# Patient Record
Sex: MALE | Race: WHITE | Employment: OTHER | URBAN - METROPOLITAN AREA
[De-identification: names, ages, dates, MRNs, and addresses within clinical notes are randomized per-mention and may not be internally consistent; named-entity substitution may affect disease eponyms.]

---

## 2019-10-03 ENCOUNTER — HOSPITAL ENCOUNTER (OUTPATIENT)
Dept: ULTRASOUND IMAGING | Age: 64
Discharge: HOME | End: 2019-10-05
Payer: MEDICARE

## 2019-10-03 DIAGNOSIS — N18.9 CHRONIC KIDNEY DISEASE, UNSPECIFIED CKD STAGE: ICD-10-CM

## 2019-10-03 PROCEDURE — 76770 US EXAM ABDO BACK WALL COMP: CPT

## 2019-10-22 ENCOUNTER — HOSPITAL ENCOUNTER (OUTPATIENT)
Age: 64
Discharge: HOME | End: 2019-10-24
Payer: MEDICARE

## 2019-10-22 ENCOUNTER — HOSPITAL ENCOUNTER (OUTPATIENT)
Dept: GENERAL RADIOLOGY | Age: 64
Discharge: HOME | End: 2019-10-24
Payer: MEDICARE

## 2019-10-22 DIAGNOSIS — R52 PAIN: ICD-10-CM

## 2019-10-22 PROCEDURE — 73080 X-RAY EXAM OF ELBOW: CPT

## 2020-03-12 VITALS
HEIGHT: 70 IN | WEIGHT: 244 LBS | BODY MASS INDEX: 34.93 KG/M2 | DIASTOLIC BLOOD PRESSURE: 98 MMHG | HEART RATE: 76 BPM | SYSTOLIC BLOOD PRESSURE: 126 MMHG

## 2020-05-30 LAB
BASOPHILS ABSOLUTE: NORMAL
BASOPHILS RELATIVE PERCENT: NORMAL
EOSINOPHILS ABSOLUTE: NORMAL
EOSINOPHILS RELATIVE PERCENT: NORMAL
HCT VFR BLD CALC: NORMAL %
HEMOGLOBIN: NORMAL
LYMPHOCYTES ABSOLUTE: NORMAL
LYMPHOCYTES RELATIVE PERCENT: NORMAL
MCH RBC QN AUTO: NORMAL PG
MCHC RBC AUTO-ENTMCNC: NORMAL G/DL
MCV RBC AUTO: NORMAL FL
MONOCYTES ABSOLUTE: NORMAL
MONOCYTES RELATIVE PERCENT: NORMAL
NEUTROPHILS ABSOLUTE: NORMAL
NEUTROPHILS RELATIVE PERCENT: NORMAL
PLATELET # BLD: NORMAL 10*3/UL
PMV BLD AUTO: NORMAL FL
RBC # BLD: NORMAL 10*6/UL
WBC # BLD: NORMAL 10*3/UL

## 2020-10-06 LAB
AVERAGE GLUCOSE: NORMAL
CHOLESTEROL, TOTAL: NORMAL
CHOLESTEROL/HDL RATIO: NORMAL
HBA1C MFR BLD: 7.8 %
HDLC SERPL-MCNC: NORMAL MG/DL
LDL CHOLESTEROL CALCULATED: NORMAL
NONHDLC SERPL-MCNC: NORMAL MG/DL
TRIGL SERPL-MCNC: NORMAL MG/DL
VLDLC SERPL CALC-MCNC: NORMAL MG/DL

## 2020-12-14 LAB — DIABETIC RETINOPATHY: POSITIVE

## 2021-01-19 ENCOUNTER — HOSPITAL ENCOUNTER (OUTPATIENT)
Dept: ULTRASOUND IMAGING | Age: 66
Discharge: HOME | End: 2021-01-21
Payer: MEDICARE

## 2021-01-19 DIAGNOSIS — N17.9 ACUTE RENAL FAILURE, UNSPECIFIED ACUTE RENAL FAILURE TYPE (HCC): ICD-10-CM

## 2021-01-19 DIAGNOSIS — I10 HYPERTENSION, UNSPECIFIED TYPE: ICD-10-CM

## 2021-01-19 DIAGNOSIS — I10 ESSENTIAL HYPERTENSION: ICD-10-CM

## 2021-01-19 PROCEDURE — 76770 US EXAM ABDO BACK WALL COMP: CPT

## 2021-01-19 PROCEDURE — 76775 US EXAM ABDO BACK WALL LIM: CPT

## 2021-04-02 ENCOUNTER — HOSPITAL ENCOUNTER (INPATIENT)
Age: 66
LOS: 6 days | Discharge: HOME HEALTH | DRG: 657 | End: 2021-04-08
Attending: EMERGENCY MEDICINE | Admitting: NEUROMUSCULOSKELETAL MEDICINE & OMM
Payer: MEDICARE

## 2021-04-02 ENCOUNTER — APPOINTMENT (OUTPATIENT)
Dept: CT IMAGING | Age: 66
DRG: 657 | End: 2021-04-02
Payer: MEDICARE

## 2021-04-02 DIAGNOSIS — N30.01 ACUTE CYSTITIS WITH HEMATURIA: ICD-10-CM

## 2021-04-02 DIAGNOSIS — R31.0 GROSS HEMATURIA: ICD-10-CM

## 2021-04-02 DIAGNOSIS — R31.9 HEMATURIA, UNSPECIFIED TYPE: ICD-10-CM

## 2021-04-02 DIAGNOSIS — N17.9 ACUTE KIDNEY INJURY (HCC): ICD-10-CM

## 2021-04-02 DIAGNOSIS — R33.8 ACUTE URINARY RETENTION: Primary | ICD-10-CM

## 2021-04-02 LAB
ALBUMIN SERPL-MCNC: 3.7 G/DL (ref 3.5–5.2)
ALBUMIN SERPL-MCNC: 3.9 G/DL (ref 3.5–5.2)
ALP BLD-CCNC: 83 U/L (ref 40–129)
ALP BLD-CCNC: 85 U/L (ref 40–129)
ALT SERPL-CCNC: 35 U/L (ref 0–40)
ALT SERPL-CCNC: 36 U/L (ref 0–40)
ANION GAP SERPL CALCULATED.3IONS-SCNC: 12 MMOL/L (ref 7–16)
ANION GAP SERPL CALCULATED.3IONS-SCNC: 13 MMOL/L (ref 7–16)
APTT: 42.6 SEC (ref 24.5–35.1)
AST SERPL-CCNC: 21 U/L (ref 0–39)
AST SERPL-CCNC: 26 U/L (ref 0–39)
BACTERIA: ABNORMAL /HPF
BASOPHILS ABSOLUTE: 0.05 E9/L (ref 0–0.2)
BASOPHILS RELATIVE PERCENT: 0.5 % (ref 0–2)
BILIRUB SERPL-MCNC: 0.4 MG/DL (ref 0–1.2)
BILIRUB SERPL-MCNC: 0.5 MG/DL (ref 0–1.2)
BILIRUBIN URINE: NEGATIVE
BLOOD, URINE: ABNORMAL
BUN BLDV-MCNC: 28 MG/DL (ref 8–23)
BUN BLDV-MCNC: 29 MG/DL (ref 8–23)
CALCIUM SERPL-MCNC: 8.8 MG/DL (ref 8.6–10.2)
CALCIUM SERPL-MCNC: 9 MG/DL (ref 8.6–10.2)
CHLORIDE BLD-SCNC: 103 MMOL/L (ref 98–107)
CHLORIDE BLD-SCNC: 106 MMOL/L (ref 98–107)
CLARITY: ABNORMAL
CO2: 23 MMOL/L (ref 22–29)
CO2: 25 MMOL/L (ref 22–29)
COLOR: ABNORMAL
CREAT SERPL-MCNC: 1.6 MG/DL (ref 0.7–1.2)
CREAT SERPL-MCNC: 1.6 MG/DL (ref 0.7–1.2)
EOSINOPHILS ABSOLUTE: 0.37 E9/L (ref 0.05–0.5)
EOSINOPHILS RELATIVE PERCENT: 3.5 % (ref 0–6)
GFR AFRICAN AMERICAN: 52
GFR AFRICAN AMERICAN: 52
GFR NON-AFRICAN AMERICAN: 43 ML/MIN/1.73
GFR NON-AFRICAN AMERICAN: 43 ML/MIN/1.73
GLUCOSE BLD-MCNC: 222 MG/DL (ref 74–99)
GLUCOSE BLD-MCNC: 292 MG/DL (ref 74–99)
GLUCOSE URINE: >=1000 MG/DL
HBA1C MFR BLD: 10.8 % (ref 4–5.6)
HCT VFR BLD CALC: 49.4 % (ref 37–54)
HEMOGLOBIN: 16.3 G/DL (ref 12.5–16.5)
IMMATURE GRANULOCYTES #: 0.05 E9/L
IMMATURE GRANULOCYTES %: 0.5 % (ref 0–5)
INR BLD: 2.5
KETONES, URINE: ABNORMAL MG/DL
LEUKOCYTE ESTERASE, URINE: ABNORMAL
LYMPHOCYTES ABSOLUTE: 2.81 E9/L (ref 1.5–4)
LYMPHOCYTES RELATIVE PERCENT: 26.7 % (ref 20–42)
MCH RBC QN AUTO: 28.6 PG (ref 26–35)
MCHC RBC AUTO-ENTMCNC: 33 % (ref 32–34.5)
MCV RBC AUTO: 86.8 FL (ref 80–99.9)
MONOCYTES ABSOLUTE: 0.83 E9/L (ref 0.1–0.95)
MONOCYTES RELATIVE PERCENT: 7.9 % (ref 2–12)
NEUTROPHILS ABSOLUTE: 6.42 E9/L (ref 1.8–7.3)
NEUTROPHILS RELATIVE PERCENT: 60.9 % (ref 43–80)
NITRITE, URINE: POSITIVE
PDW BLD-RTO: 13.2 FL (ref 11.5–15)
PH UA: 7 (ref 5–9)
PLATELET # BLD: 238 E9/L (ref 130–450)
PMV BLD AUTO: 10.8 FL (ref 7–12)
POTASSIUM REFLEX MAGNESIUM: 4.2 MMOL/L (ref 3.5–5)
POTASSIUM SERPL-SCNC: 4.2 MMOL/L (ref 3.5–5)
PROTEIN UA: >=300 MG/DL
PROTHROMBIN TIME: 27.3 SEC (ref 9.3–12.4)
RBC # BLD: 5.69 E12/L (ref 3.8–5.8)
RBC UA: ABNORMAL /HPF (ref 0–2)
SODIUM BLD-SCNC: 140 MMOL/L (ref 132–146)
SODIUM BLD-SCNC: 142 MMOL/L (ref 132–146)
SPECIFIC GRAVITY UA: 1.01 (ref 1–1.03)
TOTAL PROTEIN: 6.8 G/DL (ref 6.4–8.3)
TOTAL PROTEIN: 7 G/DL (ref 6.4–8.3)
UROBILINOGEN, URINE: 1 E.U./DL
WBC # BLD: 10.5 E9/L (ref 4.5–11.5)
WBC UA: ABNORMAL /HPF (ref 0–5)

## 2021-04-02 PROCEDURE — 87088 URINE BACTERIA CULTURE: CPT

## 2021-04-02 PROCEDURE — 74176 CT ABD & PELVIS W/O CONTRAST: CPT

## 2021-04-02 PROCEDURE — 6370000000 HC RX 637 (ALT 250 FOR IP): Performed by: NEUROMUSCULOSKELETAL MEDICINE & OMM

## 2021-04-02 PROCEDURE — 81001 URINALYSIS AUTO W/SCOPE: CPT

## 2021-04-02 PROCEDURE — 85610 PROTHROMBIN TIME: CPT

## 2021-04-02 PROCEDURE — 85025 COMPLETE CBC W/AUTO DIFF WBC: CPT

## 2021-04-02 PROCEDURE — 85730 THROMBOPLASTIN TIME PARTIAL: CPT

## 2021-04-02 PROCEDURE — 51700 IRRIGATION OF BLADDER: CPT

## 2021-04-02 PROCEDURE — 2580000003 HC RX 258: Performed by: NEUROMUSCULOSKELETAL MEDICINE & OMM

## 2021-04-02 PROCEDURE — 36415 COLL VENOUS BLD VENIPUNCTURE: CPT

## 2021-04-02 PROCEDURE — 99282 EMERGENCY DEPT VISIT SF MDM: CPT

## 2021-04-02 PROCEDURE — 2580000003 HC RX 258: Performed by: EMERGENCY MEDICINE

## 2021-04-02 PROCEDURE — 80053 COMPREHEN METABOLIC PANEL: CPT

## 2021-04-02 PROCEDURE — 83036 HEMOGLOBIN GLYCOSYLATED A1C: CPT

## 2021-04-02 PROCEDURE — 1200000000 HC SEMI PRIVATE

## 2021-04-02 PROCEDURE — 6360000002 HC RX W HCPCS: Performed by: EMERGENCY MEDICINE

## 2021-04-02 PROCEDURE — 94660 CPAP INITIATION&MGMT: CPT

## 2021-04-02 RX ORDER — TAMSULOSIN HYDROCHLORIDE 0.4 MG/1
0.4 CAPSULE ORAL 2 TIMES DAILY
COMMUNITY
End: 2021-04-20

## 2021-04-02 RX ORDER — LISINOPRIL 20 MG/1
20 TABLET ORAL DAILY
COMMUNITY
End: 2022-04-25 | Stop reason: SDUPTHER

## 2021-04-02 RX ORDER — ATORVASTATIN CALCIUM 20 MG/1
20 TABLET, FILM COATED ORAL EVERY EVENING
Status: DISCONTINUED | OUTPATIENT
Start: 2021-04-02 | End: 2021-04-08 | Stop reason: HOSPADM

## 2021-04-02 RX ORDER — TAMSULOSIN HYDROCHLORIDE 0.4 MG/1
0.4 CAPSULE ORAL 2 TIMES DAILY
Status: DISCONTINUED | OUTPATIENT
Start: 2021-04-02 | End: 2021-04-08 | Stop reason: HOSPADM

## 2021-04-02 RX ORDER — ALLOPURINOL 100 MG/1
100 TABLET ORAL DAILY
Status: DISCONTINUED | OUTPATIENT
Start: 2021-04-02 | End: 2021-04-08 | Stop reason: HOSPADM

## 2021-04-02 RX ORDER — M-VIT,TX,IRON,MINS/CALC/FOLIC 27MG-0.4MG
1 TABLET ORAL DAILY
COMMUNITY

## 2021-04-02 RX ORDER — CARVEDILOL 25 MG/1
25 TABLET ORAL 2 TIMES DAILY
Status: ON HOLD | COMMUNITY
End: 2021-05-14

## 2021-04-02 RX ORDER — ERGOCALCIFEROL 1.25 MG/1
50000 CAPSULE ORAL WEEKLY
Status: DISCONTINUED | OUTPATIENT
Start: 2021-04-11 | End: 2021-04-08 | Stop reason: HOSPADM

## 2021-04-02 RX ORDER — EMPAGLIFLOZIN 25 MG/1
25 TABLET, FILM COATED ORAL DAILY
COMMUNITY
End: 2021-11-24 | Stop reason: SDUPTHER

## 2021-04-02 RX ORDER — CARVEDILOL 25 MG/1
25 TABLET ORAL 2 TIMES DAILY
Status: DISCONTINUED | OUTPATIENT
Start: 2021-04-02 | End: 2021-04-08 | Stop reason: HOSPADM

## 2021-04-02 RX ORDER — ATORVASTATIN CALCIUM 40 MG/1
20 TABLET, FILM COATED ORAL EVERY EVENING
COMMUNITY
End: 2021-12-23 | Stop reason: SDUPTHER

## 2021-04-02 RX ORDER — SODIUM CHLORIDE 9 MG/ML
INJECTION, SOLUTION INTRAVENOUS CONTINUOUS
Status: DISCONTINUED | OUTPATIENT
Start: 2021-04-02 | End: 2021-04-07 | Stop reason: SDUPTHER

## 2021-04-02 RX ORDER — M-VIT,TX,IRON,MINS/CALC/FOLIC 27MG-0.4MG
1 TABLET ORAL DAILY
Status: DISCONTINUED | OUTPATIENT
Start: 2021-04-03 | End: 2021-04-08 | Stop reason: HOSPADM

## 2021-04-02 RX ADMIN — ATORVASTATIN CALCIUM 20 MG: 20 TABLET, FILM COATED ORAL at 20:25

## 2021-04-02 RX ADMIN — SODIUM CHLORIDE: 9 INJECTION, SOLUTION INTRAVENOUS at 13:14

## 2021-04-02 RX ADMIN — CEFTRIAXONE SODIUM 1000 MG: 1 INJECTION, POWDER, FOR SOLUTION INTRAMUSCULAR; INTRAVENOUS at 13:14

## 2021-04-02 RX ADMIN — TAMSULOSIN HYDROCHLORIDE 0.4 MG: 0.4 CAPSULE ORAL at 20:24

## 2021-04-02 RX ADMIN — CARVEDILOL 25 MG: 25 TABLET, FILM COATED ORAL at 20:25

## 2021-04-02 RX ADMIN — ALLOPURINOL 100 MG: 100 TABLET ORAL at 13:14

## 2021-04-02 NOTE — ED PROVIDER NOTES
Department of Emergency Medicine   ED  Provider Note  Admit Date/RoomTime: 4/2/2021  9:00 AM  ED Room: 06/06          History of Present Illness:  4/2/21, Time: 9:23 AM EDT  Chief Complaint   Patient presents with    Hematuria     since tuesday,  saw Dr Nadeem Ashford on wednesday, saw clots when urinating yesterday, denies dysuria                 Will Adama is a 77 y.o. male presenting to the ED for hematuria, beginning a few days ago. The complaint has been persistent, moderate in severity, and worsened by nothing. Patient reports hematuria with urinary retention for few days. He reports he saw urology 2 days ago and was given Flomax. He reports his symptoms have worsened which is what prompted his visit. He reports he is having trouble pain and still having gross hematuria. He is anticoagulated with Coumadin secondary to atrial fibrillation. No fevers or chills. No chest pain or shortness of breath. No abdominal pain. He says his bladder feels full at times. He denies any other complaints. Review of Systems:   A complete review of systems was performed and pertinent positives and negatives are stated within HPI, all other systems reviewed and are negative.        --------------------------------------------- PAST HISTORY ---------------------------------------------  Past Medical History:  has a past medical history of Arrhythmia, Atrial fibrillation ([address] Herniated disc, Hyperlipidemia, Hypertension, LONG TERM ANTICOAGULENT USE, Obesity, CONCHITA (obstructive sleep apnea), Pre-diabetes, Tobacco abuse, and Vitamin D deficiency. Past Surgical History:  has a past surgical history that includes back surgery (2007); Diagnostic Cardiac Cath Lab Procedure (12/13/2006); and cardiovascular stress test (11/21/2006). Social History:  reports that he quit smoking about 22 years ago. He has a 30.00 pack-year smoking history.  He has never used smokeless tobacco. He reports that he does not drink alcohol or use drugs. Family History: family history includes Arthritis in his mother; Cancer in his father; Hypertension in his mother; Prostate Cancer (age of onset: 62) in his father. . Unless otherwise noted, family history is non contributory    The patients home medications have been reviewed. Allergies: Patient has no known allergies. I have reviewed the past medical history, past surgical history, social history, and family history    ---------------------------------------------------PHYSICAL EXAM--------------------------------------    Constitutional/General: Alert and oriented x3  Head: Normocephalic and atraumatic  Eyes: PERRL, EOMI, sclera non icteric  ENT: Oropharynx clear, handling secretions  Neck: Supple, full ROM, no stridor, no meningeal signs  Respiratory: Lungs clear to auscultation bilaterally, no wheezes, rales, or rhonchi. Not in respiratory distress  Cardiovascular:  Regular rate. Regular rhythm. No murmurs, no aortic murmurs, no gallops, no rubs. 2+ distal pulses. Equal extremity pulses. Gastrointestinal:  Abdomen Soft, Non tender, Non distended. No rebound, guarding, or rigidity. No pulsatile masses. Musculoskeletal: Moves all extremities x 4. Warm and well perfused, no clubbing, no cyanosis, no edema. Capillary refill <3 seconds  Skin: skin warm and dry. No rashes. Neurologic: GCS 15, no focal deficits  Psychiatric: Normal Affect          -------------------------------------------------- RESULTS -------------------------------------------------  I have personally reviewed all laboratory and imaging results for this patient. Results are listed below.      LABS: (Lab results interpreted by me)  Results for orders placed or performed during the hospital encounter of 04/02/21   CBC Auto Differential   Result Value Ref Range    WBC 10.5 4.5 - 11.5 E9/L    RBC 5.69 3.80 - 5.80 E12/L    Hemoglobin 16.3 12.5 - 16.5 g/dL    Hematocrit 49.4 37.0 - 54.0 %    MCV 86.8 80.0 - 99.9 fL    MCH 28.6 26.0 - 35.0 pg    MCHC 33.0 32.0 - 34.5 %    RDW 13.2 11.5 - 15.0 fL    Platelets 170 828 - 029 E9/L    MPV 10.8 7.0 - 12.0 fL    Neutrophils % 60.9 43.0 - 80.0 %    Immature Granulocytes % 0.5 0.0 - 5.0 %    Lymphocytes % 26.7 20.0 - 42.0 %    Monocytes % 7.9 2.0 - 12.0 %    Eosinophils % 3.5 0.0 - 6.0 %    Basophils % 0.5 0.0 - 2.0 %    Neutrophils Absolute 6.42 1.80 - 7.30 E9/L    Immature Granulocytes # 0.05 E9/L    Lymphocytes Absolute 2.81 1.50 - 4.00 E9/L    Monocytes Absolute 0.83 0.10 - 0.95 E9/L    Eosinophils Absolute 0.37 0.05 - 0.50 E9/L    Basophils Absolute 0.05 0.00 - 0.20 E9/L   Comprehensive Metabolic Panel w/ Reflex to MG   Result Value Ref Range    Sodium 142 132 - 146 mmol/L    Potassium reflex Magnesium 4.2 3.5 - 5.0 mmol/L    Chloride 106 98 - 107 mmol/L    CO2 23 22 - 29 mmol/L    Anion Gap 13 7 - 16 mmol/L    Glucose 222 (H) 74 - 99 mg/dL    BUN 29 (H) 8 - 23 mg/dL    CREATININE 1.6 (H) 0.7 - 1.2 mg/dL    GFR Non-African American 43 >=60 mL/min/1.73    GFR African American 52     Calcium 9.0 8.6 - 10.2 mg/dL    Total Protein 7.0 6.4 - 8.3 g/dL    Albumin 3.9 3.5 - 5.2 g/dL    Total Bilirubin 0.5 0.0 - 1.2 mg/dL    Alkaline Phosphatase 83 40 - 129 U/L    ALT 35 0 - 40 U/L    AST 21 0 - 39 U/L   APTT   Result Value Ref Range    aPTT 42.6 (H) 24.5 - 35.1 sec   Protime-INR   Result Value Ref Range    Protime 27.3 (H) 9.3 - 12.4 sec    INR 2.5    Urinalysis   Result Value Ref Range    Color, UA RED (A) Straw/Yellow    Clarity, UA TURBID (A) Clear    Glucose, Ur >=1000 (A) Negative mg/dL    Bilirubin Urine Negative Negative    Ketones, Urine TRACE (A) Negative mg/dL    Specific Gravity, UA 1.015 1.005 - 1.030    Blood, Urine LARGE (A) Negative    pH, UA 7.0 5.0 - 9.0    Protein, UA >=300 (A) Negative mg/dL    Urobilinogen, Urine 1.0 <2.0 E.U./dL    Nitrite, Urine POSITIVE (A) Negative    Leukocyte Esterase, Urine SMALL (A) Negative   Microscopic Urinalysis   Result Value Ref Range    WBC, UA 1-3 0 - 5 /HPF    RBC, UA PACKED 0 - 2 /HPF    Bacteria, UA RARE (A) None Seen /HPF   ,       RADIOLOGY:  Interpreted by Radiologist unless otherwise specified  CT ABDOMEN PELVIS WO CONTRAST Additional Contrast? None    (Results Pending)           ------------------------- NURSING NOTES AND VITALS REVIEWED ---------------------------   The nursing notes within the ED encounter and vital signs as below have been reviewed by myself  BP (!) 147/95   Pulse 102   Temp 97.1 °F (36.2 °C)   Ht 5' 7\" (1.702 m)   Wt 250 lb (113.4 kg)   SpO2 98%   BMI 39.16 kg/m²     Oxygen Saturation Interpretation: Normal    The patients available past medical records and past encounters were reviewed. ------------------------------ ED COURSE/MEDICAL DECISION MAKING----------------------  Medications   cefTRIAXone (ROCEPHIN) 1,000 mg in sterile water 10 mL IV syringe (has no administration in time range)              I, Dr. Kalani Corona, am the primary provider of record    Medical Decision Making:   Gross hematuria with bladder obstruction secondary to clots. Three-way Frazier with piston irrigation followed by continuous bladder irrigation started. Also has acute kidney injury. Antibiotics given for UTI. Urology consulted and evaluated and they requested medicine to admit. Medicine consulted for admission    Oxygen Saturation Interpretation: 98 % on RA. Normal      Re-Evaluations:       improving      This patient's ED course included: a personal history and physicial examination, re-evaluation prior to disposition, IV medications and complex medical decision making and emergency management    This patient has remained hemodynamically stable during their ED course. Consultations:  Urology  Dr. Joseph Rodriguez      Counseling: The emergency provider has spoken with the patient and discussed todays results, in addition to providing specific details for the plan of care and counseling regarding the diagnosis and prognosis.   Questions are answered at this time and they are agreeable with the plan.       --------------------------------- IMPRESSION AND DISPOSITION ---------------------------------    IMPRESSION  1. Acute urinary retention    2. Hematuria, unspecified type    3. Acute cystitis with hematuria    4. [facility] injury ([facility]        DISPOSITION  Disposition: Admit to med/surg floor  Patient condition is stable        NOTE: This report was transcribed using voice recognition software.  Every effort was made to ensure accuracy; however, inadvertent computerized transcription errors may be present       Cali Batista MD  04/02/21 0755

## 2021-04-02 NOTE — CARE COORDINATION
4/2/21 Transition of Care: patient to ER due to having difficulty urinating with blood. Patient is on coumadin due to having afib. He has a three way cummings placed which obtained 800cc dark cherry. Gu irrigant started. Urine specimen positive for nitrates. CT abdomen pending. He has iv 75hr. Started on iv rocephin qd. He resides alone but has help from family. He sees Dr Joseph Rodriguez and his pharmacy is [facility] on [address]. Plan is for patient to return home at discharge.  Mitch Lopez Rn Cm

## 2021-04-02 NOTE — PROGRESS NOTES
Message sent to Dr. To Client and asked for admission orders for patient.   Electronically signed by Gilda Renteria RN on 4/2/2021 at 12:09 PM

## 2021-04-02 NOTE — PROGRESS NOTES
Message sent to Dr. Contreras Rosado notifying that a code status needs ordered for patient.    Electronically signed by Lana Dhillon RN on 4/2/2021 at 4:40 PM

## 2021-04-02 NOTE — H&P
[address]                  [address] [facility],  [facility]                              HISTORY AND PHYSICAL    PATIENT NAME: Saad Brian                      :        1955  MED REC NO:   [id number]                            ROOM:       5208  ACCOUNT NO:   [de-identified]                           ADMIT DATE: 2021  PROVIDER:     Zi Cardenas DO    CHIEF COMPLAINT:  Hematuria. HISTORY OF CHIEF COMPLAINT:  A 68-year-old male presents to the  emergency room with blood in his urine, beginning two to three days  prior. The patient had seen Dr. Larry Malloy in his office on 2021,  he was planning an outpatient procedure, possibly a cystoscopy for his  hematuria. The patient states that the blood in the urine became worse,  he even saw some clots. He was given Flomax _____ per Dr. Gonzales Mail at his  office visit. Symptoms had worsened. He had been having some pain with  the urination. He is on anticoagulation of Coumadin for his chronic  atrial fib. His INR in the ER was 2.5. He denies any abdominal pain,  fever, sweats, chills, chest pain or shortness of breath. The patient  states that his bladder does feel full at times. He denies any other  complaints currently. Workup in the ER showed that he was afebrile. His white count was 10.5. As mentioned, his INR was 2.5 with a PTT of 42.6. BUN and creatinine  were 29 and 1.6 respectively with a sugar of 222. Electrolytes normal.   Liver functions normal.  Urinalysis was read; turbid, greater than 1000  glucose, large blood, positive nitrites, small leukocytes, greater than  300 protein, RBCs were packed, WBCs one to three, bacteria rare. CT  scan of the abdomen and pelvis was obtained in the ER, the result is  pending at the time of this dictation.   The patient did receive IV  Rocephin 1 gm while in the emergency room and will be admitted under my  service with Urology consulted for the gross hematuria. The patient  offers no other complaints currently. PAST MEDICAL HISTORY:  1. Chronic AFib. 2.  Hypertension. 3.  Hyperlipidemia. 4.  Obstructive sleep apnea. 5.  Tobacco abuse. 6.  Vitamin D deficiency. 7.  Borderline diabetes. Last hemoglobin A1c was 6.  8.  Lower back pain with lumbar disk disorder. 9.  Obesity. 10.  Long-term oral anticoagulation. PAST SURGICAL HISTORY:  1. He had a back surgery in 2007, repair of herniated disk. 2.  Heart catheterization done in 2006. Of note is there is no  obstructive coronary artery disease, mild dilated cardiomyopathy with an  EF of 45% to 50%. The patient had a stress test in 2006. Findings  were of an inferior and anterior ischemia. ALLERGIES:  He has no known drug allergies. MEDICATIONS:  Prior to admission he was on Prinivil 20 mg one daily,  Coreg 25 mg one by mouth twice a day, Lipitor 40 mg daily, Jardiance 25  mg one daily, multivitamin one daily, Flomax 0.4 mg one by mouth twice a  day, Lasix 20 mg daily, allopurinol 100 mg daily, Coumadin 3 mg daily,  aspirin 81 mg daily. SOCIAL HISTORY:  Former smoker, he quit back in [address] [place] [address]. Nondrinker. No  history of illicit drug use. Unknown if the patient vapes. Occupation,  I believe he is retired. Marital status, he is . FAMILY HISTORY:  Mother is alive with a history of hypertension and  arthritis. Father is , had a history of prostate cancer at age  62. He has one sister alive with no known medical problems. REVIEW OF SYSTEMS:  As mentioned in chief complaint, otherwise  unremarkable. PHYSICAL EXAMINATION:  VITAL SIGNS:  On admission, temperature 97.1, pulse 102, respirations  18, blood pressure 147/95, pulse ox 95% on room air. Height 5 feet 7  inches, weight 250 pounds, BMI is 39.16. GENERAL:  Alert and oriented x3, appears to be in no acute distress. HEENT:  Unremarkable. SKIN:  Adequate turgor. No tenting. No rash or wounds. Dry and  intact. HEART:  Regular rate and rhythm without murmurs, rubs or gallops. LUNGS:  Decreased breath sounds in the bilateral bases without rales or  rhonchi. ABDOMEN:  Obese, soft, nontender, nondistended. Good bowel sounds  throughout. No masses, no organomegaly, no bruit. EXTREMITIES:  No clubbing, cyanosis, edema noted. NEUROLOGIC:  Intact. No focal deficits. Cranial nerves II through XII  grossly intact. MUSCULOSKELETAL:  Appropriate for above-stated age. LABORATORY DATA:  Metabolic panel showed a BUN and creatinine of 29 and  1.6. Sugar 222. Liver functions normal.  CBC:  White count 10.5,  platelets 565, H and H of 16.3 and 49.4. INR is 2.5, PTT 42.6. Urinalysis shows trace ketones, large amount of blood, greater than 300  protein, positive nitrites, small leukocytes, red in color, WBCs 1 to 3,  RBCs were packed, bacteria was rare, leukocyte esterase small. The  patient did receive 1 gm of IV Rocephin in the ER. IMPRESSION:  1. Gross hematuria. 2.  Urinary retention. 3.  Acute kidney injury. 4.  Chronic atrial fib. 5.  Chronic oral anticoagulation, on Coumadin. 6.  Hypertension. 7.  Hyperlipidemia. 8.  Obesity. 9.  History of BPH.  10.  Prediabetes with last hemoglobin A1c noted to be 6. PLAN:  The patient will be admitted under my service to general medical  floor with Dr. Teofilo Dong, Urology consulted. We will do bladder irrigation  until clear. Hold Coumadin due to blood loss. Await further  recommendations per consultants and treat accordingly. DISPOSITION:  To home when medically stable.         Shalonda Serrano DO    D: 04/02/2021 12:18:57       T: 04/02/2021 12:29:29     [email or web address]  Job#: [id number]     Doc#: [id number]    CC:

## 2021-04-02 NOTE — CONSULTS
4/2/2021 9:51 AM  Service: Urology  Group: [facility] ([facility])    Sveta Lowry  [id number]     Chief Complaint:    Gross hematuria    History of Present Illness: The patient is a 77 y.o. male patient who presented to the hospital today with complaints of gross hematuria that began approximately 2 days ago. He is on Coumadin for history of atrial fibrillation. While in the emergency department he had a three-way Frazier catheter inserted for approximately 800 cc of immediate dark cherry urine output. He is known to Dr. Jonathan Malloy. He has a history of BPH with LUTS, incomplete bladder emptying, renal cysts, bladder diverticulum, and right renal calculi. He was seen in our office 2 days ago with complaints of gross hematuria. Urine culture was sent at that time but so far is showing no growth. He was scheduled for an office cystoscopy on 4/15. He currently denies any pain or discomfort. No fever or chills. No dysuria. He has been taking Flomax twice daily for his BPH. Does feel that he has been urinating well with this, but we have talked to him in he past about possible TURP in the future.          Past Medical History:   Diagnosis Date    Arrhythmia 1/2013    afib on coumadin    Atrial fibrillation ([address] 10/2012    now on Coumadin    Herniated disc 2006    s/p back surgery    Hyperlipidemia     Hypertension     LONG TERM ANTICOAGULENT USE     Obesity     CONCHITA (obstructive sleep apnea)     CPAP    Pre-diabetes 10/2012    Hgb A1c  6.0    Tobacco abuse     Vitamin D deficiency     supplimented         Past Surgical History:   Procedure Laterality Date    BACK SURGERY  2007    repair of herniated disc    CARDIOVASCULAR STRESS TEST  11/21/2006    findings of inferior and anterior ischemia     DIAGNOSTIC CARDIAC CATH LAB PROCEDURE  12/13/2006    No obstructive CAD and mild dilated cardiomyopathy with EF 45-50%       Medications Prior to Admission:    Not in a hospital admission. Allergies:    Patient has no known allergies. Social History:    reports that he quit smoking about 22 years ago. He has a 30.00 pack-year smoking history. He has never used smokeless tobacco. He reports that he does not drink alcohol or use drugs. Family History:   Non-contributory to this Urological problem  family history includes Arthritis in his mother; Cancer in his father; Hypertension in his mother; Prostate Cancer (age of onset: 62) in his father. Review of Systems:  Constitutional: No fever or chills   Respiratory: negative for cough and hemoptysis  Cardiovascular: negative for chest pain and dyspnea  Gastrointestinal: negative for abdominal pain, diarrhea, nausea and vomiting   Derm: negative for rash and skin lesion(s)  Neurological: negative for seizures and tremors  Musculoskeletal: Negative    Psychiatric: Negative   : As above in the HPI, otherwise negative  All other reviews are negative    Physical Exam:     Vitals:  BP (!) 147/95   Pulse 102   Temp 97.1 °F (36.2 °C)   Ht 5' 7\" (1.702 m)   Wt 250 lb (113.4 kg)   SpO2 98%   BMI 39.16 kg/m²     General:  Awake, alert, oriented X 3. No apparent distress. HEENT:  Normocephalic, atraumatic. Lungs:  Respirations symmetric and non-labored. Abdomen:  soft, nontender, no masses  Extremities:  No clubbing, cyanosis, or edema  Skin:  Warm and dry, no open lesions or rashes  Neuro: There are no motor or sensory deficits in the 4 quadrant extremities   Rectal: deferred  Genitourinary: Three-way Cummings catheter draining dark cherry colored urine    Labs:     No results for input(s): WBC, RBC, HGB, HCT, MCV, MCH, MCHC, RDW, PLT, MPV in the last 72 hours. No results for input(s): CREATININE in the last 72 hours. Procedures:  Irrigated Cummings catheter for large amount of clot retrieval.  Medline catheter very difficult to irrigate. At this time elected to remove the cummings catheter.   His genitalia are prepped and draped in sterile fashion. Xylocaine jelly was injected into his urethra using a Urojet. Following this a 25 French Simplastic Frazier catheter was inserted into his bladder. He was then hand irrigated with approximately 1000 cc of normal saline for moderate amount of clot retrieval.  His urine returned to a very light pink in color. CBI was started and urine was clear. He tolerated well. Assessment/plan:  Gross hematuria  Urinary Retention (800cc)  BPH with LUTS  Renal cysts  Bladder Diverticulum   Azotemia       Continue to watch the creatinine  Urine cx from 3/31 no growth to date  Repeat urine cx ordered and pending  Antibiotics per primary, given Rocephin in the ED  CT abdomen pelvis without contrast ordered and pending  Hold Coumadin if okay with primary  Continue the Frazier catheter  Manually irrigate every 2 hours and as needed gross hematuria  Continue the CBI  May benefit from TURP in the future  We will continue to follow              Electronically signed by LINDA Ugalde CNP on 4/2/2021 at 9:51 AM       The patient was seen and examined. I have reviewed the medical record in detail. I agree with the plan as outlined by SHIRA Ugalde.     Lisbeth Hager MD  4:10 PM  4/3/2021

## 2021-04-03 LAB
BASOPHILS ABSOLUTE: 0.07 E9/L (ref 0–0.2)
BASOPHILS RELATIVE PERCENT: 0.6 % (ref 0–2)
EOSINOPHILS ABSOLUTE: 0.3 E9/L (ref 0.05–0.5)
EOSINOPHILS RELATIVE PERCENT: 2.7 % (ref 0–6)
HCT VFR BLD CALC: 48.7 % (ref 37–54)
HEMOGLOBIN: 15.8 G/DL (ref 12.5–16.5)
IMMATURE GRANULOCYTES #: 0.04 E9/L
IMMATURE GRANULOCYTES %: 0.4 % (ref 0–5)
INR BLD: 2.2
LYMPHOCYTES ABSOLUTE: 2.45 E9/L (ref 1.5–4)
LYMPHOCYTES RELATIVE PERCENT: 22.4 % (ref 20–42)
MCH RBC QN AUTO: 28.9 PG (ref 26–35)
MCHC RBC AUTO-ENTMCNC: 32.4 % (ref 32–34.5)
MCV RBC AUTO: 89.2 FL (ref 80–99.9)
MONOCYTES ABSOLUTE: 0.87 E9/L (ref 0.1–0.95)
MONOCYTES RELATIVE PERCENT: 7.9 % (ref 2–12)
NEUTROPHILS ABSOLUTE: 7.23 E9/L (ref 1.8–7.3)
NEUTROPHILS RELATIVE PERCENT: 66 % (ref 43–80)
PDW BLD-RTO: 13.4 FL (ref 11.5–15)
PLATELET # BLD: 218 E9/L (ref 130–450)
PMV BLD AUTO: 11 FL (ref 7–12)
PROTHROMBIN TIME: 24.2 SEC (ref 9.3–12.4)
RBC # BLD: 5.46 E12/L (ref 3.8–5.8)
WBC # BLD: 11 E9/L (ref 4.5–11.5)

## 2021-04-03 PROCEDURE — 85610 PROTHROMBIN TIME: CPT

## 2021-04-03 PROCEDURE — 2580000003 HC RX 258: Performed by: NEUROMUSCULOSKELETAL MEDICINE & OMM

## 2021-04-03 PROCEDURE — 1200000000 HC SEMI PRIVATE

## 2021-04-03 PROCEDURE — 6370000000 HC RX 637 (ALT 250 FOR IP): Performed by: UROLOGY

## 2021-04-03 PROCEDURE — 6370000000 HC RX 637 (ALT 250 FOR IP): Performed by: NEUROMUSCULOSKELETAL MEDICINE & OMM

## 2021-04-03 PROCEDURE — 85025 COMPLETE CBC W/AUTO DIFF WBC: CPT

## 2021-04-03 PROCEDURE — 36415 COLL VENOUS BLD VENIPUNCTURE: CPT

## 2021-04-03 PROCEDURE — 94660 CPAP INITIATION&MGMT: CPT

## 2021-04-03 PROCEDURE — 6360000002 HC RX W HCPCS: Performed by: NEUROMUSCULOSKELETAL MEDICINE & OMM

## 2021-04-03 RX ORDER — FINASTERIDE 5 MG/1
5 TABLET, FILM COATED ORAL DAILY
Status: DISCONTINUED | OUTPATIENT
Start: 2021-04-03 | End: 2021-04-08 | Stop reason: HOSPADM

## 2021-04-03 RX ADMIN — ATORVASTATIN CALCIUM 20 MG: 20 TABLET, FILM COATED ORAL at 17:45

## 2021-04-03 RX ADMIN — ALLOPURINOL 100 MG: 100 TABLET ORAL at 08:20

## 2021-04-03 RX ADMIN — CARVEDILOL 25 MG: 25 TABLET, FILM COATED ORAL at 08:20

## 2021-04-03 RX ADMIN — FINASTERIDE 5 MG: 5 TABLET, FILM COATED ORAL at 17:45

## 2021-04-03 RX ADMIN — CEFTRIAXONE SODIUM 1000 MG: 1 INJECTION, POWDER, FOR SOLUTION INTRAMUSCULAR; INTRAVENOUS at 08:20

## 2021-04-03 RX ADMIN — Medication 1 TABLET: at 08:20

## 2021-04-03 RX ADMIN — TAMSULOSIN HYDROCHLORIDE 0.4 MG: 0.4 CAPSULE ORAL at 08:20

## 2021-04-03 RX ADMIN — SODIUM CHLORIDE: 9 INJECTION, SOLUTION INTRAVENOUS at 14:36

## 2021-04-03 RX ADMIN — TAMSULOSIN HYDROCHLORIDE 0.4 MG: 0.4 CAPSULE ORAL at 21:30

## 2021-04-03 RX ADMIN — CARVEDILOL 25 MG: 25 TABLET, FILM COATED ORAL at 21:30

## 2021-04-03 ASSESSMENT — PAIN SCALES - GENERAL: PAINLEVEL_OUTOF10: 0

## 2021-04-03 NOTE — PROGRESS NOTES
Date: 4/2/2021    Time: 10:40 PM    Patient Placed On BIPAP/CPAP/ Non-Invasive Ventilation? No    If no must comment. Comments:    Pt stated he usually goes on CPAP around [id number]. Will notify RN when he is ready.  Unit in room and ready to be placed on Patient when needed     [facility]

## 2021-04-03 NOTE — PLAN OF CARE
Problem: Falls - Risk of:  Goal: Will remain free from falls  Description: Will remain free from falls  4/3/2021 0127 by Shan Gonzalez RN  Outcome: Met This Shift  4/2/2021 1356 by Nevin Renteria RN  Outcome: Met This Shift  Goal: Absence of physical injury  Description: Absence of physical injury  4/3/2021 0127 by Shan Gonzalez RN  Outcome: Met This Shift  4/2/2021 1356 by Nevin Renteria RN  Outcome: Met This Shift

## 2021-04-03 NOTE — PROGRESS NOTES
[facility]  PROGRESS NOTE    Chief Complaint:   Gross hematuria/BPH/Urinary retention/Renal cysts/Bladder diverticulum/Right renal calculi    HPI:   He feels much better. No pain or catheter discomfort    Vitals:    21 0800   BP: 117/85   Pulse: 99   Resp: 18   Temp: 97.6 °F (36.4 °C)   SpO2: 95%       Allergies: Patient has no known allergies.     PAST MEDICAL HISTORY:   Past Medical History:   Diagnosis Date    Arrhythmia 2013    afib on coumadin    Atrial fibrillation ([address] 10/2012    now on Coumadin    Herniated disc 2006    s/p back surgery    Hyperlipidemia     Hypertension     LONG TERM ANTICOAGULENT USE     Obesity     CONCHITA (obstructive sleep apnea)     CPAP    Pre-diabetes 10/2012    Hgb A1c  6.0    Tobacco abuse     Vitamin D deficiency     supplimented       PAST SURGICAL HISTORY:   Past Surgical History:   Procedure Laterality Date    BACK SURGERY  2007    repair of herniated disc    CARDIOVASCULAR STRESS TEST  2006    findings of inferior and anterior ischemia     DIAGNOSTIC CARDIAC CATH LAB PROCEDURE  2006    No obstructive CAD and mild dilated cardiomyopathy with EF 45-50%        PAST FAMILY HISTORY:    Family History   Problem Relation Age of Onset    Prostate Cancer Father 62    Cancer Father     Hypertension Mother         and sister    Sergey Galla Arthritis Mother        PAST SOCIAL HISTORY:    Social History     Socioeconomic History    Marital status:      Spouse name: None    Number of children: None    Years of education: None    Highest education level: None   Occupational History    None   Social Needs    Financial resource strain: None    Food insecurity     Worry: None     Inability: None    Transportation needs     Medical: None     Non-medical: None   Tobacco Use    Smoking status: Former Smoker     Packs/day: 1.00     Years: 30.00     Pack years: 30.00     Quit date: 1999     Years since quittin.2    Smokeless tobacco: Never Used Substance and Sexual Activity    Alcohol use: No    Drug use: No    Sexual activity: None   Lifestyle    Physical activity     Days per week: None     Minutes per session: None    Stress: None   Relationships    Social connections     Talks on phone: None     Gets together: None     Attends Yazdanism service: None     Active member of club or organization: None     Attends meetings of clubs or organizations: None     Relationship status: None    Intimate partner violence     Fear of current or ex partner: None     Emotionally abused: None     Physically abused: None     Forced sexual activity: None   Other Topics Concern    None   Social History Narrative    None       IV:    sodium chloride 75 mL/hr at 04/03/21 1436       PRN:     Scheduled:    allopurinol  100 mg Oral Daily    atorvastatin  20 mg Oral QPM    carvedilol  25 mg Oral BID    empagliflozin  25 mg Oral Daily    therapeutic multivitamin-minerals  1 tablet Oral Daily    tamsulosin  0.4 mg Oral BID    [START ON 4/11/2021] vitamin D  50,000 Units Oral Weekly    cefTRIAXone (ROCEPHIN) IV  1,000 mg Intravenous Q24H       Lab Results   Component Value Date     04/02/2021    K 4.2 04/02/2021    K 4.2 04/02/2021    BUN 28 04/02/2021    CREATININE 1.6 04/02/2021        Lab Results   Component Value Date    HGB 15.8 04/03/2021    HCT 48.7 04/03/2021       No results found for: PSA    Review Of Systems:  Constitutional: Tired  Eyes: negative  Ears, nose, mouth, throat, and face: negative  Respiratory: Sleep apnea  Cardiovascular: Atrial fib  Gastrointestinal: negative  Genitourinary: BPH  Musculoskeletal: Disc disease  Neurological: negative  Behavioral/Psych: negative  Endocrine: Pre-diabetes    Physical Exam:  Skin is dry, and without rashes  Respirations are non-labored, intact  Abdomen is soft, non-tender, non-distended. Active bowel sounds are present. No rebound or guarding. Obese  Alert and oriented x 3.   No focal motor/sensory deficits  Frazier catheter is draining light pink colored urine. Minimal CBI    Assessment and Plan:  Gross hematuria/BPH/Urinary retention/Renal cysts/Bladder diverticulum/Right renal calculi  -Urine culture on 4/2/21 was negative  -Urine cytology on 3/31/21 was negative  -Continue flomax. Start proscar as well  -Continue empiric antibiotics  -He is doing much better since the catheter was changed to a three-way Simplastic Frazier. Wean CBI off as long as his hematuria resolves. He will remain off anticoagulation for now. Continue manual Frazier catheter irrigations to maintain catheter patency.    -He will likely require a TURP at some point which we discussed.   He will require preoperative medical and cardiac clearance prior to this being done.  -We will follow him during his hospital stay          Julián Camejo MD  4/3/2021  4:22 PM

## 2021-04-03 NOTE — PROGRESS NOTES
Admit Date: 4/2/2021    Subjective:     Feels fine better   Urine still pink     Objective:     Patient Vitals for the past 8 hrs:   BP Temp Temp src Pulse Resp SpO2   04/03/21 0800 117/85 97.6 °F (36.4 °C) Temporal 99 18 95 %     I/O last 3 completed shifts: In: 6516 [P.O.:360; I.V.:2411]  Out: 52947 [CFQQT:96343]  I/O this shift:  In: -   Out: 1002 [Urine:4050]    HEENT: Normal  NECK: Thyroid normal. No carotid bruit. No lymphphadenopathy. CVS: RRR  RS: Clear. No wheeze. No rhonchi. Good airflow bilaterally. ABD: Soft. Non tender. No mass. Normal BS.obese   EXT: No edema. Non tender. Pulses present.    NEURO: no focal deficit       Scheduled Meds:   allopurinol  100 mg Oral Daily    atorvastatin  20 mg Oral QPM    carvedilol  25 mg Oral BID    empagliflozin  25 mg Oral Daily    therapeutic multivitamin-minerals  1 tablet Oral Daily    tamsulosin  0.4 mg Oral BID    [START ON 4/11/2021] vitamin D  50,000 Units Oral Weekly    cefTRIAXone (ROCEPHIN) IV  1,000 mg Intravenous Q24H     Continuous Infusions:   sodium chloride 75 mL/hr at 04/02/21 2031       CBC with Differential:    Lab Results   Component Value Date    WBC 11.0 04/03/2021    RBC 5.46 04/03/2021    HGB 15.8 04/03/2021    HCT 48.7 04/03/2021     04/03/2021    MCV 89.2 04/03/2021    MCH 28.9 04/03/2021    MCHC 32.4 04/03/2021    RDW 13.4 04/03/2021    SEGSPCT 61 02/22/2013    LYMPHOPCT 22.4 04/03/2021    MONOPCT 7.9 04/03/2021    BASOPCT 0.6 04/03/2021    MONOSABS 0.87 04/03/2021    LYMPHSABS 2.45 04/03/2021    EOSABS 0.30 04/03/2021    BASOSABS 0.07 04/03/2021     CMP:    Lab Results   Component Value Date     04/02/2021    K 4.2 04/02/2021    K 4.2 04/02/2021     04/02/2021    CO2 25 04/02/2021    BUN 28 04/02/2021    CREATININE 1.6 04/02/2021    GFRAA 52 04/02/2021    LABGLOM 43 04/02/2021    PROT 6.8 04/02/2021    LABALBU 3.7 04/02/2021    CALCIUM 8.8 04/02/2021    BILITOT 0.4 04/02/2021    ALKPHOS 85 04/02/2021    AST 26

## 2021-04-04 LAB
INR BLD: 1.7
PROTHROMBIN TIME: 19.1 SEC (ref 9.3–12.4)
URINE CULTURE, ROUTINE: NORMAL

## 2021-04-04 PROCEDURE — 1200000000 HC SEMI PRIVATE

## 2021-04-04 PROCEDURE — 94660 CPAP INITIATION&MGMT: CPT

## 2021-04-04 PROCEDURE — 51700 IRRIGATION OF BLADDER: CPT

## 2021-04-04 PROCEDURE — 6360000002 HC RX W HCPCS: Performed by: NEUROMUSCULOSKELETAL MEDICINE & OMM

## 2021-04-04 PROCEDURE — 6370000000 HC RX 637 (ALT 250 FOR IP): Performed by: NEUROMUSCULOSKELETAL MEDICINE & OMM

## 2021-04-04 PROCEDURE — 2580000003 HC RX 258: Performed by: NEUROMUSCULOSKELETAL MEDICINE & OMM

## 2021-04-04 PROCEDURE — 36415 COLL VENOUS BLD VENIPUNCTURE: CPT

## 2021-04-04 PROCEDURE — 6370000000 HC RX 637 (ALT 250 FOR IP): Performed by: UROLOGY

## 2021-04-04 PROCEDURE — 85610 PROTHROMBIN TIME: CPT

## 2021-04-04 RX ADMIN — TAMSULOSIN HYDROCHLORIDE 0.4 MG: 0.4 CAPSULE ORAL at 08:29

## 2021-04-04 RX ADMIN — ATORVASTATIN CALCIUM 20 MG: 20 TABLET, FILM COATED ORAL at 17:09

## 2021-04-04 RX ADMIN — CARVEDILOL 25 MG: 25 TABLET, FILM COATED ORAL at 08:29

## 2021-04-04 RX ADMIN — ALLOPURINOL 100 MG: 100 TABLET ORAL at 08:29

## 2021-04-04 RX ADMIN — FINASTERIDE 5 MG: 5 TABLET, FILM COATED ORAL at 08:28

## 2021-04-04 RX ADMIN — TAMSULOSIN HYDROCHLORIDE 0.4 MG: 0.4 CAPSULE ORAL at 20:51

## 2021-04-04 RX ADMIN — CARVEDILOL 25 MG: 25 TABLET, FILM COATED ORAL at 20:51

## 2021-04-04 RX ADMIN — SODIUM CHLORIDE: 9 INJECTION, SOLUTION INTRAVENOUS at 15:38

## 2021-04-04 RX ADMIN — Medication 1 TABLET: at 08:28

## 2021-04-04 RX ADMIN — CEFTRIAXONE SODIUM 1000 MG: 1 INJECTION, POWDER, FOR SOLUTION INTRAMUSCULAR; INTRAVENOUS at 08:29

## 2021-04-04 ASSESSMENT — PAIN SCALES - GENERAL: PAINLEVEL_OUTOF10: 0

## 2021-04-04 NOTE — PROGRESS NOTES
04/02/2021    BILITOT 0.4 04/02/2021    ALKPHOS 85 04/02/2021    AST 26 04/02/2021    ALT 36 04/02/2021     PT/INR:    Lab Results   Component Value Date    PROTIME 19.1 04/04/2021    PROTIME 27.3 05/15/2013    INR 1.7 04/04/2021       Assessment:     Active Problems:    Hematuria    DAISY    A. Fib       Plan:   Improvement,continue irrigation monitor

## 2021-04-05 LAB
EKG ATRIAL RATE: 326 BPM
EKG Q-T INTERVAL: 366 MS
EKG QRS DURATION: 102 MS
EKG QTC CALCULATION (BAZETT): 437 MS
EKG R AXIS: -26 DEGREES
EKG T AXIS: 42 DEGREES
EKG VENTRICULAR RATE: 86 BPM
INR BLD: 1.4
PROTHROMBIN TIME: 15 SEC (ref 9.3–12.4)

## 2021-04-05 PROCEDURE — 93005 ELECTROCARDIOGRAM TRACING: CPT | Performed by: PHYSICIAN ASSISTANT

## 2021-04-05 PROCEDURE — 1200000000 HC SEMI PRIVATE

## 2021-04-05 PROCEDURE — 6370000000 HC RX 637 (ALT 250 FOR IP): Performed by: NEUROMUSCULOSKELETAL MEDICINE & OMM

## 2021-04-05 PROCEDURE — APPSS60 APP SPLIT SHARED TIME 46-60 MINUTES: Performed by: PHYSICIAN ASSISTANT

## 2021-04-05 PROCEDURE — 85610 PROTHROMBIN TIME: CPT

## 2021-04-05 PROCEDURE — 6360000002 HC RX W HCPCS: Performed by: NEUROMUSCULOSKELETAL MEDICINE & OMM

## 2021-04-05 PROCEDURE — 6370000000 HC RX 637 (ALT 250 FOR IP): Performed by: UROLOGY

## 2021-04-05 PROCEDURE — 36415 COLL VENOUS BLD VENIPUNCTURE: CPT

## 2021-04-05 PROCEDURE — 99222 1ST HOSP IP/OBS MODERATE 55: CPT | Performed by: INTERNAL MEDICINE

## 2021-04-05 PROCEDURE — 94660 CPAP INITIATION&MGMT: CPT

## 2021-04-05 PROCEDURE — 51700 IRRIGATION OF BLADDER: CPT

## 2021-04-05 PROCEDURE — 2580000003 HC RX 258: Performed by: NEUROMUSCULOSKELETAL MEDICINE & OMM

## 2021-04-05 RX ADMIN — TAMSULOSIN HYDROCHLORIDE 0.4 MG: 0.4 CAPSULE ORAL at 21:59

## 2021-04-05 RX ADMIN — FINASTERIDE 5 MG: 5 TABLET, FILM COATED ORAL at 09:27

## 2021-04-05 RX ADMIN — CEFTRIAXONE SODIUM 1000 MG: 1 INJECTION, POWDER, FOR SOLUTION INTRAMUSCULAR; INTRAVENOUS at 09:26

## 2021-04-05 RX ADMIN — ATORVASTATIN CALCIUM 20 MG: 20 TABLET, FILM COATED ORAL at 18:34

## 2021-04-05 RX ADMIN — ALLOPURINOL 100 MG: 100 TABLET ORAL at 09:26

## 2021-04-05 RX ADMIN — CARVEDILOL 25 MG: 25 TABLET, FILM COATED ORAL at 21:59

## 2021-04-05 RX ADMIN — Medication 1 TABLET: at 09:26

## 2021-04-05 RX ADMIN — TAMSULOSIN HYDROCHLORIDE 0.4 MG: 0.4 CAPSULE ORAL at 09:27

## 2021-04-05 RX ADMIN — CARVEDILOL 25 MG: 25 TABLET, FILM COATED ORAL at 09:27

## 2021-04-05 RX ADMIN — SODIUM CHLORIDE: 9 INJECTION, SOLUTION INTRAVENOUS at 19:22

## 2021-04-05 ASSESSMENT — PAIN SCALES - GENERAL
PAINLEVEL_OUTOF10: 0
PAINLEVEL_OUTOF10: 0

## 2021-04-05 NOTE — PLAN OF CARE
Problem: Falls - Risk of:  Goal: Will remain free from falls  Description: Will remain free from falls  Outcome: Met This Shift  Goal: Absence of physical injury  Description: Absence of physical injury  Outcome: Met This Shift     Problem:  Activity:  Goal: Ability to tolerate increased activity will improve  Description: Ability to tolerate increased activity will improve  Outcome: Met This Shift  Goal: Expression of feelings of increased energy will increase  Description: Expression of feelings of increased energy will increase  Outcome: Met This Shift     Problem: Cardiac:  Goal: Ability to maintain an adequate cardiac output will improve  Description: Ability to maintain an adequate cardiac output will improve  Outcome: Met This Shift     Problem: Safety:  Goal: Ability to remain free from injury will improve  Description: Ability to remain free from injury will improve  Outcome: Met This Shift  Goal: Will show no signs and symptoms of excessive bleeding  Description: Will show no signs and symptoms of excessive bleeding  Outcome: Met This Shift

## 2021-04-05 NOTE — PLAN OF CARE
Problem: Falls - Risk of:  Goal: Will remain free from falls  Description: Will remain free from falls  4/5/2021 1114 by Rondi Schlatter, RN  Outcome: Met This Shift     Problem: Falls - Risk of:  Goal: Absence of physical injury  Description: Absence of physical injury  4/5/2021 1114 by Rondi Schlatter, RN  Outcome: Met This Shift     Problem: Urinary Retention:  Goal: Urinary elimination within specified parameters  Description: Urinary elimination within specified parameters  4/5/2021 1114 by Rondi Schlatter, RN  Outcome: Met This Shift     Problem: Urinary Retention:  Goal: Able to perform urinary catheter care  Description: Able to perform urinary catheter care  4/5/2021 1114 by Rondi Schlatter, RN  Outcome: Met This Shift     Problem:  Activity:  Goal: Ability to tolerate increased activity will improve  Description: Ability to tolerate increased activity will improve  4/5/2021 1114 by Rondi Schlatter, RN  Outcome: Met This Shift     Problem: Coping:  Goal: Level of anxiety will decrease  Description: Level of anxiety will decrease  4/5/2021 1114 by Rondi Schlatter, RN  Outcome: Met This Shift

## 2021-04-05 NOTE — PROGRESS NOTES
I spoke to [facility] [employer] . Urology nurse practioner and she stated she turned off the CBI . Continue to monitor his urine and manual irrigate every 4 hours as ordered . If urine starts to turn bloody restart the CBI . Plan per Spike Sites is a TURP on Wednesday . Patient aware .

## 2021-04-05 NOTE — PROGRESS NOTES
4/5/2021 10:55 AM  Service: Urology  Group: [facility] ([facility])    Shara Rivera  [id number]    Subjective:    Urine yellow  CBI at slow rate  Denies pain or discomfort  No fever or chills  No family present  Pending cardiology consult      Review of Systems  Constitutional: No fever or chills   Respiratory: negative for cough and hemoptysis  Cardiovascular: negative for chest pain and dyspnea  Gastrointestinal: negative for abdominal pain, diarrhea, nausea and vomiting   : See above  Derm: negative for rash and skin lesion(s)  Neurological: negative for seizures and tremors  Musculoskeletal: Negative    Psychiatric: Negative   All other reviews are negative      Scheduled Meds:   finasteride  5 mg Oral Daily    allopurinol  100 mg Oral Daily    atorvastatin  20 mg Oral QPM    carvedilol  25 mg Oral BID    empagliflozin  25 mg Oral Daily    therapeutic multivitamin-minerals  1 tablet Oral Daily    tamsulosin  0.4 mg Oral BID    [START ON 4/11/2021] vitamin D  50,000 Units Oral Weekly    cefTRIAXone (ROCEPHIN) IV  1,000 mg Intravenous Q24H       Objective:  Vitals:    04/05/21 0730   BP: (!) 161/99   Pulse: 79   Resp: 16   Temp: 98 °F (36.7 °C)   SpO2: 95%         Allergies: Patient has no known allergies. General Appearance: alert and oriented to person, place and time and in no acute distress  Skin: no rash or erythema  Head: normocephalic and atraumatic  Pulmonary/Chest: normal air movement, no respiratory distress  Abdomen: soft, non-tender, non-distended  Genitourinary:  Frazier draining yellow urine  Extremities: no cyanosis, clubbing or edema         Labs:     Recent Labs     04/02/21  1721      K 4.2      CO2 25   BUN 28*   CREATININE 1.6*   GLUCOSE 292*   CALCIUM 8.8       Lab Results   Component Value Date    HGB 15.8 04/03/2021    HCT 48.7 04/03/2021         Assessment/Plan:  Gross hematuria   BPH   Urinary Retention   Renal Cysts   Bladder Diverticulum   Right Renal Calculi         Cardiology has been consulted for clearance for possible TURP and needing to be off of anticoagulation   Will wean CBI today  Continue manual irrigations every 4 hours and PRN  Cont the Flomax  Cont the Proscar   Will need TURP with medical and cardiac clearance (pending), can possibly do TURP Wednesday afternoon if still admitted and cleared  Will follow         LINDA Henley - ANAY PEREZ  Urology

## 2021-04-05 NOTE — CONSULTS
Inpatient Cardiology Consultation      Reason for Consult:  Preoperative Cardiac Clearance     Consulting Physician: Dr Mario Cohen     Requesting Physician:  Dr Graciela Watt    Date of Consultation: 4/5/2021    HISTORY OF PRESENT ILLNESS:   Mr Makayla Subramanian is a 76 yo male who follows with Dr Mario Cohen, last seen in the office 3/12/2020. Past medical history includes chronic atrial fibrillation on anticoagulation with Coumadin, hypertension, hyperlipidemia, obstructive sleep apnea on CPAP, obesity, type 2 diabetes mellitus, previous tobacco abuse. Presented to [facility] 4/2/2021 with hematuria. VS: 97.1-102-18-95%RA-147/95   Labs: BC 10.5, H&H 16.3/49.4, Plt 238. K+ 4.2, BUN/Scr 29/1.6, glucose 222. INR 2.5     UA positive, large blood     CT ab/pelvis: Nonobstructing right renal stones. Bilateral renal lesions. Focal mild prominence of the left ureter. Urinary bladder diverticulum slightly increased from previous.  Thickening of the urinary bladder likely due to underdistention but cystitis not excluded. Gas in the bladder is likely due to the Cummings catheter.  Infection or fistula could also produce this. He was admitted and evaluated by Urology with recommendations or TURP. He continues to have large cots evacuated from his cummings. Anticoagulation is on hold --> INR 1.4 today. Cardiology was asked to see the patient for cardiac clearance prior to TURP procedure. He states that he lives independently in a single story home with steps to the basement with the laundry. He states that he is able to get to the basement without chest pain or shortness of breath. He is not very active at baseline but completes his ADL without difficult. He has some peripheral edema but usually resolves with elevation and his Lasix at home. He denies any orthopnea/PNd, lightheadedness, dizziness, falls or LOC.      Please note: past medical records were reviewed per electronic medical record (EMR) - see detailed reports under Past stated age. Awake, alert, cooperative, no apparent distress  HEENT:   Head- Normocephalic, atraumatic   Eyes- Conjunctivae pink, anicteric  Throat- Oral mucosa pink and moist  Neck-  No stridor, trachea midline, no jugular venous distention. CHEST: Chest symmetrical and non-tender to palpation. RESPIRATORY: Lung sounds clear throughout fields bilaterally. No wheeze or rhonchi noted. CARDIOVASCULAR:     No carotid bruit noted bilaterally   Heart Ausculation- Irregular rhythm, controlled rate. No murmur  PV: 1+ lower extremity edema. No varicosities. ABDOMEN: Soft, non-tender to light palpation. Bowel sounds present. MS: Good muscle strength and tone. No atrophy or abnormal movements. : Deferred   SKIN: Warm and dry no statis dermatitis or ulcers   NEURO / PSYCH: Oriented to person, place and time. Speech clear and appropriate. Follows all commands. Pleasant affect     DATA:    ECG: none performed   Tele strips:  Not on a telemetry floor     Diagnostic:      Intake/Output Summary (Last 24 hours) at 4/5/2021 0945  Last data filed at 4/5/2021 0559  Gross per 24 hour   Intake 2123 ml   Output 3290 ml   Net -1167 ml       Labs:   CBC:   Recent Labs     04/03/21  0657   WBC 11.0   HGB 15.8   HCT 48.7        BMP:   Recent Labs     04/02/21  1721      K 4.2   CO2 25   BUN 28*   CREATININE 1.6*   LABGLOM 43   CALCIUM 8.8     HgA1c:   Lab Results   Component Value Date    LABA1C 10.8 (H) 04/02/2021     PT/INR:   Recent Labs     04/04/21  0523 04/05/21  0639   PROTIME 19.1* 15.0*   INR 1.7 1.4     FASTING LIPID PANEL:  Lab Results   Component Value Date    CHOL 211 01/15/2013    HDL 44.0 01/15/2013    LDLCALC 144 01/15/2013    TRIG 116 01/15/2013     LIVER PROFILE:  Recent Labs     04/02/21  1721   AST 26   ALT 36   LABALBU 3.7       Assessment:   1. Hematuria/BPH/urinary retention --  S/p cummings with irrigation -- large clot evacuations. Hgb stable   2.  Renal cysts and bladder diverticulum / right renal calculi on CT ab/pelvis   3. Chronic atrial fibrillation   4. Chronic anticoagulation with Coumadin -- on hold; INR 1.4 today  5. DAISY vs CKD? Scr 1.6 on admission (previously 1.1 2013)   6. Hypertension, controlled   7. Hyperlipidemia, on statin   8. Type 2 diabetes mellitus, A1c 10.8  9. Obstructive sleep apnea, compliant with CPAP   10. Obesity  11. Previous tobacco abuse     Plan:   1. Patient is acceptable risk to proceed with surgery without further cardiac testing at this time. RCRI class I risk, 0.4% risk of major cardiac event. 2. Resume anticoagulation when ok with urology. 3. Rest as per primary service and other consultants. Above discussed with Dr Trav Waggoner. Electronically signed by Savita Rincon on 4/5/2021 at 1625 [facility] progress note  Mojgan Villafuerte     I have personally participated in a face-to-face and personally obtained history and performed physical exam on the date of service. I reviewed chart, vitals, labs and radiologic studies. I also participated in medical decision making with KIKO Rincon on the date of service All of the assessments and recommendations are from me and I agree with all of the pertinent clinical information, assessment and treatment plan. I have reviewed and edited the note above based on my findings during my history, exam, and decision making. Please see my additional contributions to the history, physical exam, assessment, and recommendations below. Lying on bed no apparent distress  Irregular    Atrial fibrillation: Chronic  Hypertension: Controlled  Hyperlipidemia  Mild tricuspid valve regurgitation  Obstructive sleep sleep apnea  Chronic anticoagulation    Patient is at acceptable risk for perioperative cardiovascular event for the planned urological procedure, patient may proceed without any further cardiac testing.   Please feel free to call for any further information  Okay to hold anticoagulation in anticipation of

## 2021-04-05 NOTE — PROGRESS NOTES
General Progress Note  4/5/2021 9:09 AM  Subjective:   Admit Date: 4/2/2021  PCP: Huey Leong DO  Interval History: events of weekend noted. Urine is clear of blood. Will need TURP soon per Urology. Awake and alert, in no acute distress. Diet: DIET CARB CONTROL;  Pain is:None  Nausea:None  Bowel Movement/Flatus yes    Data:   Scheduled Meds:   finasteride  5 mg Oral Daily    allopurinol  100 mg Oral Daily    atorvastatin  20 mg Oral QPM    carvedilol  25 mg Oral BID    empagliflozin  25 mg Oral Daily    therapeutic multivitamin-minerals  1 tablet Oral Daily    tamsulosin  0.4 mg Oral BID    [START ON 4/11/2021] vitamin D  50,000 Units Oral Weekly    cefTRIAXone (ROCEPHIN) IV  1,000 mg Intravenous Q24H     Continuous Infusions:   sodium chloride 75 mL/hr at 04/04/21 1538     PRN Meds:  I/O last 3 completed shifts: In: 2603 [P.O.:900; I.V.:1703]  Out: 4490 [Urine:4490]  No intake/output data recorded.     Intake/Output Summary (Last 24 hours) at 4/5/2021 0909  Last data filed at 4/5/2021 0559  Gross per 24 hour   Intake 2123 ml   Output 4490 ml   Net -2367 ml       CBC with Differential:    Lab Results   Component Value Date    WBC 11.0 04/03/2021    RBC 5.46 04/03/2021    HGB 15.8 04/03/2021    HCT 48.7 04/03/2021     04/03/2021    MCV 89.2 04/03/2021    MCH 28.9 04/03/2021    MCHC 32.4 04/03/2021    RDW 13.4 04/03/2021    SEGSPCT 61 02/22/2013    LYMPHOPCT 22.4 04/03/2021    MONOPCT 7.9 04/03/2021    BASOPCT 0.6 04/03/2021    MONOSABS 0.87 04/03/2021    LYMPHSABS 2.45 04/03/2021    EOSABS 0.30 04/03/2021    BASOSABS 0.07 04/03/2021     CMP:    Lab Results   Component Value Date     04/02/2021    K 4.2 04/02/2021    K 4.2 04/02/2021     04/02/2021    CO2 25 04/02/2021    BUN 28 04/02/2021    CREATININE 1.6 04/02/2021    GFRAA 52 04/02/2021    LABGLOM 43 04/02/2021    GLUCOSE 292 04/02/2021    PROT 6.8 04/02/2021    LABALBU 3.7 04/02/2021    CALCIUM 8.8 04/02/2021    BILITOT 0.4 04/02/2021    ALKPHOS 85 04/02/2021    AST 26 04/02/2021    ALT 36 04/02/2021     Magnesium:  No results found for: MG  Phosphorus:  No results found for: PHOS  PT/INR:    Lab Results   Component Value Date    PROTIME 15.0 04/05/2021    PROTIME 27.3 05/15/2013    INR 1.4 04/05/2021     Last 3 Troponin:  No results found for: TROPONINI  U/A:    Lab Results   Component Value Date    COLORU RED 04/02/2021    PHUR 7.0 04/02/2021    WBCUA 1-3 04/02/2021    RBCUA PACKED 04/02/2021    RBCUA NONE 02/22/2013    BACTERIA RARE 04/02/2021    CLARITYU TURBID 04/02/2021    SPECGRAV 1.015 04/02/2021    LEUKOCYTESUR SMALL 04/02/2021    UROBILINOGEN 1.0 04/02/2021    BILIRUBINUR Negative 04/02/2021    BLOODU LARGE 04/02/2021    GLUCOSEU >=1000 04/02/2021     HgBA1c:  No components found for: HGBA1C  TSH:    Lab Results   Component Value Date    TSH 0.970 10/17/2012     -----------------------------------------------------------------    Objective:   Vitals: BP (!) 161/99   Pulse 79   Temp 98 °F (36.7 °C) (Temporal)   Resp 16   Ht 5' 7\" (1.702 m)   Wt 250 lb (113.4 kg)   SpO2 95%   BMI 39.16 kg/m²   General appearance: alert, appears stated age and cooperative  Skin: Skin color, texture, turgor normal. No rashes or lesions  HEENT: Head: Normal, normocephalic, atraumatic. Neck: no adenopathy, no carotid bruit, no JVD, supple, symmetrical, trachea midline and thyroid not enlarged, symmetric, no tenderness/mass/nodules  Lungs: clear to auscultation bilaterally  Heart: regular rate and rhythm, S1, S2 normal, no murmur, click, rub or gallop  Abdomen: soft, non-tender; bowel sounds normal; no masses,  no organomegaly  Extremities: extremities normal, atraumatic, no cyanosis or edema  Neurologic: Mental status: Alert, oriented, thought content appropriate    Assessment:   Active Problems:    Hematuria  Resolved Problems:    * No resolved hospital problems. *    Plan:   1.  Consult , Cardiology, for pre-operative clearance and recent hematuria with chronic oral anticoagulation for chronic a-fib. 2. Will follow and await Cardiology input. 3. Will follow.     Javad Marti D.O.  9:09 AM  4/5/2021

## 2021-04-05 NOTE — PROGRESS NOTES
Dr. Manuela Rowe, regarding chronic oral anticoagulation with h/o a-fib, pre-operative clearance for TURP. Message sent to Kayla HOOVER for cardiology consult .

## 2021-04-06 LAB
ANION GAP SERPL CALCULATED.3IONS-SCNC: 10 MMOL/L (ref 7–16)
BUN BLDV-MCNC: 20 MG/DL (ref 8–23)
CALCIUM SERPL-MCNC: 8.5 MG/DL (ref 8.6–10.2)
CHLORIDE BLD-SCNC: 110 MMOL/L (ref 98–107)
CO2: 19 MMOL/L (ref 22–29)
CREAT SERPL-MCNC: 1.2 MG/DL (ref 0.7–1.2)
GFR AFRICAN AMERICAN: >60
GFR NON-AFRICAN AMERICAN: >60 ML/MIN/1.73
GLUCOSE BLD-MCNC: 256 MG/DL (ref 74–99)
HCT VFR BLD CALC: 42.4 % (ref 37–54)
HEMOGLOBIN: 13.5 G/DL (ref 12.5–16.5)
INR BLD: 1.3
MCH RBC QN AUTO: 28.4 PG (ref 26–35)
MCHC RBC AUTO-ENTMCNC: 31.8 % (ref 32–34.5)
MCV RBC AUTO: 89.3 FL (ref 80–99.9)
PDW BLD-RTO: 13.4 FL (ref 11.5–15)
PLATELET # BLD: 228 E9/L (ref 130–450)
PMV BLD AUTO: 11.1 FL (ref 7–12)
POTASSIUM SERPL-SCNC: 4.4 MMOL/L (ref 3.5–5)
PROTHROMBIN TIME: 14 SEC (ref 9.3–12.4)
RBC # BLD: 4.75 E12/L (ref 3.8–5.8)
SODIUM BLD-SCNC: 139 MMOL/L (ref 132–146)
WBC # BLD: 12.2 E9/L (ref 4.5–11.5)

## 2021-04-06 PROCEDURE — 85610 PROTHROMBIN TIME: CPT

## 2021-04-06 PROCEDURE — 36415 COLL VENOUS BLD VENIPUNCTURE: CPT

## 2021-04-06 PROCEDURE — 6360000002 HC RX W HCPCS: Performed by: NEUROMUSCULOSKELETAL MEDICINE & OMM

## 2021-04-06 PROCEDURE — 94660 CPAP INITIATION&MGMT: CPT

## 2021-04-06 PROCEDURE — 1200000000 HC SEMI PRIVATE

## 2021-04-06 PROCEDURE — 2580000003 HC RX 258: Performed by: NEUROMUSCULOSKELETAL MEDICINE & OMM

## 2021-04-06 PROCEDURE — 85027 COMPLETE CBC AUTOMATED: CPT

## 2021-04-06 PROCEDURE — 80048 BASIC METABOLIC PNL TOTAL CA: CPT

## 2021-04-06 PROCEDURE — 6370000000 HC RX 637 (ALT 250 FOR IP): Performed by: NEUROMUSCULOSKELETAL MEDICINE & OMM

## 2021-04-06 PROCEDURE — 6370000000 HC RX 637 (ALT 250 FOR IP): Performed by: UROLOGY

## 2021-04-06 RX ADMIN — CARVEDILOL 25 MG: 25 TABLET, FILM COATED ORAL at 21:21

## 2021-04-06 RX ADMIN — TAMSULOSIN HYDROCHLORIDE 0.4 MG: 0.4 CAPSULE ORAL at 21:22

## 2021-04-06 RX ADMIN — CEFTRIAXONE SODIUM 1000 MG: 1 INJECTION, POWDER, FOR SOLUTION INTRAMUSCULAR; INTRAVENOUS at 09:21

## 2021-04-06 RX ADMIN — CARVEDILOL 25 MG: 25 TABLET, FILM COATED ORAL at 09:20

## 2021-04-06 RX ADMIN — ATORVASTATIN CALCIUM 20 MG: 20 TABLET, FILM COATED ORAL at 18:12

## 2021-04-06 RX ADMIN — TAMSULOSIN HYDROCHLORIDE 0.4 MG: 0.4 CAPSULE ORAL at 09:20

## 2021-04-06 RX ADMIN — FINASTERIDE 5 MG: 5 TABLET, FILM COATED ORAL at 09:20

## 2021-04-06 RX ADMIN — ALLOPURINOL 100 MG: 100 TABLET ORAL at 09:20

## 2021-04-06 RX ADMIN — SODIUM CHLORIDE: 9 INJECTION, SOLUTION INTRAVENOUS at 21:25

## 2021-04-06 RX ADMIN — Medication 1 TABLET: at 09:20

## 2021-04-06 NOTE — PROGRESS NOTES
General Progress Note  4/6/2021 8:59 AM  Subjective:   Admit Date: 4/2/2021  PCP: Matthias Leong DO  Interval History: no acute issues overnight. Cardiology cleared patient for TURP tomorrow. Denies abdominal pain, nausea, or vomiting. Diet: DIET CARB CONTROL;  Pain is:None  Nausea:None  Bowel Movement/Flatus yes    Data:   Scheduled Meds:   finasteride  5 mg Oral Daily    allopurinol  100 mg Oral Daily    atorvastatin  20 mg Oral QPM    carvedilol  25 mg Oral BID    empagliflozin  25 mg Oral Daily    therapeutic multivitamin-minerals  1 tablet Oral Daily    tamsulosin  0.4 mg Oral BID    [START ON 4/11/2021] vitamin D  50,000 Units Oral Weekly    cefTRIAXone (ROCEPHIN) IV  1,000 mg Intravenous Q24H     Continuous Infusions:   sodium chloride 75 mL/hr at 04/05/21 1922     PRN Meds:  I/O last 3 completed shifts: In: 1430 [P.O.:880; I.V.:550]  Out: 3350 [GDBLB:6094]  I/O this shift:   In: 362 [I.V.:870]  Out: -     Intake/Output Summary (Last 24 hours) at 4/6/2021 0859  Last data filed at 4/6/2021 0805  Gross per 24 hour   Intake 2300 ml   Output 3350 ml   Net -1050 ml       CBC with Differential:    Lab Results   Component Value Date    WBC 11.0 04/03/2021    RBC 5.46 04/03/2021    HGB 15.8 04/03/2021    HCT 48.7 04/03/2021     04/03/2021    MCV 89.2 04/03/2021    MCH 28.9 04/03/2021    MCHC 32.4 04/03/2021    RDW 13.4 04/03/2021    SEGSPCT 61 02/22/2013    LYMPHOPCT 22.4 04/03/2021    MONOPCT 7.9 04/03/2021    BASOPCT 0.6 04/03/2021    MONOSABS 0.87 04/03/2021    LYMPHSABS 2.45 04/03/2021    EOSABS 0.30 04/03/2021    BASOSABS 0.07 04/03/2021     CMP:    Lab Results   Component Value Date     04/02/2021    K 4.2 04/02/2021    K 4.2 04/02/2021     04/02/2021    CO2 25 04/02/2021    BUN 28 04/02/2021    CREATININE 1.6 04/02/2021    GFRAA 52 04/02/2021    LABGLOM 43 04/02/2021    GLUCOSE 292 04/02/2021    PROT 6.8 04/02/2021    LABALBU 3.7 04/02/2021    CALCIUM 8.8 04/02/2021 BILITOT 0.4 04/02/2021    ALKPHOS 85 04/02/2021    AST 26 04/02/2021    ALT 36 04/02/2021     Magnesium:  No results found for: MG  Phosphorus:  No results found for: PHOS  PT/INR:    Lab Results   Component Value Date    PROTIME 14.0 04/06/2021    PROTIME 27.3 05/15/2013    INR 1.3 04/06/2021     Last 3 Troponin:  No results found for: TROPONINI  U/A:    Lab Results   Component Value Date    COLORU RED 04/02/2021    PHUR 7.0 04/02/2021    WBCUA 1-3 04/02/2021    RBCUA PACKED 04/02/2021    RBCUA NONE 02/22/2013    BACTERIA RARE 04/02/2021    CLARITYU TURBID 04/02/2021    SPECGRAV 1.015 04/02/2021    LEUKOCYTESUR SMALL 04/02/2021    UROBILINOGEN 1.0 04/02/2021    BILIRUBINUR Negative 04/02/2021    BLOODU LARGE 04/02/2021    GLUCOSEU >=1000 04/02/2021     HgBA1c:  No components found for: HGBA1C  TSH:    Lab Results   Component Value Date    TSH 0.970 10/17/2012     -----------------------------------------------------------------    Objective:   Vitals: /79   Pulse 88   Temp 97.7 °F (36.5 °C) (Temporal)   Resp 16   Ht 5' 7\" (1.702 m)   Wt 250 lb (113.4 kg)   SpO2 95%   BMI 39.16 kg/m²   General appearance: alert, appears stated age and cooperative  Skin: Skin color, texture, turgor normal. No rashes or lesions  HEENT: Head: Normal, normocephalic, atraumatic.   Neck: no adenopathy, no carotid bruit, no JVD, supple, symmetrical, trachea midline and thyroid not enlarged, symmetric, no tenderness/mass/nodules  Lungs: clear to auscultation bilaterally  Heart: regular rate and rhythm, S1, S2 normal, no murmur, click, rub or gallop  Abdomen: soft, non-tender; bowel sounds normal; no masses,  no organomegaly  Extremities: extremities normal, atraumatic, no cyanosis or edema  Neurologic: Mental status: Alert, oriented, thought content appropriate    Assessment:   Principal Problem:    Hematuria  Active Problems:    Essential hypertension    Hyperlipidemia    Nonrheumatic tricuspid valve regurgitation    Chronic anticoagulation  Resolved Problems:    * No resolved hospital problems. *    Plan:   1. For TURP tomorrow per Urology. 2. Will follow.     Lynn Trinh D.O.  8:59 AM  4/6/2021

## 2021-04-06 NOTE — CARE COORDINATION
4/6/2021 - Urology following. Manual irrigations of cummings catheter continued. Cleared by cardiology for TURP tomorrow. Plan is to discharge home when medically stable. SW/CM will follow.

## 2021-04-06 NOTE — PROGRESS NOTES
growth  Cardiology has cleared patient for TURP tomorrow   Continue the Frazier catheter  Manually irrigate as needed to maintain patency  Cont the Flomax  Cont the Proscar  NPO after midnight  Continue the antibiotics, on Rocephin  Treatment consent placed for cystoscopy, urethral dilation, retrograde pyelogram, plasma loop transurethral resection of the prostate  Consented to the above         Frankie Galloway APRN - [facility]  Urology

## 2021-04-06 NOTE — PLAN OF CARE
Problem: Falls - Risk of:  Goal: Will remain free from falls  Description: Will remain free from falls  Outcome: Met This Shift     Problem: Falls - Risk of:  Goal: Absence of physical injury  Description: Absence of physical injury  Outcome: Met This Shift     Problem: Urinary Retention:  Goal: Urinary elimination within specified parameters  Description: Urinary elimination within specified parameters  Outcome: Met This Shift     Problem: Urinary Retention:  Goal: Able to perform urinary catheter care  Description: Able to perform urinary catheter care  Outcome: Met This Shift     Problem:  Activity:  Goal: Ability to tolerate increased activity will improve  Description: Ability to tolerate increased activity will improve  4/6/2021 1058 by Maurice Link RN  Outcome: Met This Shift  4/6/2021 0148 by Kelley Hough RN  Outcome: Met This Shift     Problem: Coping:  Goal: Level of anxiety will decrease  Description: Level of anxiety will decrease  Outcome: Met This Shift     Problem: Safety:  Goal: Ability to remain free from injury will improve  Description: Ability to remain free from injury will improve  Outcome: Met This Shift

## 2021-04-06 NOTE — PROGRESS NOTES
Patient's cummings catheter was irrigated per order with appropriate return amount. Urine appears light pink with red flecks present. Patient tolerated well. Will continue to monitor.

## 2021-04-07 ENCOUNTER — ANESTHESIA EVENT (OUTPATIENT)
Dept: OPERATING ROOM | Age: 66
DRG: 657 | End: 2021-04-07
Payer: MEDICARE

## 2021-04-07 ENCOUNTER — ANESTHESIA (OUTPATIENT)
Dept: OPERATING ROOM | Age: 66
DRG: 657 | End: 2021-04-07
Payer: MEDICARE

## 2021-04-07 ENCOUNTER — APPOINTMENT (OUTPATIENT)
Dept: GENERAL RADIOLOGY | Age: 66
DRG: 657 | End: 2021-04-07
Payer: MEDICARE

## 2021-04-07 VITALS
OXYGEN SATURATION: 96 % | DIASTOLIC BLOOD PRESSURE: 80 MMHG | RESPIRATION RATE: 10 BRPM | SYSTOLIC BLOOD PRESSURE: 113 MMHG

## 2021-04-07 LAB
INR BLD: 1.2
METER GLUCOSE: 142 MG/DL (ref 74–99)
PROTHROMBIN TIME: 13.4 SEC (ref 9.3–12.4)

## 2021-04-07 PROCEDURE — 36415 COLL VENOUS BLD VENIPUNCTURE: CPT

## 2021-04-07 PROCEDURE — 3700000000 HC ANESTHESIA ATTENDED CARE: Performed by: UROLOGY

## 2021-04-07 PROCEDURE — 6370000000 HC RX 637 (ALT 250 FOR IP): Performed by: UROLOGY

## 2021-04-07 PROCEDURE — 3600000013 HC SURGERY LEVEL 3 ADDTL 15MIN: Performed by: UROLOGY

## 2021-04-07 PROCEDURE — 1200000000 HC SEMI PRIVATE

## 2021-04-07 PROCEDURE — 7100000000 HC PACU RECOVERY - FIRST 15 MIN: Performed by: UROLOGY

## 2021-04-07 PROCEDURE — 2580000003 HC RX 258: Performed by: UROLOGY

## 2021-04-07 PROCEDURE — 85610 PROTHROMBIN TIME: CPT

## 2021-04-07 PROCEDURE — 2720000010 HC SURG SUPPLY STERILE: Performed by: UROLOGY

## 2021-04-07 PROCEDURE — BT1F1ZZ FLUOROSCOPY OF LEFT KIDNEY, URETER AND BLADDER USING LOW OSMOLAR CONTRAST: ICD-10-PCS | Performed by: UROLOGY

## 2021-04-07 PROCEDURE — 0T778DZ DILATION OF LEFT URETER WITH INTRALUMINAL DEVICE, VIA NATURAL OR ARTIFICIAL OPENING ENDOSCOPIC: ICD-10-PCS | Performed by: UROLOGY

## 2021-04-07 PROCEDURE — C1758 CATHETER, URETERAL: HCPCS | Performed by: UROLOGY

## 2021-04-07 PROCEDURE — 94660 CPAP INITIATION&MGMT: CPT

## 2021-04-07 PROCEDURE — 3600000003 HC SURGERY LEVEL 3 BASE: Performed by: UROLOGY

## 2021-04-07 PROCEDURE — 82962 GLUCOSE BLOOD TEST: CPT

## 2021-04-07 PROCEDURE — 51700 IRRIGATION OF BLADDER: CPT

## 2021-04-07 PROCEDURE — 6360000002 HC RX W HCPCS: Performed by: UROLOGY

## 2021-04-07 PROCEDURE — 6360000002 HC RX W HCPCS: Performed by: NEUROMUSCULOSKELETAL MEDICINE & OMM

## 2021-04-07 PROCEDURE — 6360000002 HC RX W HCPCS: Performed by: NURSE ANESTHETIST, CERTIFIED REGISTERED

## 2021-04-07 PROCEDURE — 3209999900 FLUORO FOR SURGICAL PROCEDURES

## 2021-04-07 PROCEDURE — 2580000003 HC RX 258: Performed by: NURSE ANESTHETIST, CERTIFIED REGISTERED

## 2021-04-07 PROCEDURE — 7100000001 HC PACU RECOVERY - ADDTL 15 MIN: Performed by: UROLOGY

## 2021-04-07 PROCEDURE — 88305 TISSUE EXAM BY PATHOLOGIST: CPT

## 2021-04-07 PROCEDURE — 0T5B8ZZ DESTRUCTION OF BLADDER, VIA NATURAL OR ARTIFICIAL OPENING ENDOSCOPIC: ICD-10-PCS | Performed by: UROLOGY

## 2021-04-07 PROCEDURE — 3700000001 HC ADD 15 MINUTES (ANESTHESIA): Performed by: UROLOGY

## 2021-04-07 PROCEDURE — 2709999900 HC NON-CHARGEABLE SUPPLY: Performed by: UROLOGY

## 2021-04-07 PROCEDURE — 2580000003 HC RX 258: Performed by: NEUROMUSCULOSKELETAL MEDICINE & OMM

## 2021-04-07 RX ORDER — PROPOFOL 10 MG/ML
INJECTION, EMULSION INTRAVENOUS CONTINUOUS PRN
Status: DISCONTINUED | OUTPATIENT
Start: 2021-04-07 | End: 2021-04-07 | Stop reason: SDUPTHER

## 2021-04-07 RX ORDER — SODIUM CHLORIDE, SODIUM LACTATE, POTASSIUM CHLORIDE, CALCIUM CHLORIDE 600; 310; 30; 20 MG/100ML; MG/100ML; MG/100ML; MG/100ML
INJECTION, SOLUTION INTRAVENOUS CONTINUOUS PRN
Status: DISCONTINUED | OUTPATIENT
Start: 2021-04-07 | End: 2021-04-07 | Stop reason: SDUPTHER

## 2021-04-07 RX ORDER — EPINEPHRINE 1 MG/ML
INJECTION, SOLUTION, CONCENTRATE INTRAVENOUS PRN
Status: DISCONTINUED | OUTPATIENT
Start: 2021-04-07 | End: 2021-04-07 | Stop reason: ALTCHOICE

## 2021-04-07 RX ORDER — SODIUM CHLORIDE 9 MG/ML
INJECTION, SOLUTION INTRAVENOUS CONTINUOUS
Status: DISCONTINUED | OUTPATIENT
Start: 2021-04-07 | End: 2021-04-08 | Stop reason: HOSPADM

## 2021-04-07 RX ORDER — PROMETHAZINE HYDROCHLORIDE 25 MG/ML
6.25 INJECTION, SOLUTION INTRAMUSCULAR; INTRAVENOUS
Status: DISCONTINUED | OUTPATIENT
Start: 2021-04-07 | End: 2021-04-07 | Stop reason: HOSPADM

## 2021-04-07 RX ORDER — DEXAMETHASONE SODIUM PHOSPHATE 10 MG/ML
INJECTION, SOLUTION INTRAMUSCULAR; INTRAVENOUS PRN
Status: DISCONTINUED | OUTPATIENT
Start: 2021-04-07 | End: 2021-04-07 | Stop reason: SDUPTHER

## 2021-04-07 RX ORDER — SODIUM CHLORIDE 9 MG/ML
INJECTION, SOLUTION INTRAVENOUS CONTINUOUS PRN
Status: DISCONTINUED | OUTPATIENT
Start: 2021-04-07 | End: 2021-04-07

## 2021-04-07 RX ORDER — PROPOFOL 10 MG/ML
INJECTION, EMULSION INTRAVENOUS PRN
Status: DISCONTINUED | OUTPATIENT
Start: 2021-04-07 | End: 2021-04-07 | Stop reason: SDUPTHER

## 2021-04-07 RX ORDER — ONDANSETRON 2 MG/ML
4 INJECTION INTRAMUSCULAR; INTRAVENOUS
Status: DISCONTINUED | OUTPATIENT
Start: 2021-04-07 | End: 2021-04-07 | Stop reason: HOSPADM

## 2021-04-07 RX ORDER — SODIUM CHLORIDE 9 MG/ML
25 INJECTION, SOLUTION INTRAVENOUS PRN
Status: DISCONTINUED | OUTPATIENT
Start: 2021-04-07 | End: 2021-04-08 | Stop reason: HOSPADM

## 2021-04-07 RX ORDER — SODIUM CHLORIDE 0.9 % (FLUSH) 0.9 %
5-40 SYRINGE (ML) INJECTION PRN
Status: DISCONTINUED | OUTPATIENT
Start: 2021-04-07 | End: 2021-04-08 | Stop reason: HOSPADM

## 2021-04-07 RX ORDER — SODIUM CHLORIDE 0.9 % (FLUSH) 0.9 %
5-40 SYRINGE (ML) INJECTION EVERY 12 HOURS SCHEDULED
Status: DISCONTINUED | OUTPATIENT
Start: 2021-04-07 | End: 2021-04-08 | Stop reason: HOSPADM

## 2021-04-07 RX ORDER — ATROPA BELLADONNA AND OPIUM 16.2; 6 MG/1; MG/1
SUPPOSITORY RECTAL PRN
Status: DISCONTINUED | OUTPATIENT
Start: 2021-04-07 | End: 2021-04-07 | Stop reason: ALTCHOICE

## 2021-04-07 RX ORDER — ONDANSETRON 2 MG/ML
INJECTION INTRAMUSCULAR; INTRAVENOUS PRN
Status: DISCONTINUED | OUTPATIENT
Start: 2021-04-07 | End: 2021-04-07 | Stop reason: SDUPTHER

## 2021-04-07 RX ORDER — FENTANYL CITRATE 50 UG/ML
INJECTION, SOLUTION INTRAMUSCULAR; INTRAVENOUS PRN
Status: DISCONTINUED | OUTPATIENT
Start: 2021-04-07 | End: 2021-04-07 | Stop reason: SDUPTHER

## 2021-04-07 RX ORDER — ATROPA BELLADONNA AND OPIUM 16.2; 6 MG/1; MG/1
60 SUPPOSITORY RECTAL EVERY 8 HOURS PRN
Status: DISCONTINUED | OUTPATIENT
Start: 2021-04-07 | End: 2021-04-08 | Stop reason: HOSPADM

## 2021-04-07 RX ADMIN — ONDANSETRON HYDROCHLORIDE 4 MG: 2 INJECTION, SOLUTION INTRAMUSCULAR; INTRAVENOUS at 11:23

## 2021-04-07 RX ADMIN — FENTANYL CITRATE 100 MCG: 50 INJECTION, SOLUTION INTRAMUSCULAR; INTRAVENOUS at 11:21

## 2021-04-07 RX ADMIN — FINASTERIDE 5 MG: 5 TABLET, FILM COATED ORAL at 17:06

## 2021-04-07 RX ADMIN — FENTANYL CITRATE 50 MCG: 50 INJECTION, SOLUTION INTRAMUSCULAR; INTRAVENOUS at 11:55

## 2021-04-07 RX ADMIN — FENTANYL CITRATE 25 MCG: 50 INJECTION, SOLUTION INTRAMUSCULAR; INTRAVENOUS at 12:06

## 2021-04-07 RX ADMIN — CARVEDILOL 25 MG: 25 TABLET, FILM COATED ORAL at 21:00

## 2021-04-07 RX ADMIN — CEFTRIAXONE SODIUM 1000 MG: 1 INJECTION, POWDER, FOR SOLUTION INTRAMUSCULAR; INTRAVENOUS at 09:57

## 2021-04-07 RX ADMIN — PROPOFOL 100 MCG/KG/MIN: 10 INJECTION, EMULSION INTRAVENOUS at 11:22

## 2021-04-07 RX ADMIN — PROPOFOL 50 MG: 10 INJECTION, EMULSION INTRAVENOUS at 11:21

## 2021-04-07 RX ADMIN — SODIUM CHLORIDE: 9 INJECTION, SOLUTION INTRAVENOUS at 09:58

## 2021-04-07 RX ADMIN — SODIUM CHLORIDE, POTASSIUM CHLORIDE, SODIUM LACTATE AND CALCIUM CHLORIDE: 600; 310; 30; 20 INJECTION, SOLUTION INTRAVENOUS at 11:16

## 2021-04-07 RX ADMIN — TAMSULOSIN HYDROCHLORIDE 0.4 MG: 0.4 CAPSULE ORAL at 21:00

## 2021-04-07 RX ADMIN — Medication 1 TABLET: at 17:05

## 2021-04-07 RX ADMIN — DEXAMETHASONE SODIUM PHOSPHATE 10 MG: 10 INJECTION, SOLUTION INTRAMUSCULAR; INTRAVENOUS at 11:23

## 2021-04-07 RX ADMIN — ATORVASTATIN CALCIUM 20 MG: 20 TABLET, FILM COATED ORAL at 17:17

## 2021-04-07 RX ADMIN — FENTANYL CITRATE 25 MCG: 50 INJECTION, SOLUTION INTRAMUSCULAR; INTRAVENOUS at 12:27

## 2021-04-07 RX ADMIN — SODIUM CHLORIDE: 9 INJECTION, SOLUTION INTRAVENOUS at 14:52

## 2021-04-07 RX ADMIN — ALLOPURINOL 100 MG: 100 TABLET ORAL at 17:04

## 2021-04-07 ASSESSMENT — PAIN DESCRIPTION - PAIN TYPE: TYPE: ACUTE PAIN

## 2021-04-07 ASSESSMENT — PULMONARY FUNCTION TESTS
PIF_VALUE: 1
PIF_VALUE: 0
PIF_VALUE: 1
PIF_VALUE: 0
PIF_VALUE: 1
PIF_VALUE: 0

## 2021-04-07 ASSESSMENT — PAIN DESCRIPTION - ONSET: ONSET: ON-GOING

## 2021-04-07 ASSESSMENT — PAIN SCALES - GENERAL
PAINLEVEL_OUTOF10: 0
PAINLEVEL_OUTOF10: 2

## 2021-04-07 ASSESSMENT — PAIN DESCRIPTION - FREQUENCY: FREQUENCY: CONTINUOUS

## 2021-04-07 NOTE — ANESTHESIA PRE PROCEDURE
Department of Anesthesiology  Preprocedure Note       Name:  Susie Hernandez   Age:  77 y.o.  :  1955                                          MRN:  [id number]         Date:  2021      Surgeon: Hank Amezcua):  Belkys Malloy MD    Procedure: Procedure(s):  CYSTOSCOPY  RETROGRADE PYELOGRAM, URETHRAL DILITATION,TRANSURETHRAL RESECTION PROSTATE PLASMA BUTTON    Medications prior to admission:   Prior to Admission medications    Medication Sig Start Date End Date Taking? Authorizing Provider   lisinopril (PRINIVIL;ZESTRIL) 20 MG tablet Take 20 mg by mouth daily   Yes Historical Provider, MD   carvedilol (COREG) 25 MG tablet Take 25 mg by mouth 2 times daily   Yes Historical Provider, MD   atorvastatin (LIPITOR) 40 MG tablet Take 20 mg by mouth every evening   Yes Historical Provider, MD   empagliflozin (JARDIANCE) 25 MG tablet Take 25 mg by mouth daily   Yes Historical Provider, MD   Multiple Vitamins-Minerals (THERAPEUTIC MULTIVITAMIN-MINERALS) tablet Take 1 tablet by mouth daily   Yes Historical Provider, MD   tamsulosin (FLOMAX) 0.4 MG capsule Take 0.4 mg by mouth 2 times daily   Yes Historical Provider, MD   furosemide (LASIX) 20 MG tablet Take 20 mg by mouth daily    Yes Historical Provider, MD   allopurinol (ZYLOPRIM) 100 MG tablet Take 100 mg by mouth daily   Yes Historical Provider, MD   warfarin (COUMADIN) 3 MG tablet Take 1 tablet by mouth daily. 13  Yes Leda Mcardle, MD   aspirin 81 MG tablet Take 1 tablet by mouth daily.  13  Yes Leda Mcardle, MD       Current medications:    Current Facility-Administered Medications   Medication Dose Route Frequency Provider Last Rate Last Admin    finasteride (PROSCAR) tablet 5 mg  5 mg Oral Daily Stacy Reid MD   Stopped at 21 1002    allopurinol (ZYLOPRIM) tablet 100 mg  100 mg Oral Daily Tonio Leong DO   Stopped at 21 1001    atorvastatin (LIPITOR) tablet 20 mg  20 mg Oral QPM Tonio Leong DO   20 mg at 04/06/21 1812    carvedilol (COREG) tablet 25 mg  25 mg Oral BID Tonio Leong, DO   Stopped at 04/07/21 1002    empagliflozin (JARDIANCE) tablet TABS 25 mg  25 mg Oral Daily Tonio Leong, DO   Stopped at 04/07/21 1002    therapeutic multivitamin-minerals 1 tablet  1 tablet Oral Daily Tonio Leong DO   Stopped at 04/07/21 1003    tamsulosin (FLOMAX) capsule 0.4 mg  0.4 mg Oral BID Tonio Leong DO   Stopped at 04/07/21 1003    [START ON 4/11/2021] vitamin D (ERGOCALCIFEROL) capsule 50,000 Units  50,000 Units Oral Weekly Tonio Leong DO        0.9 % sodium chloride infusion   Intravenous Continuous Catrachito Brookfelicitas Leong DO 75 mL/hr at 04/07/21 0958 New Bag at 04/07/21 0958    cefTRIAXone (ROCEPHIN) 1,000 mg in sterile water 10 mL IV syringe  1,000 mg Intravenous Q24H Tonio Leong DO   1,000 mg at 04/07/21 7331       Allergies:  No Known Allergies    Problem List:    Patient Active Problem List   Diagnosis Code    Irregular heart rhythm I49.9    Essential hypertension I10    Abnormal hemoglobin (Hgb) (HCC) D58.2    Elevated hemoglobin (HCC) D58.2    Hyperlipidemia E78.5    Atrial fibrillation (HCC) I48.91    Edema R60.9    Bulging discs [id number]    Hematuria R31.9    Nonrheumatic tricuspid valve regurgitation I36.1    Chronic anticoagulation Z79.01       Past Medical History:        Diagnosis Date    Arrhythmia 1/2013    afib on coumadin    Atrial fibrillation ([address] 10/2012    now on Coumadin    Herniated disc 2006    s/p back surgery    Hyperlipidemia     Hypertension     LONG TERM ANTICOAGULENT USE     Obesity     CONCHITA (obstructive sleep apnea)     CPAP    Pre-diabetes 10/2012    Hgb A1c  6.0    Tobacco abuse     Vitamin D deficiency     supplimented       Past Surgical History:        Procedure Laterality Date    BACK SURGERY  2007    repair of herniated disc    CARDIOVASCULAR STRESS TEST  11/21/2006    findings of inferior and anterior ischemia     DIAGNOSTIC CARDIAC CATH LAB PROCEDURE  2006    No obstructive CAD and mild dilated cardiomyopathy with EF 45-50%       Social History:    Social History     Tobacco Use    Smoking status: Former Smoker     Packs/day: 1.00     Years: 30.00     Pack years: 30.00     Quit date: 1999     Years since quittin.2    Smokeless tobacco: Never Used   Substance Use Topics    Alcohol use: No                                Counseling given: Not Answered      Vital Signs (Current):   Vitals:    21 1142 21 1506 21 0019 21 0915   BP: 116/71 (!) 102/58 139/86 134/86   Pulse: 88 94 89 92   Resp: 16 16 16 16   Temp: 98.2 °F (36.8 °C) 98.3 °F (36.8 °C) 97.6 °F (36.4 °C) 99.2 °F (37.3 °C)   TempSrc: Temporal Temporal Temporal Temporal   SpO2: 94% 93%  95%   Weight:       Height:                                                  BP Readings from Last 3 Encounters:   21 134/86   09/04/15 (!) 126/98   12/27/15 150/89       NPO Status: Time of last liquid consumption:                         Time of last solid consumption:                         Date of last liquid consumption: 21                        Date of last solid food consumption: 21    BMI:   Wt Readings from Last 3 Encounters:   21 250 lb (113.4 kg)   09/04/15 244 lb (110.7 kg)   05/15/13 259 lb (117.5 kg)     Body mass index is 39.16 kg/m².     CBC:   Lab Results   Component Value Date    WBC 12.2 2021    RBC 4.75 2021    HGB 13.5 2021    HCT 42.4 2021    MCV 89.3 2021    RDW 13.4 2021     2021       CMP:   Lab Results   Component Value Date     2021    K 4.4 2021    K 4.2 2021     2021    CO2 19 2021    BUN 20 2021    CREATININE 1.2 2021    GFRAA >60 2021    LABGLOM >60 2021    GLUCOSE 256 2021    PROT 6.8 2021    CALCIUM 8.5 2021    BILITOT 0.4 2021 ALKPHOS 85 04/02/2021    AST 26 04/02/2021    ALT 36 04/02/2021       POC Tests: No results for input(s): POCGLU, POCNA, POCK, POCCL, POCBUN, POCHEMO, POCHCT in the last 72 hours. Coags:   Lab Results   Component Value Date    PROTIME 13.4 04/07/2021    PROTIME 27.3 05/15/2013    INR 1.2 04/07/2021    APTT 42.6 04/02/2021       HCG (If Applicable): No results found for: PREGTESTUR, PREGSERUM, HCG, HCGQUANT     ABGs: No results found for: PHART, PO2ART, GWM2PIB, NCN4DQB, BEART, B4GPVZIY     Type & Screen (If Applicable):  No results found for: LABABO, LABRH    Drug/Infectious Status (If Applicable):  No results found for: HIV, HEPCAB    COVID-19 Screening (If Applicable): No results found for: COVID19        Anesthesia Evaluation  Patient summary reviewed no history of anesthetic complications:   Airway: Mallampati: III  TM distance: >3 FB   Neck ROM: full  Mouth opening: > = 3 FB Dental:    (+) upper dentures and lower dentures      Pulmonary: breath sounds clear to auscultation  (+) sleep apnea: on CPAP,      Smoker: former smoker. Cardiovascular:    (+) hypertension:, dysrhythmias: atrial fibrillation,       ECG reviewed  Rhythm: regular  Rate: normal    Stress test reviewed             ROS comment: Stress Test 2015: non-ischemic    EKG 4/2021: A-fib     Neuro/Psych:   (+) neuromuscular disease (s/p back surgery):,             GI/Hepatic/Renal:             Endo/Other:    (+) DiabetesType II DM, , blood dyscrasia: anticoagulation therapy:., .                 Abdominal:   (+) obese,         Vascular:                                      Anesthesia Plan      MAC     ASA 3       Induction: intravenous. Anesthetic plan and risks discussed with patient.                   [id number] CHAUDHRY  4/7/21

## 2021-04-07 NOTE — BRIEF OP NOTE
Brief Postoperative Note      Patient: Aurora Hong  YOB: 1955  MRN: [id number]    Date of Procedure: 4/7/2021    Pre-Op Diagnosis: Urinary retention due to bladder obstruction and BPH. Gross hematuria from bladder irritation by the Frazier catheter. Assess for upper tract cause for hematuria  Post-Op Diagnosis: Unable to see the ureteral orifices unable to do retrograde pyelograms. Narrow fossa navicularis. Patulous prostatic fossa. Trabeculated thickened bladder with diverticular formation. 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 Tumor was approximately 3 cm in depth and the tumor was 2 cm in width without satellites was a papillary lesion without solid components       Procedure. Cystopanendoscopy urethral dilatation attempted bilateral retrograde pyelograms biopsy and fulguration of a 2 cm tumor and a 3 cm diverticulum.   Surgeon(s):  Semaj Johnson MD    Assistant:  None    Anesthesia: Monitor Anesthesia Care and a B and O suppository at the end of the case  Estimated Blood Loss (mL):     Complications: Patient had either a long phallus or a partial erection I tried to decompress the erection with 0.1 cc of epinephrine 1-100,000  There was a bleeder at the base of the tumor in the diverticulum I had difficulty controlling this eventually it stopped bleeding after extensive fulguration    Specimens: Cold cut and cautery specimens of a large papillary tumor  Blood loss: 20 cc    Implants:  None      Drains:   Urethral Catheter (Active)   Catheter Indications Urology/Urologist seeing this patient or inserted indwelling catheter 04/02/21 1114   Site Assessment No urethral drainage 04/07/21 0000   Urine Color Lisa 04/07/21 0600   Urine Appearance Sediment 04/07/21 0600   Output (mL) 400 mL 04/07/21 0600       Findings: As above    Electronically signed by Semaj Johnson MD on 4/7/2021 at 8:14 AM

## 2021-04-07 NOTE — PROGRESS NOTES
04/02/2021    AST 26 04/02/2021    ALT 36 04/02/2021     Magnesium:  No results found for: MG  Phosphorus:  No results found for: PHOS  PT/INR:    Lab Results   Component Value Date    PROTIME 13.4 04/07/2021    PROTIME 27.3 05/15/2013    INR 1.2 04/07/2021     Last 3 Troponin:  No results found for: TROPONINI  U/A:    Lab Results   Component Value Date    COLORU RED 04/02/2021    PHUR 7.0 04/02/2021    WBCUA 1-3 04/02/2021    RBCUA PACKED 04/02/2021    RBCUA NONE 02/22/2013    BACTERIA RARE 04/02/2021    CLARITYU TURBID 04/02/2021    SPECGRAV 1.015 04/02/2021    LEUKOCYTESUR SMALL 04/02/2021    UROBILINOGEN 1.0 04/02/2021    BILIRUBINUR Negative 04/02/2021    BLOODU LARGE 04/02/2021    GLUCOSEU >=1000 04/02/2021     HgBA1c:  No components found for: HGBA1C  TSH:    Lab Results   Component Value Date    TSH 0.970 10/17/2012     -----------------------------------------------------------------    Objective:   Vitals: /86   Pulse 89   Temp 97.6 °F (36.4 °C) (Temporal)   Resp 16   Ht 5' 7\" (1.702 m)   Wt 250 lb (113.4 kg)   SpO2 93%   BMI 39.16 kg/m²   General appearance: alert, appears stated age and cooperative  Skin: Skin color, texture, turgor normal. No rashes or lesions  HEENT: Head: Normal, normocephalic, atraumatic.   Neck: no adenopathy, no carotid bruit, no JVD, supple, symmetrical, trachea midline and thyroid not enlarged, symmetric, no tenderness/mass/nodules  Lungs: clear to auscultation bilaterally  Heart: regular rate and rhythm, S1, S2 normal, no murmur, click, rub or gallop  Abdomen: soft, non-tender; bowel sounds normal; no masses,  no organomegaly  Extremities: extremities normal, atraumatic, no cyanosis or edema  Neurologic: Mental status: Alert, oriented, thought content appropriate    Assessment:   Principal Problem:    Hematuria  Active Problems:    Essential hypertension    Hyperlipidemia    Nonrheumatic tricuspid valve regurgitation    Chronic anticoagulation  Resolved Problems: * No resolved hospital problems. *    Plan:   1. To OR today for TURP procedure. 2. Will follow.     [address] [facility] [address], D.O.  9:12 AM  4/7/2021

## 2021-04-08 VITALS
HEIGHT: 67 IN | OXYGEN SATURATION: 94 % | WEIGHT: 250 LBS | HEART RATE: 81 BPM | DIASTOLIC BLOOD PRESSURE: 72 MMHG | RESPIRATION RATE: 16 BRPM | BODY MASS INDEX: 39.24 KG/M2 | SYSTOLIC BLOOD PRESSURE: 117 MMHG | TEMPERATURE: 97.6 F

## 2021-04-08 PROBLEM — Z98.890 S/P BLADDER TUMOR EXCISION WITH FULGURATION: Status: ACTIVE | Noted: 2021-04-08

## 2021-04-08 PROBLEM — N31.9 NEUROGENIC BLADDER: Status: ACTIVE | Noted: 2021-04-08

## 2021-04-08 LAB
BASOPHILS ABSOLUTE: 0.01 E9/L (ref 0–0.2)
BASOPHILS RELATIVE PERCENT: 0.1 % (ref 0–2)
EOSINOPHILS ABSOLUTE: 0 E9/L (ref 0.05–0.5)
EOSINOPHILS RELATIVE PERCENT: 0 % (ref 0–6)
HCT VFR BLD CALC: 44.1 % (ref 37–54)
HEMOGLOBIN: 14.1 G/DL (ref 12.5–16.5)
IMMATURE GRANULOCYTES #: 0.09 E9/L
IMMATURE GRANULOCYTES %: 0.7 % (ref 0–5)
INR BLD: 1.1
LYMPHOCYTES ABSOLUTE: 1.2 E9/L (ref 1.5–4)
LYMPHOCYTES RELATIVE PERCENT: 9.6 % (ref 20–42)
MCH RBC QN AUTO: 28.8 PG (ref 26–35)
MCHC RBC AUTO-ENTMCNC: 32 % (ref 32–34.5)
MCV RBC AUTO: 90.2 FL (ref 80–99.9)
MONOCYTES ABSOLUTE: 0.84 E9/L (ref 0.1–0.95)
MONOCYTES RELATIVE PERCENT: 6.7 % (ref 2–12)
NEUTROPHILS ABSOLUTE: 10.35 E9/L (ref 1.8–7.3)
NEUTROPHILS RELATIVE PERCENT: 82.9 % (ref 43–80)
PDW BLD-RTO: 13.5 FL (ref 11.5–15)
PLATELET # BLD: 262 E9/L (ref 130–450)
PMV BLD AUTO: 11.4 FL (ref 7–12)
PROTHROMBIN TIME: 12.4 SEC (ref 9.3–12.4)
RBC # BLD: 4.89 E12/L (ref 3.8–5.8)
WBC # BLD: 12.5 E9/L (ref 4.5–11.5)

## 2021-04-08 PROCEDURE — 97165 OT EVAL LOW COMPLEX 30 MIN: CPT

## 2021-04-08 PROCEDURE — 85610 PROTHROMBIN TIME: CPT

## 2021-04-08 PROCEDURE — 6370000000 HC RX 637 (ALT 250 FOR IP): Performed by: UROLOGY

## 2021-04-08 PROCEDURE — 2580000003 HC RX 258: Performed by: UROLOGY

## 2021-04-08 PROCEDURE — 97161 PT EVAL LOW COMPLEX 20 MIN: CPT

## 2021-04-08 PROCEDURE — 85025 COMPLETE CBC W/AUTO DIFF WBC: CPT

## 2021-04-08 PROCEDURE — 6360000002 HC RX W HCPCS: Performed by: UROLOGY

## 2021-04-08 PROCEDURE — 97530 THERAPEUTIC ACTIVITIES: CPT

## 2021-04-08 PROCEDURE — 94660 CPAP INITIATION&MGMT: CPT

## 2021-04-08 PROCEDURE — 36415 COLL VENOUS BLD VENIPUNCTURE: CPT

## 2021-04-08 PROCEDURE — 97535 SELF CARE MNGMENT TRAINING: CPT

## 2021-04-08 RX ORDER — PHENAZOPYRIDINE HYDROCHLORIDE 200 MG/1
200 TABLET, FILM COATED ORAL 2 TIMES DAILY PRN
Qty: 10 TABLET | Refills: 2 | Status: ON HOLD
Start: 2021-04-08 | End: 2021-04-22 | Stop reason: HOSPADM

## 2021-04-08 RX ORDER — HYDROCODONE BITARTRATE AND ACETAMINOPHEN 5; 325 MG/1; MG/1
1 TABLET ORAL EVERY 4 HOURS PRN
Qty: 10 TABLET | Refills: 0 | Status: SHIPPED | OUTPATIENT
Start: 2021-04-08 | End: 2021-04-15

## 2021-04-08 RX ORDER — TAMSULOSIN HYDROCHLORIDE 0.4 MG/1
0.4 CAPSULE ORAL DAILY
Qty: 10 CAPSULE | Refills: 1 | Status: ON HOLD | OUTPATIENT
Start: 2021-04-08 | End: 2021-04-22 | Stop reason: HOSPADM

## 2021-04-08 RX ORDER — CEPHALEXIN 500 MG/1
500 CAPSULE ORAL 2 TIMES DAILY
Qty: 10 CAPSULE | Refills: 1 | Status: SHIPPED | OUTPATIENT
Start: 2021-04-08 | End: 2021-04-13

## 2021-04-08 RX ADMIN — ALLOPURINOL 100 MG: 100 TABLET ORAL at 08:58

## 2021-04-08 RX ADMIN — Medication 1 TABLET: at 08:58

## 2021-04-08 RX ADMIN — FINASTERIDE 5 MG: 5 TABLET, FILM COATED ORAL at 08:58

## 2021-04-08 RX ADMIN — TAMSULOSIN HYDROCHLORIDE 0.4 MG: 0.4 CAPSULE ORAL at 08:58

## 2021-04-08 RX ADMIN — CEFTRIAXONE SODIUM 1000 MG: 1 INJECTION, POWDER, FOR SOLUTION INTRAMUSCULAR; INTRAVENOUS at 08:57

## 2021-04-08 RX ADMIN — CARVEDILOL 25 MG: 25 TABLET, FILM COATED ORAL at 08:58

## 2021-04-08 ASSESSMENT — PAIN SCALES - GENERAL: PAINLEVEL_OUTOF10: 0

## 2021-04-08 NOTE — PLAN OF CARE
Problem: Falls - Risk of:  Goal: Will remain free from falls  Description: Will remain free from falls  4/8/2021 1353 by Oly Rodriguez RN  Outcome: Completed  4/8/2021 1017 by Oly Rodriguez RN  Outcome: Met This Shift  Goal: Absence of physical injury  Description: Absence of physical injury  4/8/2021 1353 by Oly Rodriguez RN  Outcome: Completed  4/8/2021 1017 by Oly Rodriguez RN  Outcome: Met This Shift     Problem: Urinary Retention:  Goal: Urinary elimination within specified parameters  Description: Urinary elimination within specified parameters  4/8/2021 1353 by Oly Rodriguez RN  Outcome: Completed  4/8/2021 1017 by Oly Rodriguez RN  Outcome: Met This Shift  Goal: Able to perform urinary catheter care  Description: Able to perform urinary catheter care  4/8/2021 1353 by Oly Rodriguez RN  Outcome: Completed  4/8/2021 1017 by Oly Rodriguez RN  Outcome: Met This Shift  Goal: Able to perform urinary self-catheterization  Description: Able to perform urinary self-catheterization  4/8/2021 1353 by Oly Rodriguez RN  Outcome: Completed  4/8/2021 1017 by Oly Rodriguez RN  Outcome: Met This Shift  Goal: Ability to reestablish a normal urinary elimination pattern will improve - after catheter removal  Description: Ability to reestablish a normal urinary elimination pattern will improve - after catheter removal  4/8/2021 1353 by Oly Rodriguez RN  Outcome: Completed  4/8/2021 1017 by Oly Rodriguez RN  Outcome: Met This Shift  Goal: Ability to recognize the need to void and respond appropriately will improve  Description: Ability to recognize the need to void and respond appropriately will improve  4/8/2021 1353 by Oly Rodriguez RN  Outcome: Completed  4/8/2021 1017 by Oly Rodriguez RN  Outcome: Met This Shift  Goal: Absence of postvoid residual urine  Description: Absence of postvoid residual urine  4/8/2021 1353 by Oly Rodriguez RN  Outcome: Completed  4/8/2021 1017 by Oly Rodriguez RN  Outcome: Met This Shift Problem:  Activity:  Goal: Ability to tolerate increased activity will improve  Description: Ability to tolerate increased activity will improve  4/8/2021 1353 by Renetta Grover RN  Outcome: Completed  4/8/2021 1017 by Renetta Grover RN  Outcome: Met This Shift  Goal: Expression of feelings of increased energy will increase  Description: Expression of feelings of increased energy will increase  4/8/2021 1353 by Renetta Grover RN  Outcome: Completed  4/8/2021 1017 by Renetta Grover RN  Outcome: Met This Shift     Problem: Cardiac:  Goal: Ability to maintain an adequate cardiac output will improve  Description: Ability to maintain an adequate cardiac output will improve  4/8/2021 1353 by Renetta Grover RN  Outcome: Completed  4/8/2021 1017 by Renetta Grover RN  Outcome: Met This Shift  Goal: Complications related to the disease process, condition or treatment will be avoided or minimized  Description: Complications related to the disease process, condition or treatment will be avoided or minimized  4/8/2021 1353 by Renetta Grover RN  Outcome: Completed  4/8/2021 1017 by Renetta Grover RN  Outcome: Met This Shift     Problem: Coping:  Goal: Level of anxiety will decrease  Description: Level of anxiety will decrease  4/8/2021 1353 by Renetta Grover RN  Outcome: Completed  4/8/2021 1017 by Renetta Grover RN  Outcome: Met This Shift  Goal: General experience of comfort will improve  Description: General experience of comfort will improve  4/8/2021 1353 by Renetta Grover RN  Outcome: Completed  4/8/2021 1017 by Renetta Grover RN  Outcome: Met This Shift     Problem: Health Behavior:  Goal: Ability to manage health-related needs will improve  Description: Ability to manage health-related needs will improve  4/8/2021 1353 by Renetta Grover RN  Outcome: Completed  4/8/2021 1017 by Renetta Grover RN  Outcome: Met This Shift     Problem: Safety:  Goal: Ability to remain free from injury will improve  Description: Ability to remain free from

## 2021-04-08 NOTE — PROGRESS NOTES
Called answering service for Dr Kj Hsu regarding the pt restarting coumadin. Awaiting response or orders.

## 2021-04-08 NOTE — PROGRESS NOTES
4/8/2021 11:58 AM  Service: Urology  Group: [facility] ([facility])    Karis Rashid  [id number]    Subjective: The patient tolerated the procedure well  He tolerated the Frazier  Urine is clear  I described the bladder tumor with him and the fact that his prostatic fossa looked patulous    Review of Systems  Respiratory: negative  Cardiovascular: negative  Gastrointestinal: negative  Hematologic/lymphatic: negative  Musculoskeletal:negative  Neurological: negative  Endocrine: negative    Scheduled Meds:   sodium chloride flush  5-40 mL Intravenous 2 times per day    finasteride  5 mg Oral Daily    allopurinol  100 mg Oral Daily    atorvastatin  20 mg Oral QPM    carvedilol  25 mg Oral BID    empagliflozin  25 mg Oral Daily    therapeutic multivitamin-minerals  1 tablet Oral Daily    tamsulosin  0.4 mg Oral BID    [START ON 4/11/2021] vitamin D  50,000 Units Oral Weekly    cefTRIAXone (ROCEPHIN) IV  1,000 mg Intravenous Q24H       Objective:  Vitals:    04/08/21 0745   BP: 117/72   Pulse: 81   Resp: 16   Temp: 97.6 °F (36.4 °C)   SpO2: 94%         Allergies: Patient has no known allergies. General Appearance: alert and oriented to person, place and time and in no acute distress  Skin: no rash or erythema  Head: normocephalic and atraumatic  Pulmonary/Chest: clear to auscultation bilaterally- no wheezes, rales or rhonchi, normal air movement, no respiratory distress and no chest wall tenderness  Abdomen: soft, non-tender, non-distended, normal bowel sounds, no masses or organomegaly and no inguinal adenopathy  Genitalia: No penile or scrotal swelling or masses. Extremities: no cyanosis, clubbing or edema and Angelo's sign negative bilaterally      Frazier well positioned and draining clear urine.     Labs:     Recent Labs     04/06/21 1958      K 4.4   *   CO2 19*   BUN 20   CREATININE 1.2   GLUCOSE 256*   CALCIUM 8.5*       Lab Results   Component Value Date    HGB 14.1 04/08/2021    HCT 44.1

## 2021-04-08 NOTE — OP NOTE
[address]                  [address] [place],  [facility]                                OPERATIVE REPORT    PATIENT NAME: Catalina Walters                      :        1955  MED REC NO:   [id number]                            ROOM:       5208  ACCOUNT NO:   [de-identified]                           ADMIT DATE: 2021  PROVIDER:     Apolonia Bingham MD    DATE OF PROCEDURE:  2021    SURGEON:  Apolonia Bingham MD    PREOPERATIVE DIAGNOSES:  Urinary retention, gross hematuria, suspected  bladder outlet obstruction due to BPH. POSTOPERATIVE DIAGNOSES:  1. Unable to do retrograde pyelogram.  2.  Narrow fossa navicularis. 3.  Patulous prostatic fossa, suspected neurogenic bladder. 4.  Thickened trabeculated bladder with diverticular formation. 5.  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 The tumor was  approximately 2 cm in width and 3 cm depth, diverticulum without  satellites, no solid components. PROCEDURES PERFORMED:  Cystopanendoscopy, urethral dilatation, attempted  bilateral retrograde pyelogram with biopsy and fulguration of 2 cm  papillary tumor and a 3 cm diverticulum on left side of the bladder. BLOOD LOSS:  20 mL. ANESTHESIA:  Monitored sedation and B and O suppository at the end of  the case. COMPLICATIONS:  The patient had a long phallus with partial erection  that I decompressed with 1.1 mL of epinephrine. I had difficulty to get  the scopes into the bladder and diverticulum due to the long phallus. There was a bleeder at the base of the tumor that had difficulty  controlling and eventually stopped it after extensive fulguration. SPECIMEN:  Cold cut and cautery specimen of the large papillary tumor. IMPLANTS:  None. DRAIN:  A 24-Japanese Simplastic catheter. FINDINGS:  As above.     DESCRIPTION OF PROCEDURE:  The timeout was read by me, the Anesthesia,  and operating staff; reviewed history and physical, allergy and  medications. The patient was on Rocephin. No additional antibiotics  were given. Placed in lithotomy position, prepped and draped in the  usual fashion. He had edema of the ventral penis at the start of the  case and there was some penis scrotal edema to a mild degree. The  scrotum was pendulous due to some residual edema. There were no  testicular abnormalities. After he was prepped and draped, a #21  panendoscope and obturator inserted into the urethra. No strictures,  false passages, abrasions, ulcerations, cystic or solid lesions. Verumontanum was intact. Incomplete obstruction of the lateral lobes in  the midline. No median lobe hypertrophy. No bladder urine sent for  culture. The bladder showed extreme trabeculation. There was a  diverticulum on the right posterior aspect of the bladder, not as deep,  maybe 1 cm in depth. There was a diverticulum in the area of the left  trigone, but I could see a papillary tumor inside this, it was estimated  2 cm in greatest dimension, it had no solid component. This very well  may have been the source of the patient's gross hematuria. I used a 3M  drape and I was able to do a rectal and the patient's prostate was  estimated at 25 gm size, normal is 15 to 20. This was not an unusual  large gland. I think this patient has a neurogenic bladder. I directed  my attention to the bladder diverticulum to accommodate the  instrumentation. I dilated the narrow fossa navicularis with male  sounds 22 through 30-Armenian. I had some difficulty getting the scopes  into even the diverticulum and I eventually used epinephrine 1:100,000  just 1/10th of the mL mixed into the intercostal area. There did not  appear to be much reduction in penile length. The penis had a semirigid  feel, but it was a very long phallus.   I eventually got into the  diverticulum with a continuous flow plasma loop system, and I decided  before doing this, I would do some cold cut biopsies of this, and I  eventually removed this system, placed a cystoscope and flexible biopsy  forceps into the diverticulum. Grabbed some papillary pieces, so there  would be no cautery effect on the specimen. Then I replaced the system  again with the plasma loop system, and I set the cautery and cutting  setting of 50:50, and I was able to remove much of the tumor and  fulgurate the base. When I got to the apex of the tumor in about the 12  o'clock position of the diverticulum posteriorly, there was a vein and I  tried to cauterize that would not stop bleeding and I eventually thought  I would have to try to cauterize this with a VaporTrode loop, but I  could not fully get the rollerball system into the diverticulum or to  the base. The bleeder stopped, and at the end of the case, there was no  bleeding. Careful examination of the bladder again showed no evidence  of any other tumors and the small diverticula was seen. There were no  other lesions. Again, I looked through both ureteral orifices. I could  not see there was edema at the base of bladder and I abandoned attempted  retrograde pyelogram.  The bladder capacity was over 300 mL, was normal.  There was no cystitis cystica or extrinsic imprints and there was no  sign of enterovesical fistula. The 24-Setswana Simplastic catheter was  inserted with 60 mL of water in a 30 mL balloon. B and O suppository  was inserted. Again, the prostate felt 25 gm size, normal is 15 to 20. He was sent to Recovery in satisfactory condition with a continuous flow  system. Hopefully, if he has papillary tumor low-grade, he will be treated with  gemcitabine with subsequent cystoscopy, and assessment of the  diverticulum, diverticulectomy could be in the future. This is a bad  bladder I think, if he does not void well. Once his clot retention has  been resolved, he very well may need to learn self-cath.         Mikie Hager MD    D: 04/07/2021 13:20:58       T: 04/07/2021 17:27:19     [email or web address]  Job#: [id number]     Doc#: [id number]    CC:  Leydi Arshad MD

## 2021-04-08 NOTE — PLAN OF CARE
Problem: Falls - Risk of:  Goal: Will remain free from falls  Description: Will remain free from falls  4/8/2021 1017 by Nabor Shepherd RN  Outcome: Met This Shift  4/7/2021 2325 by Tracy Kate RN  Outcome: Met This Shift  4/7/2021 2101 by Rad Hargrove RN  Outcome: Met This Shift  Goal: Absence of physical injury  Description: Absence of physical injury  4/8/2021 1017 by Nabor Shepherd RN  Outcome: Met This Shift  4/7/2021 2325 by Tracy Kate RN  Outcome: Met This Shift  4/7/2021 2101 by Rad Hargrove RN  Outcome: Met This Shift     Problem: Urinary Retention:  Goal: Urinary elimination within specified parameters  Description: Urinary elimination within specified parameters  Outcome: Met This Shift  Goal: Able to perform urinary catheter care  Description: Able to perform urinary catheter care  Outcome: Met This Shift  Goal: Able to perform urinary self-catheterization  Description: Able to perform urinary self-catheterization  Outcome: Met This Shift  Goal: Ability to reestablish a normal urinary elimination pattern will improve - after catheter removal  Description: Ability to reestablish a normal urinary elimination pattern will improve - after catheter removal  Outcome: Met This Shift  Goal: Ability to recognize the need to void and respond appropriately will improve  Description: Ability to recognize the need to void and respond appropriately will improve  Outcome: Met This Shift  Goal: Absence of postvoid residual urine  Description: Absence of postvoid residual urine  Outcome: Met This Shift     Problem:  Activity:  Goal: Ability to tolerate increased activity will improve  Description: Ability to tolerate increased activity will improve  4/8/2021 1017 by Nabor Shepherd RN  Outcome: Met This Shift  4/7/2021 2325 by Tracy Kate RN  Outcome: Ongoing  4/7/2021 2101 by Rad Hargrove RN  Outcome: Ongoing  Goal: Expression of feelings of increased energy will increase  Description: Expression of feelings of increased energy will increase  4/8/2021 1017 by Savita Lombardi RN  Outcome: Met This Shift  4/7/2021 2325 by Celine Hernandes RN  Outcome: Ongoing  4/7/2021 2101 by Fallon Orlando RN  Outcome: Ongoing     Problem: Cardiac:  Goal: Ability to maintain an adequate cardiac output will improve  Description: Ability to maintain an adequate cardiac output will improve  4/8/2021 1017 by Savita Lombardi RN  Outcome: Met This Shift  4/7/2021 2101 by Fallon Orlando RN  Outcome: Met This Shift  Goal: Complications related to the disease process, condition or treatment will be avoided or minimized  Description: Complications related to the disease process, condition or treatment will be avoided or minimized  4/8/2021 1017 by Savita Lombardi RN  Outcome: Met This Shift  4/7/2021 2101 by Fallon Orlando RN  Outcome: Met This Shift     Problem: Coping:  Goal: Level of anxiety will decrease  Description: Level of anxiety will decrease  4/8/2021 1017 by Savita Lombardi RN  Outcome: Met This Shift  4/7/2021 2325 by Celine Hernandes RN  Outcome: Met This Shift  4/7/2021 2101 by Fallon Orlando RN  Outcome: Met This Shift  Goal: General experience of comfort will improve  Description: General experience of comfort will improve  4/8/2021 1017 by Savita Lombardi RN  Outcome: Met This Shift  4/7/2021 2101 by Fallon Orlando RN  Outcome: Met This Shift     Problem: Health Behavior:  Goal: Ability to manage health-related needs will improve  Description: Ability to manage health-related needs will improve  4/8/2021 1017 by Savita Lombardi RN  Outcome: Met This Shift  4/7/2021 2101 by Fallon Orlando RN  Outcome: Ongoing     Problem: Safety:  Goal: Ability to remain free from injury will improve  Description: Ability to remain free from injury will improve  4/8/2021 1017 by Savita Lombardi RN  Outcome: Met This Shift  4/7/2021 2101 by Fallon Orlando RN  Outcome: Met This Shift  Goal: Will show no signs and symptoms of excessive bleeding  Description: Will show no signs and symptoms of excessive bleeding  4/8/2021 1017 by Miguel Anthony RN  Outcome: Met This Shift  4/7/2021 2101 by Lang Lam RN  Outcome: Met This Shift     Problem: Pain:  Goal: Pain level will decrease  Description: Pain level will decrease  4/8/2021 1017 by Miguel Anthony RN  Outcome: Met This Shift  4/7/2021 2325 by Bautista Zhao RN  Outcome: Met This Shift  4/7/2021 2101 by Lang Lam RN  Outcome: Met This Shift  Goal: Control of acute pain  Description: Control of acute pain  4/8/2021 1017 by Miguel Anthony RN  Outcome: Met This Shift  4/7/2021 2325 by Bautista Zhao RN  Outcome: Met This Shift  4/7/2021 2101 by Lang Lam RN  Outcome: Met This Shift  Goal: Control of chronic pain  Description: Control of chronic pain  4/8/2021 1017 by Miguel Anthony RN  Outcome: Met This Shift  4/7/2021 2325 by Bautista Zhao RN  Outcome: Met This Shift  4/7/2021 2101 by Lang Lam RN  Outcome: Met This Shift

## 2021-04-08 NOTE — PROGRESS NOTES
OCCUPATIONAL THERAPY INITIAL EVALUATION      Date:2021  Patient Name: Eliana Lowery  MRN: [id number]  : 1955  Room: [address] [place] [address]  Referring Provider:  Valerie Angeles DO    Evaluating OT: Jacob Byrd OTR/L   License #  [id number]     AM-PAC Daily Activity Raw Score:     Recommended Adaptive Equipment:  A.E. issued    Diagnosis: Hematuria    Surgery:  21: TURP  Pertinent Medical History:   Past Medical History:   Diagnosis Date    Arrhythmia 2013    afib on coumadin    Atrial fibrillation ([facility] [address] 10/2012    now on Coumadin    Herniated disc 2006    s/p back surgery    Hyperlipidemia     Hypertension     LONG TERM ANTICOAGULENT USE     Obesity     CONCHITA (obstructive sleep apnea)     CPAP    Pre-diabetes 10/2012    Hgb A1c  6.0    Tobacco abuse     Vitamin D deficiency     supplimented      Precautions:  Falls, B hearing aids, cummings cath. Home Living: Pt lives alone in a 1 story with 1+1steps to enter and post/HR. Bathroom setup: walk in shower, std. commode   Equipment owned: multiple ww, sp cane, shower chair    Prior Level of Function: Ind. with ADLs , Ind. with IADLs; ambulated ww in the home, sp cane communtiy  Driving: [employer]  Occupation: retired     Pain Level: no pain  Cognition: A&O: 4/4; Follows 2 step directions   Memory:  good   Sequencing:  good   Problem solving:  good   Judgement/safety:  Fair/good     Functional Assessment:   Initial Eval Status  Date: 2021 Treatment Status  Date: Short Term Goals = Long Term Goals  Treatment frequency: 3-5 days   Feeding Ind. Grooming Set up seated  Modified [place]    UB Dressing SBA  Modified [place]    LB Dressing Max A to jones B socks/shoes seated in bedside chair Pt. Issued A.E. (reacher, sock aid & shoe horn) to improve Ind. with dressing Modified [place]    Bathing Moderate Assist Pt.  Issued LH sponge to improve Ind. with LB bathing Modified [place]    Toileting zoila MCADAMS cath.  Modified [place]    Bed Mobility  Supine to sit: NT   Sit to supine:NT  Supine to sit: Modified [place]   Sit to supine: Modified [place]    Functional Transfers SBA with sit <> stand, SPT using ww  Modified [place]    Functional Mobility SBA with ww ~household distances  Modified [place]    Balance Sitting:     Static:  Sup    Dynamic:SBA  Standing: SBA     Activity Tolerance Fair with lt./mod. ax.  spO2 & HR remained WFL throughout  Good with mod. ax. Visual/  Perceptual Glasses: yes        Safety Awareness Fair/good                    good     Hand dominance: R     Strength ROM Additional Info:    RUE  4+/5  WFL good  and wfl FMC/dexterity noted during ADL tasks     LUE 4+/5  WFL good  and wfl FMC/dexterity noted during ADL tasks       Hearing: WFL   Sensation:  Pt. c/o no numbness/ tingling B UE  Tone: WFL   Edema: none noted B UE                            Comments: Upon arrival, patient seated, cleared by Nursing, agreeable to OT. Pt demonstrating fair+ understanding of education/techniques, requiring additional training / education. At end of session, patient returned to bedside chair, all needs met, RN notified, with call light and phone within reach, all lines and tubes intact. Pt would benefit from continued skilled OT to increase safety and independence with completion of ADL/iADL tasks, functional transfers/mobility to improve independence and quality of life.     Treatment:  OT services provided include: facilitation of safe transfers/functional mobility/ ADLs, skilled monitoring of vitals & pt.'s response to treatment, instruction/training on safe & adapted techniques within precautions for completion of ADLs, issued/ instructed A.E. (reacher, sock aide, shoe horn, sponge) proper posture and/or positioning, facilitation of functional seated & standing tolerance & balance ax. during ADLs and functional ax., skilled cuing on hand placement & proper body mechanics during functional transfer/mobility training with focus on safety, technique, precautions. Pt.  also Instructed RE: safe transfers/mobility, ADL training, role of OT, E.C. techniques, treatment plan, recs. , prec. Eval Complexity: Low    Assessment of current deficits:    Functional mobility [x]  ADLs [x] Strength [x]  Cognition []  Functional transfers  [x] IADLs [x] Safety Awareness [x]  Endurance [x]  Fine Motor Coordination [] Balance [x] Vision/perception [] Sensation []   Gross Motor Coordination [] ROM [] Delirium []                  Motor Control []   Plan of Care: 1-3 days/week for 1-2 weeks PRN   ADL retraining/adapted techniques and AE recommendations to increase functional independence within precautions                    Energy conservation techniques to improve tolerance for selfcare routine   Functional transfer/mobility training/DME recommendations for increased independence, safety and fall prevention         Patient/family education to increase safety and functional independence             Environmental modifications for safe mobility and completion of ADLs                             Therapeutic activity to improve functional performance during ADLs. Therapeutic exercise to improve tolerance and functional strength for ADLs   Balance retraining/tolerance tasks for facilitation of postural control with dynamic challenges during ADLs                     Positioning to improve functional independence    Rehab Potential:  Good for established goals     Patient / Family Goal: to return home soon      Patient and/or family were instructed on functional diagnosis, prognosis/goals and OT plan of care. Demonstrated fair+ understanding.      Eval Complexity: Low      Time In: 13:15  Time Out: 13:38  Total Treatment Time: 13    Min Units   OT Eval Low 71602  x     OT Eval Medium [id number]      OT Eval High [id number]       OT Re-Eval [id number]       Therapeutic Ex [id number]

## 2021-04-08 NOTE — PROGRESS NOTES
General Progress Note  4/8/2021 9:27 AM  Subjective:   Admit Date: 4/2/2021  PCP: Huey Leong,   Interval History: patient awake and alert. Urine clear of blood at this time. Cystoscopy results noted. Wanting to go home. Will ask Urology about restarting oral anti-coagulation (Coumadin). Diet: DIET CARB CONTROL;  Pain is:None  Nausea:None  Bowel Movement/Flatus yes    Data:   Scheduled Meds:   sodium chloride flush  5-40 mL Intravenous 2 times per day    finasteride  5 mg Oral Daily    allopurinol  100 mg Oral Daily    atorvastatin  20 mg Oral QPM    carvedilol  25 mg Oral BID    empagliflozin  25 mg Oral Daily    therapeutic multivitamin-minerals  1 tablet Oral Daily    tamsulosin  0.4 mg Oral BID    [START ON 4/11/2021] vitamin D  50,000 Units Oral Weekly    cefTRIAXone (ROCEPHIN) IV  1,000 mg Intravenous Q24H     Continuous Infusions:   sodium chloride      sodium chloride 75 mL/hr at 04/07/21 1452     PRN Meds:sodium chloride flush, sodium chloride, opium-belladonna  I/O last 3 completed shifts: In: 960 [P.O.:360; I.V.:600]  Out: 3617 [Urine:6625]  No intake/output data recorded.     Intake/Output Summary (Last 24 hours) at 4/8/2021 0927  Last data filed at 4/8/2021 0532  Gross per 24 hour   Intake 960 ml   Output 6625 ml   Net -5665 ml       CBC with Differential:    Lab Results   Component Value Date    WBC 12.5 04/08/2021    RBC 4.89 04/08/2021    HGB 14.1 04/08/2021    HCT 44.1 04/08/2021     04/08/2021    MCV 90.2 04/08/2021    MCH 28.8 04/08/2021    MCHC 32.0 04/08/2021    RDW 13.5 04/08/2021    SEGSPCT 61 02/22/2013    LYMPHOPCT 9.6 04/08/2021    MONOPCT 6.7 04/08/2021    BASOPCT 0.1 04/08/2021    MONOSABS 0.84 04/08/2021    LYMPHSABS 1.20 04/08/2021    EOSABS 0.00 04/08/2021    BASOSABS 0.01 04/08/2021     CMP:    Lab Results   Component Value Date     04/06/2021    K 4.4 04/06/2021    K 4.2 04/02/2021     04/06/2021    CO2 19 04/06/2021    BUN 20 04/06/2021 CREATININE 1.2 04/06/2021    GFRAA >60 04/06/2021    LABGLOM >60 04/06/2021    GLUCOSE 256 04/06/2021    PROT 6.8 04/02/2021    LABALBU 3.7 04/02/2021    CALCIUM 8.5 04/06/2021    BILITOT 0.4 04/02/2021    ALKPHOS 85 04/02/2021    AST 26 04/02/2021    ALT 36 04/02/2021     Magnesium:  No results found for: MG  Phosphorus:  No results found for: PHOS  PT/INR:    Lab Results   Component Value Date    PROTIME 12.4 04/08/2021    PROTIME 27.3 05/15/2013    INR 1.1 04/08/2021     Last 3 Troponin:  No results found for: TROPONINI  U/A:    Lab Results   Component Value Date    COLORU RED 04/02/2021    PHUR 7.0 04/02/2021    WBCUA 1-3 04/02/2021    RBCUA PACKED 04/02/2021    RBCUA NONE 02/22/2013    BACTERIA RARE 04/02/2021    CLARITYU TURBID 04/02/2021    SPECGRAV 1.015 04/02/2021    LEUKOCYTESUR SMALL 04/02/2021    UROBILINOGEN 1.0 04/02/2021    BILIRUBINUR Negative 04/02/2021    BLOODU LARGE 04/02/2021    GLUCOSEU >=1000 04/02/2021     HgBA1c:  No components found for: HGBA1C  TSH:    Lab Results   Component Value Date    TSH 0.970 10/17/2012     -----------------------------------------------------------------    Objective:   Vitals: /72   Pulse 81   Temp 97.6 °F (36.4 °C) (Temporal)   Resp 16   Ht 5' 7\" (1.702 m)   Wt 250 lb (113.4 kg)   SpO2 94%   BMI 39.16 kg/m²   General appearance: alert, appears stated age and cooperative  Skin: Skin color, texture, turgor normal. No rashes or lesions  HEENT: Head: Normal, normocephalic, atraumatic.   Neck: no adenopathy, no carotid bruit, no JVD, supple, symmetrical, trachea midline and thyroid not enlarged, symmetric, no tenderness/mass/nodules  Lungs: clear to auscultation bilaterally  Heart: regular rate and rhythm, S1, S2 normal, no murmur, click, rub or gallop  Abdomen: soft, non-tender; bowel sounds normal; no masses,  no organomegaly  Extremities: extremities normal, atraumatic, no cyanosis or edema  Neurologic: Mental status: Alert, oriented, thought content appropriate        Steve Schneider MD   Physician   Specialty:  Urology   Op Note   Unsigned Transcription   Date of Service:  2021  5:27 PM            (suggestion)   Draft: Not electronically signed. Jayson STEPHENS. [address] [place],  [facility]                                 OPERATIVE REPORT     PATIENT NAME: Irving Ascencio                      :        1955  MED REC NO:   [id number]                            ROOM:       [facility]  ACCOUNT NO:   [de-identified]                           ADMIT DATE: 2021  PROVIDER:     Leydi Arshad MD     DATE OF PROCEDURE:  2021     SURGEON:  Leydi Arshad MD     PREOPERATIVE DIAGNOSES:  Urinary retention, gross hematuria, suspected  bladder outlet obstruction due to BPH.     POSTOPERATIVE DIAGNOSES:  1. Unable to do retrograde pyelogram.  2.  Narrow fossa navicularis. 3.  Patulous prostatic fossa, suspected neurogenic bladder. 4.  Thickened trabeculated bladder with diverticular formation. 5.  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 The tumor was  approximately 2 cm in width and 3 cm depth, diverticulum without  satellites, no solid components.     PROCEDURES PERFORMED:  Cystopanendoscopy, urethral dilatation, attempted  bilateral retrograde pyelogram with biopsy and fulguration of 2 cm  papillary tumor and a 3 cm diverticulum on left side of the bladder.     BLOOD LOSS:  20 mL.     ANESTHESIA:  Monitored sedation and B and O suppository at the end of  the case.     COMPLICATIONS:  The patient had a long phallus with partial erection  that I decompressed with 1.1 mL of epinephrine. I had difficulty to get  the scopes into the bladder and diverticulum due to the long phallus.    There was a bleeder at the base of the tumor that had difficulty  controlling and eventually stopped it after extensive fulguration.     SPECIMEN:  Cold cut and cautery specimen of the large papillary tumor.     IMPLANTS:  None.     DRAIN:  A 24-Tristanian Simplastic catheter.     FINDINGS:  As above.     DESCRIPTION OF PROCEDURE:  The timeout was read by me, the Anesthesia,  and operating staff; reviewed history and physical, allergy and  medications. The patient was on Rocephin. No additional antibiotics  were given. Placed in lithotomy position, prepped and draped in the  usual fashion. He had edema of the ventral penis at the start of the  case and there was some penis scrotal edema to a mild degree. The  scrotum was pendulous due to some residual edema. There were no  testicular abnormalities. After he was prepped and draped, a #21  panendoscope and obturator inserted into the urethra. No strictures,  false passages, abrasions, ulcerations, cystic or solid lesions. Verumontanum was intact. Incomplete obstruction of the lateral lobes in  the midline. No median lobe hypertrophy. No bladder urine sent for  culture. The bladder showed extreme trabeculation. There was a  diverticulum on the right posterior aspect of the bladder, not as deep,  maybe 1 cm in depth. There was a diverticulum in the area of the left  trigone, but I could see a papillary tumor inside this, it was estimated  2 cm in greatest dimension, it had no solid component. This very well  may have been the source of the patient's gross hematuria. I used a 3M  drape and I was able to do a rectal and the patient's prostate was  estimated at 25 gm size, normal is 15 to 20. This was not an unusual  large gland. I think this patient has a neurogenic bladder. I directed  my attention to the bladder diverticulum to accommodate the  instrumentation. I dilated the narrow fossa navicularis with male  sounds 22 through 30-Tristanian. I had some difficulty getting the scopes  into even the diverticulum and I eventually used epinephrine 1:100,000  just 1/10th of the mL mixed into the intercostal area.   There did not  appear to be much reduction in penile length. The penis had a semirigid  feel, but it was a very long phallus. I eventually got into the  diverticulum with a continuous flow plasma loop system, and I decided  before doing this, I would do some cold cut biopsies of this, and I  eventually removed this system, placed a cystoscope and flexible biopsy  forceps into the diverticulum. Grabbed some papillary pieces, so there  would be no cautery effect on the specimen. Then I replaced the system  again with the plasma loop system, and I set the cautery and cutting  setting of 50:50, and I was able to remove much of the tumor and  fulgurate the base. When I got to the apex of the tumor in about the 12  o'clock position of the diverticulum posteriorly, there was a vein and I  tried to cauterize that would not stop bleeding and I eventually thought  I would have to try to cauterize this with a VaporTrode loop, but I  could not fully get the rollerball system into the diverticulum or to  the base. The bleeder stopped, and at the end of the case, there was no  bleeding. Careful examination of the bladder again showed no evidence  of any other tumors and the small diverticula was seen. There were no  other lesions. Again, I looked through both ureteral orifices. I could  not see there was edema at the base of bladder and I abandoned attempted  retrograde pyelogram.  The bladder capacity was over 300 mL, was normal.  There was no cystitis cystica or extrinsic imprints and there was no  sign of enterovesical fistula. The 24-Moroccan Simplastic catheter was  inserted with 60 mL of water in a 30 mL balloon. B and O suppository  was inserted. Again, the prostate felt 25 gm size, normal is 15 to 20.    He was sent to Recovery in satisfactory condition with a continuous flow  system.     Hopefully, if he has papillary tumor low-grade, he will be treated with  gemcitabine with subsequent cystoscopy, and assessment of the  diverticulum,

## 2021-04-08 NOTE — PROGRESS NOTES
Physical Therapy  Physical Therapy Initial Assessment   Name: Karis Rashid  : 1955  MRN: [id number]    Referring Provider:  Amy Meyer DO    Date of Service: 2021    Evaluating PT:  Yair Cuellar, PT, DPT [id number]    Room #:  3761/6790-E  Diagnosis:  Hematuria  PMHx/PSHx:  HTN, CONCHITA, HLD, Tobacco abuse, arrhythmia, vit d deficiency, pre-diabetes, herniated disc, obesity, long term anti-coagulant use, afib  Procedure/Surgery:  TURP 2021  Precautions:  Falls, cummings  Equipment Needs:  Front [employer] - pt owns    SUBJECTIVE:    Pt lives alone in a 1 story home with 1+1 stairs to enter and 1 rail. Bed is on first floor and bath is in basement floor. 12 steps with 1 rail to basement. Pt ambulated with front [employer] Thuan at home and [facility] Thuan in community PTA. Pt reports independence for all ADLs and driving PTA. OBJECTIVE:   Initial Evaluation  Date: 2021 Treatment Short Term/ Long Term   Goals   AM-PAC 6 Clicks      Was pt agreeable to Eval/treatment? yes     Does pt have pain? No c/o pain     Bed Mobility  NT pt sitting in chair at beginning and end of session  Rolling: Independent  Supine to sit: Independent  Sit to supine: Independent  Scooting: Independent   Transfers Sit to stand: SBA  Stand to sit: SBA  Stand pivot: SBA front [employer]  Sit to stand: Independent  Stand to sit:  Independent  Stand pivot: Independent   Ambulation    150 feet with front [employer] SBA  300 feet with front [employer] Thuan   Stair negotiation: ascended and descended  10 steps with 1 rail Fernando  10 steps with 1 rail Thuan   ROM BUE:  Per OT note  BLE:  WNL     Strength BUE:  Per OT note  BLE:  WNL     Balance Sitting EOB:  Independent   Dynamic Standing:  SBA front [employer]  Sitting EOB:  Independent  Dynamic Standing:  Thuan front [employer]     Pt is A & O x 4  Sensation:  Pt denies numbness and tingling to extremities  Edema:  Mild edema to BLE    Vitals:  Spo2 97-98% throughout session on RA    Therapeutic Exercises:  hip flexion, LAQ, ankle

## 2021-04-08 NOTE — DISCHARGE SUMMARY
Physician Discharge Summary     Patient ID:  Lucie Oquendo  [id number]  77 y.o.  1955    Admit date: 4/2/2021    Discharge date and time: 4/8/2021    Admitting Physician: Wilner Gannon DO     Admission Diagnoses: Hematuria [R31.9]    Discharge Diagnoses gross hematuria from tumor in a diverticulum    Hospital Course: Clot retention was managed with irrigation    Treatments: Cystoscopy resection of a bladder tumor and a diverticulum    Disposition: Home  Condition: Stable  Patient Instructions:   Current Discharge Medication List      START taking these medications    Details   cephALEXin (KEFLEX) 500 MG capsule Take 1 capsule by mouth 2 times daily for 10 doses  Qty: 10 capsule, Refills: 1      phenazopyridine (PYRIDIUM) 200 MG tablet Take 1 tablet by mouth 2 times daily as needed for Pain  Qty: 10 tablet, Refills: 2      !! tamsulosin (FLOMAX) 0.4 MG capsule Take 1 capsule by mouth daily for 10 doses  Qty: 10 capsule, Refills: 1      HYDROcodone-acetaminophen (NORCO) 5-325 MG per tablet Take 1 tablet by mouth every 4 hours as needed for Pain for up to 10 doses. Qty: 10 tablet, Refills: 0    Comments: Reduce doses taken as pain becomes manageable  Associated Diagnoses: Acute urinary retention       ! ! - Potential duplicate medications found. Please discuss with provider.       CONTINUE these medications which have NOT CHANGED    Details   lisinopril (PRINIVIL;ZESTRIL) 20 MG tablet Take 20 mg by mouth daily      carvedilol (COREG) 25 MG tablet Take 25 mg by mouth 2 times daily      atorvastatin (LIPITOR) 40 MG tablet Take 20 mg by mouth every evening      empagliflozin (JARDIANCE) 25 MG tablet Take 25 mg by mouth daily      Multiple Vitamins-Minerals (THERAPEUTIC MULTIVITAMIN-MINERALS) tablet Take 1 tablet by mouth daily      !! tamsulosin (FLOMAX) 0.4 MG capsule Take 0.4 mg by mouth 2 times daily      furosemide (LASIX) 20 MG tablet Take 20 mg by mouth daily       allopurinol (ZYLOPRIM) 100 MG tablet Take 100 mg by mouth daily      warfarin (COUMADIN) 3 MG tablet Take 1 tablet by mouth daily. Qty: 30 tablet, Refills: 0    Associated Diagnoses: Atrial fibrillation (HCC)      aspirin 81 MG tablet Take 1 tablet by mouth daily. Qty: 30 tablet, Refills: 0       !! - Potential duplicate medications found. Please discuss with provider.       STOP taking these medications       rosuvastatin (CRESTOR) 40 MG tablet Comments:   Reason for Stopping:         Coenzyme Q10 (CO Q 10) 10 MG CAPS Comments:   Reason for Stopping:         B Complex Vitamins (B COMPLEX 50 PO) Comments:   Reason for Stopping:         Omega-3 Fatty Acids (OMEGA-3 CF) 1000 MG CAPS Comments:   Reason for Stopping:         metFORMIN (GLUCOPHAGE) 500 MG tablet Comments:   Reason for Stopping:         Milk Thistle 250 MG CAPS Comments:   Reason for Stopping:         traMADol (ULTRAM) 50 MG tablet Comments:   Reason for Stopping:         lisinopril-hydrochlorothiazide (PRINZIDE) 20-12.5 MG per tablet Comments:   Reason for Stopping:         lovastatin (MEVACOR) 40 MG tablet Comments:   Reason for Stopping:         Cholecalciferol (VITAMIN D-3) 5000 UNITS TABS Comments:   Reason for Stopping:         vitamin D (ERGOCALCIFEROL) 03536 UNITS CAPS capsule Comments:   Reason for Stopping:                 Signed:  Jaelyn Pierce MD  4/8/2021  12:03 PM

## 2021-04-08 NOTE — PROGRESS NOTES
Discharge instructions given and explained to pt who voiced understanding, denies needs or questions.

## 2021-04-08 NOTE — PLAN OF CARE
Problem: Falls - Risk of:  Goal: Will remain free from falls  Description: Will remain free from falls  4/7/2021 2325 by Miky Linares RN  Outcome: Met This Shift  4/7/2021 2101 by Taylor Chang RN  Outcome: Met This Shift  Goal: Absence of physical injury  Description: Absence of physical injury  4/7/2021 2325 by Miky Linares RN  Outcome: Met This Shift  4/7/2021 2101 by Taylro Chang RN  Outcome: Met This Shift     Problem: Cardiac:  Goal: Ability to maintain an adequate cardiac output will improve  Description: Ability to maintain an adequate cardiac output will improve  4/7/2021 2101 by Taylor Chang RN  Outcome: Met This Shift  Goal: Complications related to the disease process, condition or treatment will be avoided or minimized  Description: Complications related to the disease process, condition or treatment will be avoided or minimized  4/7/2021 2101 by Taylor Chang RN  Outcome: Met This Shift     Problem: Coping:  Goal: Level of anxiety will decrease  Description: Level of anxiety will decrease  4/7/2021 2325 by Miky Linares RN  Outcome: Met This Shift  4/7/2021 2101 by Taylor Chang RN  Outcome: Met This Shift  Goal: General experience of comfort will improve  Description: General experience of comfort will improve  4/7/2021 2101 by Taylor Chang RN  Outcome: Met This Shift     Problem: Safety:  Goal: Ability to remain free from injury will improve  Description: Ability to remain free from injury will improve  4/7/2021 2101 by Taylor Chang RN  Outcome: Met This Shift  Goal: Will show no signs and symptoms of excessive bleeding  Description: Will show no signs and symptoms of excessive bleeding  4/7/2021 2101 by Taylor Chang RN  Outcome: Met This Shift     Problem: Pain:  Goal: Pain level will decrease  Description: Pain level will decrease  4/7/2021 2325 by Miky Linares RN  Outcome: Met This Shift  4/7/2021 2101 by Taylor Chang RN  Outcome: Met This Shift  Goal: Control of acute pain  Description: Control of acute pain  4/7/2021 2325 by Venita Perez RN  Outcome: Met This Shift  4/7/2021 2101 by Aric Suarez RN  Outcome: Met This Shift  Goal: Control of chronic pain  Description: Control of chronic pain  4/7/2021 2325 by Venita Perez RN  Outcome: Met This Shift  4/7/2021 2101 by Aric Suarez RN  Outcome: Met This Shift     Problem:  Activity:  Goal: Ability to tolerate increased activity will improve  Description: Ability to tolerate increased activity will improve  4/7/2021 2325 by Venita Perez RN  Outcome: Ongoing  4/7/2021 2101 by Aric Suarez RN  Outcome: Ongoing  Goal: Expression of feelings of increased energy will increase  Description: Expression of feelings of increased energy will increase  4/7/2021 2325 by Venita Perez RN  Outcome: Ongoing  4/7/2021 2101 by Aric Suarez RN  Outcome: Ongoing     Problem: Health Behavior:  Goal: Ability to manage health-related needs will improve  Description: Ability to manage health-related needs will improve  4/7/2021 2101 by Aric Suarez RN  Outcome: Ongoing

## 2021-04-08 NOTE — PLAN OF CARE
Problem: Falls - Risk of:  Goal: Will remain free from falls  Description: Will remain free from falls  Outcome: Met This Shift  Goal: Absence of physical injury  Description: Absence of physical injury  Outcome: Met This Shift     Problem: Cardiac:  Goal: Ability to maintain an adequate cardiac output will improve  Description: Ability to maintain an adequate cardiac output will improve  Outcome: Met This Shift  Goal: Complications related to the disease process, condition or treatment will be avoided or minimized  Description: Complications related to the disease process, condition or treatment will be avoided or minimized  Outcome: Met This Shift     Problem: Coping:  Goal: Level of anxiety will decrease  Description: Level of anxiety will decrease  Outcome: Met This Shift  Goal: General experience of comfort will improve  Description: General experience of comfort will improve  Outcome: Met This Shift     Problem: Safety:  Goal: Ability to remain free from injury will improve  Description: Ability to remain free from injury will improve  Outcome: Met This Shift  Goal: Will show no signs and symptoms of excessive bleeding  Description: Will show no signs and symptoms of excessive bleeding  Outcome: Met This Shift     Problem: Pain:  Goal: Pain level will decrease  Description: Pain level will decrease  Outcome: Met This Shift  Goal: Control of acute pain  Description: Control of acute pain  Outcome: Met This Shift  Goal: Control of chronic pain  Description: Control of chronic pain  Outcome: Met This Shift     Problem:  Activity:  Goal: Ability to tolerate increased activity will improve  Description: Ability to tolerate increased activity will improve  Outcome: Ongoing  Goal: Expression of feelings of increased energy will increase  Description: Expression of feelings of increased energy will increase  Outcome: Ongoing     Problem: Health Behavior:  Goal: Ability to manage health-related needs will improve  Description: Ability to manage health-related needs will improve  Outcome: Ongoing

## 2021-04-09 NOTE — CARE COORDINATION
4/9, SW spoke with Malorie Lozada from [facility] on patient. Discussed patient discharging yesterday. Faxed [facility] order to [phone]. PCP is Dr. Daniela Peters updated on this information.       TEENA Bennett  [facility] Case Management  [phone]

## 2021-04-09 NOTE — PROGRESS NOTES
Physician Progress Note      PATIENT:               Pia Tatum  CSN #:                  [id number]  :                       1955  ADMIT DATE:       2021 9:00 AM  DISCH DATE:        2021 3:30 PM  RESPONDING  PROVIDER #:        Suellen VARELA DO          QUERY TEXT:    Dear Dr Sarah Rodríguez,    Pt admitted with gross hematuria. Pt noted to have papillary tumor per op   report urology. If possible, please document in progress notes and discharge   summary the relationship, if any, between gross hematuria and papillary tumor. The medical record reflects the following:  Risk Factors: age chronic AF anticoagulation HTN  Clinical Indicators: U/A: Urinalysis was read; turbid, greater than 1000  glucose, large blood, positive nitrites, small leukocytes, greater than  300 protein, RBCs were packed, WBCs one to three, bacteria rare. Per urology   op report: papillary tumor inside this, it was estimated 2 cm in greatest   dimension, it had no solid component. This very well  may have been the source of the patient's gross hematuria  Treatment: bladder irrigation cystoscopy monitor I&O's H&H's    Rocio Ortiz RN BSN CCDS  Options provided:  -- gross hematuria due to papillary tumor  -- gross hematuria unrelated to unrelated to papillary tumor  -- Other - I will add my own diagnosis  -- Disagree - Not applicable / Not valid  -- Disagree - Clinically unable to determine / Unknown  -- Refer to Clinical Documentation Reviewer    PROVIDER RESPONSE TEXT:    This patient has gross hematuria due to papillary tumor.     Query created by: Yuliana Clay on 2021 2:28 PM      Electronically signed by:  Roslyn Sheth DO 2021 6:25 AM

## 2021-04-16 ENCOUNTER — HOSPITAL ENCOUNTER (OUTPATIENT)
Age: 66
Discharge: HOME | End: 2021-04-18
Payer: MEDICARE

## 2021-04-16 DIAGNOSIS — Z01.818 PREOP TESTING: ICD-10-CM

## 2021-04-16 PROCEDURE — U0005 INFEC AGEN DETEC AMPLI PROBE: HCPCS

## 2021-04-16 PROCEDURE — U0003 INFECTIOUS AGENT DETECTION BY NUCLEIC ACID (DNA OR RNA); SEVERE ACUTE RESPIRATORY SYNDROME CORONAVIRUS 2 (SARS-COV-2) (CORONAVIRUS DISEASE [COVID-19]), AMPLIFIED PROBE TECHNIQUE, MAKING USE OF HIGH THROUGHPUT TECHNOLOGIES AS DESCRIBED BY CMS-2020-01-R: HCPCS

## 2021-04-18 ENCOUNTER — PREP FOR PROCEDURE (OUTPATIENT)
Dept: UROLOGY | Age: 66
End: 2021-04-18

## 2021-04-18 LAB — SARS-COV-2, PCR: NOT DETECTED

## 2021-04-18 RX ORDER — SODIUM CHLORIDE 9 MG/ML
25 INJECTION, SOLUTION INTRAVENOUS PRN
Status: CANCELLED | OUTPATIENT
Start: 2021-04-18

## 2021-04-18 RX ORDER — SODIUM CHLORIDE 0.9 % (FLUSH) 0.9 %
5-40 SYRINGE (ML) INJECTION PRN
Status: CANCELLED | OUTPATIENT
Start: 2021-04-18

## 2021-04-18 RX ORDER — SODIUM CHLORIDE 0.9 % (FLUSH) 0.9 %
5-40 SYRINGE (ML) INJECTION EVERY 12 HOURS SCHEDULED
Status: CANCELLED | OUTPATIENT
Start: 2021-04-18

## 2021-04-18 RX ORDER — CIPROFLOXACIN 2 MG/ML
400 INJECTION, SOLUTION INTRAVENOUS
Status: CANCELLED | OUTPATIENT
Start: 2021-04-18 | End: 2021-04-18

## 2021-04-20 NOTE — PROGRESS NOTES
Have you been tested for COVID  Yes           Have you been told you were positive for COVID No  Have you had any known exposure to someone that is positive for COVID No  Do you have a cough                   No              Do you have shortness of breath No                 Do you have a sore throat            No                Are you having chills                    No                Are you having muscle aches. No                    Please come to the hospital wearing a mask and have your significant other wear a mask as well. Both of you should check your temperature before leaving to come here,  if it is 100 or higher please call [phone] for instruction. Timothy PRE-ADMISSION TESTING INSTRUCTIONS    The Preadmission Testing patient is instructed accordingly using the following criteria (check applicable):    ARRIVAL INSTRUCTIONS:  [x] Parking the day of Surgery is located in the Main Entrance lot. Upon entering the door, make an immediate right to the surgery reception desk    [x] Bring photo ID and insurance card     [x] Bring in a copy of Living will or Durable Power of  papers.     [x] Please be sure to arrange for responsible adult to provide transportation to and from the hospital    [x] Please arrange for responsible adult to be with you for the 24 hour period post procedure due to having anesthesia      GENERAL INSTRUCTIONS:    [x] Nothing by mouth after midnight, including gum, candy, mints or water    [x] You may brush your teeth, but do not swallow any water    [x] Take medications as instructed with 1-2 oz of water    [x] Stop herbal supplements and vitamins 5 days prior to procedure    [x] Follow preop dosing of blood thinners per physician instructions    [] Take 1/2 dose of evening insulin, but no insulin after midnight    [x] No oral diabetic medications after midnight    [x] If diabetic and have low blood sugar or feel symptomatic, take 1-2oz apple juice only    [] Bring inhalers day of surgery    [] Bring C-PAP/ Bi-Pap day of surgery    [] Bring urine specimen day of surgery    [x] Shower or bath with soap, lather and rinse well, AM of Surgery, no lotion, powders or creams to surgical site    [] Follow bowel prep as instructed per surgeon    [x] No tobacco products within 24 hours of surgery     [x] No alcohol or illegal drug use within 24 hours of surgery.     [x] Jewelry, body piercing's, eyeglasses, contact lenses and dentures are not permitted into surgery (bring cases)      [x] Please do not wear any nail polish, make up or hair products on the day of surgery    [x] You can expect a call the business day prior to procedure to notify you if your arrival time changes    [x] If you receive a survey after surgery we would greatly appreciate your comments    [] Parent/guardian of a minor must accompany their child and remain on the premises  the entire time they are under our care     [] Pediatric patients may bring favorite toy, blanket or comfort item with them    [] A caregiver or family member must remain with the patient during their stay if they are mentally handicapped, have dementia, disoriented or unable to use a call light or would be a safety concern if left unattended    [x] Please notify surgeon if you develop any illness between now and time of surgery (cold, cough, sore throat, fever, nausea, vomiting) or any signs of infections  including skin, wounds, and dental.    [x]  The Outpatient Pharmacy is available to fill your prescription here on your day of surgery, ask your preop nurse for details    [] Other instructions    EDUCATIONAL MATERIALS PROVIDED:    [] PAT Preoperative Education Packet/Booklet     [] Medication List    [] Transfusion bracelet applied with instructions    [] Shower with soap, lather and rinse well, and use CHG wipes provided the evening before surgery as instructed    [] Incentive spirometer with instructions

## 2021-04-20 NOTE — PROGRESS NOTES
Message left with Angelito at Dr. Joel Verde office in regard to not yet having a medical clearance.

## 2021-04-21 ENCOUNTER — ANESTHESIA (OUTPATIENT)
Dept: OPERATING ROOM | Age: 66
End: 2021-04-21
Payer: MEDICARE

## 2021-04-21 ENCOUNTER — HOSPITAL ENCOUNTER (OUTPATIENT)
Age: 66
Setting detail: OBSERVATION
Discharge: HOME | End: 2021-04-22
Attending: UROLOGY | Admitting: UROLOGY
Payer: MEDICARE

## 2021-04-21 ENCOUNTER — HOSPITAL ENCOUNTER (OUTPATIENT)
Dept: GENERAL RADIOLOGY | Age: 66
Setting detail: OUTPATIENT SURGERY
Discharge: HOME | End: 2021-04-23
Attending: UROLOGY
Payer: MEDICARE

## 2021-04-21 ENCOUNTER — ANESTHESIA EVENT (OUTPATIENT)
Dept: OPERATING ROOM | Age: 66
End: 2021-04-21
Payer: MEDICARE

## 2021-04-21 VITALS
SYSTOLIC BLOOD PRESSURE: 108 MMHG | DIASTOLIC BLOOD PRESSURE: 77 MMHG | RESPIRATION RATE: 3 BRPM | TEMPERATURE: 97.3 F | OXYGEN SATURATION: 98 %

## 2021-04-21 DIAGNOSIS — Z01.818 PREOP TESTING: Primary | ICD-10-CM

## 2021-04-21 DIAGNOSIS — G89.18 POST-OP PAIN: ICD-10-CM

## 2021-04-21 DIAGNOSIS — R52 PAIN: ICD-10-CM

## 2021-04-21 PROBLEM — N40.1 BPH WITH URINARY OBSTRUCTION: Status: ACTIVE | Noted: 2021-04-21

## 2021-04-21 PROBLEM — N13.8 BPH WITH URINARY OBSTRUCTION: Status: ACTIVE | Noted: 2021-04-21

## 2021-04-21 LAB — METER GLUCOSE: 135 MG/DL (ref 74–99)

## 2021-04-21 PROCEDURE — 82962 GLUCOSE BLOOD TEST: CPT

## 2021-04-21 PROCEDURE — 6360000002 HC RX W HCPCS: Performed by: NURSE ANESTHETIST, CERTIFIED REGISTERED

## 2021-04-21 PROCEDURE — C1758 CATHETER, URETERAL: HCPCS | Performed by: UROLOGY

## 2021-04-21 PROCEDURE — 6370000000 HC RX 637 (ALT 250 FOR IP): Performed by: NEUROMUSCULOSKELETAL MEDICINE & OMM

## 2021-04-21 PROCEDURE — 3700000000 HC ANESTHESIA ATTENDED CARE: Performed by: UROLOGY

## 2021-04-21 PROCEDURE — 3600000013 HC SURGERY LEVEL 3 ADDTL 15MIN: Performed by: UROLOGY

## 2021-04-21 PROCEDURE — 7100000000 HC PACU RECOVERY - FIRST 15 MIN: Performed by: UROLOGY

## 2021-04-21 PROCEDURE — 3209999900 FLUORO FOR SURGICAL PROCEDURES

## 2021-04-21 PROCEDURE — 6360000004 HC RX CONTRAST MEDICATION: Performed by: UROLOGY

## 2021-04-21 PROCEDURE — 3600000003 HC SURGERY LEVEL 3 BASE: Performed by: UROLOGY

## 2021-04-21 PROCEDURE — 2500000003 HC RX 250 WO HCPCS: Performed by: UROLOGY

## 2021-04-21 PROCEDURE — 2709999900 HC NON-CHARGEABLE SUPPLY: Performed by: UROLOGY

## 2021-04-21 PROCEDURE — 2720000010 HC SURG SUPPLY STERILE: Performed by: UROLOGY

## 2021-04-21 PROCEDURE — C1887 CATHETER, GUIDING: HCPCS | Performed by: UROLOGY

## 2021-04-21 PROCEDURE — 3700000001 HC ADD 15 MINUTES (ANESTHESIA): Performed by: UROLOGY

## 2021-04-21 PROCEDURE — 7100000001 HC PACU RECOVERY - ADDTL 15 MIN: Performed by: UROLOGY

## 2021-04-21 PROCEDURE — 2580000003 HC RX 258: Performed by: NURSE ANESTHETIST, CERTIFIED REGISTERED

## 2021-04-21 PROCEDURE — 2580000003 HC RX 258: Performed by: UROLOGY

## 2021-04-21 PROCEDURE — 6360000002 HC RX W HCPCS: Performed by: NURSE PRACTITIONER

## 2021-04-21 PROCEDURE — G0378 HOSPITAL OBSERVATION PER HR: HCPCS

## 2021-04-21 PROCEDURE — 2500000003 HC RX 250 WO HCPCS: Performed by: NURSE ANESTHETIST, CERTIFIED REGISTERED

## 2021-04-21 PROCEDURE — 88305 TISSUE EXAM BY PATHOLOGIST: CPT

## 2021-04-21 RX ORDER — OXYCODONE HYDROCHLORIDE AND ACETAMINOPHEN 5; 325 MG/1; MG/1
2 TABLET ORAL EVERY 4 HOURS PRN
Status: DISCONTINUED | OUTPATIENT
Start: 2021-04-21 | End: 2021-04-22 | Stop reason: HOSPADM

## 2021-04-21 RX ORDER — SODIUM CHLORIDE 0.9 % (FLUSH) 0.9 %
5-40 SYRINGE (ML) INJECTION EVERY 12 HOURS SCHEDULED
Status: DISCONTINUED | OUTPATIENT
Start: 2021-04-21 | End: 2021-04-22 | Stop reason: HOSPADM

## 2021-04-21 RX ORDER — CARVEDILOL 25 MG/1
25 TABLET ORAL 2 TIMES DAILY
Status: DISCONTINUED | OUTPATIENT
Start: 2021-04-21 | End: 2021-04-22 | Stop reason: HOSPADM

## 2021-04-21 RX ORDER — FENTANYL CITRATE 50 UG/ML
INJECTION, SOLUTION INTRAMUSCULAR; INTRAVENOUS PRN
Status: DISCONTINUED | OUTPATIENT
Start: 2021-04-21 | End: 2021-04-21 | Stop reason: SDUPTHER

## 2021-04-21 RX ORDER — DIPHENHYDRAMINE HYDROCHLORIDE 50 MG/ML
12.5 INJECTION INTRAMUSCULAR; INTRAVENOUS
Status: DISCONTINUED | OUTPATIENT
Start: 2021-04-21 | End: 2021-04-21 | Stop reason: HOSPADM

## 2021-04-21 RX ORDER — TAMSULOSIN HYDROCHLORIDE 0.4 MG/1
0.4 CAPSULE ORAL DAILY
Status: DISCONTINUED | OUTPATIENT
Start: 2021-04-21 | End: 2021-04-22 | Stop reason: HOSPADM

## 2021-04-21 RX ORDER — SODIUM CHLORIDE 9 MG/ML
25 INJECTION, SOLUTION INTRAVENOUS PRN
Status: DISCONTINUED | OUTPATIENT
Start: 2021-04-21 | End: 2021-04-22 | Stop reason: HOSPADM

## 2021-04-21 RX ORDER — CIPROFLOXACIN 2 MG/ML
400 INJECTION, SOLUTION INTRAVENOUS EVERY 12 HOURS
Status: COMPLETED | OUTPATIENT
Start: 2021-04-22 | End: 2021-04-22

## 2021-04-21 RX ORDER — SODIUM CHLORIDE 0.9 % (FLUSH) 0.9 %
5-40 SYRINGE (ML) INJECTION EVERY 12 HOURS SCHEDULED
Status: DISCONTINUED | OUTPATIENT
Start: 2021-04-21 | End: 2021-04-21 | Stop reason: HOSPADM

## 2021-04-21 RX ORDER — LIDOCAINE HYDROCHLORIDE 20 MG/ML
INJECTION, SOLUTION EPIDURAL; INFILTRATION; INTRACAUDAL; PERINEURAL PRN
Status: DISCONTINUED | OUTPATIENT
Start: 2021-04-21 | End: 2021-04-21 | Stop reason: SDUPTHER

## 2021-04-21 RX ORDER — OXYCODONE HYDROCHLORIDE AND ACETAMINOPHEN 5; 325 MG/1; MG/1
1 TABLET ORAL EVERY 6 HOURS PRN
Status: DISCONTINUED | OUTPATIENT
Start: 2021-04-21 | End: 2021-04-22 | Stop reason: HOSPADM

## 2021-04-21 RX ORDER — LISINOPRIL 20 MG/1
20 TABLET ORAL DAILY
Status: DISCONTINUED | OUTPATIENT
Start: 2021-04-21 | End: 2021-04-22 | Stop reason: HOSPADM

## 2021-04-21 RX ORDER — ASPIRIN 81 MG/1
81 TABLET, CHEWABLE ORAL DAILY
Status: DISCONTINUED | OUTPATIENT
Start: 2021-04-21 | End: 2021-04-22 | Stop reason: HOSPADM

## 2021-04-21 RX ORDER — M-VIT,TX,IRON,MINS/CALC/FOLIC 27MG-0.4MG
1 TABLET ORAL DAILY
Status: DISCONTINUED | OUTPATIENT
Start: 2021-04-21 | End: 2021-04-22 | Stop reason: HOSPADM

## 2021-04-21 RX ORDER — SODIUM CHLORIDE 0.9 % (FLUSH) 0.9 %
5-40 SYRINGE (ML) INJECTION PRN
Status: DISCONTINUED | OUTPATIENT
Start: 2021-04-21 | End: 2021-04-21 | Stop reason: HOSPADM

## 2021-04-21 RX ORDER — SODIUM CHLORIDE 9 MG/ML
25 INJECTION, SOLUTION INTRAVENOUS PRN
Status: DISCONTINUED | OUTPATIENT
Start: 2021-04-21 | End: 2021-04-21 | Stop reason: HOSPADM

## 2021-04-21 RX ORDER — PROPOFOL 10 MG/ML
INJECTION, EMULSION INTRAVENOUS PRN
Status: DISCONTINUED | OUTPATIENT
Start: 2021-04-21 | End: 2021-04-21 | Stop reason: SDUPTHER

## 2021-04-21 RX ORDER — FUROSEMIDE 20 MG/1
20 TABLET ORAL DAILY
Status: DISCONTINUED | OUTPATIENT
Start: 2021-04-21 | End: 2021-04-22 | Stop reason: HOSPADM

## 2021-04-21 RX ORDER — MEPERIDINE HYDROCHLORIDE 25 MG/ML
12.5 INJECTION INTRAMUSCULAR; INTRAVENOUS; SUBCUTANEOUS EVERY 5 MIN PRN
Status: DISCONTINUED | OUTPATIENT
Start: 2021-04-21 | End: 2021-04-21 | Stop reason: HOSPADM

## 2021-04-21 RX ORDER — CIPROFLOXACIN 2 MG/ML
400 INJECTION, SOLUTION INTRAVENOUS
Status: COMPLETED | OUTPATIENT
Start: 2021-04-21 | End: 2021-04-21

## 2021-04-21 RX ORDER — ALLOPURINOL 100 MG/1
100 TABLET ORAL DAILY
Status: DISCONTINUED | OUTPATIENT
Start: 2021-04-21 | End: 2021-04-22 | Stop reason: HOSPADM

## 2021-04-21 RX ORDER — ATORVASTATIN CALCIUM 20 MG/1
20 TABLET, FILM COATED ORAL EVERY EVENING
Status: DISCONTINUED | OUTPATIENT
Start: 2021-04-21 | End: 2021-04-22 | Stop reason: HOSPADM

## 2021-04-21 RX ORDER — PHENAZOPYRIDINE HYDROCHLORIDE 100 MG/1
200 TABLET, FILM COATED ORAL 2 TIMES DAILY PRN
Status: DISCONTINUED | OUTPATIENT
Start: 2021-04-21 | End: 2021-04-22 | Stop reason: HOSPADM

## 2021-04-21 RX ORDER — ONDANSETRON 2 MG/ML
INJECTION INTRAMUSCULAR; INTRAVENOUS PRN
Status: DISCONTINUED | OUTPATIENT
Start: 2021-04-21 | End: 2021-04-21 | Stop reason: SDUPTHER

## 2021-04-21 RX ORDER — ATROPA BELLADONNA AND OPIUM 16.2; 6 MG/1; MG/1
60 SUPPOSITORY RECTAL EVERY 8 HOURS PRN
Status: DISCONTINUED | OUTPATIENT
Start: 2021-04-21 | End: 2021-04-22 | Stop reason: HOSPADM

## 2021-04-21 RX ORDER — SODIUM CHLORIDE 9 MG/ML
INJECTION, SOLUTION INTRAVENOUS CONTINUOUS PRN
Status: DISCONTINUED | OUTPATIENT
Start: 2021-04-21 | End: 2021-04-21 | Stop reason: SDUPTHER

## 2021-04-21 RX ORDER — DEXTROSE, SODIUM CHLORIDE, AND POTASSIUM CHLORIDE 5; .45; .15 G/100ML; G/100ML; G/100ML
INJECTION INTRAVENOUS CONTINUOUS
Status: DISCONTINUED | OUTPATIENT
Start: 2021-04-21 | End: 2021-04-22 | Stop reason: HOSPADM

## 2021-04-21 RX ORDER — SODIUM CHLORIDE 0.9 % (FLUSH) 0.9 %
5-40 SYRINGE (ML) INJECTION PRN
Status: DISCONTINUED | OUTPATIENT
Start: 2021-04-21 | End: 2021-04-22 | Stop reason: HOSPADM

## 2021-04-21 RX ADMIN — PHENYLEPHRINE HYDROCHLORIDE 100 MCG: 10 INJECTION INTRAVENOUS at 13:02

## 2021-04-21 RX ADMIN — LISINOPRIL 20 MG: 20 TABLET ORAL at 17:04

## 2021-04-21 RX ADMIN — PROPOFOL 200 MG: 10 INJECTION, EMULSION INTRAVENOUS at 12:52

## 2021-04-21 RX ADMIN — ATORVASTATIN CALCIUM 20 MG: 20 TABLET, FILM COATED ORAL at 17:04

## 2021-04-21 RX ADMIN — TAMSULOSIN HYDROCHLORIDE 0.4 MG: 0.4 CAPSULE ORAL at 17:06

## 2021-04-21 RX ADMIN — SODIUM CHLORIDE: 9 INJECTION, SOLUTION INTRAVENOUS at 12:45

## 2021-04-21 RX ADMIN — POTASSIUM CHLORIDE, DEXTROSE MONOHYDRATE AND SODIUM CHLORIDE: 150; 5; 450 INJECTION, SOLUTION INTRAVENOUS at 15:39

## 2021-04-21 RX ADMIN — MULTIPLE VITAMINS W/ MINERALS TAB 1 TABLET: TAB at 17:04

## 2021-04-21 RX ADMIN — PHENYLEPHRINE HYDROCHLORIDE 100 MCG: 10 INJECTION INTRAVENOUS at 13:18

## 2021-04-21 RX ADMIN — FENTANYL CITRATE 100 MCG: 50 INJECTION, SOLUTION INTRAMUSCULAR; INTRAVENOUS at 12:52

## 2021-04-21 RX ADMIN — LIDOCAINE HYDROCHLORIDE 50 MG: 20 INJECTION, SOLUTION EPIDURAL; INFILTRATION; INTRACAUDAL; PERINEURAL at 12:52

## 2021-04-21 RX ADMIN — SODIUM CHLORIDE, PRESERVATIVE FREE 10 ML: 5 INJECTION INTRAVENOUS at 21:11

## 2021-04-21 RX ADMIN — FUROSEMIDE 20 MG: 20 TABLET ORAL at 17:04

## 2021-04-21 RX ADMIN — CIPROFLOXACIN 400 MG: 2 INJECTION, SOLUTION INTRAVENOUS at 11:56

## 2021-04-21 RX ADMIN — ONDANSETRON 4 MG: 2 INJECTION INTRAMUSCULAR; INTRAVENOUS at 13:09

## 2021-04-21 RX ADMIN — CARVEDILOL 25 MG: 25 TABLET, FILM COATED ORAL at 21:11

## 2021-04-21 RX ADMIN — ALLOPURINOL 100 MG: 100 TABLET ORAL at 17:04

## 2021-04-21 ASSESSMENT — PULMONARY FUNCTION TESTS
PIF_VALUE: 1
PIF_VALUE: 3
PIF_VALUE: 18
PIF_VALUE: 1
PIF_VALUE: 4
PIF_VALUE: 18
PIF_VALUE: 18
PIF_VALUE: 5
PIF_VALUE: 19
PIF_VALUE: 1
PIF_VALUE: 1
PIF_VALUE: 18
PIF_VALUE: 6
PIF_VALUE: 18
PIF_VALUE: 3
PIF_VALUE: 1
PIF_VALUE: 5
PIF_VALUE: 5
PIF_VALUE: 4
PIF_VALUE: 1
PIF_VALUE: 17
PIF_VALUE: 17
PIF_VALUE: 6
PIF_VALUE: 3
PIF_VALUE: 2
PIF_VALUE: 18
PIF_VALUE: 4

## 2021-04-21 ASSESSMENT — PAIN SCALES - GENERAL
PAINLEVEL_OUTOF10: 0

## 2021-04-21 NOTE — PROGRESS NOTES
Perfectserved Dr. Anthony Molina about oral pain meds. Awaiting return call.  Electronically signed by Lina Costa RN on 4/21/2021 at 7:12 PM

## 2021-04-21 NOTE — PLAN OF CARE
Problem: Falls - Risk of:  Goal: Will remain free from falls  Description: Will remain free from falls  Outcome: Met This Shift     Problem: Falls - Risk of:  Goal: Absence of physical injury  Description: Absence of physical injury  Outcome: Met This Shift     Problem: SAFETY  Goal: Free from accidental physical injury  Outcome: Met This Shift     Problem: DAILY CARE  Goal: Daily care needs are met  Outcome: Met This Shift     Problem: PAIN  Goal: Patient's pain/discomfort is manageable  Outcome: Met This Shift     Problem: SKIN INTEGRITY  Goal: Skin integrity is maintained or improved  Outcome: Met This Shift     Problem: KNOWLEDGE DEFICIT  Goal: Patient/S.O. demonstrates understanding of disease process, treatment plan, medications, and discharge instructions.   Outcome: Met This Shift     Problem: DISCHARGE BARRIERS  Goal: Patient's continuum of care needs are met  Outcome: Met This Shift

## 2021-04-21 NOTE — PROGRESS NOTES
Spoke with Dr. Judd Santiago regarding restarting warfrin and ASA. He said to verify tomorrow with Dr. Vicki Chambers to see when its ok to resume.  Electronically signed by All Mcadams RN on 4/21/2021 at 4:52 PM

## 2021-04-21 NOTE — ANESTHESIA PRE PROCEDURE
Department of Anesthesiology  Preprocedure Note       Name:  Hi Torres.   Age:  77 y.o.  :  1955                                          MRN:  [id number]         Date:  2021      Surgeon: Avinash Ordonez):  Mya Malloy DO    Procedure: Procedure(s):  CYSTOSCOPY RETROGRADE PYELOGRAM PLASMA LOOP TRANSURETHRAL RESECTION PROSTATE    Medications prior to admission:   Prior to Admission medications    Medication Sig Start Date End Date Taking? Authorizing Provider   phenazopyridine (PYRIDIUM) 200 MG tablet Take 1 tablet by mouth 2 times daily as needed for Pain 21  Yes Sherill Kanner Memo, MD   tamsulosin (FLOMAX) 0.4 MG capsule Take 1 capsule by mouth daily for 10 doses 21 Yes Sherill Kanner Memo, MD   lisinopril (PRINIVIL;ZESTRIL) 20 MG tablet Take 20 mg by mouth daily   Yes Historical Provider, MD   carvedilol (COREG) 25 MG tablet Take 25 mg by mouth 2 times daily   Yes Historical Provider, MD   atorvastatin (LIPITOR) 40 MG tablet Take 20 mg by mouth every evening   Yes Historical Provider, MD   empagliflozin (JARDIANCE) 25 MG tablet Take 25 mg by mouth daily   Yes Historical Provider, MD   Multiple Vitamins-Minerals (THERAPEUTIC MULTIVITAMIN-MINERALS) tablet Take 1 tablet by mouth daily   Yes Historical Provider, MD   furosemide (LASIX) 20 MG tablet Take 20 mg by mouth daily    Yes Historical Provider, MD   allopurinol (ZYLOPRIM) 100 MG tablet Take 100 mg by mouth daily   Yes Historical Provider, MD   warfarin (COUMADIN) 3 MG tablet Take 1 tablet by mouth daily. 13  Yes Philomena Paget, MD   aspirin 81 MG tablet Take 1 tablet by mouth daily.  13  Yes Philomena Paget, MD       Current medications:    Current Facility-Administered Medications   Medication Dose Route Frequency Provider Last Rate Last Admin    0.9 % sodium chloride infusion  25 mL Intravenous PRN Jennifer Turcios, APRN - CNP        ciprofloxacin (CIPRO) IVPB 400 mg  400 mg Intravenous On Call to [address] [place], APRN - CNP        sodium chloride flush 0.9 % injection 5-40 mL  5-40 mL Intravenous 2 times per day LINDA Aguilar CNP        sodium chloride flush 0.9 % injection 5-40 mL  5-40 mL Intravenous PRN LINDA Aguilar CNP           Allergies:  No Known Allergies    Problem List:    Patient Active Problem List   Diagnosis Code    Irregular heart rhythm I49.9    Essential hypertension I10    Abnormal hemoglobin (Hgb) (HCC) D58.2    Elevated hemoglobin (HCC) D58.2    Hyperlipidemia E78.5    Atrial fibrillation ([address] I48.91    Edema R60.9    Bulging discs [id number]    Hematuria R31.9    Nonrheumatic tricuspid valve regurgitation I36.1    Chronic anticoagulation Z79.01    S/P bladder tumor excision with fulguration Z98.890    Neurogenic bladder N31.9    BPH with urinary obstruction N40.1, N13.8       Past Medical History:        Diagnosis Date    Atrial fibrillation ([address] 10/2012    now on Coumadin    Diabetes mellitus [facility]     Hyperlipidemia     Hypertension     LONG TERM ANTICOAGULENT USE     CONCHITA (obstructive sleep apnea)     CPAP       Past Surgical History:        Procedure Laterality Date    BACK SURGERY  2007    repair of herniated disc    CARDIOVASCULAR STRESS TEST  2006    findings of inferior and anterior ischemia     DIAGNOSTIC CARDIAC CATH LAB PROCEDURE  2006    No obstructive CAD and mild dilated cardiomyopathy with EF 45-50%    TURP N/A 2021    CYSTOSCOPY, URETHRAL DILITATION, PLASMA BUTTON TRANSURETHRAL RESECTION BLADDER TUMOR WITH FULGURATION, EXCHANGE GABRIEL CATHETER performed by Maxwell Ramirez MD at [facility] OR       Social History:    Social History     Tobacco Use    Smoking status: Former Smoker     Packs/day: 1.00     Years: 30.00     Pack years: 30.00     Quit date: 1999     Years since quittin.3    Smokeless tobacco: Never Used   Substance Use Topics    Alcohol use:  No                                Counseling given: Not Answered      Vital Signs (Current):   Vitals:    04/20/21 1121 04/21/21 1126   BP:  (!) 146/94   Pulse:  96   Resp:  20   Temp:  98 °F (36.7 °C)   TempSrc:  Temporal   SpO2:  96%   Weight: 240 lb (108.9 kg) 240 lb (108.9 kg)   Height: 5' 7\" (1.702 m) 5' 7\" (1.702 m)                                              BP Readings from Last 3 Encounters:   04/21/21 (!) 146/94   04/08/21 117/72   04/07/21 113/80       NPO Status: Time of last liquid consumption: 2000                        Time of last solid consumption: 2000                        Date of last liquid consumption: 04/20/21                        Date of last solid food consumption: 04/20/21    BMI:   Wt Readings from Last 3 Encounters:   04/21/21 240 lb (108.9 kg)   04/02/21 250 lb (113.4 kg)   09/04/15 244 lb (110.7 kg)     Body mass index is 37.59 kg/m². CBC:   Lab Results   Component Value Date    WBC 12.5 04/08/2021    RBC 4.89 04/08/2021    HGB 14.1 04/08/2021    HCT 44.1 04/08/2021    MCV 90.2 04/08/2021    RDW 13.5 04/08/2021     04/08/2021       CMP:   Lab Results   Component Value Date     04/06/2021    K 4.4 04/06/2021    K 4.2 04/02/2021     04/06/2021    CO2 19 04/06/2021    BUN 20 04/06/2021    CREATININE 1.2 04/06/2021    GFRAA >60 04/06/2021    LABGLOM >60 04/06/2021    GLUCOSE 256 04/06/2021    PROT 6.8 04/02/2021    CALCIUM 8.5 04/06/2021    BILITOT 0.4 04/02/2021    ALKPHOS 85 04/02/2021    AST 26 04/02/2021    ALT 36 04/02/2021       POC Tests: No results for input(s): POCGLU, POCNA, POCK, POCCL, POCBUN, POCHEMO, POCHCT in the last 72 hours.     Coags:   Lab Results   Component Value Date    PROTIME 12.4 04/08/2021    PROTIME 27.3 05/15/2013    INR 1.1 04/08/2021    APTT 42.6 04/02/2021       HCG (If Applicable): No results found for: PREGTESTUR, PREGSERUM, HCG, HCGQUANT     ABGs: No results found for: PHART, PO2ART, GHR7IIN, AZR0QUR, BEART, U8HVPNRN     Type & Screen (If Applicable):  No results found for: LABABO, [facility]    Drug/Infectious Status (If Applicable):  No results found for: HIV, HEPCAB    COVID-19 Screening (If Applicable):   Lab Results   Component Value Date    COVID19 Not Detected 04/16/2021           Anesthesia Evaluation  Patient summary reviewed no history of anesthetic complications:   Airway: Mallampati: III  TM distance: >3 FB   Neck ROM: full  Mouth opening: > = 3 FB Dental:    (+) upper dentures and lower dentures      Pulmonary: breath sounds clear to auscultation  (+) sleep apnea: on CPAP,                            ROS comment: former smoker   Cardiovascular:    (+) hypertension:, dysrhythmias: atrial fibrillation, hyperlipidemia      ECG reviewed  Rhythm: regular  Rate: normal    Stress test reviewed       Beta Blocker:  Dose within 24 Hrs      ROS comment: Stress Test 2015: non-ischemic    EKG 4/2021: A-fib     Neuro/Psych:   (+) neuromuscular disease (s/p back surgery):,             GI/Hepatic/Renal: Neg GI/Hepatic/Renal ROS            Endo/Other:    (+) DiabetesType II DM, , blood dyscrasia: anticoagulation therapy:., malignancy/cancer (bladder). ROS comment: obese Abdominal:           Vascular: negative vascular ROS. Anesthesia Plan      general     ASA 3     (#5 LMA)  Induction: intravenous. Anesthetic plan and risks discussed with patient. Plan discussed with CRNA.                   Jeet Gauthier  4/21/21

## 2021-04-21 NOTE — PROGRESS NOTES
Frazier irrigated easily  with 60 cc's 0.9NS. no blot clots noted . Taking a few ice chips with good toleration.

## 2021-04-22 VITALS
TEMPERATURE: 98.3 F | DIASTOLIC BLOOD PRESSURE: 54 MMHG | BODY MASS INDEX: 37.67 KG/M2 | RESPIRATION RATE: 16 BRPM | HEIGHT: 67 IN | SYSTOLIC BLOOD PRESSURE: 92 MMHG | WEIGHT: 240 LBS | OXYGEN SATURATION: 92 % | HEART RATE: 104 BPM

## 2021-04-22 PROBLEM — N40.1 URINARY RETENTION DUE TO BENIGN PROSTATIC HYPERPLASIA: Status: ACTIVE | Noted: 2021-04-22

## 2021-04-22 PROBLEM — R33.8 URINARY RETENTION DUE TO BENIGN PROSTATIC HYPERPLASIA: Status: ACTIVE | Noted: 2021-04-22

## 2021-04-22 PROBLEM — C67.9 BLADDER CANCER (HCC): Status: ACTIVE | Noted: 2021-04-22

## 2021-04-22 LAB
ANION GAP SERPL CALCULATED.3IONS-SCNC: 9 MMOL/L (ref 7–16)
BUN BLDV-MCNC: 23 MG/DL (ref 6–23)
CALCIUM SERPL-MCNC: 8.2 MG/DL (ref 8.6–10.2)
CHLORIDE BLD-SCNC: 105 MMOL/L (ref 98–107)
CO2: 21 MMOL/L (ref 22–29)
CREAT SERPL-MCNC: 1.4 MG/DL (ref 0.7–1.2)
GFR AFRICAN AMERICAN: >60
GFR NON-AFRICAN AMERICAN: 50 ML/MIN/1.73
GLUCOSE BLD-MCNC: 219 MG/DL (ref 74–99)
HCT VFR BLD CALC: 43.9 % (ref 37–54)
HEMOGLOBIN: 13.9 G/DL (ref 12.5–16.5)
MCH RBC QN AUTO: 28.8 PG (ref 26–35)
MCHC RBC AUTO-ENTMCNC: 31.7 % (ref 32–34.5)
MCV RBC AUTO: 91.1 FL (ref 80–99.9)
PDW BLD-RTO: 13.2 FL (ref 11.5–15)
PLATELET # BLD: 281 E9/L (ref 130–450)
PMV BLD AUTO: 10.6 FL (ref 7–12)
POTASSIUM SERPL-SCNC: 4.2 MMOL/L (ref 3.5–5)
RBC # BLD: 4.82 E12/L (ref 3.8–5.8)
SODIUM BLD-SCNC: 135 MMOL/L (ref 132–146)
WBC # BLD: 14.3 E9/L (ref 4.5–11.5)

## 2021-04-22 PROCEDURE — 2580000003 HC RX 258: Performed by: UROLOGY

## 2021-04-22 PROCEDURE — 96366 THER/PROPH/DIAG IV INF ADDON: CPT

## 2021-04-22 PROCEDURE — 6360000002 HC RX W HCPCS: Performed by: UROLOGY

## 2021-04-22 PROCEDURE — 85027 COMPLETE CBC AUTOMATED: CPT

## 2021-04-22 PROCEDURE — 80048 BASIC METABOLIC PNL TOTAL CA: CPT

## 2021-04-22 PROCEDURE — 96365 THER/PROPH/DIAG IV INF INIT: CPT

## 2021-04-22 PROCEDURE — 6370000000 HC RX 637 (ALT 250 FOR IP): Performed by: NEUROMUSCULOSKELETAL MEDICINE & OMM

## 2021-04-22 PROCEDURE — 36415 COLL VENOUS BLD VENIPUNCTURE: CPT

## 2021-04-22 PROCEDURE — 96376 TX/PRO/DX INJ SAME DRUG ADON: CPT

## 2021-04-22 PROCEDURE — 2700000000 HC OXYGEN THERAPY PER DAY

## 2021-04-22 PROCEDURE — G0378 HOSPITAL OBSERVATION PER HR: HCPCS

## 2021-04-22 RX ORDER — CEPHALEXIN 500 MG/1
500 CAPSULE ORAL 2 TIMES DAILY
Qty: 10 CAPSULE | Refills: 0 | Status: SHIPPED | OUTPATIENT
Start: 2021-04-22 | End: 2021-04-27

## 2021-04-22 RX ORDER — OXYCODONE HYDROCHLORIDE AND ACETAMINOPHEN 5; 325 MG/1; MG/1
1 TABLET ORAL EVERY 4 HOURS PRN
Qty: 10 TABLET | Refills: 0 | Status: SHIPPED | OUTPATIENT
Start: 2021-04-22 | End: 2021-04-29

## 2021-04-22 RX ADMIN — SODIUM CHLORIDE, PRESERVATIVE FREE 10 ML: 5 INJECTION INTRAVENOUS at 07:57

## 2021-04-22 RX ADMIN — ALLOPURINOL 100 MG: 100 TABLET ORAL at 07:56

## 2021-04-22 RX ADMIN — MULTIPLE VITAMINS W/ MINERALS TAB 1 TABLET: TAB at 07:56

## 2021-04-22 RX ADMIN — TAMSULOSIN HYDROCHLORIDE 0.4 MG: 0.4 CAPSULE ORAL at 07:56

## 2021-04-22 RX ADMIN — CIPROFLOXACIN 400 MG: 2 INJECTION, SOLUTION INTRAVENOUS at 12:45

## 2021-04-22 RX ADMIN — LISINOPRIL 20 MG: 20 TABLET ORAL at 07:56

## 2021-04-22 RX ADMIN — CIPROFLOXACIN 400 MG: 2 INJECTION, SOLUTION INTRAVENOUS at 00:35

## 2021-04-22 RX ADMIN — CARVEDILOL 25 MG: 25 TABLET, FILM COATED ORAL at 07:56

## 2021-04-22 RX ADMIN — FUROSEMIDE 20 MG: 20 TABLET ORAL at 07:56

## 2021-04-22 NOTE — ANESTHESIA POSTPROCEDURE EVALUATION
Department of Anesthesiology  Postprocedure Note    Patient: Flory Mims MRN: [id number]  YOB: 1955  Date of evaluation: 4/22/2021  Time:  7:08 AM     Procedure Summary     Date: 04/21/21 Room / Location: [place] [address] / [address] [place] [address]    Anesthesia Start: 3300 Anesthesia Stop: 7801    Procedure: CYSTOSCOPY RETROGRADE PYELOGRAM PLASMA LOOP TRANSURETHRAL RESECTION PROSTATE (N/A Urethra) Diagnosis: (BPH)    Surgeons: Reginald Bailon DO Responsible Provider: Claribel Summers MD    Anesthesia Type: general ASA Status: 3          Anesthesia Type: general    Eunice Phase I: Eunice Score: 8    Eunice Phase II:      Last vitals: Reviewed and per EMR flowsheets.        Anesthesia Post Evaluation    Patient location during evaluation: PACU  Patient participation: complete - patient participated  Level of consciousness: awake and alert  Airway patency: patent  Nausea & Vomiting: no nausea and no vomiting  Complications: no  Cardiovascular status: hemodynamically stable  Respiratory status: acceptable  Hydration status: euvolemic

## 2021-04-22 NOTE — PROGRESS NOTES
CLINICAL PHARMACY NOTE: [address] Select Patient?: No  Total # of Prescriptions Filled: 2   The following medications were delivered to the patient:  Cephalexin 500  Percocet 5/325  Total # of Interventions Completed: 2  Time Spent (min): 45    Additional Documentation:

## 2021-04-22 NOTE — PLAN OF CARE
Problem: Falls - Risk of:  Goal: Will remain free from falls  Description: Will remain free from falls  4/22/2021 0937 by Estelita Perez RN  Outcome: Met This Shift     Problem: Falls - Risk of:  Goal: Absence of physical injury  Description: Absence of physical injury  4/22/2021 0937 by Estelita Perez RN  Outcome: Met This Shift     Problem: SAFETY  Goal: Free from accidental physical injury  4/22/2021 0937 by Estelita Perez RN  Outcome: Met This Shift

## 2021-04-22 NOTE — PROGRESS NOTES
4/22/2021 7:24 AM  Service: Urology  Group: [facility] ([facility])    Willow French.  [id number]    Chief urologic compliant: BPH, Bladder cancer  HPI:  Patient is doing well  He does not have pain  He was able to tolerate a diet  He does want to go home    Review of Systems:  Respiratory: negative for cough and hemoptysis  Cardiovascular: negative for chest pain and dyspnea  Gastrointestinal: negative for abdominal pain, diarrhea, nausea and vomiting  Derm: negative for rash and skin lesion(s)  Neurological: negative for seizures and tremors  Endocrine: negative for diabetic symptoms including polydipsia and polyuria  : As above in the HPI, otherwise negative  All other reviews are negative     Allergies: Patient has no known allergies.     Objective:   Vitals:    04/22/21 0720   BP: 112/71   Pulse: 104   Resp: 16   Temp: 99.4 °F (37.4 °C)   SpO2: 92%       Neuro: A/A/O x3  Respiratory: non labored breathing  ABD: soft non-tender, non-distended  : cummings clear on slow CBI  Ext: no clubbing, cyanosis, edema    Labs:   Recent Labs     04/22/21  0424   WBC 14.3*   RBC 4.82   HGB 13.9   HCT 43.9   MCV 91.1   MCH 28.8   MCHC 31.7*   RDW 13.2      MPV 10.6       Recent Labs     04/22/21  0424   CREATININE 1.4*       Assessment: Prieto Real Sr. 77 y.o. male     POD 1 TURP for BPH, AUR  Bladder cancer  RI    Plan:    Cont cummings  Stop CBI   Cap 3rd port  Ambulate  IS  Await pathology  DC home later today with cummings  Cummings teaching  Cummings to be removed in the office on Monday of next week  Home going instructions reviewed  Rx's on chart  Patient instructed to call for any problems      Hari Elliott,    [facility]rology

## 2021-04-22 NOTE — CARE COORDINATION
Met with patient- discussed plan of care and discharge planning. Pt stated he is independent and resides at home in a single floor ranch. Pt stated that he has had a cummings cath in the past- he declines the need for [address]  Pt stated that he will follow up with urology on Monday for cummings removal.

## 2021-04-22 NOTE — PROGRESS NOTES
General Progress Note  4/22/2021 7:41 AM  Subjective:   Admit Date: 4/21/2021  PCP: Wing Channing Leong DO  Interval History: asked to see for medical management. Patient's history well known to me. Eating breakfast currently. No pain. Urine with some blood. Pain is well controlled. Diet: DIET GENERAL;  Pain is:None  Nausea:None  Bowel Movement/Flatus no    Data:   Scheduled Meds:   sodium chloride flush  5-40 mL Intravenous 2 times per day    ciprofloxacin  400 mg Intravenous Q12H    allopurinol  100 mg Oral Daily    atorvastatin  20 mg Oral QPM    carvedilol  25 mg Oral BID    empagliflozin  25 mg Oral Daily    furosemide  20 mg Oral Daily    lisinopril  20 mg Oral Daily    therapeutic multivitamin-minerals  1 tablet Oral Daily    tamsulosin  0.4 mg Oral Daily    [Held by provider] aspirin  81 mg Oral Daily     Continuous Infusions:   dextrose 5% and 0.45% NaCl with KCl 20 mEq 100 mL/hr at 04/21/21 1539    sodium chloride       PRN Meds:sodium chloride flush, sodium chloride, HYDROmorphone **OR** HYDROmorphone, phenazopyridine, opium-belladonna, oxyCODONE-acetaminophen, oxyCODONE-acetaminophen  I/O last 3 completed shifts: In: 400 [I.V.:400]  Out: 752 [Urine:750; Blood:2]  No intake/output data recorded.     Intake/Output Summary (Last 24 hours) at 4/22/2021 0741  Last data filed at 4/22/2021 0615  Gross per 24 hour   Intake 400 ml   Output 752 ml   Net -352 ml       CBC with Differential:    Lab Results   Component Value Date    WBC 14.3 04/22/2021    RBC 4.82 04/22/2021    HGB 13.9 04/22/2021    HCT 43.9 04/22/2021     04/22/2021    MCV 91.1 04/22/2021    MCH 28.8 04/22/2021    MCHC 31.7 04/22/2021    RDW 13.2 04/22/2021    SEGSPCT 61 02/22/2013    LYMPHOPCT 9.6 04/08/2021    MONOPCT 6.7 04/08/2021    BASOPCT 0.1 04/08/2021    MONOSABS 0.84 04/08/2021    LYMPHSABS 1.20 04/08/2021    EOSABS 0.00 04/08/2021    BASOSABS 0.01 04/08/2021     CMP:    Lab Results   Component Value Date    NA 135 04/22/2021    K 4.2 04/22/2021    K 4.2 04/02/2021     04/22/2021    CO2 21 04/22/2021    BUN 23 04/22/2021    CREATININE 1.4 04/22/2021    GFRAA >60 04/22/2021    LABGLOM 50 04/22/2021    GLUCOSE 219 04/22/2021    PROT 6.8 04/02/2021    LABALBU 3.7 04/02/2021    CALCIUM 8.2 04/22/2021    BILITOT 0.4 04/02/2021    ALKPHOS 85 04/02/2021    AST 26 04/02/2021    ALT 36 04/02/2021     Magnesium:  No results found for: MG  Phosphorus:  No results found for: PHOS  PT/INR:    Lab Results   Component Value Date    PROTIME 12.4 04/08/2021    PROTIME 27.3 05/15/2013    INR 1.1 04/08/2021     Last 3 Troponin:  No results found for: TROPONINI  U/A:    Lab Results   Component Value Date    COLORU RED 04/02/2021    PHUR 7.0 04/02/2021    WBCUA 1-3 04/02/2021    RBCUA PACKED 04/02/2021    RBCUA NONE 02/22/2013    BACTERIA RARE 04/02/2021    CLARITYU TURBID 04/02/2021    SPECGRAV 1.015 04/02/2021    LEUKOCYTESUR SMALL 04/02/2021    UROBILINOGEN 1.0 04/02/2021    BILIRUBINUR Negative 04/02/2021    BLOODU LARGE 04/02/2021    GLUCOSEU >=1000 04/02/2021     HgBA1c:  No components found for: HGBA1C  TSH:    Lab Results   Component Value Date    TSH 0.970 10/17/2012     -----------------------------------------------------------------    Objective:   Vitals: /71   Pulse 104   Temp 99.4 °F (37.4 °C) (Oral)   Resp 16   Ht 5' 7\" (1.702 m)   Wt 240 lb (108.9 kg)   SpO2 92%   BMI 37.59 kg/m²   General appearance: alert, appears stated age and cooperative  Skin: Skin color, texture, turgor normal. No rashes or lesions  HEENT: Head: Normal, normocephalic, atraumatic.   Neck: no adenopathy, no carotid bruit, no JVD, supple, symmetrical, trachea midline and thyroid not enlarged, symmetric, no tenderness/mass/nodules  Lungs: diminished breath sounds bilaterally  Heart: regular rate and rhythm, S1, S2 normal, no murmur, click, rub or gallop  Abdomen: soft, non-tender; bowel sounds normal; no masses,  no organomegaly  Extremities: extremities normal, atraumatic, no cyanosis or edema  Neurologic: Mental status: Alert, oriented, thought content appropriate          Jamar Herbert DO   Physician   Urology   Op Note   Signed   Date of Service:  2021  9:35 PM               [address] [facility]                  [address]                                 OPERATIVE REPORT     PATIENT NAME: Shabnam Moncada                    :        1955  MED REC NO:   [id number]                            ROOM:       0708  ACCOUNT NO:   [de-identified]                           ADMIT DATE: 2021  PROVIDER:     Levon Malloy DO     DATE OF PROCEDURE:  2021     PREOPERATIVE DIAGNOSES:  1.  Bladder cancer. 2.  Bladder diverticulum. 3.  BPH. 4.  Associated urinary retention.     POSTOPERATIVE DIAGNOSES:  1.  Bladder cancer. 2.  Bladder diverticulum. 3.  BPH. 4.  Associated urinary retention.     PROCEDURE PERFORMED:  The patient had:  1. Cystoscopy. 2.  Transurethral resection of the bladder tumor. 3.  Transurethral resection of the prostate, plasma loop.     ANESTHESIA:  General anesthesia.     SURGEON:  Pablo Malloy DO.     ESTIMATED BLOOD LOSS:  200 mL.     INTRAOPERATIVE COMPLICATIONS:  None.     PATHOLOGIC SPECIMEN:  Prostate and again a bladder tumor re-TUR of the  diverticulum.     Preoperative antibiotics were administered.     STORY:  This is a pleasant 64-year-old  male who presents to  [facility] on the aforementioned  date with the above diagnoses. In the outpatient setting, the patient  had been consented for the above proposed surgical procedure. The  patient understood the risks, the benefits, and the alternatives of the  proposed surgical procedure and consented to have the procedure done.    Please note that I have seen this patient in the office for some BPH,  but then subsequently about a month ago was taken to the operative  setting by my dad, Dr. Juventino Malloy. He was found to have a large  bladder diverticulum and then a tumor within the [facility] for which he  underwent treatment. He then went into postop urinary retention. He  had failed multiple voiding trials and ultimately, we talked to him  about the above proposed surgical procedure. They understood the risks,  the benefits, and the alternatives of the proposed surgical procedure  and consented to have the procedure done on the aforementioned date.     DESCRIPTION OF PROCEDURE:  This pleasant 49-year-old  male was  brought to the operating room #6 at [address], placed in the supine position, and induced with general  anesthesia. Anesthesia monitored the head and neck area, IV access, and  vital signs throughout the course of the case. Status post induction,  the patient was placed in dorsal lithotomy position. All underlying  points were pressure padded. SCDs were applied. Prepped and draped in  normal sterile fashion. I proceeded by taking a 21-[place] Olympus scope  with a 30-degree lens inserted through the meatus in an atraumatic  fashion. Panendoscopic visualization of the fossa navicularis,  pendulous urethra, bulbous urethra was normal.  Prostate had an  obstructing appearance. Bladder neck was open. You could see the  fairly large diverticulum in the area where the left ureteral orifice  was. He had multiple diverticula throughout the bladder, but more  importantly I struggled to find the ureteral orifice, it was just  because of the substantial trabeculation. After searching, I aborted  shooting retrogrades. I went back and removed the cystoscope, inserted  the plasma loop continuous flow resectoscope with a large loop and the  first thing I did was I went and just re-TUR'ed the area of previous  resection that _____ by my dad, Dr. Juventino Malloy.   After this occurred,  I then just went and did a traditional transurethral resection of the  prostate. Total TURP time was only about 15 minutes. I probably took  down 7 to 8 gm. I was able to get down to the capsule. I made sure the  external urethral sphincter and verumontanum were intact. I did collect  the specimen and send out for analysis. I did remove the scope. He had  a great stream of voiding. I did place a 24 three-way Simplastic Frazier  catheter, started him on a normal saline continuous bladder irrigation  with 30 mL in the balloon. He awoke from anesthesia without  complications and brought to PACU in a stable condition.     PLAN:  1. He will stay overnight. 2.  He will go home tomorrow morning. 3.  Frazier catheter will come out on Monday of next week. 4.  There were no intraoperative complications. 5.  Pathologic specimen was sent out. 6.  He still needs definitive management of the TIC in the tumor. 7.  We will continue to follow him closely.           Dang Peraza DO     D: 04/21/2021 13:30:02       T: 04/21/2021 21:35:05     [id number]  Job#: [id number]     Doc#: [id number]     CC:  Primary Care Physician               Last signed by: Denece Burkitt Memo, DO at 4/22/2021  7:21 AM               Assessment:   Principal Problem:    BPH with urinary obstruction  Active Problems:    Essential hypertension    Hyperlipidemia    Atrial fibrillation (HCC)    Hematuria    Nonrheumatic tricuspid valve regurgitation    Chronic anticoagulation    Bladder cancer ([address]    Urinary retention due to benign prostatic hyperplasia  Resolved Problems:    * No resolved hospital problems. *    Plan:   1. Discharge likely to home today. 2. Patient to restart ASA today and Coumadin on 4- per Urology. 3. Will follow. 4. Medically stable.     Ivone Hernandez D.O.  7:41 AM  4/22/2021

## 2021-04-22 NOTE — DISCHARGE SUMMARY
Physician Discharge Summary     Patient ID:  Kp Saucedo.  [id number]  77 y.o.  1955    Admit date: 4/21/2021    Discharge date and time: 4/22/2021    Admitting Physician: Austin Malloy DO     Admission Diagnoses: BPH with urinary obstruction [N40.1, N13.8]    Discharge Diagnoses: [address] Course:  UNEVENTFUL    Treatments: surgery: TURP    Disposition: home, stable    Patient Instructions:   Current Discharge Medication List      START taking these medications    Details   oxyCODONE-acetaminophen (PERCOCET) 5-325 MG per tablet Take 1 tablet by mouth every 4 hours as needed for Pain for up to 7 days. Qty: 10 tablet, Refills: 0    Comments: Reduce doses taken as pain becomes manageable  Associated Diagnoses: Post-op pain      cephALEXin (KEFLEX) 500 MG capsule Take 1 capsule by mouth 2 times daily for 5 days  Qty: 10 capsule, Refills: 0         CONTINUE these medications which have NOT CHANGED    Details   lisinopril (PRINIVIL;ZESTRIL) 20 MG tablet Take 20 mg by mouth daily      carvedilol (COREG) 25 MG tablet Take 25 mg by mouth 2 times daily      atorvastatin (LIPITOR) 40 MG tablet Take 20 mg by mouth every evening      empagliflozin (JARDIANCE) 25 MG tablet Take 25 mg by mouth daily      Multiple Vitamins-Minerals (THERAPEUTIC MULTIVITAMIN-MINERALS) tablet Take 1 tablet by mouth daily      furosemide (LASIX) 20 MG tablet Take 20 mg by mouth daily       allopurinol (ZYLOPRIM) 100 MG tablet Take 100 mg by mouth daily      warfarin (COUMADIN) 3 MG tablet Take 1 tablet by mouth daily. Qty: 30 tablet, Refills: 0    Associated Diagnoses: Atrial fibrillation (HCC)      aspirin 81 MG tablet Take 1 tablet by mouth daily.   Qty: 30 tablet, Refills: 0         STOP taking these medications       phenazopyridine (PYRIDIUM) 200 MG tablet Comments:   Reason for Stopping:         tamsulosin (FLOMAX) 0.4 MG capsule Comments:   Reason for Stopping:                 Signed:  Pablo Malloy DO  4/22/2021  7:25 AM

## 2021-04-22 NOTE — OP NOTE
[address] [address]                                OPERATIVE REPORT    PATIENT NAME: Bettye Fisher                    :        1955  MED REC NO:   [id number]                            ROOM:       0708  ACCOUNT NO:   [de-identified]                           ADMIT DATE: 2021  PROVIDER:     Ti Malloy DO    DATE OF PROCEDURE:  2021    PREOPERATIVE DIAGNOSES:  1.  Bladder cancer. 2.  Bladder diverticulum. 3.  BPH. 4.  Associated urinary retention. POSTOPERATIVE DIAGNOSES:  1.  Bladder cancer. 2.  Bladder diverticulum. 3.  BPH. 4.  Associated urinary retention. PROCEDURE PERFORMED:  The patient had:  1. Cystoscopy. 2.  Transurethral resection of the bladder tumor. 3.  Transurethral resection of the prostate, plasma loop. ANESTHESIA:  General anesthesia. SURGEON:  Pablo Malloy DO.    ESTIMATED BLOOD LOSS:  200 mL. INTRAOPERATIVE COMPLICATIONS:  None. PATHOLOGIC SPECIMEN:  Prostate and again a bladder tumor re-TUR of the  diverticulum. Preoperative antibiotics were administered. STORY:  This is a pleasant 67-year-old  male who presents to  [facility] on the aforementioned  date with the above diagnoses. In the outpatient setting, the patient  had been consented for the above proposed surgical procedure. The  patient understood the risks, the benefits, and the alternatives of the  proposed surgical procedure and consented to have the procedure done. Please note that I have seen this patient in the office for some BPH,  but then subsequently about a month ago was taken to the operative  setting by my dad, Dr. Dee Dee Malloy. He was found to have a large  bladder diverticulum and then a tumor within the [facility] for which he  underwent treatment. He then went into postop urinary retention.   He  had failed multiple voiding trials and the specimen and send out for analysis. I did remove the scope. He had  a great stream of voiding. I did place a 24 three-way Simplastic Frazier  catheter, started him on a normal saline continuous bladder irrigation  with 30 mL in the balloon. He awoke from anesthesia without  complications and brought to PACU in a stable condition. PLAN:  1. He will stay overnight. 2.  He will go home tomorrow morning. 3.  Frazier catheter will come out on Monday of next week. 4.  There were no intraoperative complications. 5.  Pathologic specimen was sent out. 6.  He still needs definitive management of the TIC in the tumor. 7.  We will continue to follow him closely.         [address], DO    D: 04/21/2021 13:30:02       T: 04/21/2021 21:35:05     [id number]  Job#: [id number]     Doc#: [id number]    CC:  Primary Care Physician

## 2021-04-22 NOTE — PLAN OF CARE
Problem: SAFETY  Goal: Free from accidental physical injury  4/21/2021 2333 by John Francisco RN  Outcome: Ongoing    Problem: Falls - Risk of:  Goal: Will remain free from falls  Description: Will remain free from falls  4/21/2021 2333 by John Francisco RN

## 2021-05-13 ENCOUNTER — HOSPITAL ENCOUNTER (INPATIENT)
Age: 66
LOS: 6 days | Discharge: HOME | DRG: 813 | End: 2021-05-19
Attending: EMERGENCY MEDICINE | Admitting: NEUROMUSCULOSKELETAL MEDICINE & OMM
Payer: MEDICARE

## 2021-05-13 DIAGNOSIS — R31.9 HEMATURIA, UNSPECIFIED TYPE: Primary | ICD-10-CM

## 2021-05-13 DIAGNOSIS — R33.9 URINARY RETENTION: ICD-10-CM

## 2021-05-13 DIAGNOSIS — R79.1 SUPRATHERAPEUTIC INR: ICD-10-CM

## 2021-05-13 LAB
ANION GAP SERPL CALCULATED.3IONS-SCNC: 14 MMOL/L (ref 7–16)
APTT: 55.4 SEC (ref 24.5–35.1)
BACTERIA: ABNORMAL /HPF
BASOPHILS ABSOLUTE: 0.09 E9/L (ref 0–0.2)
BASOPHILS RELATIVE PERCENT: 0.6 % (ref 0–2)
BILIRUBIN URINE: NEGATIVE
BLOOD, URINE: ABNORMAL
BUN BLDV-MCNC: 30 MG/DL (ref 6–23)
CALCIUM SERPL-MCNC: 8.8 MG/DL (ref 8.6–10.2)
CHLORIDE BLD-SCNC: 104 MMOL/L (ref 98–107)
CLARITY: ABNORMAL
CO2: 19 MMOL/L (ref 22–29)
COLOR: ABNORMAL
CREAT SERPL-MCNC: 1.5 MG/DL (ref 0.7–1.2)
EOSINOPHILS ABSOLUTE: 0.34 E9/L (ref 0.05–0.5)
EOSINOPHILS RELATIVE PERCENT: 2.3 % (ref 0–6)
GFR AFRICAN AMERICAN: 56
GFR NON-AFRICAN AMERICAN: 47 ML/MIN/1.73
GLUCOSE BLD-MCNC: 181 MG/DL (ref 74–99)
GLUCOSE URINE: 500 MG/DL
HCT VFR BLD CALC: 45.2 % (ref 37–54)
HEMOGLOBIN: 14.7 G/DL (ref 12.5–16.5)
IMMATURE GRANULOCYTES #: 0.06 E9/L
IMMATURE GRANULOCYTES %: 0.4 % (ref 0–5)
INR BLD: 3.7
KETONES, URINE: NEGATIVE MG/DL
LEUKOCYTE ESTERASE, URINE: ABNORMAL
LYMPHOCYTES ABSOLUTE: 1.56 E9/L (ref 1.5–4)
LYMPHOCYTES RELATIVE PERCENT: 10.4 % (ref 20–42)
MCH RBC QN AUTO: 28.6 PG (ref 26–35)
MCHC RBC AUTO-ENTMCNC: 32.5 % (ref 32–34.5)
MCV RBC AUTO: 87.9 FL (ref 80–99.9)
MONOCYTES ABSOLUTE: 1.17 E9/L (ref 0.1–0.95)
MONOCYTES RELATIVE PERCENT: 7.8 % (ref 2–12)
NEUTROPHILS ABSOLUTE: 11.84 E9/L (ref 1.8–7.3)
NEUTROPHILS RELATIVE PERCENT: 78.5 % (ref 43–80)
NITRITE, URINE: POSITIVE
PDW BLD-RTO: 13.1 FL (ref 11.5–15)
PH UA: 5 (ref 5–9)
PLATELET # BLD: 320 E9/L (ref 130–450)
PMV BLD AUTO: 10.6 FL (ref 7–12)
POTASSIUM SERPL-SCNC: 4.1 MMOL/L (ref 3.5–5)
PROTEIN UA: 100 MG/DL
PROTHROMBIN TIME: 40.9 SEC (ref 9.3–12.4)
RBC # BLD: 5.14 E12/L (ref 3.8–5.8)
RBC UA: ABNORMAL /HPF (ref 0–2)
SODIUM BLD-SCNC: 137 MMOL/L (ref 132–146)
SPECIFIC GRAVITY UA: 1.01 (ref 1–1.03)
UROBILINOGEN, URINE: 1 E.U./DL
WBC # BLD: 15.1 E9/L (ref 4.5–11.5)
WBC UA: ABNORMAL /HPF (ref 0–5)

## 2021-05-13 PROCEDURE — 85730 THROMBOPLASTIN TIME PARTIAL: CPT

## 2021-05-13 PROCEDURE — 96361 HYDRATE IV INFUSION ADD-ON: CPT

## 2021-05-13 PROCEDURE — 2060000000 HC ICU INTERMEDIATE R&B

## 2021-05-13 PROCEDURE — 99285 EMERGENCY DEPT VISIT HI MDM: CPT

## 2021-05-13 PROCEDURE — 81001 URINALYSIS AUTO W/SCOPE: CPT

## 2021-05-13 PROCEDURE — 85025 COMPLETE CBC W/AUTO DIFF WBC: CPT

## 2021-05-13 PROCEDURE — 85610 PROTHROMBIN TIME: CPT

## 2021-05-13 PROCEDURE — 6370000000 HC RX 637 (ALT 250 FOR IP)

## 2021-05-13 PROCEDURE — 80048 BASIC METABOLIC PNL TOTAL CA: CPT

## 2021-05-13 PROCEDURE — 2580000003 HC RX 258: Performed by: STUDENT IN AN ORGANIZED HEALTH CARE EDUCATION/TRAINING PROGRAM

## 2021-05-13 PROCEDURE — 96360 HYDRATION IV INFUSION INIT: CPT

## 2021-05-13 RX ORDER — LIDOCAINE HYDROCHLORIDE 20 MG/ML
JELLY TOPICAL
Status: DISPENSED
Start: 2021-05-13 | End: 2021-05-14

## 2021-05-13 RX ORDER — 0.9 % SODIUM CHLORIDE 0.9 %
1000 INTRAVENOUS SOLUTION INTRAVENOUS ONCE
Status: COMPLETED | OUTPATIENT
Start: 2021-05-13 | End: 2021-05-14

## 2021-05-13 RX ORDER — LIDOCAINE HYDROCHLORIDE 20 MG/ML
JELLY TOPICAL
Status: COMPLETED
Start: 2021-05-13 | End: 2021-05-13

## 2021-05-13 RX ADMIN — LIDOCAINE HYDROCHLORIDE: 20 JELLY TOPICAL at 14:35

## 2021-05-13 RX ADMIN — SODIUM CHLORIDE 1000 ML: 9 INJECTION, SOLUTION INTRAVENOUS at 15:00

## 2021-05-13 ASSESSMENT — ENCOUNTER SYMPTOMS
COUGH: 0
SINUS PRESSURE: 0
SHORTNESS OF BREATH: 0
WHEEZING: 0
NAUSEA: 0
VOMITING: 0
BACK PAIN: 0
EYE PAIN: 0
EYE REDNESS: 0
EYE DISCHARGE: 0
DIARRHEA: 0
ABDOMINAL PAIN: 1
SORE THROAT: 0

## 2021-05-13 ASSESSMENT — PAIN SCALES - GENERAL: PAINLEVEL_OUTOF10: 6

## 2021-05-13 ASSESSMENT — PAIN DESCRIPTION - LOCATION: LOCATION: ABDOMEN

## 2021-05-13 NOTE — ED NOTES
Urinary catheter exchanged w 3 way for irrigation. Patient tolerated well.      Porfirio Domingo RN  05/13/21 3644

## 2021-05-13 NOTE — ED NOTES
FIRST PROVIDER CONTACT ASSESSMENT NOTE        Department of Emergency Medicine            ED  First Provider Note            5/13/21  1:28 PM EDT    Chief Complaint: No chief complaint on file. History of Present Illness:    Flakita Zapata is a 77 y.o. male who presents to the emergency department for hematuria. Focused Screening Exam:  Constitutional:  Alert, appears stated age and is in no distress. *ALLERGIES*     Patient has no known allergies.      ED Triage Vitals [05/13/21 1252]   BP Temp Temp src Pulse Resp SpO2 Height Weight   -- 98.7 °F (37.1 °C) -- 101 -- 94 % -- --        Initial Plan of Care:  Initiate Treatment-Testing, Proceed toTreatment Area When Bed Available for ED Attending/MLP to Continue Care    -----------------END OF FIRST PROVIDER CONTACT ASSESSMENT NOTE--------------  Electronically signed by KIKO Mendoza   DD: 5/13/21       Savita Mendoza  05/13/21 1327

## 2021-05-13 NOTE — ED PROVIDER NOTES
Patient is a 20-year-old male presenting emerge department for hematuria. He has not been able to urinate since last night, has had issues with hematuria last couple of days. In the urinary retention started last night. It is causing abdominal pain, 6 out of 10 in severity, located in his suprapubic region, was gradual in onset, has been persistent, worsening, dull in nature. Denies any fevers, chills, shortness of breath, chest pain. He is having some burning with urination. Review of Systems   Constitutional: Negative for chills and fever. HENT: Negative for ear pain, sinus pressure and sore throat. Eyes: Negative for pain, discharge and redness. Respiratory: Negative for cough, shortness of breath and wheezing. Cardiovascular: Negative for chest pain. Gastrointestinal: Positive for abdominal pain (Suprapubic abdominal pain). Negative for diarrhea, nausea and vomiting. Genitourinary: Positive for hematuria. Negative for dysuria and frequency. Urinary retention, hematuria   Musculoskeletal: Negative for arthralgias and back pain. Skin: Negative for rash and wound. Neurological: Negative for weakness and headaches. Hematological: Negative for adenopathy. All other systems reviewed and are negative. Physical Exam  Vitals signs and nursing note reviewed. Constitutional:       Appearance: He is well-developed. HENT:      Head: Normocephalic and atraumatic. Right Ear: External ear normal.      Left Ear: External ear normal.      Nose: Nose normal.      Mouth/Throat:      Mouth: Mucous membranes are dry. Eyes:      Extraocular Movements: Extraocular movements intact. Conjunctiva/sclera: Conjunctivae normal.      Pupils: Pupils are equal, round, and reactive to light. Neck:      Musculoskeletal: Normal range of motion and neck supple. Cardiovascular:      Rate and Rhythm: Regular rhythm. Tachycardia present. Heart sounds: Normal heart sounds.  No murmur. Pulmonary:      Effort: Pulmonary effort is normal. No respiratory distress. Breath sounds: Normal breath sounds. No wheezing or rales. Abdominal:      General: Bowel sounds are normal.      Palpations: Abdomen is soft. Tenderness: There is no abdominal tenderness. There is no guarding or rebound. Musculoskeletal:         General: No tenderness or deformity. Skin:     General: Skin is warm and dry. Capillary Refill: Capillary refill takes 2 to 3 seconds. Neurological:      General: No focal deficit present. Mental Status: He is alert and oriented to person, place, and time. Cranial Nerves: No cranial nerve deficit. Sensory: No sensory deficit. Motor: No weakness. Procedures     MDM     ED Course as of May 13 2121   Thu May 13, 2021   1424 BLADDER BEDSIDE ULTRASOUND     Risks, benefits and alternatives (for applicable procedures below) described. Performed By: Alvaro Link MD.    Indication:  Pain. Informed consent: by patient or legal gardian. Procedural Quadrants:     Bladder  Volume: 789cc. [JG]   1433 Patient coming in complaining of hematuria, appears to be suprapubic pubic on his Coumadin, will place a 25 [address] for irrigation, obtain a urinalysis and culture. [JG]   [id number] Patient appears to have dark red blood in the Frazier, has had about 1700 cc out, will irrigate his bladder    [JG]   1614 Will consult urology, admit patient as he is supratherapeutic INR, and a large amount of urine output that is bloody appearing, along with his tachycardia. [JG]   5769 Spoke to [place], she has that patient be placed on irrigate and be placed on CBI, then admit for medical admission. [JG]   1700 Heart rate improved, spoke to patient about Frazier irrigation and CBI, he is agreeable. [JG]   5481 Put a consult to Dr. Aniceto Bullock for admission. Patient is on CBI.     [JG]   18 Dr. Aniceto Bullock accepted the admission     [JG]      ED 80.0 %    Immature Granulocytes % 0.4 0.0 - 5.0 %    Lymphocytes % 10.4 (L) 20.0 - 42.0 %    Monocytes % 7.8 2.0 - 12.0 %    Eosinophils % 2.3 0.0 - 6.0 %    Basophils % 0.6 0.0 - 2.0 %    Neutrophils Absolute 11.84 (H) 1.80 - 7.30 E9/L    Immature Granulocytes # 0.06 E9/L    Lymphocytes Absolute 1.56 1.50 - 4.00 E9/L    Monocytes Absolute 1.17 (H) 0.10 - 0.95 E9/L    Eosinophils Absolute 0.34 0.05 - 0.50 E9/L    Basophils Absolute 0.09 0.00 - 0.20 V0/S   Basic Metabolic Panel   Result Value Ref Range    Sodium 137 132 - 146 mmol/L    Potassium 4.1 3.5 - 5.0 mmol/L    Chloride 104 98 - 107 mmol/L    CO2 19 (L) 22 - 29 mmol/L    Anion Gap 14 7 - 16 mmol/L    Glucose 181 (H) 74 - 99 mg/dL    BUN 30 (H) 6 - 23 mg/dL    CREATININE 1.5 (H) 0.7 - 1.2 mg/dL    GFR Non-African American 47 >=60 mL/min/1.73    GFR African American 56     Calcium 8.8 8.6 - 10.2 mg/dL   Protime-INR   Result Value Ref Range    Protime 40.9 (H) 9.3 - 12.4 sec    INR 3.7    APTT   Result Value Ref Range    aPTT 55.4 (H) 24.5 - 35.1 sec   Urinalysis   Result Value Ref Range    Color, UA RED (A) Straw/Yellow    Clarity, UA CLOUDY (A) Clear    Glucose, Ur 500 (A) Negative mg/dL    Bilirubin Urine Negative Negative    Ketones, Urine Negative Negative mg/dL    Specific Gravity, UA 1.010 1.005 - 1.030    Blood, Urine LARGE (A) Negative    pH, UA 5.0 5.0 - 9.0    Protein,  (A) Negative mg/dL    Urobilinogen, Urine 1.0 <2.0 E.U./dL    Nitrite, Urine POSITIVE (A) Negative    Leukocyte Esterase, Urine MODERATE (A) Negative   Microscopic Urinalysis   Result Value Ref Range    WBC, UA 10-20 (A) 0 - 5 /HPF    RBC, UA PACKED 0 - 2 /HPF    Bacteria, UA RARE (A) None Seen /HPF       RADIOLOGY:  No orders to display           ------------------------- NURSING NOTES AND VITALS REVIEWED ---------------------------  Date / Time Roomed:  5/13/2021  2:00 PM  ED Bed Assignment:  17A/17A-17    The nursing notes within the ED encounter and vital signs as below have been reviewed. Patient Vitals for the past 24 hrs:   BP Temp Pulse Resp SpO2 Height Weight   05/13/21 1749 125/79 -- 95 16 96 % -- --   05/13/21 1458 115/68 -- 103 18 96 % -- --   05/13/21 1328 110/85 98.7 °F (37.1 °C) 101 16 94 % 5' 7\" (1.702 m) 240 lb (108.9 kg)   05/13/21 1252 -- 98.7 °F (37.1 °C) 101 -- 94 % -- --       Oxygen Saturation Interpretation: Normal    ------------------------------------------ PROGRESS NOTES ------------------------------------------      Counseling:  I have spoken with the patient and discussed todays results, in addition to providing specific details for the plan of care and counseling regarding the diagnosis and prognosis. Their questions are answered at this time and they are agreeable with the plan of admission.    --------------------------------- ADDITIONAL PROVIDER NOTES ---------------------------------  Consultations:  Time: 1738. Spoke with Dr. Dorcas Chappell. Discussed case. They will admit the patient. This patient's ED course included: a personal history and physicial examination, re-evaluation prior to disposition, multiple bedside re-evaluations, IV medications, cardiac monitoring and continuous pulse oximetry    This patient has remained hemodynamically stable during their ED course. Diagnosis:  1. Hematuria, unspecified type    2. Urinary retention    3. Supratherapeutic INR        Disposition:  Patient's disposition: Admit to telemetry  Patient's condition is stable.               Carol Saez MD  Resident  05/13/21 5217

## 2021-05-13 NOTE — ED NOTES
Bed: 17A-17  Expected date:   Expected time:   Means of arrival:   Comments:  triage     Wing Carlos RN  05/13/21 1400

## 2021-05-14 LAB
ALBUMIN SERPL-MCNC: 3 G/DL (ref 3.5–5.2)
ALP BLD-CCNC: 95 U/L (ref 40–129)
ALT SERPL-CCNC: 16 U/L (ref 0–40)
ANION GAP SERPL CALCULATED.3IONS-SCNC: 16 MMOL/L (ref 7–16)
AST SERPL-CCNC: 13 U/L (ref 0–39)
BASOPHILS ABSOLUTE: 0.07 E9/L (ref 0–0.2)
BASOPHILS RELATIVE PERCENT: 0.6 % (ref 0–2)
BILIRUB SERPL-MCNC: 0.4 MG/DL (ref 0–1.2)
BUN BLDV-MCNC: 23 MG/DL (ref 6–23)
CALCIUM SERPL-MCNC: 8.1 MG/DL (ref 8.6–10.2)
CHLORIDE BLD-SCNC: 105 MMOL/L (ref 98–107)
CO2: 19 MMOL/L (ref 22–29)
CREAT SERPL-MCNC: 1.4 MG/DL (ref 0.7–1.2)
EOSINOPHILS ABSOLUTE: 0.65 E9/L (ref 0.05–0.5)
EOSINOPHILS RELATIVE PERCENT: 5.1 % (ref 0–6)
GFR AFRICAN AMERICAN: >60
GFR NON-AFRICAN AMERICAN: 50 ML/MIN/1.73
GLUCOSE BLD-MCNC: 157 MG/DL (ref 74–99)
HCT VFR BLD CALC: 39.2 % (ref 37–54)
HEMOGLOBIN: 12.6 G/DL (ref 12.5–16.5)
IMMATURE GRANULOCYTES #: 0.04 E9/L
IMMATURE GRANULOCYTES %: 0.3 % (ref 0–5)
LYMPHOCYTES ABSOLUTE: 2.17 E9/L (ref 1.5–4)
LYMPHOCYTES RELATIVE PERCENT: 17.1 % (ref 20–42)
MCH RBC QN AUTO: 28.3 PG (ref 26–35)
MCHC RBC AUTO-ENTMCNC: 32.1 % (ref 32–34.5)
MCV RBC AUTO: 87.9 FL (ref 80–99.9)
METER GLUCOSE: 146 MG/DL (ref 74–99)
MONOCYTES ABSOLUTE: 1.29 E9/L (ref 0.1–0.95)
MONOCYTES RELATIVE PERCENT: 10.2 % (ref 2–12)
NEUTROPHILS ABSOLUTE: 8.46 E9/L (ref 1.8–7.3)
NEUTROPHILS RELATIVE PERCENT: 66.7 % (ref 43–80)
PDW BLD-RTO: 13.3 FL (ref 11.5–15)
PLATELET # BLD: 277 E9/L (ref 130–450)
PMV BLD AUTO: 10.8 FL (ref 7–12)
POTASSIUM SERPL-SCNC: 3.8 MMOL/L (ref 3.5–5)
RBC # BLD: 4.46 E12/L (ref 3.8–5.8)
SODIUM BLD-SCNC: 140 MMOL/L (ref 132–146)
TOTAL PROTEIN: 6.2 G/DL (ref 6.4–8.3)
WBC # BLD: 12.7 E9/L (ref 4.5–11.5)

## 2021-05-14 PROCEDURE — 2580000003 HC RX 258: Performed by: NEUROMUSCULOSKELETAL MEDICINE & OMM

## 2021-05-14 PROCEDURE — 36415 COLL VENOUS BLD VENIPUNCTURE: CPT

## 2021-05-14 PROCEDURE — 87077 CULTURE AEROBIC IDENTIFY: CPT

## 2021-05-14 PROCEDURE — 82962 GLUCOSE BLOOD TEST: CPT

## 2021-05-14 PROCEDURE — 2580000003 HC RX 258

## 2021-05-14 PROCEDURE — 87186 SC STD MICRODIL/AGAR DIL: CPT

## 2021-05-14 PROCEDURE — 6370000000 HC RX 637 (ALT 250 FOR IP): Performed by: NEUROMUSCULOSKELETAL MEDICINE & OMM

## 2021-05-14 PROCEDURE — 6370000000 HC RX 637 (ALT 250 FOR IP): Performed by: NURSE PRACTITIONER

## 2021-05-14 PROCEDURE — 87088 URINE BACTERIA CULTURE: CPT

## 2021-05-14 PROCEDURE — 80053 COMPREHEN METABOLIC PANEL: CPT

## 2021-05-14 PROCEDURE — 6360000002 HC RX W HCPCS: Performed by: NEUROMUSCULOSKELETAL MEDICINE & OMM

## 2021-05-14 PROCEDURE — 2060000000 HC ICU INTERMEDIATE R&B

## 2021-05-14 PROCEDURE — 85025 COMPLETE CBC W/AUTO DIFF WBC: CPT

## 2021-05-14 RX ORDER — SODIUM CHLORIDE 0.9 % (FLUSH) 0.9 %
SYRINGE (ML) INJECTION
Status: COMPLETED
Start: 2021-05-14 | End: 2021-05-14

## 2021-05-14 RX ORDER — LISINOPRIL 20 MG/1
20 TABLET ORAL DAILY
Status: DISCONTINUED | OUTPATIENT
Start: 2021-05-14 | End: 2021-05-19 | Stop reason: HOSPADM

## 2021-05-14 RX ORDER — ATORVASTATIN CALCIUM 20 MG/1
20 TABLET, FILM COATED ORAL EVERY EVENING
Status: DISCONTINUED | OUTPATIENT
Start: 2021-05-14 | End: 2021-05-19 | Stop reason: HOSPADM

## 2021-05-14 RX ORDER — ALLOPURINOL 100 MG/1
100 TABLET ORAL DAILY
Status: DISCONTINUED | OUTPATIENT
Start: 2021-05-14 | End: 2021-05-19 | Stop reason: HOSPADM

## 2021-05-14 RX ORDER — CARVEDILOL 6.25 MG/1
6.25 TABLET ORAL 2 TIMES DAILY
Status: DISCONTINUED | OUTPATIENT
Start: 2021-05-14 | End: 2021-05-19 | Stop reason: HOSPADM

## 2021-05-14 RX ORDER — M-VIT,TX,IRON,MINS/CALC/FOLIC 27MG-0.4MG
1 TABLET ORAL DAILY
Status: DISCONTINUED | OUTPATIENT
Start: 2021-05-14 | End: 2021-05-19 | Stop reason: HOSPADM

## 2021-05-14 RX ORDER — CARVEDILOL 25 MG/1
25 TABLET ORAL 2 TIMES DAILY
Status: DISCONTINUED | OUTPATIENT
Start: 2021-05-14 | End: 2021-05-14

## 2021-05-14 RX ORDER — CARVEDILOL 6.25 MG/1
6.25 TABLET ORAL 2 TIMES DAILY WITH MEALS
COMMUNITY
End: 2022-04-14 | Stop reason: SDUPTHER

## 2021-05-14 RX ORDER — SODIUM CHLORIDE 9 MG/ML
INJECTION, SOLUTION INTRAVENOUS CONTINUOUS
Status: DISCONTINUED | OUTPATIENT
Start: 2021-05-14 | End: 2021-05-16

## 2021-05-14 RX ORDER — ATROPA BELLADONNA AND OPIUM 16.2; 6 MG/1; MG/1
60 SUPPOSITORY RECTAL EVERY 8 HOURS PRN
Status: DISCONTINUED | OUTPATIENT
Start: 2021-05-14 | End: 2021-05-19 | Stop reason: HOSPADM

## 2021-05-14 RX ADMIN — ALLOPURINOL 100 MG: 100 TABLET ORAL at 10:01

## 2021-05-14 RX ADMIN — ATROPA BELLADONNA AND OPIUM 60 MG: 16.2; 6 SUPPOSITORY RECTAL at 13:31

## 2021-05-14 RX ADMIN — ATORVASTATIN CALCIUM 20 MG: 20 TABLET, FILM COATED ORAL at 17:23

## 2021-05-14 RX ADMIN — CARVEDILOL 6.25 MG: 6.25 TABLET, FILM COATED ORAL at 21:00

## 2021-05-14 RX ADMIN — CEFTRIAXONE SODIUM 1000 MG: 1 INJECTION, POWDER, FOR SOLUTION INTRAMUSCULAR; INTRAVENOUS at 10:01

## 2021-05-14 RX ADMIN — CARVEDILOL 6.25 MG: 6.25 TABLET, FILM COATED ORAL at 11:16

## 2021-05-14 RX ADMIN — Medication 1 TABLET: at 10:01

## 2021-05-14 RX ADMIN — SODIUM CHLORIDE: 9 INJECTION, SOLUTION INTRAVENOUS at 17:35

## 2021-05-14 RX ADMIN — SODIUM CHLORIDE: 9 INJECTION, SOLUTION INTRAVENOUS at 07:03

## 2021-05-14 RX ADMIN — SODIUM CHLORIDE, PRESERVATIVE FREE 10 ML: 5 INJECTION INTRAVENOUS at 10:03

## 2021-05-14 ASSESSMENT — PAIN SCALES - GENERAL
PAINLEVEL_OUTOF10: 0
PAINLEVEL_OUTOF10: 0

## 2021-05-14 NOTE — H&P
distended, hypoactive bowel sounds. No  masses, no organomegaly, no bruit. EXTREMITIES:  +1 to 2 lower extremity edema without pitting. NEUROLOGIC:  Intact. No focal deficits. Cranial nerves II through XII  grossly intact. MUSCULOSKELETAL:  Appropriate for above-stated age. LABORATORY DATA:  Metabolic panel was unremarkable on admission except  for a CO2 of 19, BUN and creatinine 30 and 1.5, sugar 181. CBC; white  count 15.1, platelets 180, H and H 14.7 and 45.2. INR is 3.7. Urinalysis shows red cloudy urine, glucose 500, large blood, protein is  100, positive nitrites, moderate leukocytes, 10 to 20 wbc's, packed  rbc's, rare bacteria, moderate leukocytes. IMPRESSION:  1. Hematuria. 2.  Urinary retention with recent TURP in 04/2021.  3.  Recently diagnosed bladder CA. 4.  Coumadin coagulopathy. 5.  Chronic atrial fib. 6.  Hypertension. 7.  Hyperlipidemia. 8.  Obesity. 9.  Obstructive sleep apnea. 10.  Chronic oral anticoagulation secondary to AFib. PLAN:  We will hold aspirin and Coumadin on admission. We will obtain a  Urology consult with Dr. Kleber Bose. We will continue the continuous bladder  irrigation. We will add IV fluids of 0.9 normal saline at 75 mL an hour  regarding his renal insufficiency. We will monitor labs. Await further  recommendations per consultants and treat accordingly. We will add  Rocephin 1 g IV daily until the urine culture and sensitivity are back.         Shalonda Serrano, DO    D: 05/14/2021 8:16:19       T: 05/14/2021 8:25:54     SALOME/[email or web address]_Benigno  Job#: [id number]     Doc#: [id number]    CC:

## 2021-05-14 NOTE — CONSULTS
5/14/2021 2:37 PM  Service: Urology  Group: [facility] ([facility])    Kamran Retana.  [id number]     Chief Complaint:    Gaylia Britain hematuria    History of Present Illness: The patient is a 77 y.o. male patient who presentedto the hospital yesterday with complaints of gross hematuria, urinary retention, and abdominal pain. He had a Gabriel catheter inserted in the emergency department for approximately 1700 cc of urine output. He is known to our practice and is a patient of Dr. Denice Malloy. He has a history of BPH with LUTS and bladder cancer in bladder diverticulum. He recently underwent a cystoscopy, transurethral resection of bladder tumor, and transurethral resection of the prostate on 4/21/2021. He followed up in our office for gabriel removal and states he had been voiding comfortably. Within the last week there was some adjustment to his Coumadin and he began noticing gross hematuria. His INR in the ER was 3.7. His Gabriel catheter currently has light cherry urine in tubing.        Past Medical History:   Diagnosis Date    Atrial fibrillation ([address] 10/2012    now on Coumadin    Diabetes mellitus ([address]     Hyperlipidemia     Hypertension     LONG TERM ANTICOAGULENT USE     CONCHITA (obstructive sleep apnea)     CPAP         Past Surgical History:   Procedure Laterality Date    BACK SURGERY  2007    repair of herniated disc    CARDIOVASCULAR STRESS TEST  11/21/2006    findings of inferior and anterior ischemia     DIAGNOSTIC CARDIAC CATH LAB PROCEDURE  12/13/2006    No obstructive CAD and mild dilated cardiomyopathy with EF 45-50%    TURP N/A 4/7/2021    CYSTOSCOPY, URETHRAL DILITATION, PLASMA BUTTON TRANSURETHRAL RESECTION BLADDER TUMOR WITH FULGURATION, EXCHANGE GABRIEL CATHETER performed by Dela Favre, MD at [place] [id number] TURP N/A 4/21/2021    CYSTOSCOPY RETROGRADE PYELOGRAM PLASMA LOOP TRANSURETHRAL RESECTION PROSTATE performed by Terry Malloy DO at [facility] OR       Medications Prior to Admission:    Medications Prior to Admission: carvedilol (COREG) 6.25 MG tablet, Take 6.25 mg by mouth 2 times daily (with meals)  lisinopril (PRINIVIL;ZESTRIL) 20 MG tablet, Take 20 mg by mouth daily  atorvastatin (LIPITOR) 40 MG tablet, Take 20 mg by mouth every evening  empagliflozin (JARDIANCE) 25 MG tablet, Take 25 mg by mouth daily  Multiple Vitamins-Minerals (THERAPEUTIC MULTIVITAMIN-MINERALS) tablet, Take 1 tablet by mouth daily  [DISCONTINUED] carvedilol (COREG) 25 MG tablet, Take 25 mg by mouth 2 times daily  furosemide (LASIX) 20 MG tablet, Take 20 mg by mouth daily   allopurinol (ZYLOPRIM) 100 MG tablet, Take 100 mg by mouth daily  warfarin (COUMADIN) 3 MG tablet, Take 1 tablet by mouth daily. aspirin 81 MG tablet, Take 1 tablet by mouth daily. Allergies:    Patient has no known allergies. Social History:    reports that he quit smoking about 22 years ago. He has a 30.00 pack-year smoking history. He has never used smokeless tobacco. He reports that he does not drink alcohol or use drugs. Family History:   Non-contributory to this Urological problem  family history includes Arthritis in his mother; Cancer in his father; Hypertension in his mother; Prostate Cancer (age of onset: 62) in his father. Review of Systems:  Constitutional: No fever or chills   Respiratory: negative for cough and hemoptysis  Cardiovascular: negative for chest pain and dyspnea  Gastrointestinal: negative for abdominal pain, diarrhea, nausea and vomiting   Derm: negative for rash and skin lesion(s)  Neurological: negative for seizures and tremors  Musculoskeletal: Negative    Psychiatric: Negative   : As above in the HPI, otherwise negative  All other reviews are negative    Physical Exam:     Vitals:  /82   Pulse 105   Temp 98.1 °F (36.7 °C) (Oral)   Resp 16   Ht 5' 7\" (1.702 m)   Wt 240 lb (108.9 kg)   SpO2 95%   BMI 37.59 kg/m²     General:  Awake, alert, oriented X 3. No apparent distress.   HEENT: Normocephalic, atraumatic. Lungs:  Respirations symmetric and non-labored. Abdomen:  soft, nontender, no masses  Extremities:  No clubbing, cyanosis, or edema  Skin:  Warm and dry, no open lesions or rashes  Neuro: There are no motor or sensory deficits in the 4 quadrant extremities   Rectal: deferred  Genitourinary: Frazier catheter draining cherry colored urine    Labs:     Recent Labs     05/13/21  1335 05/14/21  1051   WBC 15.1* 12.7*   RBC 5.14 4.46   HGB 14.7 12.6   HCT 45.2 39.2   MCV 87.9 87.9   MCH 28.6 28.3   MCHC 32.5 32.1   RDW 13.1 13.3    277   MPV 10.6 10.8         Recent Labs     05/13/21  1335 05/14/21  1051   CREATININE 1.5* 1.4*           Assessment/plan:  Gross hematuria  Supratherapeutic INR   BPH w/ LUTS s/p TURP 4/21/21  UTI    Frazier catheter was hand irrigated for moderate clot retrieval.  Urine returned to krystal in color.     Continue to watch the hemoglobin  UA reviewed  Urine culture pending  Antibiotics per primary  Stop CBI  Managed with manual irrigations every 2 hours and as needed today  If urine remains clear, ok for discharge in AM   Communicated with nursing   B&O suppositories PRN Bladder spasms       Electronically signed by LINDA Pedersen CNP on 5/14/2021 at 2:37 PM     Agree with above  Seen and examined  Agree with the plan and treatment    [facility], DO

## 2021-05-14 NOTE — PROGRESS NOTES
Dr. Candis Hampton notified I restarted patients CBI-patient still dark cherry red even with frequent manual irrigation.

## 2021-05-15 LAB
METER GLUCOSE: 117 MG/DL (ref 74–99)
METER GLUCOSE: 130 MG/DL (ref 74–99)
METER GLUCOSE: 141 MG/DL (ref 74–99)
METER GLUCOSE: 149 MG/DL (ref 74–99)

## 2021-05-15 PROCEDURE — 6360000002 HC RX W HCPCS: Performed by: NEUROMUSCULOSKELETAL MEDICINE & OMM

## 2021-05-15 PROCEDURE — 2060000000 HC ICU INTERMEDIATE R&B

## 2021-05-15 PROCEDURE — 6370000000 HC RX 637 (ALT 250 FOR IP): Performed by: NEUROMUSCULOSKELETAL MEDICINE & OMM

## 2021-05-15 PROCEDURE — 2580000003 HC RX 258: Performed by: NEUROMUSCULOSKELETAL MEDICINE & OMM

## 2021-05-15 PROCEDURE — 6370000000 HC RX 637 (ALT 250 FOR IP): Performed by: NURSE PRACTITIONER

## 2021-05-15 PROCEDURE — 82962 GLUCOSE BLOOD TEST: CPT

## 2021-05-15 RX ORDER — ACETAMINOPHEN 325 MG/1
650 TABLET ORAL EVERY 6 HOURS PRN
Status: DISCONTINUED | OUTPATIENT
Start: 2021-05-15 | End: 2021-05-19 | Stop reason: HOSPADM

## 2021-05-15 RX ORDER — SODIUM CHLORIDE 0.9 % (FLUSH) 0.9 %
5-40 SYRINGE (ML) INJECTION 2 TIMES DAILY
Status: DISCONTINUED | OUTPATIENT
Start: 2021-05-15 | End: 2021-05-19 | Stop reason: HOSPADM

## 2021-05-15 RX ADMIN — Medication 10 ML: at 11:10

## 2021-05-15 RX ADMIN — ATROPA BELLADONNA AND OPIUM 60 MG: 16.2; 6 SUPPOSITORY RECTAL at 09:10

## 2021-05-15 RX ADMIN — CEFTRIAXONE SODIUM 1000 MG: 1 INJECTION, POWDER, FOR SOLUTION INTRAMUSCULAR; INTRAVENOUS at 09:04

## 2021-05-15 RX ADMIN — SODIUM CHLORIDE: 9 INJECTION, SOLUTION INTRAVENOUS at 17:33

## 2021-05-15 RX ADMIN — ALLOPURINOL 100 MG: 100 TABLET ORAL at 09:04

## 2021-05-15 RX ADMIN — CARVEDILOL 6.25 MG: 6.25 TABLET, FILM COATED ORAL at 09:04

## 2021-05-15 RX ADMIN — CARVEDILOL 6.25 MG: 6.25 TABLET, FILM COATED ORAL at 20:48

## 2021-05-15 RX ADMIN — ACETAMINOPHEN 650 MG: 325 TABLET ORAL at 11:10

## 2021-05-15 RX ADMIN — ATORVASTATIN CALCIUM 20 MG: 20 TABLET, FILM COATED ORAL at 17:33

## 2021-05-15 RX ADMIN — SODIUM CHLORIDE: 9 INJECTION, SOLUTION INTRAVENOUS at 06:16

## 2021-05-15 RX ADMIN — Medication 1 TABLET: at 09:04

## 2021-05-15 ASSESSMENT — PAIN SCALES - GENERAL
PAINLEVEL_OUTOF10: 3
PAINLEVEL_OUTOF10: 0
PAINLEVEL_OUTOF10: 0

## 2021-05-15 ASSESSMENT — PAIN DESCRIPTION - ONSET: ONSET: GRADUAL

## 2021-05-15 ASSESSMENT — PAIN DESCRIPTION - DESCRIPTORS: DESCRIPTORS: ACHING;DISCOMFORT;SORE

## 2021-05-15 ASSESSMENT — PAIN - FUNCTIONAL ASSESSMENT: PAIN_FUNCTIONAL_ASSESSMENT: PREVENTS OR INTERFERES WITH MANY ACTIVE NOT PASSIVE ACTIVITIES

## 2021-05-15 ASSESSMENT — PAIN DESCRIPTION - PAIN TYPE: TYPE: CHRONIC PAIN

## 2021-05-15 NOTE — PROGRESS NOTES
450 ml; bloody urine in urine collection bag. Urinary catheter irrigated with 100 ml NSS. Clear yellow returns. Patient tolerated procedure well.

## 2021-05-15 NOTE — PROGRESS NOTES
General Progress Note  5/15/2021 9:14 AM  Subjective:   Admit Date: 5/13/2021  PCP: Danitza Leong,   Interval History: patient had bridger irrigations yesterday, but had to be placed back on continuous per Urology due to persistent blood in urine. He is awake and alert, in no acute distress. Denies fever, sweats, or chills. His urine currently is cherry, red in the bag. Diet: DIET CARB CONTROL;  Pain is:None  Nausea:None  Bowel Movement/Flatus yes    Data:   Scheduled Meds:   allopurinol  100 mg Oral Daily    atorvastatin  20 mg Oral QPM    empagliflozin  25 mg Oral Daily    [Held by provider] lisinopril  20 mg Oral Daily    therapeutic multivitamin-minerals  1 tablet Oral Daily    cefTRIAXone (ROCEPHIN) IV  1,000 mg Intravenous Q24H    carvedilol  6.25 mg Oral BID     Continuous Infusions:   sodium chloride 75 mL/hr at 05/15/21 0616     PRN Meds:opium-belladonna  I/O last 3 completed shifts: In: 1817.7 [P.O.:600; I.V.:1217.7]  Out: -850   I/O this shift:   In: 488.8 [P.O.:360; I.V.:128.8]  Out: 150 [Urine:150]    Intake/Output Summary (Last 24 hours) at 5/15/2021 0914  Last data filed at 5/15/2021 0800  Gross per 24 hour   Intake 2306.46 ml   Output -700 ml   Net 3006.46 ml       CBC with Differential:    Lab Results   Component Value Date    WBC 12.7 05/14/2021    RBC 4.46 05/14/2021    HGB 12.6 05/14/2021    HCT 39.2 05/14/2021     05/14/2021    MCV 87.9 05/14/2021    MCH 28.3 05/14/2021    MCHC 32.1 05/14/2021    RDW 13.3 05/14/2021    SEGSPCT 61 02/22/2013    LYMPHOPCT 17.1 05/14/2021    MONOPCT 10.2 05/14/2021    BASOPCT 0.6 05/14/2021    MONOSABS 1.29 05/14/2021    LYMPHSABS 2.17 05/14/2021    EOSABS 0.65 05/14/2021    BASOSABS 0.07 05/14/2021     CMP:    Lab Results   Component Value Date     05/14/2021    K 3.8 05/14/2021    K 4.2 04/02/2021     05/14/2021    CO2 19 05/14/2021    BUN 23 05/14/2021    CREATININE 1.4 05/14/2021    GFRAA >60 05/14/2021    LABGLOM 50 05/14/2021    GLUCOSE 157 05/14/2021    PROT 6.2 05/14/2021    LABALBU 3.0 05/14/2021    CALCIUM 8.1 05/14/2021    BILITOT 0.4 05/14/2021    ALKPHOS 95 05/14/2021    AST 13 05/14/2021    ALT 16 05/14/2021     Magnesium:  No results found for: MG  Phosphorus:  No results found for: PHOS  PT/INR:    Lab Results   Component Value Date    PROTIME 40.9 05/13/2021    PROTIME 27.3 05/15/2013    INR 3.7 05/13/2021     Last 3 Troponin:  No results found for: TROPONINI  U/A:    Lab Results   Component Value Date    COLORU RED 05/13/2021    PHUR 5.0 05/13/2021    WBCUA 10-20 05/13/2021    RBCUA PACKED 05/13/2021    RBCUA NONE 02/22/2013    BACTERIA RARE 05/13/2021    CLARITYU CLOUDY 05/13/2021    SPECGRAV 1.010 05/13/2021    LEUKOCYTESUR MODERATE 05/13/2021    UROBILINOGEN 1.0 05/13/2021    BILIRUBINUR Negative 05/13/2021    BLOODU LARGE 05/13/2021    GLUCOSEU 500 05/13/2021     HgBA1c:  No components found for: HGBA1C  TSH:    Lab Results   Component Value Date    TSH 0.970 10/17/2012     -----------------------------------------------------------------    Objective:   Vitals: /85   Pulse 100   Temp 98.9 °F (37.2 °C) (Temporal)   Resp 18   Ht 5' 7\" (1.702 m)   Wt 240 lb (108.9 kg)   SpO2 92%   BMI 37.59 kg/m²   General appearance: alert, appears stated age and cooperative  Skin: Skin color, texture, turgor normal. No rashes or lesions  HEENT: Head: Normal, normocephalic, atraumatic.   Neck: no adenopathy, no carotid bruit, no JVD, supple, symmetrical, trachea midline and thyroid not enlarged, symmetric, no tenderness/mass/nodules  Lungs: clear to auscultation bilaterally  Heart: regular rate and rhythm, S1, S2 normal, no murmur, click, rub or gallop  Abdomen: soft, non-tender; bowel sounds normal; no masses,  no organomegaly  Extremities: extremities normal, atraumatic, no cyanosis or edema  Neurologic: Mental status: Alert, oriented, thought content appropriate    Assessment:   Principal Problem: Hematuria  Active Problems:    Essential hypertension    Hyperlipidemia    Atrial fibrillation (HCC)    Nonrheumatic tricuspid valve regurgitation    Chronic anticoagulation    S/P bladder tumor excision with fulguration    Neurogenic bladder    Bladder cancer (HCC)    Urinary retention due to benign prostatic hyperplasia  Resolved Problems:    * No resolved hospital problems. *    Plan:   1. Continue IV fluids and CBI. 2. Will monitor BMP's daily. 3. Continue IV Rocephin until culture and sensitivities are returned.     Lexi Hancock D.O.  9:14 AM  5/15/2021

## 2021-05-15 NOTE — PROGRESS NOTES
[facility], notified of patient's need for today's labs to be drawn.

## 2021-05-15 NOTE — PROGRESS NOTES
5/15/2021 8:22 AM  Service: Urology  Group: [facility] ([facility])    Margoth Almanzar.  [id number]    Chief urologic compliant: Gross hematuria  HPI:  Patient is doing  The hematuria restarted last night  He had to have the CBI restarted  He complains of suprapubic pressure  No fevers    Review of Systems:  Respiratory: negative for cough and hemoptysis  Cardiovascular: negative for chest pain and dyspnea  Gastrointestinal: negative for abdominal pain, diarrhea, nausea and vomiting  Derm: negative for rash and skin lesion(s)  Neurological: negative for seizures and tremors  Endocrine: negative for diabetic symptoms including polydipsia and polyuria  : As above in the HPI, otherwise negative  All other reviews are negative     Allergies: Patient has no known allergies. Objective:   Vitals:    05/15/21 0000   BP: 124/74   Pulse: 103   Resp: 18   Temp: 98.8 °F (37.1 °C)   SpO2: 95%       Neuro: A/A/O x3  Respiratory: non labored breathing  ABD: soft non-tender, non-distended  : cummings tea colored  Ext: no clubbing, cyanosis, edema    Labs:   Recent Labs     05/13/21  1335 05/14/21  1051   WBC 15.1* 12.7*   RBC 5.14 4.46   HGB 14.7 12.6   HCT 45.2 39.2   MCV 87.9 87.9   MCH 28.6 28.3   MCHC 32.5 32.1   RDW 13.1 13.3    277   MPV 10.6 10.8       Recent Labs     05/13/21  1335 05/14/21  1051   CREATININE 1.5* 1.4*       Assessment: Jean Real Sr. 77 y.o. male     Gross hematuria, recurrent   Supratherapeutic INR   BPH w/ LUTS s/p TURP 4/21/21  UTI with leukocytosis     Plan:  Unable to irrigated cummings. Balloon was deflated and readvanced up to the bevel of the cummings catheter. The balloon was inflated with 20cc of sterile water. He had approximately 1 liter of krystal urine returned. Stated relief.  Cummings was then hand irrigated, no clots, remained light pink to krystal in color    Cont the cummings  Manually irrigate the cummings ever 2 hours and PRN   Cont IVF  Cont the antibiotics  Cont to watch a Cr  Cont to watch the [facility]   If remains clear with manual irrigations will allow for discharge from  standpoint in AM with cummings catheter indwelling   Will follow

## 2021-05-15 NOTE — PROGRESS NOTES
300 ml bloody urine with red flecks in urine collection bag, Urinary catheter irrigated with 100 ml NSS, pink  colored returns with small clots. Patient tolerated procedure well.

## 2021-05-15 NOTE — PROGRESS NOTES
200 ml bloody urine with small clots in collection bag. Urinary catheter irrigated with 100 ml NSS. Pink returns with small clots noted. Patient tolerated procedure well. Addendum:   [facility] updated.   \"Restart CBI \"

## 2021-05-16 LAB
ANION GAP SERPL CALCULATED.3IONS-SCNC: 9 MMOL/L (ref 7–16)
BASOPHILS ABSOLUTE: 0.12 E9/L (ref 0–0.2)
BASOPHILS RELATIVE PERCENT: 0.9 % (ref 0–2)
BUN BLDV-MCNC: 14 MG/DL (ref 6–23)
CALCIUM SERPL-MCNC: 8.1 MG/DL (ref 8.6–10.2)
CHLORIDE BLD-SCNC: 110 MMOL/L (ref 98–107)
CO2: 21 MMOL/L (ref 22–29)
CREAT SERPL-MCNC: 1.2 MG/DL (ref 0.7–1.2)
EOSINOPHILS ABSOLUTE: 1.03 E9/L (ref 0.05–0.5)
EOSINOPHILS RELATIVE PERCENT: 7.8 % (ref 0–6)
GFR AFRICAN AMERICAN: >60
GFR NON-AFRICAN AMERICAN: >60 ML/MIN/1.73
GLUCOSE BLD-MCNC: 109 MG/DL (ref 74–99)
HCT VFR BLD CALC: 38.7 % (ref 37–54)
HEMOGLOBIN: 12.3 G/DL (ref 12.5–16.5)
IMMATURE GRANULOCYTES #: 0.07 E9/L
IMMATURE GRANULOCYTES %: 0.5 % (ref 0–5)
INR BLD: 1.9
LYMPHOCYTES ABSOLUTE: 3.01 E9/L (ref 1.5–4)
LYMPHOCYTES RELATIVE PERCENT: 22.8 % (ref 20–42)
MCH RBC QN AUTO: 28.3 PG (ref 26–35)
MCHC RBC AUTO-ENTMCNC: 31.8 % (ref 32–34.5)
MCV RBC AUTO: 89.2 FL (ref 80–99.9)
METER GLUCOSE: 110 MG/DL (ref 74–99)
METER GLUCOSE: 146 MG/DL (ref 74–99)
METER GLUCOSE: 147 MG/DL (ref 74–99)
METER GLUCOSE: 152 MG/DL (ref 74–99)
MONOCYTES ABSOLUTE: 1.2 E9/L (ref 0.1–0.95)
MONOCYTES RELATIVE PERCENT: 9.1 % (ref 2–12)
NEUTROPHILS ABSOLUTE: 7.75 E9/L (ref 1.8–7.3)
NEUTROPHILS RELATIVE PERCENT: 58.9 % (ref 43–80)
ORGANISM: ABNORMAL
PDW BLD-RTO: 13.3 FL (ref 11.5–15)
PLATELET # BLD: 294 E9/L (ref 130–450)
PMV BLD AUTO: 10.8 FL (ref 7–12)
POTASSIUM SERPL-SCNC: 3.7 MMOL/L (ref 3.5–5)
PROTHROMBIN TIME: 20.3 SEC (ref 9.3–12.4)
RBC # BLD: 4.34 E12/L (ref 3.8–5.8)
SODIUM BLD-SCNC: 140 MMOL/L (ref 132–146)
URINE CULTURE, ROUTINE: ABNORMAL
WBC # BLD: 13.2 E9/L (ref 4.5–11.5)

## 2021-05-16 PROCEDURE — 6370000000 HC RX 637 (ALT 250 FOR IP): Performed by: NEUROMUSCULOSKELETAL MEDICINE & OMM

## 2021-05-16 PROCEDURE — 6360000002 HC RX W HCPCS: Performed by: NEUROMUSCULOSKELETAL MEDICINE & OMM

## 2021-05-16 PROCEDURE — 80048 BASIC METABOLIC PNL TOTAL CA: CPT

## 2021-05-16 PROCEDURE — 82962 GLUCOSE BLOOD TEST: CPT

## 2021-05-16 PROCEDURE — 85610 PROTHROMBIN TIME: CPT

## 2021-05-16 PROCEDURE — 99222 1ST HOSP IP/OBS MODERATE 55: CPT | Performed by: INTERNAL MEDICINE

## 2021-05-16 PROCEDURE — 2060000000 HC ICU INTERMEDIATE R&B

## 2021-05-16 PROCEDURE — 36415 COLL VENOUS BLD VENIPUNCTURE: CPT

## 2021-05-16 PROCEDURE — 85025 COMPLETE CBC W/AUTO DIFF WBC: CPT

## 2021-05-16 PROCEDURE — APPSS45 APP SPLIT SHARED TIME 31-45 MINUTES: Performed by: NURSE PRACTITIONER

## 2021-05-16 PROCEDURE — 2580000003 HC RX 258: Performed by: NEUROMUSCULOSKELETAL MEDICINE & OMM

## 2021-05-16 RX ORDER — DOXYCYCLINE HYCLATE 100 MG/1
100 CAPSULE ORAL EVERY 12 HOURS SCHEDULED
Status: DISCONTINUED | OUTPATIENT
Start: 2021-05-16 | End: 2021-05-19 | Stop reason: HOSPADM

## 2021-05-16 RX ADMIN — CARVEDILOL 6.25 MG: 6.25 TABLET, FILM COATED ORAL at 21:02

## 2021-05-16 RX ADMIN — CARVEDILOL 6.25 MG: 6.25 TABLET, FILM COATED ORAL at 09:05

## 2021-05-16 RX ADMIN — SODIUM CHLORIDE: 9 INJECTION, SOLUTION INTRAVENOUS at 04:55

## 2021-05-16 RX ADMIN — Medication 10 ML: at 09:05

## 2021-05-16 RX ADMIN — ALLOPURINOL 100 MG: 100 TABLET ORAL at 09:05

## 2021-05-16 RX ADMIN — DOXYCYCLINE HYCLATE 100 MG: 100 CAPSULE ORAL at 21:03

## 2021-05-16 RX ADMIN — ATORVASTATIN CALCIUM 20 MG: 20 TABLET, FILM COATED ORAL at 18:03

## 2021-05-16 RX ADMIN — CEFTRIAXONE SODIUM 1000 MG: 1 INJECTION, POWDER, FOR SOLUTION INTRAMUSCULAR; INTRAVENOUS at 09:04

## 2021-05-16 RX ADMIN — Medication 1 TABLET: at 09:04

## 2021-05-16 RX ADMIN — DOXYCYCLINE HYCLATE 100 MG: 100 CAPSULE ORAL at 13:11

## 2021-05-16 RX ADMIN — ACETAMINOPHEN 650 MG: 325 TABLET ORAL at 09:04

## 2021-05-16 RX ADMIN — Medication 10 ML: at 21:02

## 2021-05-16 ASSESSMENT — PAIN - FUNCTIONAL ASSESSMENT: PAIN_FUNCTIONAL_ASSESSMENT: PREVENTS OR INTERFERES SOME ACTIVE ACTIVITIES AND ADLS

## 2021-05-16 ASSESSMENT — PAIN SCALES - GENERAL
PAINLEVEL_OUTOF10: 0

## 2021-05-16 ASSESSMENT — PAIN DESCRIPTION - LOCATION: LOCATION: GENERALIZED

## 2021-05-16 ASSESSMENT — PAIN DESCRIPTION - PROGRESSION: CLINICAL_PROGRESSION: GRADUALLY IMPROVING

## 2021-05-16 NOTE — PROGRESS NOTES
05/14/2021    ALKPHOS 95 05/14/2021    AST 13 05/14/2021    ALT 16 05/14/2021     Magnesium:  No results found for: MG  Phosphorus:  No results found for: PHOS  PT/INR:    Lab Results   Component Value Date    PROTIME 20.3 05/16/2021    PROTIME 27.3 05/15/2013    INR 1.9 05/16/2021     Last 3 Troponin:  No results found for: TROPONINI  U/A:    Lab Results   Component Value Date    COLORU RED 05/13/2021    PHUR 5.0 05/13/2021    WBCUA 10-20 05/13/2021    RBCUA PACKED 05/13/2021    RBCUA NONE 02/22/2013    BACTERIA RARE 05/13/2021    CLARITYU CLOUDY 05/13/2021    SPECGRAV 1.010 05/13/2021    LEUKOCYTESUR MODERATE 05/13/2021    UROBILINOGEN 1.0 05/13/2021    BILIRUBINUR Negative 05/13/2021    BLOODU LARGE 05/13/2021    GLUCOSEU 500 05/13/2021     HgBA1c:  No components found for: HGBA1C  TSH:    Lab Results   Component Value Date    TSH 0.970 10/17/2012     -----------------------------------------------------------------    Objective:   Vitals: /88   Pulse 105   Temp 99.1 °F (37.3 °C) (Temporal)   Resp 18   Ht 5' 7\" (1.702 m)   Wt 240 lb (108.9 kg)   SpO2 96%   BMI 37.59 kg/m²   General appearance: alert, appears stated age and cooperative  Skin: Skin color, texture, turgor normal. No rashes or lesions  HEENT: Head: Normal, normocephalic, atraumatic.   Neck: no adenopathy, no carotid bruit, no JVD, supple, symmetrical, trachea midline and thyroid not enlarged, symmetric, no tenderness/mass/nodules  Lungs: clear to auscultation bilaterally  Heart: regular rate and rhythm, S1, S2 normal, no murmur, click, rub or gallop  Abdomen: soft, non-tender; bowel sounds normal; no masses,  no organomegaly  Extremities: extremities normal, atraumatic, no cyanosis or edema  Neurologic: Mental status: Alert, oriented, thought content appropriate    Assessment:   Principal Problem:    Hematuria  Active Problems:    Essential hypertension    Hyperlipidemia    Atrial fibrillation (HCC)    Nonrheumatic tricuspid valve regurgitation    Chronic anticoagulation    S/P bladder tumor excision with fulguration    Neurogenic bladder    Bladder cancer [facility])    Urinary retention due to benign prostatic hyperplasia  Resolved Problems:    * No resolved hospital problems. *    Plan:   1. Will obtain Cardiology consult regarding oral anticoagulation with recent hematuria and bladder ca. 2. Will get PT/OT evaluation. 3. Will follow.     [address] [facility] [address],   10:45 AM  5/16/2021

## 2021-05-16 NOTE — CONSULTS
Inpatient Cardiology Consultation      Reason for Consult: Coumadin coagulopathy    Consulting Physician: Dr. Chelly Sarmiento    Requesting Physician: Dr. Jennifer Gonzalez    Date of Consultation: 5/16/2021    HISTORY OF PRESENT ILLNESS:     This 38-year-old male is known to [address] [place] [address] and is followed by Dr. Chelly Sarmiento. He has a history of nonischemic cardiomyopathy and ejection fraction has improved, chronic atrial fibrillation and is on Coumadin. He was last evaluated as an inpatient by Dr. Chelly Sarmiento in April 2021. At that time he was admitted for hematuria and urology recommended a TURP. Cardiology was consulted for cardiac risk stratification and it was recommended that anticoagulation could be held and he could proceed with seed with surgery with no further testing. He underwent resection of a bladder tumor and diverticulum. Coumadin was resumed    He notes that his INR is are difficult to control. Last week his INR was low and 1 mg of Coumadin was added to his 3 mg daily. At the next INR check he was supratherapeutic and Coumadin was held for 2 days. He presented to the emergency room on May 13 with hematuria. INR was supratherapeutic . Frazier catheter was placed and there was a large amount of bloody urine output in his bag    Blood pressure on admission was 110/85 and he was afebrile and there was no hypoxia but he was mildly tachycardic at 101. Potassium was 3.7 with a BUN of 14 and a creatinine of 1.21 WBCs 13.2 with a hemoglobin of 12.3 hematocrit of 38.7. EKG was not done    He was evaluated by urology and the Frazier is being irrigated and he has a urinary tract infection. Last night the hematuria returned and Frazier catheter was irrigated and is now clear. His Coumadin remains on hold and his INR is 1.9. Past Medical History:    1. Chronic Atrial fibrillation   2. Chronic anticoagulation with Coumadin   3.  [address] [facility] 2006: dilated cardiomyopathy, EF 45-50%, no obstructive coronary artery disease   4. Echo 2010: mild LVH, EF 55%, mild TR  5. Lexiscan MPS 2015: non ischemic, Ef 55-50%, no wall motion abnormalities   6. Hypertension  7. Hyperlipidemia   8. Type 2 diabetes mellitus  9. Obstructive sleep apnea on CPAP   10. Previous tobacco abuse   11. Obesity   12. H/o herniated disc s/p back surgery   13. BPH and bladder cancer  of bladder diverticulum and status post resection and recent cystoscopy and TURP on April 21, 2021 and Frazier catheter removed. Also has a neurogenic bladder      Medications Prior to admit:  Prior to Admission medications    Medication Sig Start Date End Date Taking? Authorizing Provider   carvedilol (COREG) 6.25 MG tablet Take 6.25 mg by mouth 2 times daily (with meals)   Yes Historical Provider, MD   lisinopril (PRINIVIL;ZESTRIL) 20 MG tablet Take 20 mg by mouth daily    Historical Provider, MD   atorvastatin (LIPITOR) 40 MG tablet Take 20 mg by mouth every evening    Historical Provider, MD   empagliflozin (JARDIANCE) 25 MG tablet Take 25 mg by mouth daily    Historical Provider, MD   Multiple Vitamins-Minerals (THERAPEUTIC MULTIVITAMIN-MINERALS) tablet Take 1 tablet by mouth daily    Historical Provider, MD   furosemide (LASIX) 20 MG tablet Take 20 mg by mouth daily     Historical Provider, MD   allopurinol (ZYLOPRIM) 100 MG tablet Take 100 mg by mouth daily    Historical Provider, MD   warfarin (COUMADIN) 3 MG tablet Take 1 tablet by mouth daily. 6/18/13   Elsie Reynaga MD   aspirin 81 MG tablet Take 1 tablet by mouth daily.  6/18/13   Elsie Reynaga MD       Current Medications:    Current Facility-Administered Medications: doxycycline hyclate (VIBRAMYCIN) capsule 100 mg, 100 mg, Oral, 2 times per day  acetaminophen (TYLENOL) tablet 650 mg, 650 mg, Oral, Q6H PRN  sodium chloride flush 0.9 % injection 5-40 mL, 5-40 mL, Intravenous, BID  allopurinol (ZYLOPRIM) tablet 100 mg, 100 mg, Oral, Daily  atorvastatin (LIPITOR) tablet 20 mg, 20 mg, Oral, QPM  empagliflozin Value Date    TSH 0.970 10/17/2012      HgA1c:   Lab Results   Component Value Date    LABA1C 10.8 (H) 04/02/2021     No results found for: EAG  proBNP: No results for input(s): PROBNP in the last 72 hours. PT/INR:   Recent Labs     05/13/21  1335 05/16/21  0459   PROTIME 40.9* 20.3*   INR 3.7 1.9     APTT:  Recent Labs     05/13/21  1335   APTT 55.4*     CARDIAC ENZYMES:No results for input(s): CKTOTAL, CKMB, CKMBINDEX, TROPONINI in the last 72 hours. FASTING LIPID PANEL:  Lab Results   Component Value Date    CHOL 211 01/15/2013    HDL 44.0 01/15/2013    LDLCALC 144 01/15/2013    TRIG 116 01/15/2013     LIVER PROFILE:  Recent Labs     05/14/21  1051   AST 13   ALT 16   LABALBU 3.0*   Impression    Atrial fibrillation: Chronic with chronic Coumadin therapy with a difficult to control INR   Hematuria and status post TURP with bladder cancer in bladder diverticulum and status post resection of the tumor  Hypertension: Controlled  Hyperlipidemia  Diabetes  Mild tricuspid valve regurgitation  Obstructive sleep sleep apnea  Obesity  History of nonischemic cardiomyopathy and  ejection fraction has normalized    Plans  1. Will resume anticoagulation when okay with urology  2. Stop aspirin     Electronically signed by LINDA Bruce CNP on 5/16/2021 at 11:38 AM   [id number] [facility] Cardiology Consult       Josephine Maxwell    I have personally participated in a face-to-face and personally obtained history and performed physical exam on the date of service. I reviewed chart, vitals, labs and radiologic studies. I also participated in medical decision making withLINDA Bansal CNP, CNP on the date of service All of the assessments and recommendations are from me and I agree with all of the pertinent clinical information, assessment and treatment plan. I have reviewed and edited the note above based on my findings during my history, exam, and decision making.  Please see my additional contributions to the history, physical exam, assessment, and recommendations below. HISTORY OF PRESENT ILLNESS:     Reviewed, as above      Past medical history:  Reviewed, as above. Past surgical history:  Reviewed, as above. Current medications:  Reviewed, as above    Allergies:  Reviewed, as above    Social history:  Reviewed, as above    Family medical history:  Reviewed, as above. REVIEW OF SYSTEMS:   Reviewed, as above. PHYSICAL EXAM:   CONSTITUTIONAL:  awake, alert, cooperative, no apparent distress, and appears stated age  EYES:  lids and lashes normal and pupils equal, round and reactive to light, anicteric sclerae  HEAD:  normocepalic, without obvious abnormality, atraumatic, pink, moist mucous membranes. NECK:  Supple, symmetrical, trachea midline, no adenopathy, thyroid symmetric, not enlarged and no tenderness, skin normal  HEMATOLOGIC/LYMPHATICS:  no cervical lymphadenopathy and no supraclavicular lymphadenopathy  LUNGS:  No increased work of breathing, good air exchange, clear to auscultation bilaterally, no crackles or wheezing  CARDIOVASCULAR:  Normal apical impulse, irregularly irregular, normal S1 and S2, no S3 or S4, and no murmur noted and no JVD, no carotid bruit, no pedal edema, good carotid upstroke bilaterally. ABDOMEN:  Soft, nontender, no masses, no hepatomegaly or splenomegaly, BS+  CHEST: nontender to palpation, expands symmetrically  MUSCULOSKELETAL:  No clubbing no cyanosis. there is no redness, warmth, or swelling of the joints  full range of motion noted  NEUROLOGIC:  Alert, awake,oriented x3. SKIN:  no bruising or bleeding, normal skin color, texture, turgor and no redness, warmth, or swelling    /84   Pulse 98   Temp 98.6 °F (37 °C) (Temporal)   Resp 18   Ht 5' 7\" (1.702 m)   Wt 240 lb (108.9 kg)   SpO2 97%   BMI 37.59 kg/m²       I/O last 3 completed shifts: In: 2117.5 [P.O.:1800;  I.V.:317.5]  Out: 4100 [Urine:4100]  I/O this shift:  In: 360 [P.O.:360]  Out: 800 [Urine:800]      DATA: I personally reviewed the admission EKG with the following interpretation: Not available for review    ECHO: Not performed to date  Stress Test: Not performed to date  Angiography: Not performed to date  Cardiology Labs:   BMP:    Lab Results   Component Value Date     05/17/2021    K 3.9 05/17/2021    K 4.2 04/02/2021     05/17/2021    CO2 24 05/17/2021    BUN 15 05/17/2021     CMP:    Lab Results   Component Value Date     05/17/2021    K 3.9 05/17/2021    K 4.2 04/02/2021     05/17/2021    CO2 24 05/17/2021    BUN 15 05/17/2021    PROT 6.2 05/14/2021     CBC:    Lab Results   Component Value Date    WBC 13.2 05/16/2021    RBC 4.34 05/16/2021    HGB 12.3 05/16/2021    HCT 38.7 05/16/2021    MCV 89.2 05/16/2021    RDW 13.3 05/16/2021     05/16/2021     PT/INR:  No results found for: PTINR  PT/INR Warfarin:  No components found for: PTPATWAR, PTINRWAR  PTT:    Lab Results   Component Value Date    APTT 55.4 05/13/2021     PTT Heparin:  No components found for: APTTHEP  Magnesium:  No results found for: MG  TSH:    Lab Results   Component Value Date    TSH 0.970 10/17/2012     TROPONIN:  No components found for: TROP  BNP:  No results found for: BNP  FASTING LIPID PANEL:    Lab Results   Component Value Date    CHOL 211 01/15/2013    HDL 44.0 01/15/2013    TRIG 116 01/15/2013     No orders to display       I have personally reviewed the laboratory, cardiac diagnostic and radiographic testing as outlined above:    IMPRESSION:  1. Chronic anticoagulation with Coumadin: Episodes of hematuria, will hold Coumadin for now until cause of hematuria is identified and treated, switching to other anticoagulant is not going to change the risk of bleeding, stopping stopping Coumadin completely, will put him at increased risk for thromboembolic event, given high risk of [id number] score  2. Chronic atrial fibrillation: Controlled ventricular response  3.  Hypertension: Controlled  4. Hyperlipidemia  5. Type 2 diabetes mellitus  6. Obstructive sleep apnea  7. Bladder CA      RECOMMENDATIONS:   1. Okay to hold Coumadin until the cause of hematuria is identified and treated  2. Continue the rest of medications  3. Basic metabolic panel and CBC in a.m.  4. Further cardiac recommendations will be forthcoming pending his clinical course and diagnostic test findings    I have reviewed my findings and recommendations with patient    Thank you for the consult  Electronically signed by Zack Blanco MD on 5/17/2021 at 9:21 AM  NOTE: This report was transcribed using voice recognition software.  Every effort was made to ensure accuracy; however, inadvertent computerized transcription errors may be present

## 2021-05-17 LAB
ANION GAP SERPL CALCULATED.3IONS-SCNC: 10 MMOL/L (ref 7–16)
BUN BLDV-MCNC: 15 MG/DL (ref 6–23)
CALCIUM SERPL-MCNC: 8.3 MG/DL (ref 8.6–10.2)
CHLORIDE BLD-SCNC: 107 MMOL/L (ref 98–107)
CO2: 24 MMOL/L (ref 22–29)
CREAT SERPL-MCNC: 1.2 MG/DL (ref 0.7–1.2)
GFR AFRICAN AMERICAN: >60
GFR NON-AFRICAN AMERICAN: >60 ML/MIN/1.73
GLUCOSE BLD-MCNC: 157 MG/DL (ref 74–99)
INR BLD: 1.6
METER GLUCOSE: 126 MG/DL (ref 74–99)
METER GLUCOSE: 139 MG/DL (ref 74–99)
METER GLUCOSE: 159 MG/DL (ref 74–99)
METER GLUCOSE: 162 MG/DL (ref 74–99)
POTASSIUM SERPL-SCNC: 3.9 MMOL/L (ref 3.5–5)
PROTHROMBIN TIME: 17.4 SEC (ref 9.3–12.4)
SODIUM BLD-SCNC: 141 MMOL/L (ref 132–146)

## 2021-05-17 PROCEDURE — 85610 PROTHROMBIN TIME: CPT

## 2021-05-17 PROCEDURE — 80048 BASIC METABOLIC PNL TOTAL CA: CPT

## 2021-05-17 PROCEDURE — 82962 GLUCOSE BLOOD TEST: CPT

## 2021-05-17 PROCEDURE — 6370000000 HC RX 637 (ALT 250 FOR IP): Performed by: NEUROMUSCULOSKELETAL MEDICINE & OMM

## 2021-05-17 PROCEDURE — 2060000000 HC ICU INTERMEDIATE R&B

## 2021-05-17 PROCEDURE — 36415 COLL VENOUS BLD VENIPUNCTURE: CPT

## 2021-05-17 PROCEDURE — 2580000003 HC RX 258: Performed by: NEUROMUSCULOSKELETAL MEDICINE & OMM

## 2021-05-17 PROCEDURE — 6370000000 HC RX 637 (ALT 250 FOR IP): Performed by: UROLOGY

## 2021-05-17 RX ADMIN — ALLOPURINOL 100 MG: 100 TABLET ORAL at 09:25

## 2021-05-17 RX ADMIN — DOXYCYCLINE HYCLATE 100 MG: 100 CAPSULE ORAL at 09:26

## 2021-05-17 RX ADMIN — Medication 10 ML: at 09:26

## 2021-05-17 RX ADMIN — CARVEDILOL 6.25 MG: 6.25 TABLET, FILM COATED ORAL at 20:44

## 2021-05-17 RX ADMIN — CARVEDILOL 6.25 MG: 6.25 TABLET, FILM COATED ORAL at 09:25

## 2021-05-17 RX ADMIN — Medication 1 TABLET: at 09:25

## 2021-05-17 RX ADMIN — Medication 10 ML: at 20:44

## 2021-05-17 RX ADMIN — ATORVASTATIN CALCIUM 20 MG: 20 TABLET, FILM COATED ORAL at 19:11

## 2021-05-17 RX ADMIN — DOXYCYCLINE HYCLATE 100 MG: 100 CAPSULE ORAL at 20:44

## 2021-05-17 ASSESSMENT — PAIN SCALES - GENERAL
PAINLEVEL_OUTOF10: 0
PAINLEVEL_OUTOF10: 0

## 2021-05-17 NOTE — PROGRESS NOTES
Patient requested IV Team for IV change stating \"I am a hard stick!'.   Read Alex TO, IV team, in room, to place new peripheral IV site per patient request, patient then stated \"I don't want any damn IV change, leave the same one in!\"

## 2021-05-17 NOTE — PROGRESS NOTES
tricuspid valve regurgitation    Chronic anticoagulation    S/P bladder tumor excision with fulguration    Neurogenic bladder    Bladder cancer ([facility] [address]    Urinary retention due to benign prostatic hyperplasia  Resolved Problems:    * No resolved hospital problems. *    Plan:   1. Continue CBI per Urology. 2. Will follow.     Amarilis Siu DO, D.O.  8:30 AM  5/17/2021

## 2021-05-17 NOTE — PROGRESS NOTES
Occupational Therapy  Attempted OT eval at b/s, however, pt declined to participate at this time. Will attempt OT assessment next date.   Thank you for this referral. MARVIN Villegas, OTR/L  # [id number]

## 2021-05-17 NOTE — PROGRESS NOTES
Physical Therapy    PT consult to evaluate/treat received and appreciated. Pt chart reviewed and evaluation attempted. Pt is currently adamantly refusing PT services. Will check back tomorrow. Thank you.         Xin Montes, PT, DPT   [id number]

## 2021-05-17 NOTE — PROGRESS NOTES
[facility]                                                            PROGRESS NOTE                                                                       2021          SUBJECTIVE:   Afebrile  Still having a few clots but urine seems to be clearing with irrigation  Hb 12.3 creatinine 1.2  Frazier irrigated    OBJECTIVE:    /84   Pulse 98   Temp 98.6 °F (37 °C) (Temporal)   Resp 18   Ht 5' 7\" (1.702 m)   Wt 240 lb (108.9 kg)   SpO2 97%   BMI 37.59 kg/m²     PHYSICAL EXAMINATION:  Skin: dry, without rashes  Respirations: non-labored, intact  Abdomen: soft, non-tender, non-distended      Lab Results   Component Value Date    WBC 13.2 (H) 2021    HGB 12.3 (L) 2021    HCT 38.7 2021    MCV 89.2 2021     2021       Lab Results   Component Value Date    CREATININE 1.2 2021       No results found for: PSA    REVIEW OF SYSTEMS:    CONSTITUTIONAL: negative  HEENT: negative  HEMATOLOGIC: negative  ENDOCRINE: negative  RESPIRATORY: negative  CV: negative  GI: negative  NEURO: negative  ORTHOPEDICS: negative  PSYCHIATRIC: negative  : as above    PAST FAMILY HISTORY:    Family History   Problem Relation Age of Onset    Prostate Cancer Father 62    Cancer Father     Hypertension Mother         and sister    Gama Bass Arthritis Mother      PAST SOCIAL HISTORY:    Social History     Socioeconomic History    Marital status:      Spouse name: None    Number of children: None    Years of education: None    Highest education level: None   Occupational History    None   Tobacco Use    Smoking status: Former Smoker     Packs/day: 1.00     Years: 30.00     Pack years: 30.00     Quit date: 1999     Years since quittin.3    Smokeless tobacco: Never Used   Vaping Use    Vaping Use: Never used   Substance and Sexual Activity    Alcohol use: No    Drug use: No    Sexual activity: None   Other Topics Concern complication, without long-term current use of insulin (HCC)       PLAN:  Bleeding less  Will stop irrigation if stays clear  Will need to restart anticoagulation    Tomorrow?     Harry Key MD, M.D.  5/17/2021  11:38 AM

## 2021-05-18 LAB
INR BLD: 1.4
METER GLUCOSE: 104 MG/DL (ref 74–99)
METER GLUCOSE: 124 MG/DL (ref 74–99)
PROTHROMBIN TIME: 15.8 SEC (ref 9.3–12.4)

## 2021-05-18 PROCEDURE — 36415 COLL VENOUS BLD VENIPUNCTURE: CPT

## 2021-05-18 PROCEDURE — 6370000000 HC RX 637 (ALT 250 FOR IP): Performed by: NEUROMUSCULOSKELETAL MEDICINE & OMM

## 2021-05-18 PROCEDURE — 2580000003 HC RX 258: Performed by: NEUROMUSCULOSKELETAL MEDICINE & OMM

## 2021-05-18 PROCEDURE — 6370000000 HC RX 637 (ALT 250 FOR IP): Performed by: UROLOGY

## 2021-05-18 PROCEDURE — 82962 GLUCOSE BLOOD TEST: CPT

## 2021-05-18 PROCEDURE — 51700 IRRIGATION OF BLADDER: CPT

## 2021-05-18 PROCEDURE — 2060000000 HC ICU INTERMEDIATE R&B

## 2021-05-18 PROCEDURE — 85610 PROTHROMBIN TIME: CPT

## 2021-05-18 RX ADMIN — ATORVASTATIN CALCIUM 20 MG: 20 TABLET, FILM COATED ORAL at 18:13

## 2021-05-18 RX ADMIN — Medication 1 TABLET: at 08:33

## 2021-05-18 RX ADMIN — CARVEDILOL 6.25 MG: 6.25 TABLET, FILM COATED ORAL at 08:33

## 2021-05-18 RX ADMIN — Medication 10 ML: at 21:35

## 2021-05-18 RX ADMIN — ALLOPURINOL 100 MG: 100 TABLET ORAL at 08:33

## 2021-05-18 RX ADMIN — Medication 10 ML: at 08:33

## 2021-05-18 RX ADMIN — CARVEDILOL 6.25 MG: 6.25 TABLET, FILM COATED ORAL at 19:47

## 2021-05-18 RX ADMIN — DOXYCYCLINE HYCLATE 100 MG: 100 CAPSULE ORAL at 19:47

## 2021-05-18 RX ADMIN — DOXYCYCLINE HYCLATE 100 MG: 100 CAPSULE ORAL at 08:33

## 2021-05-18 NOTE — PROGRESS NOTES
Physical Therapy      PT consult to evaluate/treat received and appreciated. Pt chart reviewed and evaluation attempted. Pt continues to refuse PT services. Attempted to explain role of PT in discharge planning which only increased pt's agitation and frustration. Pt yelling at this PT stating he's \"not here to do leg exercises or walk up and down the terrazas. \"  Attempted to explain the risks of prolonged bed rest and the need for physical therapy evaluation to determine safety and mobility at discharge to ensure pt will be able to get in/out of his home. Pt refused to let PT even get a word in while continuing to yell. Will complete PT evaluation if and when pt is agreeable. Thank you.         Andrea Mcdermott, PT, DPT   [id number]

## 2021-05-18 NOTE — PROGRESS NOTES
General Progress Note  5/18/2021 9:04 AM  Subjective:   Admit Date: 5/13/2021  PCP: Amadeo Leong DO  Interval History: case discussed with Dr. Marco Alanis regarding home going care and instructions. Hoping to discharge tomorrow, if today is a good day. Remains off anticoagulants and ASA. Awake and alert, in no acute distress. Urine is clear, currently. Diet: DIET CARB CONTROL;  Pain is:None  Nausea:None  Bowel Movement/Flatus yes    Data:   Scheduled Meds:   doxycycline hyclate  100 mg Oral 2 times per day    sodium chloride flush  5-40 mL Intravenous BID    allopurinol  100 mg Oral Daily    atorvastatin  20 mg Oral QPM    empagliflozin  25 mg Oral Daily    [Held by provider] lisinopril  20 mg Oral Daily    therapeutic multivitamin-minerals  1 tablet Oral Daily    carvedilol  6.25 mg Oral BID     Continuous Infusions:  PRN Meds:acetaminophen, opium-belladonna  I/O last 3 completed shifts: In: 1680 [P.O.:1680]  Out: 4280 [NFVDC:5650]  I/O this shift:   In: 480 [P.O.:480]  Out: 700 [Urine:700]    Intake/Output Summary (Last 24 hours) at 5/18/2021 0904  Last data filed at 5/18/2021 0844  Gross per 24 hour   Intake 1800 ml   Output 4180 ml   Net -2380 ml       CBC with Differential:    Lab Results   Component Value Date    WBC 13.2 05/16/2021    RBC 4.34 05/16/2021    HGB 12.3 05/16/2021    HCT 38.7 05/16/2021     05/16/2021    MCV 89.2 05/16/2021    MCH 28.3 05/16/2021    MCHC 31.8 05/16/2021    RDW 13.3 05/16/2021    SEGSPCT 61 02/22/2013    LYMPHOPCT 22.8 05/16/2021    MONOPCT 9.1 05/16/2021    BASOPCT 0.9 05/16/2021    MONOSABS 1.20 05/16/2021    LYMPHSABS 3.01 05/16/2021    EOSABS 1.03 05/16/2021    BASOSABS 0.12 05/16/2021     CMP:    Lab Results   Component Value Date     05/17/2021    K 3.9 05/17/2021    K 4.2 04/02/2021     05/17/2021    CO2 24 05/17/2021    BUN 15 05/17/2021    CREATININE 1.2 05/17/2021    GFRAA >60 05/17/2021    LABGLOM >60 05/17/2021    GLUCOSE 157 05/17/2021    PROT 6.2 05/14/2021    LABALBU 3.0 05/14/2021    CALCIUM 8.3 05/17/2021    BILITOT 0.4 05/14/2021    ALKPHOS 95 05/14/2021    AST 13 05/14/2021    ALT 16 05/14/2021     Magnesium:  No results found for: MG  Phosphorus:  No results found for: PHOS  PT/INR:    Lab Results   Component Value Date    PROTIME 15.8 05/18/2021    PROTIME 27.3 05/15/2013    INR 1.4 05/18/2021     Last 3 Troponin:  No results found for: TROPONINI  U/A:    Lab Results   Component Value Date    COLORU RED 05/13/2021    PHUR 5.0 05/13/2021    WBCUA 10-20 05/13/2021    RBCUA PACKED 05/13/2021    RBCUA NONE 02/22/2013    BACTERIA RARE 05/13/2021    CLARITYU CLOUDY 05/13/2021    SPECGRAV 1.010 05/13/2021    LEUKOCYTESUR MODERATE 05/13/2021    UROBILINOGEN 1.0 05/13/2021    BILIRUBINUR Negative 05/13/2021    BLOODU LARGE 05/13/2021    GLUCOSEU 500 05/13/2021     HgBA1c:  No components found for: HGBA1C  TSH:    Lab Results   Component Value Date    TSH 0.970 10/17/2012     -----------------------------------------------------------------    Objective:   Vitals: /88   Pulse 103   Temp 97.8 °F (36.6 °C) (Axillary)   Resp 18   Ht 5' 7\" (1.702 m)   Wt 240 lb (108.9 kg)   SpO2 95%   BMI 37.59 kg/m²   General appearance: alert, appears stated age and cooperative  Skin: Skin color, texture, turgor normal. No rashes or lesions  HEENT: Head: Normal, normocephalic, atraumatic.   Neck: no adenopathy, no carotid bruit, no JVD, supple, symmetrical, trachea midline and thyroid not enlarged, symmetric, no tenderness/mass/nodules  Lungs: clear to auscultation bilaterally  Heart: regular rate and rhythm, S1, S2 normal, no murmur, click, rub or gallop  Abdomen: soft, non-tender; bowel sounds normal; no masses,  no organomegaly  Extremities: extremities normal, atraumatic, no cyanosis or edema  Neurologic: Mental status: Alert, oriented, thought content appropriate    Assessment:   Principal Problem:    Hematuria  Active Problems: Essential hypertension    Hyperlipidemia    Atrial fibrillation (HCC)    Nonrheumatic tricuspid valve regurgitation    Chronic anticoagulation    S/P bladder tumor excision with fulguration    Neurogenic bladder    Bladder cancer (HCC)    Urinary retention due to benign prostatic hyperplasia    Type 2 diabetes mellitus without complication, without long-term current use of insulin (HCC)  Resolved Problems:    * No resolved hospital problems. *    Plan:   1. Planning for discharge to home tomorrow with Frazier in.  2. Will follow.     Philippe Meek DO, D.O.  9:04 AM  5/18/2021

## 2021-05-18 NOTE — PROGRESS NOTES
Frazier was no longer draining, pt complained of full feeling, manually irrigated with 100ml NSS. Small clots returned and flow returned. Now patent and draining clear yellow urine.  Call light in reach

## 2021-05-18 NOTE — PROGRESS NOTES
CBI moderate to fast.  No urinary output. Frazier catheter manually irigated with 100 ml NSS. Dime-sized red clots returned. Patient tolerated procedure well. CBI continues at fast rate,  Urine is clear yellow with multiple red clots.

## 2021-05-19 VITALS
RESPIRATION RATE: 20 BRPM | DIASTOLIC BLOOD PRESSURE: 87 MMHG | TEMPERATURE: 98.6 F | OXYGEN SATURATION: 94 % | WEIGHT: 240 LBS | HEIGHT: 67 IN | SYSTOLIC BLOOD PRESSURE: 128 MMHG | HEART RATE: 88 BPM | BODY MASS INDEX: 37.67 KG/M2

## 2021-05-19 LAB
INR BLD: 1.3
METER GLUCOSE: 100 MG/DL (ref 74–99)
METER GLUCOSE: 132 MG/DL (ref 74–99)
PROTHROMBIN TIME: 14.4 SEC (ref 9.3–12.4)

## 2021-05-19 PROCEDURE — 82962 GLUCOSE BLOOD TEST: CPT

## 2021-05-19 PROCEDURE — 85610 PROTHROMBIN TIME: CPT

## 2021-05-19 PROCEDURE — 6370000000 HC RX 637 (ALT 250 FOR IP): Performed by: NEUROMUSCULOSKELETAL MEDICINE & OMM

## 2021-05-19 PROCEDURE — 2580000003 HC RX 258: Performed by: NEUROMUSCULOSKELETAL MEDICINE & OMM

## 2021-05-19 PROCEDURE — 6370000000 HC RX 637 (ALT 250 FOR IP): Performed by: UROLOGY

## 2021-05-19 PROCEDURE — 36415 COLL VENOUS BLD VENIPUNCTURE: CPT

## 2021-05-19 RX ORDER — CARVEDILOL 6.25 MG/1
6.25 TABLET ORAL 2 TIMES DAILY
Qty: 60 TABLET | Refills: 3 | Status: SHIPPED | OUTPATIENT
Start: 2021-05-19 | End: 2021-09-08

## 2021-05-19 RX ORDER — DOXYCYCLINE HYCLATE 100 MG/1
100 CAPSULE ORAL EVERY 12 HOURS SCHEDULED
Qty: 20 CAPSULE | Refills: 0 | Status: SHIPPED | OUTPATIENT
Start: 2021-05-19 | End: 2021-05-29

## 2021-05-19 RX ADMIN — DOXYCYCLINE HYCLATE 100 MG: 100 CAPSULE ORAL at 09:06

## 2021-05-19 RX ADMIN — Medication 10 ML: at 10:06

## 2021-05-19 RX ADMIN — ALLOPURINOL 100 MG: 100 TABLET ORAL at 09:06

## 2021-05-19 RX ADMIN — CARVEDILOL 6.25 MG: 6.25 TABLET, FILM COATED ORAL at 09:06

## 2021-05-19 RX ADMIN — Medication 1 TABLET: at 09:06

## 2021-05-19 ASSESSMENT — PAIN SCALES - GENERAL
PAINLEVEL_OUTOF10: 0
PAINLEVEL_OUTOF10: 0

## 2021-05-19 NOTE — CARE COORDINATION
CBI continues, possibly stopping later today. [facility]. Will need homecare orders prior to discharge. Therapy ordered to confirm safe to return home when released. For questions I can be reached at [phone].  Austyn Riddle [place]
Natalie notified of discharge today. They will follow up at home once authorized by insurance. For questions I can be reached at [phone].  Alex Poole, [facility]
Patient for possible discharge tomorrow. [facility] notified of plan and orders faxed. They will need notified if discharge tomorrow. For [facility] today. For questions I can be reached at [phone].  Thai Smiley, [place]
Patient from home, lives alone. He has a cane and a walker. Uses either depending on the day. PCP is Dr. Shelley Winn. He has been caring for zoila at home, familiar with care. He Was recently active with [facility] and would like them again at discharge. Referral to [facility] fax [phone], will need homecare orders prior to discharge. Patient will drive himself home if he is feeling well or if not he has family that can transport. CBI continues. For questions I can be reached at [phone]. [facility]    The Plan for Transition of Care is related to the following treatment goals: discharge planning when stable     The Patient and/or patient representative Ardyce Matters was provided with a choice of provider and agrees   with the discharge plan. [x] Yes [] No    Freedom of choice list was provided with basic dialogue that supports the patient's individualized plan of care/goals, treatment preferences and shares the quality data associated with the providers.  [x] Yes [] No
Offered and patient declined

## 2021-05-19 NOTE — PROGRESS NOTES
General Progress Note  5/19/2021 12:40 PM  Subjective:   Admit Date: 5/13/2021  PCP: Deisy Leong DO  Interval History: no more blood in urine. Had a good overnight. No abdominal pain or nausea or vomiting. Urine culture showed enterococcus faecalis. Denies fever, sweat, or chills. Diet: DIET CARB CONTROL;  Pain is:None  Nausea:None  Bowel Movement/Flatus yes    Data:   Scheduled Meds:   doxycycline hyclate  100 mg Oral 2 times per day    sodium chloride flush  5-40 mL Intravenous BID    allopurinol  100 mg Oral Daily    atorvastatin  20 mg Oral QPM    empagliflozin  25 mg Oral Daily    [Held by provider] lisinopril  20 mg Oral Daily    therapeutic multivitamin-minerals  1 tablet Oral Daily    carvedilol  6.25 mg Oral BID     Continuous Infusions:  PRN Meds:acetaminophen, opium-belladonna  I/O last 3 completed shifts:   In: 960 [P.O.:960]  Out: 2775 [Urine:2775]  I/O this shift:  In: 240 [P.O.:240]  Out: -     Intake/Output Summary (Last 24 hours) at 5/19/2021 1240  Last data filed at 5/19/2021 0823  Gross per 24 hour   Intake 720 ml   Output 1975 ml   Net -1255 ml       CBC with Differential:    Lab Results   Component Value Date    WBC 13.2 05/16/2021    RBC 4.34 05/16/2021    HGB 12.3 05/16/2021    HCT 38.7 05/16/2021     05/16/2021    MCV 89.2 05/16/2021    MCH 28.3 05/16/2021    MCHC 31.8 05/16/2021    RDW 13.3 05/16/2021    SEGSPCT 61 02/22/2013    LYMPHOPCT 22.8 05/16/2021    MONOPCT 9.1 05/16/2021    BASOPCT 0.9 05/16/2021    MONOSABS 1.20 05/16/2021    LYMPHSABS 3.01 05/16/2021    EOSABS 1.03 05/16/2021    BASOSABS 0.12 05/16/2021     CMP:    Lab Results   Component Value Date     05/17/2021    K 3.9 05/17/2021    K 4.2 04/02/2021     05/17/2021    CO2 24 05/17/2021    BUN 15 05/17/2021    CREATININE 1.2 05/17/2021    GFRAA >60 05/17/2021    LABGLOM >60 05/17/2021    GLUCOSE 157 05/17/2021    PROT 6.2 05/14/2021    LABALBU 3.0 05/14/2021    CALCIUM 8.3 05/17/2021 BILITOT 0.4 05/14/2021    ALKPHOS 95 05/14/2021    AST 13 05/14/2021    ALT 16 05/14/2021     Magnesium:  No results found for: MG  Phosphorus:  No results found for: PHOS  PT/INR:    Lab Results   Component Value Date    PROTIME 14.4 05/19/2021    PROTIME 27.3 05/15/2013    INR 1.3 05/19/2021     Last 3 Troponin:  No results found for: TROPONINI  U/A:    Lab Results   Component Value Date    COLORU RED 05/13/2021    PHUR 5.0 05/13/2021    WBCUA 10-20 05/13/2021    RBCUA PACKED 05/13/2021    RBCUA NONE 02/22/2013    BACTERIA RARE 05/13/2021    CLARITYU CLOUDY 05/13/2021    SPECGRAV 1.010 05/13/2021    LEUKOCYTESUR MODERATE 05/13/2021    UROBILINOGEN 1.0 05/13/2021    BILIRUBINUR Negative 05/13/2021    BLOODU LARGE 05/13/2021    GLUCOSEU 500 05/13/2021     HgBA1c:  No components found for: HGBA1C  TSH:    Lab Results   Component Value Date    TSH 0.970 10/17/2012     -----------------------------------------------------------------    Objective:   Vitals: /87   Pulse 88   Temp 98.6 °F (37 °C) (Oral)   Resp 20   Ht 5' 7\" (1.702 m)   Wt 240 lb (108.9 kg)   SpO2 94%   BMI 37.59 kg/m²   General appearance: alert, appears stated age and cooperative  Skin: Skin color, texture, turgor normal. No rashes or lesions  HEENT: Head: Normal, normocephalic, atraumatic.   Neck: no adenopathy, no carotid bruit, no JVD, supple, symmetrical, trachea midline and thyroid not enlarged, symmetric, no tenderness/mass/nodules  Lungs: clear to auscultation bilaterally  Heart: regular rate and rhythm, S1, S2 normal, no murmur, click, rub or gallop  Abdomen: soft, non-tender; bowel sounds normal; no masses,  no organomegaly  Extremities: extremities normal, atraumatic, no cyanosis or edema  Neurologic: Mental status: Alert, oriented, thought content appropriate          5/16/2021  7:34 AM - Johnnie, Sunshine Incoming Results From [facility]    Specimen Information: Urine, clean catch        Component Collected Lab   Organism Abnormal

## 2021-05-19 NOTE — PROGRESS NOTES
CLINICAL PHARMACY NOTE: MEDS TO BEDS    Total # of Prescriptions Filled: 2   The following medications were delivered to the patient:  · Doxycycline 100  · Carvedilol 6.25    Additional Documentation:

## 2021-05-19 NOTE — PROGRESS NOTES
hypertension    Hyperlipidemia    Atrial fibrillation (HCC)    Nonrheumatic tricuspid valve regurgitation    Chronic anticoagulation    S/P bladder tumor excision with fulguration    Neurogenic bladder    Bladder cancer (HCC)    Urinary retention due to benign prostatic hyperplasia    Type 2 diabetes mellitus without complication, without long-term current use of insulin (HCC)  Resolved Problems:    * No resolved hospital problems. *      He has urinary tract infection will be managed with Trimpex prescribed by his primary care doctor 10 days twice  a day    Plan:    He is going home with a Frazier catheter today he will have this removed in our office on Friday.   Will resume his anticoagulation after we are sure that he is voiding well probably review start this on Monday    Mya Gracia MD   [facility]  Urology

## 2021-05-19 NOTE — PROGRESS NOTES
Occupational Therapy  Received OT orders, Reviewed Chart. Pt adamantly Declines to participate in OT services at this time.   Will Discontinue OT order per pt request.  Thank you for this referral MARVIN Zafar, OTR/L  # [id number]

## 2021-05-20 NOTE — DISCHARGE SUMMARY
Family Practice Discharge Summary    Esthela Rivers Sr.  :  1955  MRN:  [id number]    Admit date:  2021  Discharge date:  2021    Admitting Physician:  Leigh Ann Atkins DO    Discharge Diagnoses:    Patient Active Problem List   Diagnosis    Irregular heart rhythm    Essential hypertension    Abnormal hemoglobin (Hgb) (HCC)    Elevated hemoglobin (HCC)    Hyperlipidemia    Atrial fibrillation (HCC)    Edema    Bulging discs    Hematuria    Nonrheumatic tricuspid valve regurgitation    Chronic anticoagulation    S/P bladder tumor excision with fulguration    Neurogenic bladder    BPH with urinary obstruction    Bl[facility] ([facility]    Urinary retention due to benign prostatic hyperplasia    Type 2 diabetes mellitus without complication, without long-term current use of insulin ([facility] [facility]       Admission Condition:  fair    Discharged Condition:  fair    Hospital Course:   A 76-year-old male, who unfortunately  recently was diagnosed with bladder cancer. He is status post TURP per  Dr. Curt Aguilar Gama on 2021. He is on chronic oral anticoagulation  secondary to chronic atrial fib. He also has a history of hypertension,  hyperlipidemia, obesity. The patient presents due to inability to  urinate over the last 2 to 3 days with noted blood clots. He had some  abdominal pain, suprapubic in location. Denied any nausea or vomiting,  fever, sweats or chills associated with the episode. His bladder  ultrasound scan done in the ER showed a residual of 789 mL.     Workup in the ER showed a white count of 15.1, platelets 350, H and H  14.7 and 45.2. His metabolic panel was unremarkable except for a CO2 of  19, BUN and creatinine 30 and 1.5 respectively, glucose of 181. His INR  was 3.7. Urinalysis was red and cloudy, glucose was 500, large amount  of blood, protein 100, positive nitrites, moderate leukocytes, 10 to 20  wbc's, rare bacteria and packed rbc's.   Urology was consulted in the ER,  Dr. María Jenkins, they will see him with admission. He was placed on a  continuous bladder irrigation while in the ER. We will hold  anticoagulation on admission. He offers no other complaints at this  time.       Discharge Medications:    See medication reconciliation under discharge insructions. Consults:  cardiology and urology    Significant Diagnostic Studies:  microbiology: urine culture: positive for Enterococcus faecalis. Hematuria with clots. Treatments:   procedures: Continuous bladder irrigation.     Discharge Exam:  /87   Pulse 88   Temp 98.6 °F (37 °C) (Oral)   Resp 20   Ht 5' 7\" (1.702 m)   Wt 240 lb (108.9 kg)   SpO2 94%   BMI 37.59 kg/m²     General Appearance:    Alert, cooperative, no distress, appears stated age   Head:    Normocephalic, without obvious abnormality, atraumatic   Eyes:    PERRL, conjunctiva/corneas clear, EOM's intact, fundi     benign, both eyes        Ears:    Normal TM's and external ear canals, both ears   Nose:   Nares normal, septum midline, mucosa normal, no drainage    or sinus tenderness   Throat:   Lips, mucosa, and tongue normal; teeth and gums normal   Neck:   Supple, symmetrical, trachea midline, no adenopathy;        thyroid:  No enlargement/tenderness/nodules; no carotid    bruit or JVD   Back:     Symmetric, no curvature, ROM normal, no CVA tenderness   Lungs:     Clear to auscultation bilaterally, respirations unlabored   Chest wall:    No tenderness or deformity   Heart:    Regular rate and rhythm, S1 and S2 normal, no murmur, rub   or gallop   Abdomen:     Soft, non-tender, bowel sounds active all four quadrants,     no masses, no organomegaly   Genitalia:    Normal male without lesion, discharge or tenderness   Rectal:    Normal tone, normal prostate, no masses or tenderness;    guaiac negative stool   Extremities:   Extremities normal, atraumatic, no cyanosis or edema   Pulses:   2+ and symmetric all extremities   Skin:   Skin color, texture, turgor normal, no rashes or lesions   Lymph nodes:   Cervical, supraclavicular, and axillary nodes normal   Neurologic:   CNII-XII intact. Normal strength, sensation and reflexes       throughout       Disposition:   Home, with Frazier in place. Medication List      START taking these medications    doxycycline hyclate 100 MG capsule  Commonly known as: VIBRAMYCIN  Take 1 capsule by mouth every 12 hours for 10 days        CHANGE how you take these medications    * carvedilol 6.25 MG tablet  Commonly known as: COREG  What changed: Another medication with the same name was changed. Make sure you understand how and when to take each. * carvedilol 6.25 MG tablet  Commonly known as: COREG  Take 1 tablet by mouth 2 times daily  What changed:   · medication strength  · how much to take         * This list has 2 medication(s) that are the same as other medications prescribed for you. Read the directions carefully, and ask your doctor or other care provider to review them with you.             CONTINUE taking these medications    allopurinol 100 MG tablet  Commonly known as: ZYLOPRIM     atorvastatin 40 MG tablet  Commonly known as: LIPITOR     Jardiance 25 MG tablet  Generic drug: empagliflozin     lisinopril 20 MG tablet  Commonly known as: PRINIVIL;ZESTRIL     therapeutic multivitamin-minerals tablet        STOP taking these medications    aspirin 81 MG tablet     furosemide 20 MG tablet  Commonly known as: LASIX     warfarin 3 MG tablet  Commonly known as: Coumadin           Where to Get Your Medications      These medications were sent to [facility], [address] [address]    Phone: [phone]   · carvedilol 6.25 MG tablet  · doxycycline hyclate 100 MG capsule              Signed:  Rashard Castro DO, D.O.  5/20/2021, 7:20 AM

## 2021-09-08 ENCOUNTER — OFFICE VISIT (OUTPATIENT)
Dept: FAMILY MEDICINE CLINIC | Age: 66
End: 2021-09-08
Payer: MEDICARE

## 2021-09-08 ENCOUNTER — TELEPHONE (OUTPATIENT)
Dept: ADMINISTRATIVE | Age: 66
End: 2021-09-08

## 2021-09-08 VITALS
HEART RATE: 95 BPM | SYSTOLIC BLOOD PRESSURE: 130 MMHG | DIASTOLIC BLOOD PRESSURE: 87 MMHG | TEMPERATURE: 97.3 F | HEIGHT: 67 IN | WEIGHT: 250 LBS | RESPIRATION RATE: 20 BRPM | BODY MASS INDEX: 39.24 KG/M2 | OXYGEN SATURATION: 96 %

## 2021-09-08 DIAGNOSIS — E11.9 TYPE 2 DIABETES MELLITUS WITHOUT COMPLICATION, WITHOUT LONG-TERM CURRENT USE OF INSULIN (HCC): Primary | ICD-10-CM

## 2021-09-08 DIAGNOSIS — E78.5 HYPERLIPIDEMIA, UNSPECIFIED HYPERLIPIDEMIA TYPE: ICD-10-CM

## 2021-09-08 DIAGNOSIS — Z12.11 SCREEN FOR COLON CANCER: ICD-10-CM

## 2021-09-08 DIAGNOSIS — I48.91 ATRIAL FIBRILLATION, UNSPECIFIED TYPE (HCC): ICD-10-CM

## 2021-09-08 LAB
HBA1C MFR BLD: 10.3 %
INTERNATIONAL NORMALIZATION RATIO, POC: 3.3
PROTHROMBIN TIME, POC: 39.4

## 2021-09-08 PROCEDURE — 83036 HEMOGLOBIN GLYCOSYLATED A1C: CPT | Performed by: FAMILY MEDICINE

## 2021-09-08 PROCEDURE — 85610 PROTHROMBIN TIME: CPT | Performed by: FAMILY MEDICINE

## 2021-09-08 PROCEDURE — 82044 UR ALBUMIN SEMIQUANTITATIVE: CPT | Performed by: FAMILY MEDICINE

## 2021-09-08 PROCEDURE — 99204 OFFICE O/P NEW MOD 45 MIN: CPT | Performed by: FAMILY MEDICINE

## 2021-09-08 RX ORDER — FUROSEMIDE 20 MG/1
1 TABLET ORAL DAILY
COMMUNITY
Start: 2021-07-02 | End: 2021-12-23 | Stop reason: SDUPTHER

## 2021-09-08 RX ORDER — METFORMIN HYDROCHLORIDE 500 MG/1
500 TABLET, EXTENDED RELEASE ORAL 2 TIMES DAILY
Qty: 180 TABLET | Refills: 1 | Status: SHIPPED
Start: 2021-09-08 | End: 2022-03-12 | Stop reason: SDUPTHER

## 2021-09-08 RX ORDER — WARFARIN SODIUM 3 MG/1
1 TABLET ORAL DAILY
COMMUNITY
Start: 2021-07-02 | End: 2021-12-23 | Stop reason: SDUPTHER

## 2021-09-08 SDOH — ECONOMIC STABILITY: FOOD INSECURITY: WITHIN THE PAST 12 MONTHS, THE FOOD YOU BOUGHT JUST DIDN'T LAST AND YOU DIDN'T HAVE MONEY TO GET MORE.: NEVER TRUE

## 2021-09-08 SDOH — ECONOMIC STABILITY: FOOD INSECURITY: WITHIN THE PAST 12 MONTHS, YOU WORRIED THAT YOUR FOOD WOULD RUN OUT BEFORE YOU GOT MONEY TO BUY MORE.: NEVER TRUE

## 2021-09-08 ASSESSMENT — PATIENT HEALTH QUESTIONNAIRE - PHQ9
SUM OF ALL RESPONSES TO PHQ9 QUESTIONS 1 & 2: 0
SUM OF ALL RESPONSES TO PHQ QUESTIONS 1-9: 0
1. LITTLE INTEREST OR PLEASURE IN DOING THINGS: 0
SUM OF ALL RESPONSES TO PHQ QUESTIONS 1-9: 0
2. FEELING DOWN, DEPRESSED OR HOPELESS: 0
SUM OF ALL RESPONSES TO PHQ QUESTIONS 1-9: 0

## 2021-09-08 ASSESSMENT — SOCIAL DETERMINANTS OF HEALTH (SDOH): HOW HARD IS IT FOR YOU TO PAY FOR THE VERY BASICS LIKE FOOD, HOUSING, MEDICAL CARE, AND HEATING?: NOT VERY HARD

## 2021-09-08 NOTE — TELEPHONE ENCOUNTER
Spoke to patient - he said he hasn't seen Dr Mercy Aponte for awhile and it didn't matter to him whom he seen. He said switching cardiologist was fine with him.   Please schedule with Dr Ashley Ovalle

## 2021-09-08 NOTE — TELEPHONE ENCOUNTER
Dr. Enrico Moritz placed a referral for pt to schedule appt with [facility] for Atrial fibrillation, unspecified type [facility]). It appears pt has been seen by Dr. Raquel Johnson in the past.  Please advise regarding scheduling.

## 2021-09-11 ASSESSMENT — ENCOUNTER SYMPTOMS
COUGH: 0
EYE DISCHARGE: 0
EYE REDNESS: 0
SHORTNESS OF BREATH: 0
PHOTOPHOBIA: 0
GASTROINTESTINAL NEGATIVE: 1

## 2021-09-11 NOTE — PROGRESS NOTES
2021    Dulce Real . Chief Complaint   Patient presents with    Establish Care       HPI  History was obtained from patient. Gina Winston is a 77 y.o. male who presents today with the followin. Type 2 diabetes mellitus without complication, without long-term current use of insulin ([facility]    2. Atrial fibrillation, unspecified type ([address]    3. Hyperlipidemia, unspecified hyperlipidemia type    4. Screen for colon cancer       Patient presents to establish primary care. Patient is currently taking only Jardiance 25 mg daily for his diabetes. He does not check his blood sugars at home. Patient has atrial fibrillation and takes Coumadin 3 mg daily for that. He is following regularly with urology and has developed some bleeding problems after procedures over the last few months. REVIEW OF SYMPTOMS    Review of Systems   Constitutional: Negative for chills, fatigue and fever. HENT: Negative for congestion. Eyes: Negative for photophobia, discharge and redness. Respiratory: Negative for cough and shortness of breath. Cardiovascular: Negative for chest pain. Gastrointestinal: Negative. Genitourinary: Negative for difficulty urinating, dysuria and hematuria. Skin: Negative. Neurological: Negative for headaches. Psychiatric/Behavioral: Negative for sleep disturbance.        PAST MEDICAL HISTORY  Past Medical History:   Diagnosis Date    Atrial fibrillation ([address] 10/2012    now on Coumadin    Diabetes mellitus ([facility] [address]     Hyperlipidemia     Hypertension     LONG TERM ANTICOAGULENT USE     CONCHITA (obstructive sleep apnea)     CPAP       FAMILY HISTORY  Family History   Problem Relation Age of Onset    Prostate Cancer Father 62    Cancer Father     Hypertension Mother         and sister    Marie Stabs Arthritis Mother        SOCIAL HISTORY  Social History     Socioeconomic History    Marital status:      Spouse name: None    Number of children: None    Years of education: None    Highest education level: None   Occupational History    None   Tobacco Use    Smoking status: Former Smoker     Packs/day: 1.00     Years: 30.00     Pack years: 30.00     Quit date: 1999     Years since quittin.7    Smokeless tobacco: Never Used   Vaping Use    Vaping Use: Never used   Substance and Sexual Activity    Alcohol use: No    Drug use: No    Sexual activity: None   Other Topics Concern    None   Social History Narrative    None     Social Determinants of Health     Financial Resource Strain: Low Risk     Difficulty of Paying Living Expenses: Not very hard   Food Insecurity: No Food Insecurity    Worried About Running Out of Food in the Last Year: Never true    Ruben of Food in the Last Year: Never true   Transportation Needs:     Lack of Transportation (Medical):      Lack of Transportation (Non-Medical):    Physical Activity:     Days of Exercise per Week:     Minutes of Exercise per Session:    Stress:     Feeling of Stress :    Social Connections:     Frequency of Communication with Friends and Family:     Frequency of Social Gatherings with Friends and Family:     Attends Yarsani Services:     Active Member of Clubs or Organizations:     Attends Club or Organization Meetings:     Marital Status:    Intimate Partner Violence:     Fear of Current or Ex-Partner:     Emotionally Abused:     Physically Abused:     Sexually Abused:         SURGICAL HISTORY  Past Surgical History:   Procedure Laterality Date    BACK SURGERY  2007    repair of herniated disc    CARDIOVASCULAR STRESS TEST  2006    findings of inferior and anterior ischemia     DIAGNOSTIC CARDIAC CATH LAB PROCEDURE  2006    No obstructive CAD and mild dilated cardiomyopathy with EF 45-50%    TURP N/A 2021    CYSTOSCOPY, URETHRAL DILITATION, PLASMA BUTTON TRANSURETHRAL RESECTION BLADDER TUMOR WITH FULGURATION, EXCHANGE GABRIEL CATHETER performed by Norman Rodriguez MD at [place] [address] TURP N/A 4/21/2021    CYSTOSCOPY RETROGRADE PYELOGRAM PLASMA LOOP TRANSURETHRAL RESECTION PROSTATE performed by Romy Malloy DO at [facility] OR                 CURRENT MEDICATIONS  Current Outpatient Medications   Medication Sig Dispense Refill    JANTOVEN 3 MG tablet Take 1 tablet by mouth daily      furosemide (LASIX) 20 MG tablet Take 1 tablet by mouth daily      metFORMIN (GLUCOPHAGE-XR) 500 MG extended release tablet Take 1 tablet by mouth 2 times daily 180 tablet 1    carvedilol (COREG) 6.25 MG tablet Take 6.25 mg by mouth 2 times daily (with meals)      lisinopril (PRINIVIL;ZESTRIL) 20 MG tablet Take 20 mg by mouth daily      atorvastatin (LIPITOR) 40 MG tablet Take 20 mg by mouth every evening      empagliflozin (JARDIANCE) 25 MG tablet Take 25 mg by mouth daily      Multiple Vitamins-Minerals (THERAPEUTIC MULTIVITAMIN-MINERALS) tablet Take 1 tablet by mouth daily      allopurinol (ZYLOPRIM) 100 MG tablet Take 100 mg by mouth daily       No current facility-administered medications for this visit. ALLERGIES  No Known Allergies    PHYSICAL EXAM    /87 (Site: Right Upper Arm, Position: Sitting, Cuff Size: Medium Adult)   Pulse 95   Temp 97.3 °F (36.3 °C) (Temporal)   Resp 20   Ht 5' 7\" (1.702 m)   Wt 250 lb (113.4 kg)   SpO2 96%   BMI 39.16 kg/m²     Physical Exam  Vitals and nursing note reviewed. Constitutional:       Appearance: Normal appearance. HENT:      Head: Normocephalic and atraumatic. Nose: Nose normal.      Mouth/Throat:      Mouth: Mucous membranes are moist.      Pharynx: Oropharynx is clear. Eyes:      General: No scleral icterus. Extraocular Movements: Extraocular movements intact. Conjunctiva/sclera: Conjunctivae normal.   Cardiovascular:      Rate and Rhythm: Normal rate. Rhythm irregularly irregular. Pulses: Normal pulses. Heart sounds: Normal heart sounds.    Pulmonary:      Effort: Pulmonary effort is normal.      Breath sounds: Normal breath sounds. Abdominal:      Palpations: Abdomen is soft. There is no mass. Tenderness: There is no abdominal tenderness. There is no right CVA tenderness or left CVA tenderness. Musculoskeletal:         General: No swelling. Cervical back: Neck supple. Right lower leg: No edema. Left lower leg: No edema. Lymphadenopathy:      Cervical: No cervical adenopathy. Skin:     General: Skin is warm and dry. Neurological:      General: No focal deficit present. Mental Status: He is alert and oriented to person, place, and time. Motor: No weakness. Gait: Gait normal.   Psychiatric:         Mood and Affect: Mood normal.       INR 09/08/2021 10:50 AM Unknown   3.3    Protime 09/08/2021 10:50 AM Unknown   39.4      Hemoglobin A1C 10.3  % Final 09/08/2021 10:44 AM       ASSESSMENT & PLAN    Kelvin Reis was seen today for establish care. Diagnoses and all orders for this visit:    Type 2 diabetes mellitus without complication, without long-term current use of insulin (HCC)  -     POCT glycosylated hemoglobin (Hb A1C)  -     POCT Microalbumin  -     Diabetic Foot Exam  -     COMPREHENSIVE METABOLIC PANEL; Future  -     TSH; Future  -     MICROALBUMIN, UR; Future  -     metFORMIN (GLUCOPHAGE-XR) 500 MG extended release tablet; Take 1 tablet by mouth 2 times daily  Patient's A1c is over 10 today. We will start on Metformin ER 1 tablet twice daily  Atrial fibrillation, unspecified type ([address]  -     POCT INR  -     Fernanda Rivers MD, Cardiology, [place]  -     CBC WITH AUTO DIFFERENTIAL; Future    Hyperlipidemia, unspecified hyperlipidemia type  -     LIPID PANEL; Future    Screen for colon cancer  -     Cologuard (For External Results Only); Future  Is to call if he has any side effects from the Metformin which are most commonly diarrhea and abdominal discomfort. Return in about 2 weeks (around 9/22/2021). Electronically signed by Hermes Morton.  Lul Matamoros DO on 9/11/2021

## 2021-10-13 ENCOUNTER — TELEPHONE (OUTPATIENT)
Dept: FAMILY MEDICINE CLINIC | Age: 66
End: 2021-10-13

## 2021-10-18 ENCOUNTER — PREP FOR PROCEDURE (OUTPATIENT)
Dept: UROLOGY | Age: 66
End: 2021-10-18

## 2021-10-18 DIAGNOSIS — D49.4 BLADDER TUMOR: Primary | ICD-10-CM

## 2021-10-18 RX ORDER — SODIUM CHLORIDE 0.9 % (FLUSH) 0.9 %
10 SYRINGE (ML) INJECTION PRN
Status: CANCELLED | OUTPATIENT
Start: 2021-10-18

## 2021-10-18 RX ORDER — SODIUM CHLORIDE 9 MG/ML
25 INJECTION, SOLUTION INTRAVENOUS PRN
Status: CANCELLED | OUTPATIENT
Start: 2021-10-18

## 2021-10-18 RX ORDER — SODIUM CHLORIDE 9 MG/ML
INJECTION, SOLUTION INTRAVENOUS CONTINUOUS
Status: CANCELLED | OUTPATIENT
Start: 2021-10-18

## 2021-10-18 RX ORDER — SODIUM CHLORIDE 0.9 % (FLUSH) 0.9 %
10 SYRINGE (ML) INJECTION EVERY 12 HOURS SCHEDULED
Status: CANCELLED | OUTPATIENT
Start: 2021-10-18

## 2021-10-20 ENCOUNTER — OFFICE VISIT (OUTPATIENT)
Dept: CARDIOLOGY CLINIC | Age: 66
End: 2021-10-20
Payer: MEDICARE

## 2021-10-20 ENCOUNTER — OFFICE VISIT (OUTPATIENT)
Dept: FAMILY MEDICINE CLINIC | Age: 66
End: 2021-10-20
Payer: MEDICARE

## 2021-10-20 VITALS
RESPIRATION RATE: 18 BRPM | HEART RATE: 94 BPM | OXYGEN SATURATION: 97 % | SYSTOLIC BLOOD PRESSURE: 132 MMHG | DIASTOLIC BLOOD PRESSURE: 88 MMHG | BODY MASS INDEX: 39.36 KG/M2 | WEIGHT: 250.8 LBS | HEIGHT: 67 IN

## 2021-10-20 VITALS
HEIGHT: 67 IN | DIASTOLIC BLOOD PRESSURE: 77 MMHG | OXYGEN SATURATION: 97 % | RESPIRATION RATE: 20 BRPM | HEART RATE: 98 BPM | TEMPERATURE: 98.6 F | SYSTOLIC BLOOD PRESSURE: 118 MMHG | BODY MASS INDEX: 39.24 KG/M2 | WEIGHT: 250 LBS

## 2021-10-20 DIAGNOSIS — I48.11 LONGSTANDING PERSISTENT ATRIAL FIBRILLATION (HCC): Primary | ICD-10-CM

## 2021-10-20 DIAGNOSIS — I48.11 LONGSTANDING PERSISTENT ATRIAL FIBRILLATION (HCC): ICD-10-CM

## 2021-10-20 DIAGNOSIS — I10 ESSENTIAL HYPERTENSION: ICD-10-CM

## 2021-10-20 DIAGNOSIS — E11.9 TYPE 2 DIABETES MELLITUS WITHOUT COMPLICATION, WITHOUT LONG-TERM CURRENT USE OF INSULIN (HCC): ICD-10-CM

## 2021-10-20 PROBLEM — R31.9 HEMATURIA: Status: RESOLVED | Noted: 2021-04-02 | Resolved: 2021-10-20

## 2021-10-20 PROBLEM — N40.1 BPH WITH URINARY OBSTRUCTION: Status: RESOLVED | Noted: 2021-04-21 | Resolved: 2021-10-20

## 2021-10-20 PROBLEM — N13.8 BPH WITH URINARY OBSTRUCTION: Status: RESOLVED | Noted: 2021-04-21 | Resolved: 2021-10-20

## 2021-10-20 PROCEDURE — 99214 OFFICE O/P EST MOD 30 MIN: CPT | Performed by: FAMILY MEDICINE

## 2021-10-20 PROCEDURE — 99214 OFFICE O/P EST MOD 30 MIN: CPT | Performed by: INTERNAL MEDICINE

## 2021-10-20 PROCEDURE — 93000 ELECTROCARDIOGRAM COMPLETE: CPT | Performed by: INTERNAL MEDICINE

## 2021-10-20 ASSESSMENT — ENCOUNTER SYMPTOMS
SHORTNESS OF BREATH: 0
GASTROINTESTINAL NEGATIVE: 1
COUGH: 0

## 2021-10-20 NOTE — PROGRESS NOTES
[facility] PRE-ADMISSION TESTING INSTRUCTIONS    The Preadmission Testing patient is instructed accordingly using the following criteria (check applicable):    ARRIVAL INSTRUCTIONS:  [x] Parking the day of Surgery is located in the Main Entrance lot. Upon entering the door, make an immediate right to the surgery reception desk    [x] Bring photo ID and insurance card    [x] Bring in a copy of Living will or Durable Power of  papers. [x] Please be sure to arrange for responsible adult to provide transportation to and from the hospital    [x] Please arrange for responsible adult to be with you for the 24 hour period post procedure due to having anesthesia      GENERAL INSTRUCTIONS:    [x] Nothing by mouth after midnight, including gum, candy, mints or water    [x] You may brush your teeth, but do not swallow any water    [x] Take medications as instructed with 1-2 oz of water    [x] Stop herbal supplements and vitamins 5 days prior to procedure    [x] Follow preop dosing of blood thinners per physician instructions    [] Take 1/2 dose of evening insulin, but no insulin after midnight    [x] No oral diabetic medications after midnight    [x] If diabetic and have low blood sugar or feel symptomatic, take 1-2oz apple juice only    [] Bring inhalers day of surgery    [] Bring C-PAP/ Bi-Pap day of surgery    [] Bring urine specimen day of surgery    [x] Shower or bath with soap, lather and rinse well, AM of Surgery, no lotion, powders or creams to surgical site    [] Follow bowel prep as instructed per surgeon    [x] No tobacco products within 24 hours of surgery     [x] No alcohol or illegal drug use within 24 hours of surgery.     [x] Jewelry, body piercing's, eyeglasses, contact lenses and dentures are not permitted into surgery (bring cases)      [] Please do not wear any nail polish, make up or hair products on the day of surgery    [x] You can expect a call the business day prior to procedure to notify you if your arrival time changes    [x] If you receive a survey after surgery we would greatly appreciate your comments    [] Parent/guardian of a minor must accompany their child and remain on the premises  the entire time they are under our care     [] Pediatric patients may bring favorite toy, blanket or comfort item with them    [] A caregiver or family member must remain with the patient during their stay if they are mentally handicapped, have dementia, disoriented or unable to use a call light or would be a safety concern if left unattended    [x] Please notify surgeon if you develop any illness between now and time of surgery (cold, cough, sore throat, fever, nausea, vomiting) or any signs of infections  including skin, wounds, and dental.    [x]  The Outpatient Pharmacy is available to fill your prescription here on your day of surgery, ask your preop nurse for details    [] Other instructions    EDUCATIONAL MATERIALS PROVIDED:    [] PAT Preoperative Education Packet/Booklet     [] Medication List    [] Transfusion bracelet applied with instructions    [] Shower with soap, lather and rinse well, and use CHG wipes provided the evening before surgery as instructed    [] Incentive spirometer with instructions

## 2021-10-20 NOTE — PATIENT INSTRUCTIONS
You have been medically cleared for your surgery. Restart Coumadin after surgery, usually in 2 days.

## 2021-10-20 NOTE — PROGRESS NOTES
Chief Complaint   Patient presents with    Atrial Fibrillation       Patient Active Problem List    Diagnosis Date Noted    Type 2 diabetes mellitus without complication, without long-term current use of insulin ([facility]     Bladder cancer ([facility] 04/22/2021    Urinary retention due to benign prostatic hyperplasia 04/22/2021    S/P bladder tumor excision with fulguration 04/08/2021    Neurogenic bladder 04/08/2021    Chronic anticoagulation     Hyperlipidemia 01/18/2013    Elevated hemoglobin (HCC) 01/16/2013    Essential hypertension 10/17/2012    Atrial fibrillation (HCC) 10/01/2012       Current Outpatient Medications   Medication Sig Dispense Refill    JANTOVEN 3 MG tablet Take 1 tablet by mouth daily      furosemide (LASIX) 20 MG tablet Take 1 tablet by mouth daily      metFORMIN (GLUCOPHAGE-XR) 500 MG extended release tablet Take 1 tablet by mouth 2 times daily 180 tablet 1    carvedilol (COREG) 6.25 MG tablet Take 6.25 mg by mouth 2 times daily (with meals)      lisinopril (PRINIVIL;ZESTRIL) 20 MG tablet Take 20 mg by mouth daily      atorvastatin (LIPITOR) 40 MG tablet Take 20 mg by mouth every evening      empagliflozin (JARDIANCE) 25 MG tablet Take 25 mg by mouth daily      Multiple Vitamins-Minerals (THERAPEUTIC MULTIVITAMIN-MINERALS) tablet Take 1 tablet by mouth daily      allopurinol (ZYLOPRIM) 100 MG tablet Take 100 mg by mouth daily       No current facility-administered medications for this visit.         No Known Allergies    Vitals:    10/20/21 1102   BP: 132/88   Pulse: 94   Resp: 18   SpO2: 97%   Weight: 250 lb 12.8 oz (113.8 kg)   Height: 5' 7\" (1.702 m)       Social History     Socioeconomic History    Marital status:      Spouse name: Not on file    Number of children: Not on file    Years of education: Not on file    Highest education level: Not on file   Occupational History    Not on file   Tobacco Use    Smoking status: Former Smoker     Packs/day: 1.00 Years: 30.00     Pack years: 30.00     Quit date: 1999     Years since quittin.8    Smokeless tobacco: Never Used   Vaping Use    Vaping Use: Never used   Substance and Sexual Activity    Alcohol use: No    Drug use: No    Sexual activity: Not on file   Other Topics Concern    Not on file   Social History Narrative    Drinks 2 cups coffee most days     Social Determinants of Health     Financial Resource Strain: Low Risk     Difficulty of Paying Living Expenses: Not very hard   Food Insecurity: No Food Insecurity    Worried About Running Out of Food in the Last Year: Never true    Ruben of Food in the Last Year: Never true   Transportation Needs:     Lack of Transportation (Medical):  Lack of Transportation (Non-Medical):    Physical Activity:     Days of Exercise per Week:     Minutes of Exercise per Session:    Stress:     Feeling of Stress :    Social Connections:     Frequency of Communication with Friends and Family:     Frequency of Social Gatherings with Friends and Family:     Attends [employer] Services:     Active Member of Clubs or Organizations:     Attends Club or Organization Meetings:     Marital Status:    Intimate Partner Violence:     Fear of Current or Ex-Partner:     Emotionally Abused:     Physically Abused:     Sexually Abused:        Family History   Problem Relation Age of Onset    Prostate Cancer Father 62    Cancer Father     Hypertension Mother         and sister     Arthritis Mother          SUBJECTIVE: Henyr Shaw. presents to the office today for pre-op assessment prior to cystoscopy. DR Lorenzo Villasenor PATIENT. He complains of no new or unstable cardiac symptoms and denies   chest pain, palpitations and syncope. Review of Systems:   Heart: as above   Lungs: as above   Eyes: denies changes in vision or discharge. Ears: denies changes in hearing or pain.    Nose: denies epistaxis or masses   Throat: denies sore throat or trouble swallowing. Neuro: denies numbness, tingling, tremors. Skin: denies rashes or itching. : denies hematuria, dysuria   GI: denies vomiting, diarrhea   Psych: denies mood changed, anxiety, depression. all others negative. Physical Exam   /88   Pulse 94   Resp 18   Ht 5' 7\" (1.702 m)   Wt 250 lb 12.8 oz (113.8 kg)   SpO2 97%   BMI 39.28 kg/m²   Constitutional: Oriented to person, place, and time. Obese . No distress. Head: Normocephalic and atraumatic. Eyes: EOM are normal. Pupils are equal, round, and reactive to light. Neck: Normal range of motion. Neck supple. No hepatojugular reflux and no JVD present. Carotid bruit is not present. Cardiovascular: Normal rate, irregular rhythm, normal heart sounds and intact distal pulses. Exam reveals no gallop and no friction rub. No murmur heard. Pulmonary/Chest: Effort normal and breath sounds normal. No respiratory distress. No wheezes. No rales. Abdominal: Soft. Bowel sounds are normal. No distension and no mass. No tenderness. No rebound and no guarding. Musculoskeletal: Normal range of motion. Trace - 1+ bilateral edema and no tenderness. Neurological: Alert and oriented to person, place, and time. Skin: Skin is warm and dry. No rash noted. Not diaphoretic. No erythema. Psychiatric: Normal mood and affect. Behavior is normal.     EKG:  atrial fibrillation, rate rate 94, low voltage, unchanged from previous tracings.     ASSESSMENT AND PLAN:  Patient Active Problem List   Diagnosis    Essential hypertension    Elevated hemoglobin (HCC)    Hyperlipidemia    Atrial fibrillation (HCC)    Chronic anticoagulation    S/P bladder tumor excision with fulguration    Neurogenic bladder    Bladder cancer ([address]    Urinary retention due to benign prostatic hyperplasia    Type 2 diabetes mellitus without complication, without long-term current use of insulin ([address]     Patient with NICM (normal cores 2006, normal Lexiscan 2015) with mild LV systolic dysfunction  Permanent atrial fibrillation usually treated with warfarin - patient has already discontinued his [address] on his own  CV exam and ekg no change  OK for procedure at low risk - restart coumadin asap          The patient was educated as to the symptoms that would require a prompt return to our office. Return visit in 1 year WITH DR Brayan Kahn.     Malini Soliz M.D  [facility]

## 2021-10-21 ENCOUNTER — ANESTHESIA EVENT (OUTPATIENT)
Dept: OPERATING ROOM | Age: 66
End: 2021-10-21
Payer: MEDICARE

## 2021-10-21 NOTE — PROGRESS NOTES
Teresa Young (:  1955) is a 74 y.o. male,Established patient, here for evaluation of the following chief complaint(s):  Pre-op Exam (having surgery on friday to have cyst removed from bladder)         ASSESSMENT/PLAN:  1. Longstanding persistent atrial fibrillation ([facility]  2. Essential hypertension  3. Type 2 diabetes mellitus without complication, without long-term current use of insulin ([facility] [address]  Patient is medically cleared for his upcoming surgery. He is to restart his Coumadin 2 days after his surgery. Patient has recent labs which show no anemia and normal kidney and liver function. Labs were done at [address] [place] [address] labs and the results are in his chart under media. Return in about 3 months (around 2022). Subjective   SUBJECTIVE/OBJECTIVE:  Patient is here today for medical clearance for bladder surgery that is planned for 10/22/2021. Patient states he has already stopped taking his Coumadin 4 days ago. He received cardiac clearance from his cardiologist earlier today. Patient has no complaints today. Patient just got off a cruise earlier in the week and states that he enjoyed himself. Patient denies any chest pain or shortness of breath. Patient can walk up a flight of stairs without becoming short of breath. Review of Systems   Constitutional: Negative for chills, fatigue and fever. HENT: Negative for congestion. Respiratory: Negative for cough and shortness of breath. Cardiovascular: Negative for chest pain. Gastrointestinal: Negative. Neurological: Negative for headaches. Objective   Physical Exam  Vitals and nursing note reviewed. Constitutional:       Appearance: Normal appearance. He is obese. HENT:      Head: Normocephalic and atraumatic. Eyes:      General: No scleral icterus. Conjunctiva/sclera: Conjunctivae normal.   Cardiovascular:      Rate and Rhythm: Normal rate. Rhythm irregular. Pulses: Normal pulses.       Heart sounds: Normal heart sounds. Pulmonary:      Effort: Pulmonary effort is normal.      Breath sounds: Normal breath sounds. Abdominal:      Palpations: Abdomen is soft. Musculoskeletal:      Cervical back: Neck supple. Skin:     General: Skin is warm and dry. Neurological:      Mental Status: He is alert and oriented to person, place, and time. Psychiatric:         Mood and Affect: Mood normal.                  An electronic signature was used to authenticate this note. --Mouna Damon.  Neo Hazel

## 2021-10-22 ENCOUNTER — HOSPITAL ENCOUNTER (OUTPATIENT)
Age: 66
Setting detail: OUTPATIENT SURGERY
Discharge: HOME | End: 2021-10-22
Attending: UROLOGY | Admitting: UROLOGY
Payer: MEDICARE

## 2021-10-22 ENCOUNTER — ANESTHESIA (OUTPATIENT)
Dept: OPERATING ROOM | Age: 66
End: 2021-10-22
Payer: MEDICARE

## 2021-10-22 ENCOUNTER — APPOINTMENT (OUTPATIENT)
Dept: GENERAL RADIOLOGY | Age: 66
End: 2021-10-22
Attending: UROLOGY
Payer: MEDICARE

## 2021-10-22 VITALS
OXYGEN SATURATION: 95 % | TEMPERATURE: 96.6 F | DIASTOLIC BLOOD PRESSURE: 68 MMHG | HEART RATE: 79 BPM | WEIGHT: 250 LBS | SYSTOLIC BLOOD PRESSURE: 120 MMHG | HEIGHT: 67 IN | BODY MASS INDEX: 39.24 KG/M2 | RESPIRATION RATE: 16 BRPM

## 2021-10-22 VITALS
OXYGEN SATURATION: 97 % | RESPIRATION RATE: 14 BRPM | SYSTOLIC BLOOD PRESSURE: 102 MMHG | DIASTOLIC BLOOD PRESSURE: 70 MMHG

## 2021-10-22 DIAGNOSIS — G89.18 POST-OP PAIN: Primary | ICD-10-CM

## 2021-10-22 LAB
HCT VFR BLD CALC: 52.5 % (ref 37–54)
HEMOGLOBIN: 16.7 G/DL (ref 12.5–16.5)
MCH RBC QN AUTO: 27.4 PG (ref 26–35)
MCHC RBC AUTO-ENTMCNC: 31.8 % (ref 32–34.5)
MCV RBC AUTO: 86.1 FL (ref 80–99.9)
METER GLUCOSE: 176 MG/DL (ref 74–99)
PDW BLD-RTO: 15.9 FL (ref 11.5–15)
PLATELET # BLD: 280 E9/L (ref 130–450)
PMV BLD AUTO: 11.1 FL (ref 7–12)
RBC # BLD: 6.1 E12/L (ref 3.8–5.8)
WBC # BLD: 9.8 E9/L (ref 4.5–11.5)

## 2021-10-22 PROCEDURE — 82962 GLUCOSE BLOOD TEST: CPT

## 2021-10-22 PROCEDURE — 6370000000 HC RX 637 (ALT 250 FOR IP): Performed by: ANESTHESIOLOGY

## 2021-10-22 PROCEDURE — 3700000000 HC ANESTHESIA ATTENDED CARE: Performed by: UROLOGY

## 2021-10-22 PROCEDURE — 74420 UROGRAPHY RTRGR +-KUB: CPT

## 2021-10-22 PROCEDURE — 3600000003 HC SURGERY LEVEL 3 BASE: Performed by: UROLOGY

## 2021-10-22 PROCEDURE — 7100000011 HC PHASE II RECOVERY - ADDTL 15 MIN: Performed by: UROLOGY

## 2021-10-22 PROCEDURE — 6360000004 HC RX CONTRAST MEDICATION: Performed by: UROLOGY

## 2021-10-22 PROCEDURE — 6360000002 HC RX W HCPCS

## 2021-10-22 PROCEDURE — 2709999900 HC NON-CHARGEABLE SUPPLY: Performed by: UROLOGY

## 2021-10-22 PROCEDURE — 6360000002 HC RX W HCPCS: Performed by: NURSE PRACTITIONER

## 2021-10-22 PROCEDURE — 2580000003 HC RX 258

## 2021-10-22 PROCEDURE — 3700000001 HC ADD 15 MINUTES (ANESTHESIA): Performed by: UROLOGY

## 2021-10-22 PROCEDURE — 7100000010 HC PHASE II RECOVERY - FIRST 15 MIN: Performed by: UROLOGY

## 2021-10-22 PROCEDURE — 85027 COMPLETE CBC AUTOMATED: CPT

## 2021-10-22 PROCEDURE — 3600000013 HC SURGERY LEVEL 3 ADDTL 15MIN: Performed by: UROLOGY

## 2021-10-22 PROCEDURE — 88307 TISSUE EXAM BY PATHOLOGIST: CPT

## 2021-10-22 PROCEDURE — C1758 CATHETER, URETERAL: HCPCS | Performed by: UROLOGY

## 2021-10-22 PROCEDURE — 36415 COLL VENOUS BLD VENIPUNCTURE: CPT

## 2021-10-22 PROCEDURE — 2720000010 HC SURG SUPPLY STERILE: Performed by: UROLOGY

## 2021-10-22 PROCEDURE — 2500000003 HC RX 250 WO HCPCS

## 2021-10-22 RX ORDER — OXYCODONE HYDROCHLORIDE AND ACETAMINOPHEN 5; 325 MG/1; MG/1
1 TABLET ORAL PRN
Status: DISCONTINUED | OUTPATIENT
Start: 2021-10-22 | End: 2021-10-22 | Stop reason: HOSPADM

## 2021-10-22 RX ORDER — FENTANYL CITRATE 50 UG/ML
INJECTION, SOLUTION INTRAMUSCULAR; INTRAVENOUS PRN
Status: DISCONTINUED | OUTPATIENT
Start: 2021-10-22 | End: 2021-10-22 | Stop reason: SDUPTHER

## 2021-10-22 RX ORDER — MIDAZOLAM HYDROCHLORIDE 1 MG/ML
INJECTION INTRAMUSCULAR; INTRAVENOUS PRN
Status: DISCONTINUED | OUTPATIENT
Start: 2021-10-22 | End: 2021-10-22 | Stop reason: SDUPTHER

## 2021-10-22 RX ORDER — PROMETHAZINE HYDROCHLORIDE 25 MG/ML
6.25 INJECTION, SOLUTION INTRAMUSCULAR; INTRAVENOUS PRN
Status: DISCONTINUED | OUTPATIENT
Start: 2021-10-22 | End: 2021-10-22 | Stop reason: HOSPADM

## 2021-10-22 RX ORDER — CARVEDILOL 6.25 MG/1
6.25 TABLET ORAL ONCE
Status: COMPLETED | OUTPATIENT
Start: 2021-10-22 | End: 2021-10-22

## 2021-10-22 RX ORDER — CIPROFLOXACIN 500 MG/1
500 TABLET, FILM COATED ORAL 2 TIMES DAILY
Qty: 10 TABLET | Refills: 0 | Status: SHIPPED | OUTPATIENT
Start: 2021-10-22 | End: 2021-10-27

## 2021-10-22 RX ORDER — DIPHENHYDRAMINE HYDROCHLORIDE 50 MG/ML
12.5 INJECTION INTRAMUSCULAR; INTRAVENOUS
Status: DISCONTINUED | OUTPATIENT
Start: 2021-10-22 | End: 2021-10-22 | Stop reason: HOSPADM

## 2021-10-22 RX ORDER — SODIUM CHLORIDE 0.9 % (FLUSH) 0.9 %
10 SYRINGE (ML) INJECTION PRN
Status: DISCONTINUED | OUTPATIENT
Start: 2021-10-22 | End: 2021-10-22 | Stop reason: HOSPADM

## 2021-10-22 RX ORDER — SODIUM CHLORIDE 9 MG/ML
INJECTION, SOLUTION INTRAVENOUS CONTINUOUS PRN
Status: DISCONTINUED | OUTPATIENT
Start: 2021-10-22 | End: 2021-10-22 | Stop reason: SDUPTHER

## 2021-10-22 RX ORDER — PROPOFOL 10 MG/ML
INJECTION, EMULSION INTRAVENOUS CONTINUOUS PRN
Status: DISCONTINUED | OUTPATIENT
Start: 2021-10-22 | End: 2021-10-22 | Stop reason: SDUPTHER

## 2021-10-22 RX ORDER — MEPERIDINE HYDROCHLORIDE 25 MG/ML
12.5 INJECTION INTRAMUSCULAR; INTRAVENOUS; SUBCUTANEOUS EVERY 10 MIN PRN
Status: DISCONTINUED | OUTPATIENT
Start: 2021-10-22 | End: 2021-10-22 | Stop reason: HOSPADM

## 2021-10-22 RX ORDER — PROPOFOL 10 MG/ML
INJECTION, EMULSION INTRAVENOUS PRN
Status: DISCONTINUED | OUTPATIENT
Start: 2021-10-22 | End: 2021-10-22 | Stop reason: SDUPTHER

## 2021-10-22 RX ORDER — SODIUM CHLORIDE 9 MG/ML
INJECTION, SOLUTION INTRAVENOUS CONTINUOUS
Status: DISCONTINUED | OUTPATIENT
Start: 2021-10-22 | End: 2021-10-22 | Stop reason: HOSPADM

## 2021-10-22 RX ORDER — OXYCODONE HYDROCHLORIDE AND ACETAMINOPHEN 5; 325 MG/1; MG/1
1 TABLET ORAL EVERY 4 HOURS PRN
Qty: 10 TABLET | Refills: 0 | Status: SHIPPED | OUTPATIENT
Start: 2021-10-22 | End: 2021-10-29

## 2021-10-22 RX ORDER — LIDOCAINE HYDROCHLORIDE 20 MG/ML
INJECTION, SOLUTION EPIDURAL; INFILTRATION; INTRACAUDAL; PERINEURAL PRN
Status: DISCONTINUED | OUTPATIENT
Start: 2021-10-22 | End: 2021-10-22 | Stop reason: SDUPTHER

## 2021-10-22 RX ORDER — SODIUM CHLORIDE 9 MG/ML
25 INJECTION, SOLUTION INTRAVENOUS PRN
Status: DISCONTINUED | OUTPATIENT
Start: 2021-10-22 | End: 2021-10-22 | Stop reason: HOSPADM

## 2021-10-22 RX ORDER — SODIUM CHLORIDE 0.9 % (FLUSH) 0.9 %
10 SYRINGE (ML) INJECTION EVERY 12 HOURS SCHEDULED
Status: DISCONTINUED | OUTPATIENT
Start: 2021-10-22 | End: 2021-10-22 | Stop reason: HOSPADM

## 2021-10-22 RX ORDER — FENTANYL CITRATE 50 UG/ML
25 INJECTION, SOLUTION INTRAMUSCULAR; INTRAVENOUS EVERY 5 MIN PRN
Status: DISCONTINUED | OUTPATIENT
Start: 2021-10-22 | End: 2021-10-22 | Stop reason: HOSPADM

## 2021-10-22 RX ADMIN — Medication 2000 MG: at 08:27

## 2021-10-22 RX ADMIN — LIDOCAINE HYDROCHLORIDE 60 MG: 20 INJECTION, SOLUTION EPIDURAL; INFILTRATION; INTRACAUDAL; PERINEURAL at 08:36

## 2021-10-22 RX ADMIN — MIDAZOLAM 1 MG: 1 INJECTION INTRAMUSCULAR; INTRAVENOUS at 08:27

## 2021-10-22 RX ADMIN — FENTANYL CITRATE 50 MCG: 50 INJECTION, SOLUTION INTRAMUSCULAR; INTRAVENOUS at 08:36

## 2021-10-22 RX ADMIN — PROPOFOL 50 MG: 10 INJECTION, EMULSION INTRAVENOUS at 08:36

## 2021-10-22 RX ADMIN — SODIUM CHLORIDE: 9 INJECTION, SOLUTION INTRAVENOUS at 08:27

## 2021-10-22 RX ADMIN — PROPOFOL 150 MCG/KG/MIN: 10 INJECTION, EMULSION INTRAVENOUS at 08:36

## 2021-10-22 RX ADMIN — FENTANYL CITRATE 25 MCG: 50 INJECTION, SOLUTION INTRAMUSCULAR; INTRAVENOUS at 08:43

## 2021-10-22 RX ADMIN — MIDAZOLAM 0.5 MG: 1 INJECTION INTRAMUSCULAR; INTRAVENOUS at 08:43

## 2021-10-22 RX ADMIN — SODIUM CHLORIDE: 9 INJECTION, SOLUTION INTRAVENOUS at 07:03

## 2021-10-22 RX ADMIN — CARVEDILOL 6.25 MG: 6.25 TABLET, FILM COATED ORAL at 08:22

## 2021-10-22 ASSESSMENT — PULMONARY FUNCTION TESTS
PIF_VALUE: 1
PIF_VALUE: 2
PIF_VALUE: 1

## 2021-10-22 ASSESSMENT — PAIN - FUNCTIONAL ASSESSMENT: PAIN_FUNCTIONAL_ASSESSMENT: 0-10

## 2021-10-22 ASSESSMENT — PAIN SCALES - GENERAL: PAINLEVEL_OUTOF10: 0

## 2021-10-22 NOTE — ANESTHESIA POSTPROCEDURE EVALUATION
Department of Anesthesiology  Postprocedure Note    Patient: Loly Rothman MRN: [id number]  YOB: 1955  Date of evaluation: 10/22/2021  Time:  11:59 AM     Procedure Summary     Date: 10/22/21 Room / Location: [facility] 06 / 106 [facility]    Anesthesia Start: 4563 Anesthesia Stop: 6290    Procedure: CYSTOSCOPY RETROGRADE PYELOGRAM TRANSURETHRAL RESECTION OF BLADDER TUMOR WITH INSTALLATION OF GEMCITIBINE (N/A Bladder) Diagnosis: (MALIGNANT NEOPLASM OF THE BLADDER)    Surgeons: Heather Malloy DO Responsible Provider: Kirk López MD    Anesthesia Type: MAC ASA Status: 3          Anesthesia Type: MAC    Eunice Phase I: Eunice Score: 10    Eunice Phase II: Eunice Score: 10    Last vitals: Reviewed and per EMR flowsheets.        Anesthesia Post Evaluation    Patient location during evaluation: PACU  Patient participation: complete - patient participated  Level of consciousness: awake and alert  Airway patency: patent  Nausea & Vomiting: no vomiting and no nausea  Complications: no  Cardiovascular status: blood pressure returned to baseline  Respiratory status: acceptable  Hydration status: euvolemic

## 2021-10-22 NOTE — ANESTHESIA PRE PROCEDURE
Department of Anesthesiology  Preprocedure Note       Name:  Curt Hill.   Age:  74 y.o.  :  1955                                          MRN:  [id number]         Date:  10/22/2021      Surgeon: Gerald Arriaza):  Dasha SINGH Memo, DO    Procedure: Procedure(s):  TRANSURETHRAL RESECTION BLADDER TUMOR WITH GEMCITABINE    Medications prior to admission:   Prior to Admission medications    Medication Sig Start Date End Date Taking?  Authorizing Provider   JANTOVEN 3 MG tablet Take 1 tablet by mouth daily 21  Yes Historical Provider, MD   furosemide (LASIX) 20 MG tablet Take 1 tablet by mouth daily 21  Yes Historical Provider, MD   metFORMIN (GLUCOPHAGE-XR) 500 MG extended release tablet Take 1 tablet by mouth 2 times daily 21  Yes Chari Dave,    carvedilol (COREG) 6.25 MG tablet Take 6.25 mg by mouth 2 times daily (with meals)   Yes Historical Provider, MD   lisinopril (PRINIVIL;ZESTRIL) 20 MG tablet Take 20 mg by mouth daily   Yes Historical Provider, MD   atorvastatin (LIPITOR) 40 MG tablet Take 20 mg by mouth every evening   Yes Historical Provider, MD   empagliflozin (JARDIANCE) 25 MG tablet Take 25 mg by mouth daily   Yes Historical Provider, MD   Multiple Vitamins-Minerals (THERAPEUTIC MULTIVITAMIN-MINERALS) tablet Take 1 tablet by mouth daily   Yes Historical Provider, MD   allopurinol (ZYLOPRIM) 100 MG tablet Take 100 mg by mouth daily   Yes Historical Provider, MD       Current medications:    Current Facility-Administered Medications   Medication Dose Route Frequency Provider Last Rate Last Admin    gemcitabine (GEMZAR) 2 g in sodium chloride 0.9 % 100 mL chemo IVPB  2 g IntraVESical Once LINDA Aguilar CNP        0.9 % sodium chloride infusion   IntraVENous Continuous LINDA Aguilar CNP        0.9 % sodium chloride infusion  25 mL IntraVENous PRN LINDA Aguilar CNP        ceFAZolin (ANCEF) 2000 mg in sterile water 20 mL IV syringe  2,000 mg IntraVENous On Call to [address] Tamiko, APRN - CNP        sodium chloride flush 0.9 % injection 10 mL  10 mL IntraVENous 2 times per day Jennifer Turcios APRN - CNP        sodium chloride flush 0.9 % injection 10 mL  10 mL IntraVENous PRN Jennifer Turcios APRN - CNP           Allergies:  No Known Allergies    Problem List:    Patient Active Problem List   Diagnosis Code    Essential hypertension I10    Elevated hemoglobin (HCC) D58.2    Hyperlipidemia E78.5    Atrial fibrillation ([address] I48.91    Chronic anticoagulation Z79.01    S/P bladder tumor excision with fulguration Z98.890    Neurogenic bladder N31.9    Bladder cancer ([address] C67.9    Urinary retention due to benign prostatic hyperplasia N40.1, R33.8    Type 2 diabetes mellitus without complication, without long-term current use of insulin ([address] E11.9       Past Medical History:        Diagnosis Date    Atrial fibrillation ([address] 10/2012    now on Coumadin    Bladder tumor     Diabetes mellitus ([address]     Hyperlipidemia     Hypertension     LONG TERM ANTICOAGULENT USE     CONCHITA on CPAP        Past Surgical History:        Procedure Laterality Date    BACK SURGERY  2007    repair of herniated disc    CARDIOVASCULAR STRESS TEST  2006    findings of inferior and anterior ischemia     DIAGNOSTIC CARDIAC CATH LAB PROCEDURE  2006    No obstructive CAD and mild dilated cardiomyopathy with EF 45-50%    TURP N/A 2021    CYSTOSCOPY, URETHRAL DILITATION, PLASMA BUTTON TRANSURETHRAL RESECTION BLADDER TUMOR WITH FULGURATION, EXCHANGE GABRIEL CATHETER performed by Branden Mast MD at [facility] TURP N/A 2021    CYSTOSCOPY RETROGRADE PYELOGRAM PLASMA LOOP TRANSURETHRAL RESECTION PROSTATE performed by Malu Malloy DO at [facility] OR       Social History:    Social History     Tobacco Use    Smoking status: Former Smoker     Packs/day: 1.00     Years: 30.00     Pack years: 30.00     Quit date: 1999     Years since quittin.8  Smokeless tobacco: Never Used   Substance Use Topics    Alcohol use: No                                Counseling given: Not Answered      Vital Signs (Current):   Vitals:    10/20/21 1349   Weight: 250 lb (113.4 kg)   Height: 5' 7\" (1.702 m)                                              BP Readings from Last 3 Encounters:   10/20/21 118/77   10/20/21 132/88   09/08/21 130/87       NPO Status:                                                                                 BMI:   Wt Readings from Last 3 Encounters:   10/20/21 250 lb (113.4 kg)   10/20/21 250 lb (113.4 kg)   10/20/21 250 lb 12.8 oz (113.8 kg)     Body mass index is 39.16 kg/m². CBC:   Lab Results   Component Value Date    WBC 13.2 05/16/2021    RBC 4.34 05/16/2021    HGB 12.3 05/16/2021    HCT 38.7 05/16/2021    MCV 89.2 05/16/2021    RDW 13.3 05/16/2021     05/16/2021       CMP:   Lab Results   Component Value Date     05/17/2021    K 3.9 05/17/2021    K 4.2 04/02/2021     05/17/2021    CO2 24 05/17/2021    BUN 15 05/17/2021    CREATININE 1.2 05/17/2021    GFRAA >60 05/17/2021    LABGLOM >60 05/17/2021    GLUCOSE 157 05/17/2021    PROT 6.2 05/14/2021    CALCIUM 8.3 05/17/2021    BILITOT 0.4 05/14/2021    ALKPHOS 95 05/14/2021    AST 13 05/14/2021    ALT 16 05/14/2021       POC Tests: No results for input(s): POCGLU, POCNA, POCK, POCCL, POCBUN, POCHEMO, POCHCT in the last 72 hours.     Coags:   Lab Results   Component Value Date    PROTIME 39.4 09/08/2021    INR 3.3 09/08/2021    INR 1.3 05/19/2021    APTT 55.4 05/13/2021       HCG (If Applicable): No results found for: PREGTESTUR, PREGSERUM, HCG, HCGQUANT     ABGs: No results found for: PHART, PO2ART, PDB9NBE, QNK1KXW, BEART, X0AAJJFP     Type & Screen (If Applicable):  No results found for: LABABO, LABRH    Drug/Infectious Status (If Applicable):  No results found for: HIV, HEPCAB    COVID-19 Screening (If Applicable):   Lab Results   Component Value Date    COVID19 Not Detected 04/16/2021     EKG 4/5/21   Ref Range & Units 4/5/21 1341   Ventricular Rate BPM 86    Atrial Rate     QRS Duration ms 102    Q-T Interval ms 366    QTc Calculation (Bazett) ms 437    R Axis degrees -26    T Axis degrees 42    Resulting Agency  [facility]      Narrative & Impression    Atrial fibrillation  Inferior infarct , age undetermined  Abnormal ECG  No previous ECGs available  Confirmed by Jerry Horner ([id number]) on 4/5/2021 4:13:28 PM         Anesthesia Evaluation  Patient summary reviewed and Nursing notes reviewed no history of anesthetic complications:   Airway: Mallampati: IV  TM distance: >3 FB   Neck ROM: full   Dental:          Pulmonary:   (+) sleep apnea: on CPAP,  decreased breath sounds,                            ROS comment: Former smoker quit 1999, patient does not know his CPAP settings    Cardiovascular:  Exercise tolerance: poor (<4 METS),   (+) hypertension:, dysrhythmias (chronic AFIB, patient took jantoven last Saturday): atrial fibrillation, peripheral edema, hyperlipidemia      ECG reviewed  Rhythm: irregular  Rate: normal  Echocardiogram reviewed    Cleared by cardiology     Beta Blocker:  No, reason not specified         Neuro/Psych:   Negative Neuro/Psych ROS              GI/Hepatic/Renal:   (+) GERD: well controlled, morbid obesity         ROS comment: Occasional, relieved with tums. Endo/Other:    (+) DiabetesType II DM, , malignancy/cancer (neoplasm of bladder ). ROS comment: Patient states he does not check his blood glucose level at home- not using insulin Abdominal:   (+) obese,     Abdomen: soft. Vascular: negative vascular ROS. Other Findings:           Anesthesia Plan      MAC     ASA 3     (#20 Right PIV )  Induction: intravenous. Anesthetic plan and risks discussed with patient. Use of blood products discussed with patient whom consented to blood products.                    Ольга Griffiths RN   10/22/2021

## 2021-10-22 NOTE — H&P
History:    reports that he quit smoking about 22 years ago. He has a 30.00 pack-year smoking history. He has never used smokeless tobacco. He reports that he does not drink alcohol and does not use drugs. Family History:   Non-contributory to this urological problem  family history includes Arthritis in his mother; Cancer in his father; Hypertension in his mother; Prostate Cancer (age of onset: 62) in his father. Review of Systems:  Respiratory: negative for cough and hemoptysis  Cardiovascular: negative for chest pain and dyspnea  Gastrointestinal: negative for abdominal pain, diarrhea, nausea and vomiting  Derm: negative for rash and skin lesion(s)  Neurological: negative for seizures and tremors  Endocrine: negative for diabetic symptoms including polydipsia and polyuria  : As above in the HPI, otherwise negative  All other reviews are negative    Physical Exam:   Vitals: BP (!) 155/95   Pulse 80   Temp 97.6 °F (36.4 °C) (Temporal)   Resp 18   Ht 5' 7\" (1.702 m)   Wt 250 lb (113.4 kg)   SpO2 94%   BMI 39.16 kg/m²   General:  Awake, alert, oriented X 3. Well developed, well nourished, well groomed. No apparent distress. HEENT:  Normocephalic, atraumatic. Pupils equal, round. No scleral icterus. No conjunctival injection. Normal lips, teeth, and gums. No nasal discharge. Neck:  Supple, no masses. Heart:  RRR  Lungs:  No audible wheezing. Respirations symmetric and non-labored. Abdomen:  soft, nontender, no masses, no organomegaly, no peritoneal signs  Extremities:  No clubbing, cyanosis, or edema  Skin:  Warm and dry, no open lesions or rashes  Neuro:  Cranial nerves 2-12 intact, no focal deficits  Rectal: deferred  Genitalia:  Frazier no    Labs:   Recent Labs     10/22/21  0645   WBC 9.8   RBC 6.10*   HGB 16.7*   HCT 52.5   MCV 86.1   MCH 27.4   MCHC 31.8*   RDW 15.9*      MPV 11.1       No results for input(s): CREATININE in the last 72 hours.     Images:      Assessment: Candy Adams Jonny Mayo. 74 y.o. male     Bladder cancer    Plan:    See the outpatient H&P  All options were discussed  The patient family is present  Progress to the OR for C&P, TURBT, GC  The risks, benefits, and alternatives were discussed  NPO  DVT prophylaxis  Pre-op antibiotics      Chaya Flaherty Gama, DO   [facility]

## 2021-10-22 NOTE — PROGRESS NOTES
Patient was turned left, right and supine for 20 minutes each. Patient's catheter was drained and then removed using chemo precautions. Patient was given discharge instructions. Patient and daughter verbalized understanding of instructions. Daughter went over to get prescriptions then out to the car.

## 2021-10-22 NOTE — BRIEF OP NOTE
Brief Postoperative Note      Patient: Jones Renteria.   YOB: 1955  MRN: [id number]    Date of Procedure: 10/22/2021    Pre-Op Diagnosis: MALIGNANT NEOPLASM OF THE BLADDER    Post-Op Diagnosis: Same       Procedure(s):  CYSTOSCOPY RETROGRADE PYELOGRAM TRANSURETHRAL RESECTION OF BLADDER TUMOR WITH INSTALLATION OF GEMCITIBINE    Surgeon(s):  Pablo Malloy DO    Assistant:  * No surgical staff found *    Anesthesia: Monitor Anesthesia Care    Estimated Blood Loss (mL): Minimal    Complications: None    Specimens:   * No specimens in log *    Implants:  * No implants in log *      Drains:   Urethral Catheter Triple-lumen 22 fr (Active)       Findings: see operative report     Electronically signed by Tonio Malloy DO on 10/22/2021 at 8:27 AM

## 2021-10-23 NOTE — OP NOTE
[address] [address]                                OPERATIVE REPORT    PATIENT NAME: Yolie Velásquez                    :        1955  MED REC NO:   [id number]                            ROOM:  ACCOUNT NO:   [de-identified]                           ADMIT DATE: 10/22/2021  PROVIDER:     Sunny Malloy DO    DATE OF PROCEDURE:  10/22/2021    PREOPERATIVE DIAGNOSES:  1. History of BPH, status post TURP. 2.  Left-sided Hutch diverticulum. 3.  Recurrent bladder cancer within the [facility] diverticulum. POSTOPERATIVE DIAGNOSES:  1. History of BPH, status post TURP. 2.  Left-sided Hutch diverticulum. 3.  Recurrent bladder cancer within the [facility] diverticulum. PROCEDURES PERFORMED:  The patient had:  1. Cystoscopy. 2.  Urethral dilatation at the bladder neck. 3.  Bilateral retrograde pyelograms done under fluoroscopy. 4.  Bladder biopsy. 5.  Transurethral resection of bladder tumor, small. 6.  Frazier catheter placement. 7.  Gemcitabine instillation, approximately 2 gm per 100 mL. ANESTHESIA:  Monitored anesthesia. SURGEON:  Pablo Malloy DO.    ESTIMATED BLOOD LOSS:  None. CONDITION:  To PACU, stable. STORY ON THIS PATIENT:  A 53-year-old  male, known to me, that  has a history of TURP that I did approximately six months ago. He has  done quite well from the standpoint. He is voiding with a good stream.   Concomitantly, just prior to my TURP, he did have a Hutch diverticulum,  which was diagnosed on the left, which had a bladder tumor, which had  undergone resection, which was a tumor of grade 3. He has done quite  well from that standpoint. Recently presented to the office. In the  office, he had an office cystoscopy. The Hutch diverticulum was scoped  and he was found to have a recurrent tumor. All options were discussed  with him as well as his daughter in that setting.   The risks, the  benefits, and the alternatives of the proposed surgical procedure were  extensively explained. They understood the risks, the benefits, and the  alternatives and elected to proceed. DESCRIPTION OF PROCEDURE:  This pleasant 67-year-old  male was  brought to the operating room #6 at [address], placed in the supine position, and induced with  monitored anesthesia. Anesthesia monitored the head and neck area, IV  access, and vital signs throughout the course of the case. Status post  induction, the patient was placed in the dorsal lithotomy position. All  underlying points were pressure padded. SCDs were applied. Prepped and  draped in normal sterile fashion. I proceeded by taking a 21-Latvian  Olympus scope with a 30-degree lens inserted through the meatus in an  atraumatic fashion. Panendoscopic visualization of the fossa  navicularis, pendulous urethra, bulbous urethra, and prostate were  visualized. Prostate was well resected. There was a little mild  bladder neck contracture that I was able to get the scope through. I  would do a small DVIU on this at the end, once the resectoscope was  placed. Ultimately, the bladder had normal spherical configuration. Again, you could [facility] and there was a small tumor  within the [facility] diverticulum. The right and left ureteral orifices had  normal position. Bilateral retrograde pyelograms under fluoroscopy were  performed. The distal, mid, and proximal portions of both sets of  ureters were devoid of any significant masses, tumors, lesions, or  defects. There was appropriate cupping of the renal calyces. There was  no substantial hydronephrosis. After this occurred, I removed the  cystoscope, inserted the 27-Latvian plasma loop continuous flow  resectoscope in normal saline and did a transurethral resection of  bladder tumor, small.   Basically, what I did first was stick the loop

## 2021-11-24 ENCOUNTER — TELEPHONE (OUTPATIENT)
Dept: FAMILY MEDICINE CLINIC | Age: 66
End: 2021-11-24

## 2021-11-24 RX ORDER — EMPAGLIFLOZIN 25 MG/1
25 TABLET, FILM COATED ORAL DAILY
Qty: 90 TABLET | Refills: 1 | Status: SHIPPED
Start: 2021-11-24 | End: 2022-02-18 | Stop reason: SDUPTHER

## 2021-11-24 NOTE — TELEPHONE ENCOUNTER
Pt needs a refill of Jardiance 25 mg taken once daily written for a 90 day supply.    [id number]     Phone # [phone] ext [phone]

## 2021-12-23 ENCOUNTER — TELEPHONE (OUTPATIENT)
Dept: FAMILY MEDICINE CLINIC | Age: 66
End: 2021-12-23

## 2021-12-23 ENCOUNTER — ANTI-COAG VISIT (OUTPATIENT)
Dept: FAMILY MEDICINE CLINIC | Age: 66
End: 2021-12-23

## 2021-12-23 RX ORDER — FUROSEMIDE 20 MG/1
20 TABLET ORAL DAILY
Qty: 90 TABLET | Refills: 1 | Status: SHIPPED
Start: 2021-12-23 | End: 2022-06-08 | Stop reason: SDUPTHER

## 2021-12-23 RX ORDER — ATORVASTATIN CALCIUM 40 MG/1
20 TABLET, FILM COATED ORAL EVERY EVENING
Qty: 45 TABLET | Refills: 1 | Status: SHIPPED
Start: 2021-12-23 | End: 2022-06-08 | Stop reason: SDUPTHER

## 2021-12-23 RX ORDER — WARFARIN SODIUM 3 MG/1
3 TABLET ORAL DAILY
Qty: 90 TABLET | Refills: 1 | Status: SHIPPED
Start: 2021-12-23 | End: 2022-06-08 | Stop reason: SDUPTHER

## 2021-12-23 NOTE — TELEPHONE ENCOUNTER
Last INR was indeed 3 months ago. I scheduled patient for Monday and he is asking if the other medications will be sent to pharmacy in the meantime.  He does have enough coumadin to get through the holidays

## 2021-12-23 NOTE — TELEPHONE ENCOUNTER
Pt needs the following refills sent to [facility]   warfarin (COUMADIN) 3 MG tablet    atorvastatin (LIPITOR) 40 MG tablet    furosemide (LASIX) 20 MG tablet       90 day supply on all with refills

## 2021-12-27 ENCOUNTER — NURSE ONLY (OUTPATIENT)
Dept: FAMILY MEDICINE CLINIC | Age: 66
End: 2021-12-27
Payer: MEDICARE

## 2021-12-27 ENCOUNTER — ANTI-COAG VISIT (OUTPATIENT)
Dept: FAMILY MEDICINE CLINIC | Age: 66
End: 2021-12-27

## 2021-12-27 DIAGNOSIS — I48.11 LONGSTANDING PERSISTENT ATRIAL FIBRILLATION (HCC): ICD-10-CM

## 2021-12-27 DIAGNOSIS — I48.11 LONGSTANDING PERSISTENT ATRIAL FIBRILLATION (HCC): Primary | ICD-10-CM

## 2021-12-27 LAB
INTERNATIONAL NORMALIZATION RATIO, POC: 3.1
PROTHROMBIN TIME, POC: 37.4

## 2021-12-27 PROCEDURE — 85610 PROTHROMBIN TIME: CPT | Performed by: FAMILY MEDICINE

## 2021-12-27 PROCEDURE — 93793 ANTICOAG MGMT PT WARFARIN: CPT | Performed by: FAMILY MEDICINE

## 2022-02-14 ENCOUNTER — TELEPHONE (OUTPATIENT)
Dept: FAMILY MEDICINE CLINIC | Age: 67
End: 2022-02-14

## 2022-02-14 NOTE — TELEPHONE ENCOUNTER
Pt needs the following refills  empagliflozin (JARDIANCE) 25 MG tablet      Please send to [facility]

## 2022-02-18 RX ORDER — EMPAGLIFLOZIN 25 MG/1
25 TABLET, FILM COATED ORAL DAILY
Qty: 90 TABLET | Refills: 1 | Status: SHIPPED
Start: 2022-02-18 | End: 2022-06-08 | Stop reason: SDUPTHER

## 2022-03-11 DIAGNOSIS — E11.9 TYPE 2 DIABETES MELLITUS WITHOUT COMPLICATION, WITHOUT LONG-TERM CURRENT USE OF INSULIN (HCC): ICD-10-CM

## 2022-03-12 DIAGNOSIS — E78.5 HYPERLIPIDEMIA, UNSPECIFIED HYPERLIPIDEMIA TYPE: ICD-10-CM

## 2022-03-12 DIAGNOSIS — E11.9 TYPE 2 DIABETES MELLITUS WITHOUT COMPLICATION, WITHOUT LONG-TERM CURRENT USE OF INSULIN (HCC): Primary | ICD-10-CM

## 2022-03-12 DIAGNOSIS — I48.91 ATRIAL FIBRILLATION, UNSPECIFIED TYPE (HCC): ICD-10-CM

## 2022-03-12 DIAGNOSIS — I10 ESSENTIAL HYPERTENSION: ICD-10-CM

## 2022-03-12 RX ORDER — METFORMIN HYDROCHLORIDE 500 MG/1
1000 TABLET, EXTENDED RELEASE ORAL 2 TIMES DAILY
Qty: 360 TABLET | Refills: 1 | Status: SHIPPED
Start: 2022-03-12 | End: 2022-08-26

## 2022-03-22 ENCOUNTER — TELEPHONE (OUTPATIENT)
Dept: FAMILY MEDICINE CLINIC | Age: 67
End: 2022-03-22

## 2022-03-22 NOTE — TELEPHONE ENCOUNTER
Pt called in about forms he mailed. Pt wants to make sure all forms are filled out and faxed so he can receive his meds. Explained to Pt that we had not received them yet as he had just ,mailed them on 3/20/22

## 2022-03-28 ENCOUNTER — TELEPHONE (OUTPATIENT)
Dept: FAMILY MEDICINE CLINIC | Age: 67
End: 2022-03-28

## 2022-03-28 NOTE — TELEPHONE ENCOUNTER
Pt called states Dr Matute Going [facility] have [facility]  [facility] Jardiance, please fill out asap, pt will be out of this med in 2 days, fax completed papers to [phone]

## 2022-03-30 DIAGNOSIS — Z12.11 SCREEN FOR COLON CANCER: ICD-10-CM

## 2022-04-14 RX ORDER — CARVEDILOL 6.25 MG/1
6.25 TABLET ORAL 2 TIMES DAILY WITH MEALS
Qty: 180 TABLET | Refills: 1 | Status: SHIPPED
Start: 2022-04-14 | End: 2022-06-08 | Stop reason: SDUPTHER

## 2022-04-25 ENCOUNTER — TELEPHONE (OUTPATIENT)
Dept: FAMILY MEDICINE CLINIC | Age: 67
End: 2022-04-25

## 2022-04-25 RX ORDER — ALLOPURINOL 100 MG/1
100 TABLET ORAL DAILY
Qty: 90 TABLET | Refills: 0 | Status: SHIPPED
Start: 2022-04-25 | End: 2022-06-08 | Stop reason: SDUPTHER

## 2022-04-25 RX ORDER — LISINOPRIL 20 MG/1
20 TABLET ORAL DAILY
Qty: 90 TABLET | Refills: 0 | Status: SHIPPED
Start: 2022-04-25 | End: 2022-06-08 | Stop reason: SDUPTHER

## 2022-05-02 ENCOUNTER — TELEPHONE (OUTPATIENT)
Dept: FAMILY MEDICINE CLINIC | Age: 67
End: 2022-05-02

## 2022-05-02 RX ORDER — VALACYCLOVIR HYDROCHLORIDE 1 G/1
1000 TABLET, FILM COATED ORAL 3 TIMES DAILY
Qty: 21 TABLET | Refills: 0 | Status: SHIPPED | OUTPATIENT
Start: 2022-05-02 | End: 2022-05-09

## 2022-05-02 NOTE — TELEPHONE ENCOUNTER
Spoke with patient. Symptoms started 2 days ago with facial muscles weak. Similar symptoms in the past that resolved. Patient is diabetic and will have bladder surgery in 2 days.  Will prescribe Valtrex only sent to [facility]

## 2022-05-02 NOTE — TELEPHONE ENCOUNTER
Pt states he got bells palsy 2 days ago, has had before, wants to know if dr Rufina Perez can prescribe something for him, pt is scheduled for surgery this coming Kelly, 5-4-22, uses [facility] on [facility],

## 2022-05-04 ENCOUNTER — HOSPITAL ENCOUNTER (OUTPATIENT)
Age: 67
Discharge: HOME | End: 2022-05-06

## 2022-05-04 PROCEDURE — 88307 TISSUE EXAM BY PATHOLOGIST: CPT

## 2022-05-16 ENCOUNTER — TELEPHONE (OUTPATIENT)
Dept: FAMILY MEDICINE CLINIC | Age: 67
End: 2022-05-16

## 2022-05-16 NOTE — TELEPHONE ENCOUNTER
Talked to Dr Stanford Joyce a few weeks ago and was given meds. Pt said meds did not help with Bell's Palsy. Pt would like something else.  Please Advise

## 2022-06-01 ENCOUNTER — TELEPHONE (OUTPATIENT)
Dept: FAMILY MEDICINE CLINIC | Age: 67
End: 2022-06-01

## 2022-06-01 NOTE — TELEPHONE ENCOUNTER
ANGUS pt last INR             Anticoagulation Summary  As of 12/27/2021  INR goal:  2.0-3.0   TTR:  0.0 % (2.1 mo)   INR used for dosing:  3.1 High  (12/27/2021)   Warfarin maintenance plan:  3 mg (3 mg x 1) every day   Weekly warfarin total:  21 mg   Plan last modified:  Kyra Waters (12/27/2021)   Next INR check:  1/10/2022   Target end date:

## 2022-06-08 ENCOUNTER — OFFICE VISIT (OUTPATIENT)
Dept: FAMILY MEDICINE CLINIC | Age: 67
End: 2022-06-08
Payer: MEDICARE

## 2022-06-08 VITALS
RESPIRATION RATE: 18 BRPM | HEART RATE: 100 BPM | BODY MASS INDEX: 39.9 KG/M2 | SYSTOLIC BLOOD PRESSURE: 114 MMHG | WEIGHT: 254.2 LBS | OXYGEN SATURATION: 95 % | DIASTOLIC BLOOD PRESSURE: 80 MMHG | HEIGHT: 67 IN | TEMPERATURE: 97.3 F

## 2022-06-08 DIAGNOSIS — M54.42 CHRONIC BILATERAL LOW BACK PAIN WITH BILATERAL SCIATICA: ICD-10-CM

## 2022-06-08 DIAGNOSIS — G89.29 CHRONIC BILATERAL LOW BACK PAIN WITH BILATERAL SCIATICA: ICD-10-CM

## 2022-06-08 DIAGNOSIS — E78.5 HYPERLIPIDEMIA, UNSPECIFIED HYPERLIPIDEMIA TYPE: ICD-10-CM

## 2022-06-08 DIAGNOSIS — E11.9 TYPE 2 DIABETES MELLITUS WITHOUT COMPLICATION, WITHOUT LONG-TERM CURRENT USE OF INSULIN (HCC): ICD-10-CM

## 2022-06-08 DIAGNOSIS — I48.91 ATRIAL FIBRILLATION, UNSPECIFIED TYPE (HCC): Primary | ICD-10-CM

## 2022-06-08 DIAGNOSIS — I10 ESSENTIAL HYPERTENSION: ICD-10-CM

## 2022-06-08 DIAGNOSIS — I48.91 ATRIAL FIBRILLATION, UNSPECIFIED TYPE (HCC): ICD-10-CM

## 2022-06-08 DIAGNOSIS — M1A.9XX0 CHRONIC GOUT WITHOUT TOPHUS, UNSPECIFIED CAUSE, UNSPECIFIED SITE: ICD-10-CM

## 2022-06-08 DIAGNOSIS — M54.41 CHRONIC BILATERAL LOW BACK PAIN WITH BILATERAL SCIATICA: ICD-10-CM

## 2022-06-08 LAB
ALBUMIN SERPL-MCNC: 3.9 G/DL (ref 3.5–5.2)
ALP BLD-CCNC: 85 U/L (ref 40–129)
ALT SERPL-CCNC: 37 U/L (ref 0–40)
ANION GAP SERPL CALCULATED.3IONS-SCNC: 17 MMOL/L (ref 7–16)
AST SERPL-CCNC: 28 U/L (ref 0–39)
BASOPHILS ABSOLUTE: 0.06 E9/L (ref 0–0.2)
BASOPHILS RELATIVE PERCENT: 0.6 % (ref 0–2)
BILIRUB SERPL-MCNC: 0.4 MG/DL (ref 0–1.2)
BUN BLDV-MCNC: 32 MG/DL (ref 6–23)
CALCIUM SERPL-MCNC: 8.9 MG/DL (ref 8.6–10.2)
CHLORIDE BLD-SCNC: 107 MMOL/L (ref 98–107)
CHOLESTEROL, TOTAL: 134 MG/DL (ref 0–199)
CO2: 19 MMOL/L (ref 22–29)
CREAT SERPL-MCNC: 1.4 MG/DL (ref 0.7–1.2)
EOSINOPHILS ABSOLUTE: 0.45 E9/L (ref 0.05–0.5)
EOSINOPHILS RELATIVE PERCENT: 4.3 % (ref 0–6)
GFR AFRICAN AMERICAN: >60
GFR NON-AFRICAN AMERICAN: 50 ML/MIN/1.73
GLUCOSE BLD-MCNC: 155 MG/DL (ref 74–99)
HCT VFR BLD CALC: 53.8 % (ref 37–54)
HDLC SERPL-MCNC: 33 MG/DL
HEMOGLOBIN: 17.4 G/DL (ref 12.5–16.5)
IMMATURE GRANULOCYTES #: 0.05 E9/L
IMMATURE GRANULOCYTES %: 0.5 % (ref 0–5)
INTERNATIONAL NORMALIZATION RATIO, POC: 2.2
INTERNATIONAL NORMALIZATION RATIO, POC: 2.2
LDL CHOLESTEROL CALCULATED: 62 MG/DL (ref 0–99)
LYMPHOCYTES ABSOLUTE: 3.02 E9/L (ref 1.5–4)
LYMPHOCYTES RELATIVE PERCENT: 28.8 % (ref 20–42)
MCH RBC QN AUTO: 29 PG (ref 26–35)
MCHC RBC AUTO-ENTMCNC: 32.3 % (ref 32–34.5)
MCV RBC AUTO: 89.7 FL (ref 80–99.9)
MONOCYTES ABSOLUTE: 1.04 E9/L (ref 0.1–0.95)
MONOCYTES RELATIVE PERCENT: 9.9 % (ref 2–12)
NEUTROPHILS ABSOLUTE: 5.86 E9/L (ref 1.8–7.3)
NEUTROPHILS RELATIVE PERCENT: 55.9 % (ref 43–80)
PDW BLD-RTO: 14.3 FL (ref 11.5–15)
PLATELET # BLD: 261 E9/L (ref 130–450)
PMV BLD AUTO: 11.7 FL (ref 7–12)
POTASSIUM SERPL-SCNC: 4.6 MMOL/L (ref 3.5–5)
PROTHROMBIN TIME, POC: 25.9
RBC # BLD: 6 E12/L (ref 3.8–5.8)
SODIUM BLD-SCNC: 143 MMOL/L (ref 132–146)
TOTAL PROTEIN: 7.4 G/DL (ref 6.4–8.3)
TRIGL SERPL-MCNC: 197 MG/DL (ref 0–149)
URIC ACID, SERUM: 7.2 MG/DL (ref 3.4–7)
VLDLC SERPL CALC-MCNC: 39 MG/DL
WBC # BLD: 10.5 E9/L (ref 4.5–11.5)

## 2022-06-08 PROCEDURE — 99214 OFFICE O/P EST MOD 30 MIN: CPT | Performed by: FAMILY MEDICINE

## 2022-06-08 PROCEDURE — 83036 HEMOGLOBIN GLYCOSYLATED A1C: CPT | Performed by: FAMILY MEDICINE

## 2022-06-08 PROCEDURE — 1123F ACP DISCUSS/DSCN MKR DOCD: CPT | Performed by: FAMILY MEDICINE

## 2022-06-08 PROCEDURE — 85610 PROTHROMBIN TIME: CPT | Performed by: FAMILY MEDICINE

## 2022-06-08 RX ORDER — ALLOPURINOL 100 MG/1
100 TABLET ORAL DAILY
Qty: 90 TABLET | Refills: 1 | Status: SHIPPED | OUTPATIENT
Start: 2022-06-08

## 2022-06-08 RX ORDER — WARFARIN SODIUM 3 MG/1
3 TABLET ORAL DAILY
Qty: 90 TABLET | Refills: 1 | Status: SHIPPED | OUTPATIENT
Start: 2022-06-08

## 2022-06-08 RX ORDER — CARVEDILOL 6.25 MG/1
6.25 TABLET ORAL 2 TIMES DAILY WITH MEALS
Qty: 180 TABLET | Refills: 1 | Status: SHIPPED | OUTPATIENT
Start: 2022-06-08

## 2022-06-08 RX ORDER — FUROSEMIDE 20 MG/1
20 TABLET ORAL DAILY
Qty: 90 TABLET | Refills: 1 | Status: SHIPPED | OUTPATIENT
Start: 2022-06-08

## 2022-06-08 RX ORDER — LISINOPRIL 20 MG/1
20 TABLET ORAL DAILY
Qty: 90 TABLET | Refills: 1 | Status: SHIPPED | OUTPATIENT
Start: 2022-06-08

## 2022-06-08 RX ORDER — NITROFURANTOIN 25; 75 MG/1; MG/1
CAPSULE ORAL
COMMUNITY
Start: 2022-05-04 | End: 2022-09-26 | Stop reason: ALTCHOICE

## 2022-06-08 RX ORDER — ATORVASTATIN CALCIUM 40 MG/1
20 TABLET, FILM COATED ORAL EVERY EVENING
Qty: 45 TABLET | Refills: 1 | Status: SHIPPED | OUTPATIENT
Start: 2022-06-08

## 2022-06-08 ASSESSMENT — PATIENT HEALTH QUESTIONNAIRE - PHQ9
SUM OF ALL RESPONSES TO PHQ QUESTIONS 1-9: 2
SUM OF ALL RESPONSES TO PHQ9 QUESTIONS 1 & 2: 2
SUM OF ALL RESPONSES TO PHQ QUESTIONS 1-9: 2
SUM OF ALL RESPONSES TO PHQ QUESTIONS 1-9: 2
2. FEELING DOWN, DEPRESSED OR HOPELESS: 1
SUM OF ALL RESPONSES TO PHQ QUESTIONS 1-9: 2
1. LITTLE INTEREST OR PLEASURE IN DOING THINGS: 1

## 2022-06-10 ASSESSMENT — ENCOUNTER SYMPTOMS
SINUS PAIN: 0
EYE DISCHARGE: 0
RHINORRHEA: 0
PHOTOPHOBIA: 0
EYE REDNESS: 0
GASTROINTESTINAL NEGATIVE: 1
COUGH: 0
SHORTNESS OF BREATH: 0

## 2022-06-10 NOTE — PROGRESS NOTES
6/10/2022    Lyndsay Real . Chief Complaint   Patient presents with    Leg Pain    Back Pain    Facial Droop     bell's palsy since may 1       HPI  History was obtained from patient  Elias Lynn is a 74 y.o. male who presents today with the followin. Atrial fibrillation, unspecified type ([address]    2. Type 2 diabetes mellitus without complication, without long-term current use of insulin ([address]    3. Chronic gout without tophus, unspecified cause, unspecified site    4. Essential hypertension    5. Hyperlipidemia, unspecified hyperlipidemia type    6. Chronic bilateral low back pain with bilateral sciatica    Patient is in the office for the first time in 6 months. His chief complaint today is his chronic low back pain is worsening with bilateral sciatica. Patient states it is difficult to walk any distance now secondary to the pain. Patient presented to the exam room in wheelchair today. REVIEW OF SYMPTOMS    Review of Systems   Constitutional: Negative for chills, fatigue and fever. HENT: Negative for congestion, mouth sores, postnasal drip, rhinorrhea and sinus pain. Eyes: Negative for photophobia, discharge and redness. Respiratory: Negative for cough and shortness of breath. Cardiovascular: Negative for chest pain. Gastrointestinal: Negative. Genitourinary: Negative for difficulty urinating. Neurological: Negative for headaches.        PAST MEDICAL HISTORY  Past Medical History:   Diagnosis Date    Atrial fibrillation ([address] 10/2012    now on Coumadin    Bladder tumor     Diabetes mellitus ([address]     Hyperlipidemia     Hypertension     LONG TERM ANTICOAGULENT USE     CONCHITA on CPAP        FAMILY HISTORY  Family History   Problem Relation Age of Onset    Prostate Cancer Father 62    Cancer Father     Hypertension Mother         and sister    Defiance Lam Arthritis Mother        SOCIAL HISTORY  Social History     Socioeconomic History    Marital status:      Spouse name: None    Number of children: None    Years of education: None    Highest education level: None   Occupational History    None   Tobacco Use    Smoking status: Former Smoker     Packs/day: 1.00     Years: 30.00     Pack years: 30.00     Quit date: 1999     Years since quittin.4    Smokeless tobacco: Never Used   Vaping Use    Vaping Use: Never used   Substance and Sexual Activity    Alcohol use: No    Drug use: No    Sexual activity: None   Other Topics Concern    None   Social History Narrative    Drinks 2 cups coffee most days     Social Determinants of Health     Financial Resource Strain: Low Risk     Difficulty of Paying Living Expenses: Not very hard   Food Insecurity: No Food Insecurity    Worried About Running Out of Food in the Last Year: Never true    Ruben of Food in the Last Year: Never true   Transportation Needs:     Lack of Transportation (Medical): Not on file    Lack of Transportation (Non-Medical):  Not on file   Physical Activity:     Days of Exercise per Week: Not on file    Minutes of Exercise per Session: Not on file   Stress:     Feeling of Stress : Not on file   Social Connections:     Frequency of Communication with Friends and Family: Not on file    Frequency of Social Gatherings with Friends and Family: Not on file    Attends Shinto Services: Not on file    Active Member of [address] [place] [address] or Organizations: Not on file    Attends Club or Organization Meetings: Not on file    Marital Status: Not on file   Intimate Partner Violence:     Fear of Current or Ex-Partner: Not on file    Emotionally Abused: Not on file    Physically Abused: Not on file    Sexually Abused: Not on file   Housing Stability:     Unable to Pay for Housing in the Last Year: Not on file    Number of [place] in the Last Year: Not on file    Unstable Housing in the Last Year: Not on file        SURGICAL HISTORY  Past Surgical History:   Procedure Laterality Date    BACK SURGERY  2007    repair of herniated disc    CARDIOVASCULAR STRESS TEST  11/21/2006    findings of inferior and anterior ischemia     CYSTOSCOPY N/A 10/22/2021    CYSTOSCOPY RETROGRADE PYELOGRAM TRANSURETHRAL RESECTION OF BLADDER TUMOR WITH INSTALLATION OF GEMCITIBINE performed by Rhys Malloy DO at [address] [facility] CATH LAB PROCEDURE  12/13/2006    No obstructive CAD and mild dilated cardiomyopathy with EF 45-50%    TURP N/A 4/7/2021    CYSTOSCOPY, URETHRAL DILITATION, PLASMA BUTTON TRANSURETHRAL RESECTION BLADDER TUMOR WITH FULGURATION, EXCHANGE GABRIEL CATHETER performed by Pilar Jerome MD at [address] TURP N/A 4/21/2021    CYSTOSCOPY RETROGRADE PYELOGRAM PLASMA LOOP TRANSURETHRAL RESECTION PROSTATE performed by Ana Laura Malloy DO at [address]  Current Outpatient Medications   Medication Sig Dispense Refill    nitrofurantoin, macrocrystal-monohydrate, (MACROBID) 100 MG capsule TAKE 1 CAPSULE BY MOUTH TWICE DAILY      allopurinol (ZYLOPRIM) 100 MG tablet Take 1 tablet by mouth daily 90 tablet 1    furosemide (LASIX) 20 MG tablet Take 1 tablet by mouth daily 90 tablet 1    lisinopril (PRINIVIL;ZESTRIL) 20 MG tablet Take 1 tablet by mouth daily 90 tablet 1    atorvastatin (LIPITOR) 40 MG tablet Take 0.5 tablets by mouth every evening 45 tablet 1    empagliflozin (JARDIANCE) 25 MG tablet Take 1 tablet by mouth daily 90 tablet 1    JANTOVEN 3 MG tablet Take 1 tablet by mouth daily 90 tablet 1    carvedilol (COREG) 6.25 MG tablet Take 1 tablet by mouth 2 times daily (with meals) 180 tablet 1    metFORMIN (GLUCOPHAGE-XR) 500 MG extended release tablet Take 2 tablets by mouth 2 times daily 360 tablet 1    Multiple Vitamins-Minerals (THERAPEUTIC MULTIVITAMIN-MINERALS) tablet Take 1 tablet by mouth daily       No current facility-administered medications for this visit.        ALLERGIES  No Known Allergies    PHYSICAL EXAM    /80 (Site: Left Upper Arm, Position: Sitting, Cuff Size: Large Adult)   Pulse 100   Temp 97.3 °F (36.3 °C) (Temporal)   Resp 18   Ht 5' 7\" (1.702 m)   Wt 254 lb 3.2 oz (115.3 kg)   SpO2 95%   BMI 39.81 kg/m²     Physical Exam  Vitals and nursing note reviewed. Constitutional:       Appearance: Normal appearance. HENT:      Head: Normocephalic and atraumatic. Nose: No congestion or rhinorrhea. Mouth/Throat:      Mouth: Mucous membranes are moist.      Pharynx: Oropharynx is clear. Eyes:      Conjunctiva/sclera: Conjunctivae normal.   Cardiovascular:      Rate and Rhythm: Normal rate and regular rhythm. Heart sounds: Normal heart sounds. Pulmonary:      Effort: Pulmonary effort is normal.      Breath sounds: Normal breath sounds. Musculoskeletal:      Cervical back: Neck supple. Lumbar back: Tenderness present. Decreased range of motion. Comments: Unable to check straight leg raising today. Sensation of patient's lower extremities to light touch is grossly intact   Skin:     General: Skin is warm. Neurological:      Mental Status: He is alert. 6/8/22 1214   INR,(POC) 2.2          ASSESSMENT & PLAN    Roland Burkitt was seen today for leg pain, back pain and facial droop. Diagnoses and all orders for this visit:    Atrial fibrillation, unspecified type ([address]  -     POCT INR  -     POCT INR  -     JANTOVEN 3 MG tablet; Take 1 tablet by mouth daily    Type 2 diabetes mellitus without complication, without long-term current use of insulin (HCC)  -     POCT glycosylated hemoglobin (Hb A1C)  -     empagliflozin (JARDIANCE) 25 MG tablet; Take 1 tablet by mouth daily    Chronic gout without tophus, unspecified cause, unspecified site  -     allopurinol (ZYLOPRIM) 100 MG tablet; Take 1 tablet by mouth daily  -     Uric Acid; Future    Essential hypertension  -     furosemide (LASIX) 20 MG tablet; Take 1 tablet by mouth daily  -     lisinopril (PRINIVIL;ZESTRIL) 20 MG tablet;  Take 1 tablet by mouth daily  -     carvedilol (COREG) 6.25 MG tablet; Take 1 tablet by mouth 2 times daily (with meals)    Hyperlipidemia, unspecified hyperlipidemia type  -     atorvastatin (LIPITOR) 40 MG tablet; Take 0.5 tablets by mouth every evening    Chronic bilateral low back pain with bilateral sciatica  -     MRI LUMBAR SPINE WO CONTRAST; Future    INR today is 2.2. He will continue with his current dose of Coumadin 3 mg daily. Return in about 4 weeks (around 7/6/2022), or check up and INR check. Electronically signed by Matthew Gannon DO on 6/10/2022

## 2022-06-28 ENCOUNTER — HOSPITAL ENCOUNTER (OUTPATIENT)
Dept: MRI IMAGING | Age: 67
Discharge: HOME | End: 2022-06-30
Payer: MEDICARE

## 2022-06-28 DIAGNOSIS — G89.29 CHRONIC BILATERAL LOW BACK PAIN WITH BILATERAL SCIATICA: ICD-10-CM

## 2022-06-28 DIAGNOSIS — M54.42 CHRONIC BILATERAL LOW BACK PAIN WITH BILATERAL SCIATICA: ICD-10-CM

## 2022-06-28 DIAGNOSIS — M54.41 CHRONIC BILATERAL LOW BACK PAIN WITH BILATERAL SCIATICA: ICD-10-CM

## 2022-06-28 PROCEDURE — 72148 MRI LUMBAR SPINE W/O DYE: CPT

## 2022-07-11 ENCOUNTER — OFFICE VISIT (OUTPATIENT)
Dept: FAMILY MEDICINE CLINIC | Age: 67
End: 2022-07-11
Payer: MEDICARE

## 2022-07-11 VITALS
DIASTOLIC BLOOD PRESSURE: 85 MMHG | SYSTOLIC BLOOD PRESSURE: 126 MMHG | TEMPERATURE: 97.9 F | OXYGEN SATURATION: 93 % | BODY MASS INDEX: 39.55 KG/M2 | RESPIRATION RATE: 18 BRPM | HEART RATE: 99 BPM | WEIGHT: 252 LBS | HEIGHT: 67 IN

## 2022-07-11 DIAGNOSIS — M54.41 CHRONIC BILATERAL LOW BACK PAIN WITH BILATERAL SCIATICA: ICD-10-CM

## 2022-07-11 DIAGNOSIS — I48.91 ATRIAL FIBRILLATION, UNSPECIFIED TYPE (HCC): ICD-10-CM

## 2022-07-11 DIAGNOSIS — G89.29 CHRONIC BILATERAL LOW BACK PAIN WITH BILATERAL SCIATICA: ICD-10-CM

## 2022-07-11 DIAGNOSIS — E11.9 TYPE 2 DIABETES MELLITUS WITHOUT COMPLICATION, WITHOUT LONG-TERM CURRENT USE OF INSULIN (HCC): Primary | ICD-10-CM

## 2022-07-11 DIAGNOSIS — M54.42 CHRONIC BILATERAL LOW BACK PAIN WITH BILATERAL SCIATICA: ICD-10-CM

## 2022-07-11 LAB
HBA1C MFR BLD: 8.2 %
INTERNATIONAL NORMALIZATION RATIO, POC: 2
PROTHROMBIN TIME, POC: 23.6

## 2022-07-11 PROCEDURE — 99214 OFFICE O/P EST MOD 30 MIN: CPT | Performed by: FAMILY MEDICINE

## 2022-07-11 PROCEDURE — 3052F HG A1C>EQUAL 8.0%<EQUAL 9.0%: CPT | Performed by: FAMILY MEDICINE

## 2022-07-11 PROCEDURE — 1123F ACP DISCUSS/DSCN MKR DOCD: CPT | Performed by: FAMILY MEDICINE

## 2022-07-11 PROCEDURE — 85610 PROTHROMBIN TIME: CPT | Performed by: FAMILY MEDICINE

## 2022-07-11 RX ORDER — GLUCOSAMINE HCL/CHONDROITIN SU 500-400 MG
CAPSULE ORAL
Qty: 100 STRIP | Refills: 3 | Status: SHIPPED | OUTPATIENT
Start: 2022-07-11

## 2022-07-11 RX ORDER — LANCETS 30 GAUGE
1 EACH MISCELLANEOUS DAILY
Qty: 100 EACH | Refills: 5 | Status: SHIPPED | OUTPATIENT
Start: 2022-07-11

## 2022-07-11 NOTE — PROGRESS NOTES
2022    [facility]. Chief Complaint   Patient presents with    Diabetes       HPI  History was obtained from patient. Antonia Uriarte is a 74 y.o. male who presents today with the followin. Type 2 diabetes mellitus without complication, without long-term current use of insulin ([address]    2. Chronic bilateral low back pain with bilateral sciatica    3. Atrial fibrillation, unspecified type [facility])    Patient is here for follow-up of diabetes atrial fibrillation and chronic low back pain. Patient had a recent lumbar MRI that showed multiple areas of severe central canal stenosis and severe neural foraminal stenosis. Patient continues to have severe low back pain especially when he walks. Patient presents today in wheelchair as walking any distance causes severe pain that radiates down both legs. He is taking all of his medications as prescribed. 1.  No fracture or bony destructive lesion. .   2. Evidence of prior hemilaminectomies at L2-3 and L3-4.   3. Severe central canal stenoses at L2-3 and L4-5.  Moderate grade stenoses   at L1-2, L3-4 and L5-S1.   4. Severe neural foraminal stenoses throughout the lumbar spine       REVIEW OF SYMPTOMS    Review of Systems   Constitutional: Negative for chills, fatigue and fever. HENT: Negative for congestion, mouth sores, postnasal drip, rhinorrhea and sinus pain. Eyes: Negative for photophobia, discharge and redness. Respiratory: Negative for cough and shortness of breath. Cardiovascular: Negative for chest pain. Endocrine: Negative for polydipsia and polyuria. Genitourinary: Negative for difficulty urinating, dysuria, hematuria and urgency. Musculoskeletal: Positive for back pain. Neurological: Negative for headaches. Psychiatric/Behavioral: Negative for sleep disturbance.        PAST MEDICAL HISTORY  Past Medical History:   Diagnosis Date    Atrial fibrillation ([address] 10/2012    now on Coumadin    Bladder tumor     Diabetes mellitus ([address]  Hyperlipidemia     Hypertension     LONG TERM ANTICOAGULENT USE     CONCHITA on CPAP        FAMILY HISTORY  Family History   Problem Relation Age of Onset    Prostate Cancer Father 62    Cancer Father     Hypertension Mother         and sister    Nelsy López Arthritis Mother        SOCIAL HISTORY  Social History     Socioeconomic History    Marital status:      Spouse name: None    Number of children: None    Years of education: None    Highest education level: None   Occupational History    None   Tobacco Use    Smoking status: Former Smoker     Packs/day: 1.00     Years: 30.00     Pack years: 30.00     Quit date: 1999     Years since quittin.5    Smokeless tobacco: Never Used   Vaping Use    Vaping Use: Never used   Substance and Sexual Activity    Alcohol use: No    Drug use: No    Sexual activity: None   Other Topics Concern    None   Social History Narrative    Drinks 2 cups coffee most days     Social Determinants of Health     Financial Resource Strain: Low Risk     Difficulty of Paying Living Expenses: Not very hard   Food Insecurity: No Food Insecurity    Worried About Running Out of Food in the Last Year: Never true    Ruben of Food in the Last Year: Never true   Transportation Needs:     Lack of Transportation (Medical): Not on file    Lack of Transportation (Non-Medical):  Not on file   Physical Activity:     Days of Exercise per Week: Not on file    Minutes of Exercise per Session: Not on file   Stress:     Feeling of Stress : Not on file   Social Connections:     Frequency of Communication with Friends and Family: Not on file    Frequency of Social Gatherings with Friends and Family: Not on file    Attends Gnosticism Services: Not on file    Active Member of Clubs or Organizations: Not on file    Attends Club or Organization Meetings: Not on file    Marital Status: Not on file   Intimate Partner Violence:     Fear of Current or Ex-Partner: Not on file   Nelsy López Emotionally Abused: Not on file    Physically Abused: Not on file    Sexually Abused: Not on file   Housing Stability:     Unable to Pay for Housing in the Last Year: Not on file    Number of Places Lived in the Last Year: Not on file    Unstable Housing in the Last Year: Not on file        SURGICAL HISTORY  Past Surgical History:   Procedure Laterality Date    BACK SURGERY  2007    repair of herniated disc    CARDIOVASCULAR STRESS TEST  11/21/2006    findings of inferior and anterior ischemia     CYSTOSCOPY N/A 10/22/2021    CYSTOSCOPY RETROGRADE PYELOGRAM TRANSURETHRAL RESECTION OF BLADDER TUMOR WITH INSTALLATION OF GEMCITIBINE performed by Brianna Malloy DO at [address] [facility] CATH LAB PROCEDURE  12/13/2006    No obstructive CAD and mild dilated cardiomyopathy with EF 45-50%    TURP N/A 4/7/2021    CYSTOSCOPY, URETHRAL DILITATION, PLASMA BUTTON TRANSURETHRAL RESECTION BLADDER TUMOR WITH FULGURATION, EXCHANGE GABRIEL CATHETER performed by Rick Murray MD at [address] TURP N/A 4/21/2021    CYSTOSCOPY RETROGRADE PYELOGRAM PLASMA LOOP TRANSURETHRAL RESECTION PROSTATE performed by Barney Malloy DO at [address]  Current Outpatient Medications   Medication Sig Dispense Refill    blood glucose monitor kit and supplies Dispense sufficient amount for indicated testing frequency plus additional to accommodate PRN testing needs. Dispense all needed supplies to include: monitor, strips, lancing device, lancets, control solutions, alcohol swabs. 1 kit 0    blood glucose monitor strips Test once a day & as needed for symptoms of irregular blood glucose. Dispense sufficient amount for indicated testing frequency plus additional to accommodate PRN testing needs.  100 strip 3    Lancets MISC 1 each by Does not apply route daily 100 each 5    nitrofurantoin, macrocrystal-monohydrate, (MACROBID) 100 MG capsule TAKE 1 CAPSULE BY MOUTH TWICE DAILY      allopurinol (ZYLOPRIM) 100 MG tablet Take 1 tablet by mouth daily 90 tablet 1    furosemide (LASIX) 20 MG tablet Take 1 tablet by mouth daily 90 tablet 1    lisinopril (PRINIVIL;ZESTRIL) 20 MG tablet Take 1 tablet by mouth daily 90 tablet 1    atorvastatin (LIPITOR) 40 MG tablet Take 0.5 tablets by mouth every evening 45 tablet 1    empagliflozin (JARDIANCE) 25 MG tablet Take 1 tablet by mouth daily 90 tablet 1    JANTOVEN 3 MG tablet Take 1 tablet by mouth daily 90 tablet 1    carvedilol (COREG) 6.25 MG tablet Take 1 tablet by mouth 2 times daily (with meals) 180 tablet 1    metFORMIN (GLUCOPHAGE-XR) 500 MG extended release tablet Take 2 tablets by mouth 2 times daily 360 tablet 1    Multiple Vitamins-Minerals (THERAPEUTIC MULTIVITAMIN-MINERALS) tablet Take 1 tablet by mouth daily       No current facility-administered medications for this visit. 7/11/22 1521    INR,(POC) 2.0    Prothrombin time,(POC) 23.6      Units 7/11/22 1506 9/8/21 1044 4/2/21 0945 10/5/20 1/15/13 1035 10/17/12 1159    Hemoglobin A1C % 8.2  10.3  10.8 High  R  7.8  5.9 R  6.0 R          ALLERGIES  No Known Allergies    PHYSICAL EXAM    /85 (Site: Left Upper Arm, Position: Sitting, Cuff Size: Medium Adult)   Pulse 99   Temp 97.9 °F (36.6 °C) (Temporal)   Resp 18   Ht 5' 7\" (1.702 m)   Wt 252 lb (114.3 kg)   SpO2 93%   BMI 39.47 kg/m²     Physical Exam  Vitals and nursing note reviewed. Constitutional:       Appearance: Normal appearance. He is obese. Eyes:      General: No scleral icterus. Neurological:      Mental Status: He is alert and oriented to person, place, and time. Psychiatric:         Mood and Affect: Mood normal.         ASSESSMENT & PLAN    Niles Altman was seen today for diabetes. Diagnoses and all orders for this visit:    Type 2 diabetes mellitus without complication, without long-term current use of insulin ([facility])  -     POCT glycosylated hemoglobin (Hb A1C)  -     blood glucose monitor kit and supplies;  Dispense sufficient amount for indicated testing frequency plus additional to accommodate PRN testing needs. Dispense all needed supplies to include: monitor, strips, lancing device, lancets, control solutions, alcohol swabs. -     blood glucose monitor strips; Test once a day & as needed for symptoms of irregular blood glucose. Dispense sufficient amount for indicated testing frequency plus additional to accommodate PRN testing needs. -     Lancets MISC; 1 each by Does not apply route daily  -     POCT INR    Chronic bilateral low back pain with bilateral sciatica  -     Corinne Graham MD, Neurosurgery, [facility]    Atrial fibrillation, unspecified type [facility])  -     Protime-INR  -     POCT glycosylated hemoglobin (Hb A1C)      Patient will continue with all his current medications as prescribed. Patient will try to be more compliant with his diabetic diet in an effort to get his A1c down to 7 although he has a significant improvement since his last visit. Referral has been placed for neurosurgery for further evaluation of his low back pain. Return in about 3 months (around 10/11/2022). Electronically signed by Pia Coats.  Blade Salinas DO on 7/13/2022

## 2022-07-13 ASSESSMENT — ENCOUNTER SYMPTOMS
RHINORRHEA: 0
PHOTOPHOBIA: 0
COUGH: 0
BACK PAIN: 1
SINUS PAIN: 0
SHORTNESS OF BREATH: 0
EYE REDNESS: 0
EYE DISCHARGE: 0

## 2022-07-19 ENCOUNTER — OFFICE VISIT (OUTPATIENT)
Dept: NEUROSURGERY | Age: 67
End: 2022-07-19
Payer: MEDICARE

## 2022-07-19 VITALS
HEIGHT: 67 IN | HEART RATE: 94 BPM | WEIGHT: 252 LBS | BODY MASS INDEX: 39.55 KG/M2 | OXYGEN SATURATION: 91 % | DIASTOLIC BLOOD PRESSURE: 89 MMHG | RESPIRATION RATE: 24 BRPM | TEMPERATURE: 98.3 F | SYSTOLIC BLOOD PRESSURE: 126 MMHG

## 2022-07-19 DIAGNOSIS — M54.40 BILATERAL LOW BACK PAIN WITH SCIATICA, SCIATICA LATERALITY UNSPECIFIED, UNSPECIFIED CHRONICITY: Primary | ICD-10-CM

## 2022-07-19 PROCEDURE — 99204 OFFICE O/P NEW MOD 45 MIN: CPT | Performed by: NEUROLOGICAL SURGERY

## 2022-07-19 PROCEDURE — 1123F ACP DISCUSS/DSCN MKR DOCD: CPT | Performed by: NEUROLOGICAL SURGERY

## 2022-07-19 PROCEDURE — 99202 OFFICE O/P NEW SF 15 MIN: CPT

## 2022-07-19 NOTE — PROGRESS NOTES
[facility] NEUROSURGERY  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 As you know, this delightful 78-year-old, right-handed, /, non-smoker nondrinker and retired  and  presents with a longstanding history of low back pain. He underwent laminectomy for decompression at multiple levels in 2007 by Dr. Kd Dean. He was doing well initially but has noticed over the last several years that his walking has become impaired. He does have low back pain that extends from the back of his leg up to the back. He does endorse upon specific questioning a little bit of calf cramping. When asked about weakness he does state that the right leg more so than the left feels like it will give out on occasion while ambulating. He denied any loss of bowel or bladder function. Initially he was seeing a chiropractor and manipulation helped him however as of late this has yet to give his same relief.     Past Medical History:   Diagnosis Date    Atrial fibrillation ([address] 10/2012    now on Coumadin    Bladder tumor     Diabetes mellitus ([address]     Hyperlipidemia     Hypertension     LONG TERM ANTICOAGULENT USE     CONCHITA on CPAP      Past Surgical History:   Procedure Laterality Date    BACK SURGERY  2007    repair of herniated disc    CARDIOVASCULAR STRESS TEST  11/21/2006    findings of inferior and anterior ischemia     CYSTOSCOPY N/A 10/22/2021    CYSTOSCOPY RETROGRADE PYELOGRAM TRANSURETHRAL RESECTION OF BLADDER TUMOR WITH INSTALLATION OF GEMCITIBINE performed by Tina Malloy DO at [address] [facility] CATH LAB PROCEDURE  2006    No obstructive CAD and mild dilated cardiomyopathy with EF 45-50%    TURP N/A 2021    CYSTOSCOPY, URETHRAL DILITATION, PLASMA BUTTON TRANSURETHRAL RESECTION BLADDER TUMOR WITH FULGURATION, EXCHANGE GABRIEL CATHETER performed by Channing Rivera MD at [address] [place]    TURP N/A 2021    CYSTOSCOPY RETROGRADE PYELOGRAM PLASMA LOOP TRANSURETHRAL RESECTION PROSTATE performed by Stepan Malloy DO at [facility] OR      Family History   Problem Relation Age of Onset    Prostate Cancer Father 62    Cancer Father     Hypertension Mother         and sister     Arthritis Mother       Social History     Socioeconomic History    Marital status:      Spouse name: Not on file    Number of children: Not on file    Years of education: Not on file    Highest education level: Not on file   Occupational History    Not on file   Tobacco Use    Smoking status: Former     Packs/day: 1.00     Years: 30.00     Pack years: 30.00     Types: Cigarettes     Quit date: 1999     Years since quittin.5    Smokeless tobacco: Never   Vaping Use    Vaping Use: Never used   Substance and Sexual Activity    Alcohol use: No    Drug use: No    Sexual activity: Not on file   Other Topics Concern    Not on file   Social History Narrative    Drinks 2 cups coffee most days     Social Determinants of Health     Financial Resource Strain: Low Risk     Difficulty of Paying Living Expenses: Not very hard   Food Insecurity: No Food Insecurity    Worried About Running Out of Food in the Last Year: Never true    Ran Out of Food in the Last Year: Never true   Transportation Needs: Not on file   Physical Activity: Not on file   Stress: Not on file   Social Connections: Not on file   Intimate Partner Violence: Not on file   Housing Stability: Not on file       Medications:   Current Outpatient Medications   Medication Sig Dispense Refill blood glucose monitor kit and supplies Dispense sufficient amount for indicated testing frequency plus additional to accommodate PRN testing needs. Dispense all needed supplies to include: monitor, strips, lancing device, lancets, control solutions, alcohol swabs. 1 kit 0    blood glucose monitor strips Test once a day & as needed for symptoms of irregular blood glucose. Dispense sufficient amount for indicated testing frequency plus additional to accommodate PRN testing needs. 100 strip 3    Lancets MISC 1 each by Does not apply route daily 100 each 5    nitrofurantoin, macrocrystal-monohydrate, (MACROBID) 100 MG capsule TAKE 1 CAPSULE BY MOUTH TWICE DAILY      allopurinol (ZYLOPRIM) 100 MG tablet Take 1 tablet by mouth daily 90 tablet 1    furosemide (LASIX) 20 MG tablet Take 1 tablet by mouth daily 90 tablet 1    lisinopril (PRINIVIL;ZESTRIL) 20 MG tablet Take 1 tablet by mouth daily 90 tablet 1    atorvastatin (LIPITOR) 40 MG tablet Take 0.5 tablets by mouth every evening 45 tablet 1    empagliflozin (JARDIANCE) 25 MG tablet Take 1 tablet by mouth daily 90 tablet 1    JANTOVEN 3 MG tablet Take 1 tablet by mouth daily 90 tablet 1    carvedilol (COREG) 6.25 MG tablet Take 1 tablet by mouth 2 times daily (with meals) 180 tablet 1    metFORMIN (GLUCOPHAGE-XR) 500 MG extended release tablet Take 2 tablets by mouth 2 times daily 360 tablet 1    Multiple Vitamins-Minerals (THERAPEUTIC MULTIVITAMIN-MINERALS) tablet Take 1 tablet by mouth daily       No current facility-administered medications for this visit. Allergies:    Patient has no known allergies. Review of Systems:    Denies any chest pain, headache, dyspnea, recent weight loss, fevers, chills or night sweats.        Physical Examination:    /89   Pulse 94   Temp 98.3 °F (36.8 °C)   Resp 24   Ht 5' 7\" (1.702 m)   Wt 252 lb (114.3 kg)   SpO2 91%   BMI 39.47 kg/m²      On focused neurological examination, he  is awake alert oriented and rationally conversant. Speech is clear and fluent. Pupils are equal and reactive to light bilaterally, extraocular movements are intact, visual fields are full to confrontation. His  face is symmetric and grossly intact to fine touch. Uvula and tongue are both midline. Shoulder shrug is symmetric and strong. Motor examination reveals preserved power in the upper and lower extremities at 5 out of 5 throughout with the exception of left hip flexion at 4 out of 5 left dorsi flexion at 4 out of 5. Reflexes are symmetric and brisk. Plantar responses are downgoing. There is no clonus. Patient is intact to fine touch in all dermatomes throughout. Palpation of the spine reveals no kyphotic or scoliotic gross deformities. He  can forward flex to well below the knee. Incision is well-healed. ASSESSMENT:    I personally reviewed Tera Mathias Sr.'s radiographic images, particularly his MRI of the lumbar spine dated 28 June 2022 which demonstrates multilevel significant spinal stenosis L2 through S1.      1. Bilateral low back pain with sciatica, sciatica laterality unspecified, unspecified chronicity  -     XR LUMBAR SPINE FLEXION AND EXTENSION ONLY; Future  -     EMG; Future  -     [facility] - Physical Therapy, [place] [address], [address] [address] Atul Cervantes MD, Pain Medicine, [address] [place] [address]; Future    MEDICAL DECISION MAKING & PLAN:    Showed the patient and his son present with him today as advocate the images and explained the findings. To what extent his stenosis is related to scar tissue needs to be ascertained and toward that end we have ordered an MRI with and without contrast in the event that surgical intervention is required. In the interim we did provide a referral for EMG with nerve conduction studies to better understand to what extent his stenosis is contributing to his complaints.   Flexion-extension x-rays are likewise indicated status post laminectomy remotely in the past and with subtle malalignment identified by MRI. Help alleviate the pain and prove his conditioning we provided a referral to both pain management and physical therapy. He will return to clinic with the above results in hand. Should he worsen in the interim would be happy to see him sooner in clinic. Thank you so much for allowing us to participate in the care of this patient. Return in about 6 weeks (around 8/30/2022). Electronically signed by Riccardo Orlando MD on 7/19/2022 at 4:06 PM       NOTE: This report was transcribed using voice recognition software.  Every effort was made to ensure accuracy; however, inadvertent computerized transcription errors may be present

## 2022-08-11 ENCOUNTER — HOSPITAL ENCOUNTER (OUTPATIENT)
Dept: PHYSICAL THERAPY | Age: 67
Setting detail: THERAPIES SERIES
Discharge: HOME | End: 2022-08-11
Payer: MEDICARE

## 2022-08-11 PROCEDURE — 97161 PT EVAL LOW COMPLEX 20 MIN: CPT | Performed by: PHYSICAL THERAPIST

## 2022-08-11 ASSESSMENT — PAIN DESCRIPTION - ORIENTATION: ORIENTATION: RIGHT;LEFT

## 2022-08-11 ASSESSMENT — PAIN DESCRIPTION - LOCATION: LOCATION: BACK;LEG

## 2022-08-11 ASSESSMENT — PAIN SCALES - GENERAL: PAINLEVEL_OUTOF10: 1

## 2022-08-11 ASSESSMENT — PAIN DESCRIPTION - PAIN TYPE: TYPE: CHRONIC PAIN

## 2022-08-11 NOTE — PROGRESS NOTES
Physical Therapy    Physical Therapy: Initial Evaluation    Patient: Rudy Swartz (75 y.o. male)   Examination Date:   Plan of Care Certification Period: 2022 to        :  1955 ;    Confirmed: Yes MRN: [id number]  CSN: [id number]   Insurance: Payor: Cely Gruber / Plan: Radharilarry Savagejaden ESSENTIAL/PLUS / Product Type: *No Product type* /   Insurance ID: [id number] - (Medicare Managed) Secondary Insurance (if applicable):    Referring Physician: Adelina Moyer MD     PCP: Montserrat Rondon.  Netta Paulino DO Visits to Date/Visits Approved: 1 /      No Show/Cancelled Appts:   /       Medical Diagnosis: Lumbago with sciatica, unspecified side [M54.40] M54.40 (ICD-10-CM) - Bilateral low back pain with sciatica, sciatica laterality unspecified, unspecified chronicity  Treatment Diagnosis:       PERTINENT MEDICAL HISTORY           Medical History: Chart Reviewed: Yes   Past Medical History:   Diagnosis Date    Atrial fibrillation ([facility] 10/2012    now on Coumadin    Bladder tumor     Diabetes mellitus ([facility]     Hyperlipidemia     Hypertension     LONG TERM ANTICOAGULENT USE     CONCHITA on CPAP      Surgical History:   Past Surgical History:   Procedure Laterality Date    BACK SURGERY      repair of herniated disc    CARDIOVASCULAR STRESS TEST  2006    findings of inferior and anterior ischemia     CYSTOSCOPY N/A 10/22/2021    CYSTOSCOPY RETROGRADE PYELOGRAM TRANSURETHRAL RESECTION OF BLADDER TUMOR WITH INSTALLATION OF GEMCITIBINE performed by Nickie SINGH Memo, DO at [address] [facility] CATH LAB PROCEDURE  2006    No obstructive CAD and mild dilated cardiomyopathy with EF 45-50%    TURP N/A 2021    CYSTOSCOPY, URETHRAL DILITATION, PLASMA BUTTON TRANSURETHRAL RESECTION BLADDER TUMOR WITH FULGURATION, EXCHANGE GABRIEL CATHETER performed by Sil Naranjo MD at [address] [place]    TURP N/A 2021    CYSTOSCOPY RETROGRADE PYELOGRAM PLASMA LOOP TRANSURETHRAL RESECTION PROSTATE performed by Kelechi Erickson Gama, DO at [facility] OR       Medications:   Current Outpatient Medications:     blood glucose monitor kit and supplies, Dispense sufficient amount for indicated testing frequency plus additional to accommodate PRN testing needs. Dispense all needed supplies to include: monitor, strips, lancing device, lancets, control solutions, alcohol swabs., Disp: 1 kit, Rfl: 0    blood glucose monitor strips, Test once a day & as needed for symptoms of irregular blood glucose. Dispense sufficient amount for indicated testing frequency plus additional to accommodate PRN testing needs. , Disp: 100 strip, Rfl: 3    Lancets MISC, 1 each by Does not apply route daily, Disp: 100 each, Rfl: 5    nitrofurantoin, macrocrystal-monohydrate, (MACROBID) 100 MG capsule, TAKE 1 CAPSULE BY MOUTH TWICE DAILY, Disp: , Rfl:     allopurinol (ZYLOPRIM) 100 MG tablet, Take 1 tablet by mouth daily, Disp: 90 tablet, Rfl: 1    furosemide (LASIX) 20 MG tablet, Take 1 tablet by mouth daily, Disp: 90 tablet, Rfl: 1    lisinopril (PRINIVIL;ZESTRIL) 20 MG tablet, Take 1 tablet by mouth daily, Disp: 90 tablet, Rfl: 1    atorvastatin (LIPITOR) 40 MG tablet, Take 0.5 tablets by mouth every evening, Disp: 45 tablet, Rfl: 1    empagliflozin (JARDIANCE) 25 MG tablet, Take 1 tablet by mouth daily, Disp: 90 tablet, Rfl: 1    JANTOVEN 3 MG tablet, Take 1 tablet by mouth daily, Disp: 90 tablet, Rfl: 1    carvedilol (COREG) 6.25 MG tablet, Take 1 tablet by mouth 2 times daily (with meals), Disp: 180 tablet, Rfl: 1    metFORMIN (GLUCOPHAGE-XR) 500 MG extended release tablet, Take 2 tablets by mouth 2 times daily, Disp: 360 tablet, Rfl: 1    Multiple Vitamins-Minerals (THERAPEUTIC MULTIVITAMIN-MINERALS) tablet, Take 1 tablet by mouth daily, Disp: , Rfl:   Allergies: Patient has no known allergies. SUBJECTIVE EXAMINATION      ,           Subjective History:    Subjective: Patient presents to PT with c/o chronic low back pain. Symptoms have been present for many years. Did have back surgery from 2007. Patient states symptoms have progressed over the years. Symptoms described as pain across BLEs that progresses to back region. BLE weakness reported with decreased overall sensation to B feet. He reports ambulation remains limited. Can take a few steps with SC therefore requires rollator to most activities. Pt denies any falls. Admits to slowed overall movement. Pt not takin any medication for pain relief. Had epidural in past with no significant symptom relief.   Additional Pertinent Hx (if applicable):     Previous treatments prior to current episode?: Injections, Outpatient PT, Chiropractor      Learning/Language: Learning  Does the patient/guardian have any barriers to learning?: No barriers  What is the preferred language of the patient/guardian?: English     Pain Screening    Pain Screening  Patient Currently in Pain: Yes  Pain Assessment: 0-10  Pain Level: 1  Best Pain Level: 1  Worst Pain Level: 10  Pain Type: Chronic pain  Pain Location: Back, Leg  Pain Orientation: Right, Left  Pain Descriptors: Sharp, Aching, Pins and needles, Tingling  Aggravating factors: Standing, Walking  Pain Management/Relieving Factors: Sitting, Laying supine    Functional Status    Dominant Hand: : Right       OBJECTIVE EXAMINATION   Restrictions:    None     Review of Systems:  Follows Commands: Within Functional Limits      Palpation:   Lumbar Spine Palpation: soreness noted across LS region with palpation    Ambulation/Gait (if applicable):   Ambulation  Surface: level tile  Device: Rollator  Assistance: Modified Independent  Quality of Gait: pt ambulates with rollator; fwd trunk flexion with limited heel-toe mechanics    Balance Screen:   Balance  Posture: Fair (fwd head/rounded shoulder posturing; fwd trunk posturing)  Sitting - Static: Good  Sitting - Dynamic: Good  Standing - Static: Fair  Standing - Dynamic: Fair    Left AROM  Right AROM       LLE- WFL     RLE- WFL        Left Strength strength of BLEs to grossly 4+/5 allowing for proper heel-toe gait with/without use of AD     decrease pain to < 3/10 across back/BLEs with activity with reduction in ZEHRA to < 30%     pt demonstrates independence with HEP          TREATMENT PLAN       Requires PT Follow-Up: Yes    Pt. actively involved in establishing Plan of Care and Goals: Yes  Patient/ Caregiver education and instruction:               Treatment may include any combination of the following: Strengthening, ROM, Manual Therapy - Soft Tissue Mobilization, Home exercise program, Safety education & training, Patient/Caregiver education & training, Modalities, aquatics     Frequency / Duration:  Patient to be seen 2 for 5 weeks      Eval Complexity:    Decision Making: Low Complexity    PT Treatment Completed:  N/A - Evaluation Only    Therapy Time  Individual Time In: 1330       Individual Time Out: [address]  Minutes: 35        Therapist Signature: Karla Jaimes, PT    Date: 7/19/9085     I certify that the above Therapy Services are being furnished while the patient is under my care. I agree with the treatment plan and certify that this therapy is necessary. Physician's Signature:  ___________________________   Date:_______                                                                   Lawanda Solis MD        Physician Comments: _______________________________________________    Please sign and return to [facility] PHYSICAL THERAPY. Please fax to the location listed below.  Ida Borrero for this referral!    [address] [facility] PHYSICAL THERAPY  [facility] [address]  Dept: [phone]  Fax: [phone]       POC NOTE

## 2022-08-15 ENCOUNTER — TELEPHONE (OUTPATIENT)
Dept: FAMILY MEDICINE CLINIC | Age: 67
End: 2022-08-15

## 2022-08-15 ENCOUNTER — HOSPITAL ENCOUNTER (OUTPATIENT)
Dept: MRI IMAGING | Age: 67
Discharge: HOME | End: 2022-08-17
Payer: MEDICARE

## 2022-08-15 ENCOUNTER — HOSPITAL ENCOUNTER (OUTPATIENT)
Age: 67
Discharge: HOME | End: 2022-08-15
Payer: MEDICARE

## 2022-08-15 DIAGNOSIS — M54.40 BILATERAL LOW BACK PAIN WITH SCIATICA, SCIATICA LATERALITY UNSPECIFIED, UNSPECIFIED CHRONICITY: ICD-10-CM

## 2022-08-15 DIAGNOSIS — N18.9 CHRONIC KIDNEY DISEASE, UNSPECIFIED CKD STAGE: ICD-10-CM

## 2022-08-15 DIAGNOSIS — N18.9 CHRONIC KIDNEY DISEASE, UNSPECIFIED CKD STAGE: Primary | ICD-10-CM

## 2022-08-15 LAB
BUN BLDV-MCNC: 34 MG/DL (ref 6–23)
CREAT SERPL-MCNC: 1.5 MG/DL (ref 0.7–1.2)
GFR AFRICAN AMERICAN: 56
GFR NON-AFRICAN AMERICAN: 46 ML/MIN/1.73

## 2022-08-15 PROCEDURE — 82565 ASSAY OF CREATININE: CPT

## 2022-08-15 PROCEDURE — 84520 ASSAY OF UREA NITROGEN: CPT

## 2022-08-15 PROCEDURE — 6360000004 HC RX CONTRAST MEDICATION: Performed by: RADIOLOGY

## 2022-08-15 PROCEDURE — A9579 GAD-BASE MR CONTRAST NOS,1ML: HCPCS | Performed by: RADIOLOGY

## 2022-08-15 PROCEDURE — 36415 COLL VENOUS BLD VENIPUNCTURE: CPT

## 2022-08-15 PROCEDURE — 72158 MRI LUMBAR SPINE W/O & W/DYE: CPT

## 2022-08-15 RX ADMIN — GADOTERIDOL 20 ML: 279.3 INJECTION, SOLUTION INTRAVENOUS at 14:09

## 2022-08-26 ENCOUNTER — HOSPITAL ENCOUNTER (OUTPATIENT)
Age: 67
Discharge: HOME | End: 2022-08-28

## 2022-08-26 DIAGNOSIS — E11.9 TYPE 2 DIABETES MELLITUS WITHOUT COMPLICATION, WITHOUT LONG-TERM CURRENT USE OF INSULIN (HCC): ICD-10-CM

## 2022-08-26 PROCEDURE — 88305 TISSUE EXAM BY PATHOLOGIST: CPT

## 2022-08-26 RX ORDER — METFORMIN HYDROCHLORIDE 500 MG/1
TABLET, EXTENDED RELEASE ORAL
Qty: 360 TABLET | Refills: 1 | Status: SHIPPED | OUTPATIENT
Start: 2022-08-26

## 2022-08-31 ENCOUNTER — HOSPITAL ENCOUNTER (OUTPATIENT)
Dept: PHYSICAL THERAPY | Age: 67
Setting detail: THERAPIES SERIES
End: 2022-08-31
Payer: MEDICARE

## 2022-09-02 ENCOUNTER — HOSPITAL ENCOUNTER (OUTPATIENT)
Dept: PHYSICAL THERAPY | Age: 67
Setting detail: THERAPIES SERIES
Discharge: HOME | End: 2022-09-02
Payer: MEDICARE

## 2022-09-02 PROCEDURE — 97110 THERAPEUTIC EXERCISES: CPT | Performed by: PHYSICAL THERAPIST

## 2022-09-02 NOTE — PROGRESS NOTES
[facility]  Phone: [phone] Fax: [phone]       Physical Therapy Daily Treatment Note  Date:  2022    Patient Name:  Carolina Navarro    :  1955  MRN: [id number]    Patient: Carolina Navarro (75 y.o. male)   Examination Date: 75/15/5448  Plan of Care Certification Period: 2022 to       :  1955 ;    Confirmed: Yes MRN: [id number]  CSN: [id number]   Insurance: Payor: Claire Hernandez / Plan: Domingo Davalos ESSENTIAL/PLUS / Product Type: *No Product type* /   Insurance ID: [id number] - (Medicare Managed) Secondary Insurance (if applicable):    Referring Physician: Eileen Pierson MD     PCP: Nicholas De La Garza. Caryl Santana DO Visits to Date/Visits Approved: 1 /      No Show/Cancelled Appts:   /       Medical Diagnosis: Lumbago with sciatica, unspecified side [M54.40] M54.40 (ICD-10-CM) - Bilateral low back pain with sciatica, sciatica laterality unspecified, unspecified chronicity  Treatment Diagnosis:       Time In:  1415  Time Out:     1500     Subjective:  Patient presents to PT for initial treatment session. Reports theron tomatoes today prior to therapy     Exercises:  Exercise/Equipment Resistance/Repetitions Other comments   Bike    x5mins           Tball flex           rot   10x10s  5x10s            Trunk rot str   5x10s            Bridge    x10           SLR   x10ea             Sit to stand     2x5                                                              Other Therapeutic Activities:      Home Exercise Program:      Manual Treatments:      Modalities:      Timed Code Treatment Minutes: Total Treatment Minutes:      Treatment/Activity Tolerance:  [x] Patient tolerated treatment well [] Patient limited by fatigue  [] Patient limited by pain  [] Patient limited by other medical complications  [] Other: performed there ex focusing on increasing AROM/strength of trunk and B hips.  Gait remains hindered with use of rollator with decreased step length/height. Good tolerance to exercises. VCs to perform sit to stand properly. Pt demonstrated proper mechanics following.      Plan:   [x] Continue per plan of care [] Alter current plan (see comments)  [] Plan of care initiated [] Hold pending MD visit [] Discharge  Plan for Next Session:         Treatment Charges: Mins Units   Initial Evaluation     Re-Evaluation     Ther Exercise         TE 45 3   Manual Therapy     MT     Ther Activities        TA     Gait Training          GT     Neuro Re-education NR     Modalities     Non-Billable Service Time     Other     Total Time/Units 45 3     Electronically signed by:  Alexander Oconnor, PT

## 2022-09-06 ENCOUNTER — HOSPITAL ENCOUNTER (OUTPATIENT)
Dept: PHYSICAL THERAPY | Age: 67
Setting detail: THERAPIES SERIES
Discharge: HOME | End: 2022-09-06
Payer: MEDICARE

## 2022-09-06 PROCEDURE — 97110 THERAPEUTIC EXERCISES: CPT | Performed by: PHYSICAL THERAPIST

## 2022-09-07 ENCOUNTER — OFFICE VISIT (OUTPATIENT)
Dept: PAIN MANAGEMENT | Age: 67
End: 2022-09-07
Payer: MEDICARE

## 2022-09-07 ENCOUNTER — PREP FOR PROCEDURE (OUTPATIENT)
Dept: PAIN MANAGEMENT | Age: 67
End: 2022-09-07

## 2022-09-07 VITALS
OXYGEN SATURATION: 92 % | TEMPERATURE: 99.6 F | HEART RATE: 91 BPM | HEIGHT: 67 IN | BODY MASS INDEX: 39.24 KG/M2 | DIASTOLIC BLOOD PRESSURE: 90 MMHG | RESPIRATION RATE: 16 BRPM | WEIGHT: 250 LBS | SYSTOLIC BLOOD PRESSURE: 132 MMHG

## 2022-09-07 DIAGNOSIS — M54.16 LUMBAR RADICULAR PAIN: ICD-10-CM

## 2022-09-07 DIAGNOSIS — M47.817 LUMBOSACRAL SPONDYLOSIS WITHOUT MYELOPATHY: ICD-10-CM

## 2022-09-07 DIAGNOSIS — M51.36 DDD (DEGENERATIVE DISC DISEASE), LUMBAR: Primary | ICD-10-CM

## 2022-09-07 DIAGNOSIS — M48.061 SPINAL STENOSIS OF LUMBAR REGION, UNSPECIFIED WHETHER NEUROGENIC CLAUDICATION PRESENT: ICD-10-CM

## 2022-09-07 DIAGNOSIS — Z79.01 ANTICOAGULATED ON COUMADIN: ICD-10-CM

## 2022-09-07 DIAGNOSIS — M96.1 POSTLAMINECTOMY SYNDROME, LUMBAR: ICD-10-CM

## 2022-09-07 PROBLEM — M51.369 DDD (DEGENERATIVE DISC DISEASE), LUMBAR: Status: ACTIVE | Noted: 2022-09-07

## 2022-09-07 PROCEDURE — 99204 OFFICE O/P NEW MOD 45 MIN: CPT | Performed by: ANESTHESIOLOGY

## 2022-09-07 PROCEDURE — 1123F ACP DISCUSS/DSCN MKR DOCD: CPT | Performed by: ANESTHESIOLOGY

## 2022-09-07 RX ORDER — SODIUM CHLORIDE 0.9 % (FLUSH) 0.9 %
5-40 SYRINGE (ML) INJECTION EVERY 12 HOURS SCHEDULED
Status: CANCELLED | OUTPATIENT
Start: 2022-09-07

## 2022-09-07 RX ORDER — SODIUM CHLORIDE 9 MG/ML
INJECTION, SOLUTION INTRAVENOUS PRN
Status: CANCELLED | OUTPATIENT
Start: 2022-09-07

## 2022-09-07 RX ORDER — SODIUM CHLORIDE 0.9 % (FLUSH) 0.9 %
5-40 SYRINGE (ML) INJECTION PRN
Status: CANCELLED | OUTPATIENT
Start: 2022-09-07

## 2022-09-07 NOTE — PROGRESS NOTES
[facility] - BEHAVIORAL HEALTH SERVICES Pain Management        [address] [place] [address]  Dept: [phone]          Consult Note      Patient:  Roro Corbett,  1955    Date of Service:  22     Requesting Physician:  Rafael Lowry MD    Reason for Consult:      Patient presents with complaints of low back pain    HISTORY OF PRESENT ILLNESS:      Mr. Roro Corbett is a 74 y.o. male presented today to [address] [place] [address] for evaluation of chronic low back pain for many years. Prior lumbar spine surgery in  by Dr. Марина Das. Having low back and B/l LE pain. Has been evaluated by NS Dr. Theodora Roque. Pain is constant and is described as aching and throbbing. Pain does radiate to both lower extremities. He  has numbness, tingling, weakness of the both lower extremities     Patient does not have bladder or bowel dysfunction. Alleviating factors include: rest.  Aggravating factors include: movement, lifting. Pain causes functional limitations/ limits Adl's : Yes    Nursing notes and details of the pain history reviewed. Please see intake notes for details. [place] palsy + right. Previous treatments:   Physical Therapy/ HEP : yes     Medications: - NSAID's : yes             - Membrane stabilizers : no            - Opioids : no            - Adjuvants or Others : yes  Spine Surgeries: yes    He has been on anticoagulation medications yes and include Warfarin (H/o A.fib)    H/O Smoking: no  H/O alcohol abuse : no  H/O Illicit drug use : no  Imaging:     MRI of LS spine: 8/15/2022:     FINDINGS:   BONES/ALIGNMENT: Loss of the normal lordotic curvature. Trace degenerative   retrolisthesis of L3 on 4. Mixed opposing endplate degenerative signal   changes throughout the lumbar spine with moderate to severe disc height loss   at L2-3 and L3-4. No osseous edema. Vertebral body heights are maintained. Prior decompressive laminectomies at L2-3 and L3-4.        SPINAL CORD:  The conus terminates normally. SOFT TISSUES:  No paraspinal mass identified. L1-L2: Disc bulge along with facet arthropathy and prominent dorsal epidural   fat again noted contributing to moderate to severe canal narrowing. Moderate   left and severe right foraminal stenosis. L2-L3: Prior laminectomy. Disc bulge along with facet arthropathy along with   epidural lipomatosis contributing to severe canal stenosis. Small amount of   enhancing granulation tissue along the right spinal canal.  Moderate left and   severe right foraminal stenosis. L3-L4: Prior laminectomy. Disc bulging and degenerative facet changes along   with epidural lipomatosis contributing to severe canal stenosis. Severe   bilateral foraminal stenosis. L4-L5: Disc bulge along with facet arthropathy and ligamentous flavum   hypertrophy resulting in severe canal stenosis, moderate right and severe   left foraminal stenosis. L5-S1: Disc bulge, facet arthropathy along with ligamentous flavum   hypertrophy resulting in moderate canal stenosis, moderate to severe   bilateral foraminal stenosis. Impression   No significant change compared to MR lumbar spine from 06/28/2022 with   multiple levels of severe canal and foraminal stenosis, as above. MRI of LS spine: 6/28/2022:  Impression   1. No fracture or bony destructive lesion. .   2. Evidence of prior hemilaminectomies at L2-3 and L3-4.   3. Severe central canal stenoses at L2-3 and L4-5. Moderate grade stenoses   at L1-2, L3-4 and L5-S1.   4. Severe neural foraminal stenoses throughout the lumbar spine.        Past Medical History:   Diagnosis Date    Atrial fibrillation ([address] 10/2012    now on Coumadin    Bladder tumor     Diabetes mellitus ([address]     Hyperlipidemia     Hypertension     LONG TERM ANTICOAGULENT USE     CONCHITA on CPAP        Past Surgical History:   Procedure Laterality Date    BACK SURGERY  2007    repair of herniated disc CARDIOVASCULAR STRESS TEST  11/21/2006    findings of inferior and anterior ischemia     CYSTOSCOPY N/A 10/22/2021    CYSTOSCOPY RETROGRADE PYELOGRAM TRANSURETHRAL RESECTION OF BLADDER TUMOR WITH INSTALLATION OF GEMCITIBINE performed by Phil Malloy DO at [address] [facility] CATH LAB PROCEDURE  12/13/2006    No obstructive CAD and mild dilated cardiomyopathy with EF 45-50%    TURP N/A 4/7/2021    CYSTOSCOPY, URETHRAL DILITATION, PLASMA BUTTON TRANSURETHRAL RESECTION BLADDER TUMOR WITH FULGURATION, EXCHANGE GABRIEL CATHETER performed by Mei Clark MD at [address]    TURP N/A 4/21/2021    CYSTOSCOPY RETROGRADE PYELOGRAM PLASMA LOOP TRANSURETHRAL RESECTION PROSTATE performed by Gerri Malloy DO at [address]       Prior to Admission medications    Medication Sig Start Date End Date Taking? Authorizing Provider   metFORMIN (GLUCOPHAGE-XR) 500 MG extended release tablet TAKE 2 TABLETS TWICE A DAY 8/26/22  Yes Shawn Patrick DO   blood glucose monitor kit and supplies Dispense sufficient amount for indicated testing frequency plus additional to accommodate PRN testing needs. Dispense all needed supplies to include: monitor, strips, lancing device, lancets, control solutions, alcohol swabs. 7/11/22  Yes Shawn Patrick DO   blood glucose monitor strips Test once a day & as needed for symptoms of irregular blood glucose. Dispense sufficient amount for indicated testing frequency plus additional to accommodate PRN testing needs.  7/11/22  Yes Shawn Patrick DO   Lancets MISC 1 each by Does not apply route daily 7/11/22  Yes Shawn Patrick, DO   nitrofurantoin, macrocrystal-monohydrate, (MACROBID) 100 MG capsule TAKE 1 CAPSULE BY MOUTH TWICE DAILY 5/4/22  Yes Historical Provider, MD   allopurinol (ZYLOPRIM) 100 MG tablet Take 1 tablet by mouth daily 6/8/22  Yes Shawn Patrick, DO   furosemide (LASIX) 20 MG tablet Take 1 tablet by mouth daily 6/8/22  Yes Shawn Patrick, DO   lisinopril (PRINIVIL;ZESTRIL) 20 MG tablet Take 1 tablet by mouth daily 22  Yes Teetee Bullock, DO   atorvastatin (LIPITOR) 40 MG tablet Take 0.5 tablets by mouth every evening 22  Yes Teetee Bullock DO   empagliflozin (JARDIANCE) 25 MG tablet Take 1 tablet by mouth daily 22  Yes Teetee Bullock, DO   JANTOVEN 3 MG tablet Take 1 tablet by mouth daily 22  Yes Teetee Bullock, DO   carvedilol (COREG) 6.25 MG tablet Take 1 tablet by mouth 2 times daily (with meals) 22  Yes Teetee Bullock, DO   Multiple Vitamins-Minerals (THERAPEUTIC MULTIVITAMIN-MINERALS) tablet Take 1 tablet by mouth daily   Yes Historical Provider, MD       No Known Allergies    Social History     Socioeconomic History    Marital status:      Spouse name: Not on file    Number of children: Not on file    Years of education: Not on file    Highest education level: Not on file   Occupational History    Not on file   Tobacco Use    Smoking status: Former     Packs/day: 1.00     Years: 30.00     Pack years: 30.00     Types: Cigarettes     Quit date: 1999     Years since quittin.6    Smokeless tobacco: Never   Vaping Use    Vaping Use: Never used   Substance and Sexual Activity    Alcohol use: No    Drug use: No    Sexual activity: Not on file   Other Topics Concern    Not on file   Social History Narrative    Drinks 2 cups coffee most days     Social Determinants of Health     Financial Resource Strain: Low Risk     Difficulty of Paying Living Expenses: Not very hard   Food Insecurity: No Food Insecurity    Worried About Running Out of Food in the Last Year: Never true    Ran Out of Food in the Last Year: Never true   Transportation Needs: Not on file   Physical Activity: Not on file   Stress: Not on file   Social Connections: Not on file   Intimate Partner Violence: Not on file   Housing Stability: Not on file       Family History   Problem Relation Age of Onset    Prostate Cancer Father 62    Cancer Father     Hypertension Mother and sister     Arthritis Mother        REVIEW OF SYSTEMS:     Patient specifically denies fever/chills, chest pain, shortness of breath, new bowel or bladder complaints. All other review of systems was negative. Review of Systems - documented reviewed. PHYSICAL EXAMINATION:      BP (!) 132/90   Pulse 91   Temp 99.6 °F (37.6 °C) (Infrared)   Resp 16   Ht 5' 7\" (1.702 m)   Wt 250 lb (113.4 kg)   SpO2 92%   BMI 39.16 kg/m²     General:      General appearance:  Pleasant and well-hydrated, in no distress and A & O x 3  Build:Obese  Function: Rises from seated position easily    HEENT:    Head:normocephalic, atraumatic  Pupils:regular, round, equal  Sclera: icterus absent    Lungs:    Breathing:normal breathing pattern     CVS:     RRR    Abdomen:    Shape:obese, non-distended, and normal    Cervical spine:    Inspection:normal  Palpation:tenderness paravertebral muscles, tenderness trapezium, left, right and positive. Range of motion:no pain    Thoracic spine:     Spine inspection:normal   Palpation:No tenderness over the midline and paraspinal area, bilaterally  Range of motion:normal in flexion, extension rotation bilateral and is not painful. Lumbar spine:    Spine inspection: Normal   Palpation: Tenderness paravertebral muscles Yes bilaterally  Range of motion: Decreased,  Sacroiliac joint tenderness No   SLR : negative bilaterally    Musculoskeletal:    Trigger points no    Extremities:    Tremors:None bilaterally upper and lower  Edema: no    Neurological:    Sensory: Normal to light touch     Motor:   Good strength except for Left LE 4/5    Dermatology:    Skin:no rashes or lesions noted    Assessment/Plan:     Diagnosis Orders   1. DDD (degenerative disc disease), lumbar        2. Lumbar radicular pain        3. Lumbosacral spondylosis without myelopathy        4. Spinal stenosis of lumbar region, unspecified whether neurogenic claudication present        5.  Postlaminectomy syndrome, lumbar 6. Anticoagulated on Coumadin            74 y.o. male with h/o low back and LE pain. Prior lumbar laminectomy/ decompression in 2007. Has been evaluated by NSG Dr. Colette Rodney. MRI LS spine reviewed. On Coumadin    Tried conservative treatment    PLAN:  Lumbar  TFESI Right L4 and left L3 under fluoroscopy. RBA discussed. Need to hold coumadin. Check PT INR on the day of the procedure. Consider adjuvants-patient not too keen on meds. If no relief, recommend reeval by NSG. Counseling : Patient encouraged to stay active and to watch/lose weight    Encouraged to continue Regular home exercise program as tolerated - stretching / strengthening. Treatment plan discussed with the patient including medication and procedure side effects. Controlled Substances Monitoring: OARRS reviewed. Brenden Hall MD    Dear Dr. Colette Rodney,   Thank you for referring Mr. Andi Ferguson. and allowing us to participate in his care. Please do not hesitate to contact me if you have any questions regarding his care. Ratna Murray MD    CC:    Jose Carlos Temple MD  [address] [place] [address]. [facility],  [address] [facility]     Therese Sandoval [address] [place] [address], [address] [place] [address].  [address]  The [facility] [address]

## 2022-09-07 NOTE — H&P (VIEW-ONLY)
[facility] - BEHAVIORAL HEALTH SERVICES Pain Management        [address] [place], [place] [id number]  Dept: [phone]          Consult Note      Patient:  William Mohr,  1955    Date of Service:  22     Requesting Physician:  Qian Jones MD    Reason for Consult:      Patient presents with complaints of low back pain    HISTORY OF PRESENT ILLNESS:      Mr. William Mohr is a 74 y.o. male presented today to [address] [place] [address] for evaluation of chronic low back pain for many years. Prior lumbar spine surgery in  by Dr. Thai Roland. Having low back and B/l LE pain. Has been evaluated by NS Dr. Giselle Tyler. Pain is constant and is described as aching and throbbing. Pain does radiate to both lower extremities. He  has numbness, tingling, weakness of the both lower extremities     Patient does not have bladder or bowel dysfunction. Alleviating factors include: rest.  Aggravating factors include: movement, lifting. Pain causes functional limitations/ limits Adl's : Yes    Nursing notes and details of the pain history reviewed. Please see intake notes for details. [place] palsy + right. Previous treatments:   Physical Therapy/ HEP : yes     Medications: - NSAID's : yes             - Membrane stabilizers : no            - Opioids : no            - Adjuvants or Others : yes  Spine Surgeries: yes    He has been on anticoagulation medications yes and include Warfarin (H/o A.fib)    H/O Smoking: no  H/O alcohol abuse : no  H/O Illicit drug use : no  Imaging:     MRI of LS spine: 8/15/2022:     FINDINGS:   BONES/ALIGNMENT: Loss of the normal lordotic curvature. Trace degenerative   retrolisthesis of L3 on 4. Mixed opposing endplate degenerative signal   changes throughout the lumbar spine with moderate to severe disc height loss   at L2-3 and L3-4. No osseous edema. Vertebral body heights are maintained. Prior decompressive laminectomies at L2-3 and L3-4.        SPINAL CORD:  The conus terminates normally. SOFT TISSUES:  No paraspinal mass identified. L1-L2: Disc bulge along with facet arthropathy and prominent dorsal epidural   fat again noted contributing to moderate to severe canal narrowing. Moderate   left and severe right foraminal stenosis. L2-L3: Prior laminectomy. Disc bulge along with facet arthropathy along with   epidural lipomatosis contributing to severe canal stenosis. Small amount of   enhancing granulation tissue along the right spinal canal.  Moderate left and   severe right foraminal stenosis. L3-L4: Prior laminectomy. Disc bulging and degenerative facet changes along   with epidural lipomatosis contributing to severe canal stenosis. Severe   bilateral foraminal stenosis. L4-L5: Disc bulge along with facet arthropathy and ligamentous flavum   hypertrophy resulting in severe canal stenosis, moderate right and severe   left foraminal stenosis. L5-S1: Disc bulge, facet arthropathy along with ligamentous flavum   hypertrophy resulting in moderate canal stenosis, moderate to severe   bilateral foraminal stenosis. Impression   No significant change compared to MR lumbar spine from 06/28/2022 with   multiple levels of severe canal and foraminal stenosis, as above. MRI of LS spine: 6/28/2022:  Impression   1. No fracture or bony destructive lesion. .   2. Evidence of prior hemilaminectomies at L2-3 and L3-4.   3. Severe central canal stenoses at L2-3 and L4-5. Moderate grade stenoses   at L1-2, L3-4 and L5-S1.   4. Severe neural foraminal stenoses throughout the lumbar spine.        Past Medical History:   Diagnosis Date    Atrial fibrillation ([address] 10/2012    now on Coumadin    Bladder tumor     Diabetes mellitus ([address]     Hyperlipidemia     Hypertension     LONG TERM ANTICOAGULENT USE     CONCHITA on CPAP        Past Surgical History:   Procedure Laterality Date    BACK SURGERY  2007    repair of herniated disc CARDIOVASCULAR STRESS TEST  11/21/2006    findings of inferior and anterior ischemia     CYSTOSCOPY N/A 10/22/2021    CYSTOSCOPY RETROGRADE PYELOGRAM TRANSURETHRAL RESECTION OF BLADDER TUMOR WITH INSTALLATION OF GEMCITIBINE performed by Rodrigo Malloy DO at [address] [facility] CATH LAB PROCEDURE  12/13/2006    No obstructive CAD and mild dilated cardiomyopathy with EF 45-50%    TURP N/A 4/7/2021    CYSTOSCOPY, URETHRAL DILITATION, PLASMA BUTTON TRANSURETHRAL RESECTION BLADDER TUMOR WITH FULGURATION, EXCHANGE GABRIEL CATHETER performed by Tamar Stafford MD at [address]    TURP N/A 4/21/2021    CYSTOSCOPY RETROGRADE PYELOGRAM PLASMA LOOP TRANSURETHRAL RESECTION PROSTATE performed by Jose Maloly DO at [address]       Prior to Admission medications    Medication Sig Start Date End Date Taking? Authorizing Provider   metFORMIN (GLUCOPHAGE-XR) 500 MG extended release tablet TAKE 2 TABLETS TWICE A DAY 8/26/22  Yes Leatha Moreno DO   blood glucose monitor kit and supplies Dispense sufficient amount for indicated testing frequency plus additional to accommodate PRN testing needs. Dispense all needed supplies to include: monitor, strips, lancing device, lancets, control solutions, alcohol swabs. 7/11/22  Yes Leatha Moreno DO   blood glucose monitor strips Test once a day & as needed for symptoms of irregular blood glucose. Dispense sufficient amount for indicated testing frequency plus additional to accommodate PRN testing needs.  7/11/22  Yes Leatha Moreno DO   Lancets MISC 1 each by Does not apply route daily 7/11/22  Yes Leatha Moreno DO   nitrofurantoin, macrocrystal-monohydrate, (MACROBID) 100 MG capsule TAKE 1 CAPSULE BY MOUTH TWICE DAILY 5/4/22  Yes Historical Provider, MD   allopurinol (ZYLOPRIM) 100 MG tablet Take 1 tablet by mouth daily 6/8/22  Yes Leatha Moreno DO   furosemide (LASIX) 20 MG tablet Take 1 tablet by mouth daily 6/8/22  Yes Leatha Moreno DO   lisinopril (PRINIVIL;ZESTRIL) 20 MG tablet Take 1 tablet by mouth daily 22  Yes Felicia Morton DO   atorvastatin (LIPITOR) 40 MG tablet Take 0.5 tablets by mouth every evening 22  Yes Felicia Morton DO   empagliflozin (JARDIANCE) 25 MG tablet Take 1 tablet by mouth daily 22  Yes Felicia Morton DO   JANTOVEN 3 MG tablet Take 1 tablet by mouth daily 22  Yes Felicia Morton DO   carvedilol (COREG) 6.25 MG tablet Take 1 tablet by mouth 2 times daily (with meals) 22  Yes Felicia Morton DO   Multiple Vitamins-Minerals (THERAPEUTIC MULTIVITAMIN-MINERALS) tablet Take 1 tablet by mouth daily   Yes Historical Provider, MD       No Known Allergies    Social History     Socioeconomic History    Marital status:      Spouse name: Not on file    Number of children: Not on file    Years of education: Not on file    Highest education level: Not on file   Occupational History    Not on file   Tobacco Use    Smoking status: Former     Packs/day: 1.00     Years: 30.00     Pack years: 30.00     Types: Cigarettes     Quit date: 1999     Years since quittin.6    Smokeless tobacco: Never   Vaping Use    Vaping Use: Never used   Substance and Sexual Activity    Alcohol use: No    Drug use: No    Sexual activity: Not on file   Other Topics Concern    Not on file   Social History Narrative    Drinks 2 cups coffee most days     Social Determinants of Health     Financial Resource Strain: Low Risk     Difficulty of Paying Living Expenses: Not very hard   Food Insecurity: No Food Insecurity    Worried About Running Out of Food in the Last Year: Never true    Ran Out of Food in the Last Year: Never true   Transportation Needs: Not on file   Physical Activity: Not on file   Stress: Not on file   Social Connections: Not on file   Intimate Partner Violence: Not on file   Housing Stability: Not on file       Family History   Problem Relation Age of Onset    Prostate Cancer Father 62    Cancer Father     Hypertension Mother and sister     Arthritis Mother        REVIEW OF SYSTEMS:     Patient specifically denies fever/chills, chest pain, shortness of breath, new bowel or bladder complaints. All other review of systems was negative. Review of Systems - documented reviewed. PHYSICAL EXAMINATION:      BP (!) 132/90   Pulse 91   Temp 99.6 °F (37.6 °C) (Infrared)   Resp 16   Ht 5' 7\" (1.702 m)   Wt 250 lb (113.4 kg)   SpO2 92%   BMI 39.16 kg/m²     General:      General appearance:  Pleasant and well-hydrated, in no distress and A & O x 3  Build:Obese  Function: Rises from seated position easily    HEENT:    Head:normocephalic, atraumatic  Pupils:regular, round, equal  Sclera: icterus absent    Lungs:    Breathing:normal breathing pattern     CVS:     RRR    Abdomen:    Shape:obese, non-distended, and normal    Cervical spine:    Inspection:normal  Palpation:tenderness paravertebral muscles, tenderness trapezium, left, right and positive. Range of motion:no pain    Thoracic spine:     Spine inspection:normal   Palpation:No tenderness over the midline and paraspinal area, bilaterally  Range of motion:normal in flexion, extension rotation bilateral and is not painful. Lumbar spine:    Spine inspection: Normal   Palpation: Tenderness paravertebral muscles Yes bilaterally  Range of motion: Decreased,  Sacroiliac joint tenderness No   SLR : negative bilaterally    Musculoskeletal:    Trigger points no    Extremities:    Tremors:None bilaterally upper and lower  Edema: no    Neurological:    Sensory: Normal to light touch     Motor:   Good strength except for Left LE 4/5    Dermatology:    Skin:no rashes or lesions noted    Assessment/Plan:     Diagnosis Orders   1. DDD (degenerative disc disease), lumbar        2. Lumbar radicular pain        3. Lumbosacral spondylosis without myelopathy        4. Spinal stenosis of lumbar region, unspecified whether neurogenic claudication present        5.  Postlaminectomy syndrome, lumbar 6. Anticoagulated on Coumadin            74 y.o. male with h/o low back and LE pain. Prior lumbar laminectomy/ decompression in 2007. Has been evaluated by NSG Dr. Mary Aguilar. MRI LS spine reviewed. On Coumadin    Tried conservative treatment    PLAN:  Lumbar  TFESI Right L4 and left L3 under fluoroscopy. RBA discussed. Need to hold coumadin. Check PT INR on the day of the procedure. Consider adjuvants-patient not too keen on meds. If no relief, recommend reeval by NSG. Counseling : Patient encouraged to stay active and to watch/lose weight    Encouraged to continue Regular home exercise program as tolerated - stretching / strengthening. Treatment plan discussed with the patient including medication and procedure side effects. Controlled Substances Monitoring: OARRS reviewed. Jim Miranda MD    Dear Dr. Mary Aguilar,   Thank you for referring Mr. Nilsa Guzman. and allowing us to participate in his care. Please do not hesitate to contact me if you have any questions regarding his care. Alsyon Headley MD    CC:    Farida Grady MD  [address] [place] [address]. [facility],  [address] [facility]     Juany Campbell [facility], [facility].  [address]  The [facility] [address]

## 2022-09-07 NOTE — PATIENT INSTRUCTIONS
Pain Management Department      Epidural Spinal Steroid Injection:  Epidural steroid injections can be an effective treatment for nerve root pain. Pinched or irritated nerves that exit the spine can cause shooting pain, burning, tingling, numbness, and muscle weakness. This is often caused by a bulging disc, herniated disc (slipped disc), trauma or aging of the spine. Epidural injections can help by placing a steroid medication which is an anti- inflammatory medicine around the irritated nerves to reduce inflammation. What are the different types of epidural spinal injections;  Caudal epidural steroid injections, if symptoms are in the low back and lower extremities. Mostly done for patients who had low back surgery. Lumbar lnterlaminar epidural injections, if symptoms are in the lower back and lower extremities. Thoracic lnterlaminar epidural injections, if symptoms are in the mid back and rib cage. Cervical lnterlaminar epidural injections, if symptoms are in the neck and upper extremities. Pre-procedure Instructions: Your current medications will be reviewed and you will be instructed on which medications to discontinue before the procedure. lf sedation is administered you will be required to fast before the procedure (eight hours) 3- A  will be required if sedation is administered. Procedure:  A local anesthetic will be used to numb your skin. A needle will be advanced under X-ray to the target area. Placement will be confirmed by dye injection after which medicine will be placed, then the needle will be removed and Band-Aid will be applied. Post-procedure: You will be monitored in the recovery room for up to 30 minutes after the injection, when you are ready to leave, the staff will give you the discharge instructions. The extent and duration of pain relief may depend on the amount of disc or nerve root irritation. Other coexisting factors may contribute to your pain.  Sometimes an injection brings several weeks to months of pain relief  and then further treatment is needed.

## 2022-09-09 ENCOUNTER — HOSPITAL ENCOUNTER (OUTPATIENT)
Dept: PHYSICAL THERAPY | Age: 67
Setting detail: THERAPIES SERIES
Discharge: HOME | End: 2022-09-09
Payer: MEDICARE

## 2022-09-09 PROCEDURE — 97110 THERAPEUTIC EXERCISES: CPT | Performed by: PHYSICAL THERAPIST

## 2022-09-09 NOTE — PROGRESS NOTES
[facility]  Phone: [phone] Fax: [phone]       Physical Therapy Daily Treatment Note  Date:  2022    Patient Name:  Nuno Raza    :  1955  MRN: [id number]    Patient: Nuno Raza (75 y.o. male)   Examination Date: 7447  Plan of Care Certification Period: 2022 to       :  1955 ;    Confirmed: Yes MRN: [id number]  CSN: [id number]   Insurance: Payor: [facility] / Plan: Naresh Kennedy ESSENTIAL/PLUS / Product Type: *No Product type* /   Insurance ID: [id number] - (Medicare Managed) Secondary Insurance (if applicable):    Referring Physician: Vazquez Estrella MD     PCP: Demetria Lesch. Renee Martin DO Visits to Date/Visits Approved: 3/      No Show/Cancelled Appts:   /       Medical Diagnosis: Lumbago with sciatica, unspecified side [M54.40] M54.40 (ICD-10-CM) - Bilateral low back pain with sciatica, sciatica laterality unspecified, unspecified chronicity  Treatment Diagnosis:       Time In:  1430  Time Out:     1515     Subjective:  Patient presents to PT for third session. Pt reports having cont soreness. States using muscles he has not used in a while     Exercises:  Exercise/Equipment Resistance/Repetitions Other comments   Bike    x6mins           Tball flex           rot   10x10s  5x10s            Trunk rot str   5x10s            Bridge    2x10           SLR   x10ea             Sit to stand     2x5           Marching    2x5ea single HR assist           Hip abd    X10ea                                   Other Therapeutic Activities:      Home Exercise Program:      Manual Treatments:      Modalities:      Timed Code Treatment Minutes: Total Treatment Minutes:      Treatment/Activity Tolerance:  [x] Patient tolerated treatment well [] Patient limited by fatigue  [] Patient limited by pain  [] Patient limited by other medical complications  [] Other: performed there ex focusing on increasing AROM/strength of trunk and B hips. Gait remains hindered with use of rollator with decreased step length/height. Cont marching (single HR assist) and standing hip abd to promote hip strength/stability to improve functional gait.  Pt demonstrating increased independence with HEP    Plan:   [x] Continue per plan of care [] Alter current plan (see comments)  [] Plan of care initiated [] Hold pending MD visit [] Discharge  Plan for Next Session:         Treatment Charges: Mins Units   Initial Evaluation     Re-Evaluation     Ther Exercise         TE 45 3   Manual Therapy     MT     Ther Activities        TA     Gait Training          GT     Neuro Re-education NR     Modalities     Non-Billable Service Time     Other     Total Time/Units 45 3     Electronically signed by:  Durga Dominguez, PT

## 2022-09-16 ENCOUNTER — HOSPITAL ENCOUNTER (OUTPATIENT)
Dept: PHYSICAL THERAPY | Age: 67
Setting detail: THERAPIES SERIES
Discharge: HOME | End: 2022-09-16
Payer: MEDICARE

## 2022-09-16 PROCEDURE — 97110 THERAPEUTIC EXERCISES: CPT | Performed by: PHYSICAL THERAPIST

## 2022-09-16 NOTE — PROGRESS NOTES
[facility]  Phone: [phone] Fax: [phone]       Physical Therapy Daily Treatment Note  Date:  2022    Patient Name:  Morales Plunkett.    :  1955  MRN: [id number]    Patient: Morales Plunkett (75 y.o. male)   Examination Date:   Plan of Care Certification Period: 2022 to       :  1955 ;    Confirmed: Yes MRN: [id number]  CSN: [id number]   Insurance: Payor: Kirill Millet / Plan: Arthurine Fergusson ESSENTIAL/PLUS / Product Type: *No Product type* /   Insurance ID: [id number] - (Medicare Managed) Secondary Insurance (if applicable):    Referring Physician: Chidi Kate MD     PCP: Keya Avery. Maddison Roldan DO Visits to Date/Visits Approved: 3/      No Show/Cancelled Appts:   /       Medical Diagnosis: Lumbago with sciatica, unspecified side [M54.40] M54.40 (ICD-10-CM) - Bilateral low back pain with sciatica, sciatica laterality unspecified, unspecified chronicity  Treatment Diagnosis:       Time In:  1430  Time Out:     1515     Subjective:  Patient presents to PT with reports of getting confused on days earlier in week. States cont to have back pain. Is supposed to have injection later this month    Exercises:  Exercise/Equipment Resistance/Repetitions Other comments   Bike    x7mins           Tball flex           rot   10x10s  5x10s            Trunk rot str   5x10s            Bridge    2x10           SLR   x10ea             Sit to stand     2x5           Marching    2x5ea single HR assist           Hip abd    X10ea                                   Other Therapeutic Activities:      Home Exercise Program:      Manual Treatments:      Modalities:      Timed Code Treatment Minutes:       Total Treatment Minutes:      Treatment/Activity Tolerance:  [x] Patient tolerated treatment well [] Patient limited by fatigue  [] Patient limited by pain  [] Patient limited by other medical complications  [] Other: performed there ex focusing on increasing AROM/strength of trunk and B hips. Gait remains hindered with use of rollator with decreased step length/height. Cont marching (single HR assist) and standing hip abd to promote hip strength/stability to improve functional gait.  Good effort and pacing throughout session    Plan:   [x] Continue per plan of care [] Alter current plan (see comments)  [] Plan of care initiated [] Hold pending MD visit [] Discharge  Plan for Next Session:         Treatment Charges: Mins Units   Initial Evaluation     Re-Evaluation     Ther Exercise         TE 45 3   Manual Therapy     MT     Ther Activities        TA     Gait Training          GT     Neuro Re-education NR     Modalities     Non-Billable Service Time     Other     Total Time/Units 45 3     Electronically signed by:  Gloria Mckeon PT

## 2022-09-20 ENCOUNTER — HOSPITAL ENCOUNTER (OUTPATIENT)
Dept: PHYSICAL THERAPY | Age: 67
Setting detail: THERAPIES SERIES
Discharge: HOME | End: 2022-09-20
Payer: MEDICARE

## 2022-09-20 PROCEDURE — 97110 THERAPEUTIC EXERCISES: CPT | Performed by: PHYSICAL THERAPIST

## 2022-09-20 NOTE — PROGRESS NOTES
[facility]  Phone: [phone] Fax: [phone]       Physical Therapy Daily Treatment Note  Date:  2022    Patient Name:  Pretty Marinelli.    :  1955  MRN: [id number]    Patient: Pretty Marinelli (75 y.o. male)   Examination Date:   Plan of Care Certification Period: 2022 to       :  1955 ;    Confirmed: Yes MRN: [id number]  CSN: [id number]   Insurance: Payor: Ирина Myers / Plan: Sameer Small ESSENTIAL/PLUS / Product Type: *No Product type* /   Insurance ID: [id number] - (Medicare Managed) Secondary Insurance (if applicable):    Referring Physician: Lana Limon MD     PCP: Leo Jones. DO Ivan Visits to Date/Visits Approved: 5/      No Show/Cancelled Appts:   /       Medical Diagnosis: Lumbago with sciatica, unspecified side [M54.40] M54.40 (ICD-10-CM) - Bilateral low back pain with sciatica, sciatica laterality unspecified, unspecified chronicity  Treatment Diagnosis:       Time In:  1425  Time Out:     1505     Subjective:  Patient reports remaining active over weekend going to both a football game and to a car show. Exercises:  Exercise/Equipment Resistance/Repetitions Other comments   Bike    x7mins           Tball flex           rot   10x10s  5x10s            Trunk rot str   5x10s            Bridge    2x10           SLR   x10ea             Sit to stand     2x5           Marching    2x5ea single HR assist           Hip abd /ext    X10ea            Side to side amb   X2 laps                     Other Therapeutic Activities:      Home Exercise Program:      Manual Treatments:      Modalities:      Timed Code Treatment Minutes: Total Treatment Minutes:      Treatment/Activity Tolerance:  [x] Patient tolerated treatment well [] Patient limited by fatigue  [] Patient limited by pain  [] Patient limited by other medical complications  [] Other: performed there ex focusing on increasing AROM/strength of trunk and B hips. Gait remains hindered with use of rollator with decreased step length/height. Cont marching (single HR assist) and standing hip abd to promote hip strength/stability to improve functional gait. Added side step ambulation for cont hip strength and to improve functional gait.      Plan:   [x] Continue per plan of care [] Alter current plan (see comments)  [] Plan of care initiated [] Hold pending MD visit [] Discharge  Plan for Next Session:         Treatment Charges: Mins Units   Initial Evaluation     Re-Evaluation     Ther Exercise         TE 40 3   Manual Therapy     MT     Ther Activities        TA     Gait Training          GT     Neuro Re-education NR     Modalities     Non-Billable Service Time     Other     Total Time/Units 45 3     Electronically signed by:  Shauna Sanchez, PT

## 2022-09-22 ENCOUNTER — TELEPHONE (OUTPATIENT)
Dept: PAIN MANAGEMENT | Age: 67
End: 2022-09-22

## 2022-09-22 DIAGNOSIS — M79.609 PAIN IN EXTREMITY, UNSPECIFIED EXTREMITY: Primary | ICD-10-CM

## 2022-09-22 NOTE — TELEPHONE ENCOUNTER
Call to Rosemarie Armas. that procedure was approved for 9/29/2022 and that [facility] should call him a few days before for the pre op call and after 3:00 PM the business day before with the arrival time. Instructed Clemente to hold ibuprofen for 24 hours, naprosyn for 4 days and any aspirin containing products or fish oil for 7 days. Luli Cancer for 5 days, last dose 9/23/2022. Patient knows that he will need lab work drawn, will come to the hospital early the day of the procedure. Instructed to call office back if any questions. Suzanne Childs verbalized understanding.     Electronically signed by Jensen Landry RN on 9/22/2022 at 2:32 PM

## 2022-09-23 ENCOUNTER — HOSPITAL ENCOUNTER (OUTPATIENT)
Dept: PHYSICAL THERAPY | Age: 67
Setting detail: THERAPIES SERIES
Discharge: HOME | End: 2022-09-23
Payer: MEDICARE

## 2022-09-23 PROCEDURE — 97110 THERAPEUTIC EXERCISES: CPT | Performed by: PHYSICAL THERAPIST

## 2022-09-23 NOTE — PROGRESS NOTES
[facility]  Phone: [phone] Fax: [phone]       Physical Therapy Daily Treatment Note  Date:  2022    Patient Name:  Caroline Blackburn    :  1955  MRN: [id number]    Patient: Caroline Blackburn (75 y.o. male)   Examination Date: 5543  Plan of Care Certification Period: 2022 to       :  1955 ;    Confirmed: Yes MRN: [id number]  CSN: [id number]   Insurance: Payor: Fito Quivers / Plan: Luis Burgos ESSENTIAL/PLUS / Product Type: *No Product type* /   Insurance ID: [id number] - (Medicare Managed) Secondary Insurance (if applicable):    Referring Physician: Raquel Ramírez MD     PCP: Tereso Cheema. Eros Mccord DO Visits to Date/Visits Approved: 6/      No Show/Cancelled Appts:   /       Medical Diagnosis: Lumbago with sciatica, unspecified side [M54.40] M54.40 (ICD-10-CM) - Bilateral low back pain with sciatica, sciatica laterality unspecified, unspecified chronicity  Treatment Diagnosis:       Time In:  1440  Time Out:     1515     Subjective:  Patient reports that he heard from pain management. He is supposed to stop coumadin and will hear from office next week for injection    Exercises:  Exercise/Equipment Resistance/Repetitions Other comments   Bike    x7mins           Tball flex           rot   10x10s  5x10s            Trunk rot str   5x10s            Bridge    2x10           SLR   x10ea             Sit to stand     2x5           Marching    2x5ea single HR assist           Hip abd /ext    X10ea            Side to side amb   NT                    Other Therapeutic Activities:      Home Exercise Program:      Manual Treatments:      Modalities:      Timed Code Treatment Minutes:       Total Treatment Minutes:      Treatment/Activity Tolerance:  [x] Patient tolerated treatment well [] Patient limited by fatigue  [] Patient limited by pain  [] Patient limited by other medical complications  [] Other: performed there ex focusing on increasing AROM/strength of trunk and B hips. Gait remains hindered with use of rollator with decreased step length/height. Cont marching (single HR assist) and standing hip abd to promote hip strength/stability to improve functional gait. Pt has completed all therapy sessions. Pt to receive injections next week.  Pt to contact PT following if physician wants additional therapy     Plan:   [x] Continue per plan of care [] Alter current plan (see comments)  [] Plan of care initiated [] Hold pending MD visit [] Discharge  Plan for Next Session:         Treatment Charges: Mins Units   Initial Evaluation     Re-Evaluation     Ther Exercise         TE 35 2   Manual Therapy     MT     Ther Activities        TA     Gait Training          GT     Neuro Re-education NR     Modalities     Non-Billable Service Time     Other     Total Time/Units 35 2     Electronically signed by:  Wilfred Brown, PT

## 2022-09-26 NOTE — PROGRESS NOTES
[facility]  INSTRUCTIONS  . .......................................................................................................................................... [x] Parking the day of Surgery is located in the [facility].   Upon entering the door, make immediate right into the surgery reception room    [x]  Bring photo ID and insurance card     [x] You may have a light breakfast day of procedure    [x]  Wear loose comfortable clothing    [x]  Please follow instructions for medications as given per Dr's office    [x] You can expect a call the business day prior to procedure to notify you of your arrival time     [x] Please arrange for     []  Other instructions

## 2022-09-29 ENCOUNTER — HOSPITAL ENCOUNTER (OUTPATIENT)
Dept: GENERAL RADIOLOGY | Age: 67
Discharge: HOME | End: 2022-10-01
Attending: ANESTHESIOLOGY
Payer: MEDICARE

## 2022-09-29 ENCOUNTER — HOSPITAL ENCOUNTER (OUTPATIENT)
Age: 67
Setting detail: OUTPATIENT SURGERY
Discharge: HOME | End: 2022-09-29
Attending: ANESTHESIOLOGY | Admitting: ANESTHESIOLOGY
Payer: MEDICARE

## 2022-09-29 VITALS
DIASTOLIC BLOOD PRESSURE: 74 MMHG | BODY MASS INDEX: 39.24 KG/M2 | SYSTOLIC BLOOD PRESSURE: 130 MMHG | HEIGHT: 67 IN | RESPIRATION RATE: 16 BRPM | TEMPERATURE: 97.9 F | OXYGEN SATURATION: 97 % | WEIGHT: 250 LBS | HEART RATE: 84 BPM

## 2022-09-29 DIAGNOSIS — R52 PAIN MANAGEMENT: ICD-10-CM

## 2022-09-29 LAB
INR BLD: 1.1
METER GLUCOSE: 124 MG/DL (ref 74–99)
PROTHROMBIN TIME: 12.7 SEC (ref 9.3–12.4)

## 2022-09-29 PROCEDURE — 2709999900 HC NON-CHARGEABLE SUPPLY: Performed by: ANESTHESIOLOGY

## 2022-09-29 PROCEDURE — 2500000003 HC RX 250 WO HCPCS: Performed by: ANESTHESIOLOGY

## 2022-09-29 PROCEDURE — 36415 COLL VENOUS BLD VENIPUNCTURE: CPT

## 2022-09-29 PROCEDURE — 85610 PROTHROMBIN TIME: CPT

## 2022-09-29 PROCEDURE — 82962 GLUCOSE BLOOD TEST: CPT

## 2022-09-29 PROCEDURE — 6360000002 HC RX W HCPCS: Performed by: ANESTHESIOLOGY

## 2022-09-29 PROCEDURE — 3600000002 HC SURGERY LEVEL 2 BASE: Performed by: ANESTHESIOLOGY

## 2022-09-29 PROCEDURE — 6360000004 HC RX CONTRAST MEDICATION: Performed by: ANESTHESIOLOGY

## 2022-09-29 PROCEDURE — 3600000012 HC SURGERY LEVEL 2 ADDTL 15MIN: Performed by: ANESTHESIOLOGY

## 2022-09-29 PROCEDURE — 7100000010 HC PHASE II RECOVERY - FIRST 15 MIN: Performed by: ANESTHESIOLOGY

## 2022-09-29 PROCEDURE — 64483 NJX AA&/STRD TFRM EPI L/S 1: CPT | Performed by: ANESTHESIOLOGY

## 2022-09-29 PROCEDURE — 7100000011 HC PHASE II RECOVERY - ADDTL 15 MIN: Performed by: ANESTHESIOLOGY

## 2022-09-29 PROCEDURE — 64484 NJX AA&/STRD TFRM EPI L/S EA: CPT | Performed by: ANESTHESIOLOGY

## 2022-09-29 PROCEDURE — 3209999900 FLUORO FOR SURGICAL PROCEDURES

## 2022-09-29 RX ORDER — SODIUM CHLORIDE 9 MG/ML
INJECTION, SOLUTION INTRAVENOUS PRN
Status: DISCONTINUED | OUTPATIENT
Start: 2022-09-29 | End: 2022-09-29 | Stop reason: HOSPADM

## 2022-09-29 RX ORDER — BUPIVACAINE HYDROCHLORIDE 5 MG/ML
INJECTION, SOLUTION EPIDURAL; INTRACAUDAL PRN
Status: DISCONTINUED | OUTPATIENT
Start: 2022-09-29 | End: 2022-09-29 | Stop reason: ALTCHOICE

## 2022-09-29 RX ORDER — METHYLPREDNISOLONE ACETATE 40 MG/ML
INJECTION, SUSPENSION INTRA-ARTICULAR; INTRALESIONAL; INTRAMUSCULAR; SOFT TISSUE PRN
Status: DISCONTINUED | OUTPATIENT
Start: 2022-09-29 | End: 2022-09-29 | Stop reason: ALTCHOICE

## 2022-09-29 RX ORDER — SODIUM CHLORIDE 0.9 % (FLUSH) 0.9 %
5-40 SYRINGE (ML) INJECTION PRN
Status: DISCONTINUED | OUTPATIENT
Start: 2022-09-29 | End: 2022-09-29 | Stop reason: HOSPADM

## 2022-09-29 RX ORDER — LIDOCAINE HYDROCHLORIDE 5 MG/ML
INJECTION, SOLUTION INFILTRATION; INTRAVENOUS PRN
Status: DISCONTINUED | OUTPATIENT
Start: 2022-09-29 | End: 2022-09-29 | Stop reason: ALTCHOICE

## 2022-09-29 RX ORDER — SODIUM CHLORIDE 0.9 % (FLUSH) 0.9 %
5-40 SYRINGE (ML) INJECTION EVERY 12 HOURS SCHEDULED
Status: DISCONTINUED | OUTPATIENT
Start: 2022-09-29 | End: 2022-09-29 | Stop reason: HOSPADM

## 2022-09-29 ASSESSMENT — PAIN DESCRIPTION - DESCRIPTORS
DESCRIPTORS: DISCOMFORT

## 2022-09-29 ASSESSMENT — PAIN DESCRIPTION - ORIENTATION
ORIENTATION: LOWER
ORIENTATION: LOWER

## 2022-09-29 ASSESSMENT — PAIN DESCRIPTION - PAIN TYPE
TYPE: CHRONIC PAIN
TYPE: CHRONIC PAIN

## 2022-09-29 ASSESSMENT — PAIN DESCRIPTION - LOCATION
LOCATION: BACK
LOCATION: BACK

## 2022-09-29 ASSESSMENT — PAIN SCALES - GENERAL
PAINLEVEL_OUTOF10: 1
PAINLEVEL_OUTOF10: 1

## 2022-09-29 ASSESSMENT — PAIN - FUNCTIONAL ASSESSMENT: PAIN_FUNCTIONAL_ASSESSMENT: 0-10

## 2022-09-29 NOTE — DISCHARGE INSTRUCTIONS
[facility]  Home Going Instructions    1-Go home, rest for the remainder of the day  2-Please do not lift over 20 pounds the day of the injection  3-If you received sedation No: alcohol, driving, operating lawn mowers, plows, tractors or other dangerous equipment until next morning. Do not make important decisions or sign legal documents for 24 hours. You may experience light headedness, dizziness, nausea or sleepiness after sedation. Do not stay alone. A responsible adult must be with you for 24 hours. You could be nauseated from the medications you have received. Your IV site may be sore and bruised. 4-No dietary restrictions     5-Resume all medications the same day, blood thinners to be resumed 24 hours after injection if you were instructed to stop any. 6-Keep the surgical site clean and dry, you may shower the next morning and remove the      dressing. 7- No sitz baths, tub baths or hot tubs/swimming for 24 hours. 8- If you have any pain at the injection site(s), application of an ice pack to the area should be       helpful, 20 minutes on/20 minutes off for next 48 hours. 9- Call [facility] immediately at if you develop.   Fever greater than 100.4 F  Have bleeding or drainage from the puncture site  Have progressive Leg/arm numbness and or weakness  Loss of control of bowel and or bladder (wet/soil yourself)  Severe headache with inability to lift head  10-You may return to work the next day

## 2022-09-29 NOTE — INTERVAL H&P NOTE
Update History & Physical    The patient's History and Physical of September 7, 2022 was reviewed with the patient and I examined the patient. There was no change. The surgical site was confirmed by the patient and me. Plan: Lumbar TFESI. Has stopped coumadin- INR- 1.1. The risks, benefits, expected outcome, and alternative to the recommended procedure have been discussed with the patient. Patient understands and wants to proceed with the procedure.      Electronically signed by Joes A Rojas MD on 9/29/2022

## 2022-09-29 NOTE — OP NOTE
Operative Note      Patient: Josafat Schmitt YOB: 1955  MRN: [id number]    Date of Procedure: 2022    Pre-Op Diagnosis: Lumbar radicular pain [M54.16]    Post-Op Diagnosis: Same       Procedure(s):  LUMBAR TRANSFORAMINAL EPIDURAL STEROID INJECTION RIGHT L4 & LEFT L3 UNDER FLUOROSCOPIC GUIDANCE           Surgeon(s):  Mehul Gomez MD    Assistant:   * No surgical staff found *    Anesthesia: Local    Estimated Blood Loss (mL): Minimal    Complications: None    Specimens:   * No specimens in log *    Implants:  * No implants in log *      Drains: * No LDAs found *    Findings: good needle placement    Detailed Description of Procedure:   2022    Patient: Huyen Rodriguez Sr.  :  1955  Age:  69 y.o. Sex:  male     PRE-OPERATIVE DIAGNOSIS: Lumbar disc displacement, lumbar neural foraminal stenosis, lumbar radiculopathy. POST-OPERATIVE DIAGNOSIS: Same. PROCEDURE: RightL 4 and left L3 Transforaminal epidural steroid injection under fluoroscopic guidance     SURGEON: Mehul Gomez MD    ANESTHESIA: local    ESTIMATED BLOOD LOSS: None.  ______________________________________________________________________  BRIEF HISTORY: Josafat Schmitt comes in today for the first lumbar transforaminal epidural steroid injection under fluoroscopic guidance. The potential complications of this procedure were discussed with him again today. He has elected to undergo the aforementioned procedure. Kathy complete History & Physical examination were reviewed in depth, a copy of which is in the chart. DESCRIPTION OF PROCEDURE:    After confirming written and informed consent, a time-out was performed and Kathy name and date of birth, the procedure to be performed as well as the plan for the location of the needle insertion were confirmed. The patient was brought into the procedure room and placed in the prone position on the fluoroscopy table.  Standard monitors were placed and vital signs were observed throughout the procedure. The area of the lumbar spine was prepped with chloraprep and draped in a sterile manner. The vertebral body was identified with AP fluoroscopy. An oblique view was obtained to better visualize the inferior junction of the pedicle and transverse process . The 6 o'clock position of the pedicle was marked and identified. The skin and subcutaneous tissue were anesthetized with 0.5% lidocaine. TWO  # 22 gauge 5 inch pencil point needle was directed toward the targeted point under fluoroscopy until bone was contacted. The needle was then walked inferiorly until the neural foramen was entered. A lateral fluoroscopic view was then used to place the needle tip in the middle of the foramen. Negative aspiration was confirmed for blood and CSF and 0.5-1 cc of Isovue M 300 contrast was injected at each level under live fluoroscopy. Appropriate neurograms were observed under AP fluoroscopy. Then after negative aspiration, a solution of the 2 cc of 0.5% lidocaine and 7 mg Dexamethasone was easily injected at each level. The needles were removed with constant aspiration technique. The patient back was cleaned and a bandage was placed over the needle insertion points    Disposition the patient tolerated the procedure well and there were no complications . Vital signs remained stable throughout the procedure. The patient was escorted to the recovery area where they remained until discharge and written discharge instructions for the procedure were given. Plan: Joseph Norton will return to our pain management center as scheduled.      Preston Blandon MD

## 2022-10-27 ENCOUNTER — OFFICE VISIT (OUTPATIENT)
Dept: PAIN MANAGEMENT | Age: 67
End: 2022-10-27
Payer: MEDICARE

## 2022-10-27 VITALS
OXYGEN SATURATION: 66 % | RESPIRATION RATE: 16 BRPM | DIASTOLIC BLOOD PRESSURE: 72 MMHG | WEIGHT: 250 LBS | SYSTOLIC BLOOD PRESSURE: 142 MMHG | TEMPERATURE: 99 F | BODY MASS INDEX: 39.24 KG/M2 | HEIGHT: 67 IN

## 2022-10-27 DIAGNOSIS — M96.1 POSTLAMINECTOMY SYNDROME, LUMBAR: Primary | ICD-10-CM

## 2022-10-27 DIAGNOSIS — M47.817 LUMBOSACRAL SPONDYLOSIS WITHOUT MYELOPATHY: ICD-10-CM

## 2022-10-27 DIAGNOSIS — M54.16 LUMBAR RADICULAR PAIN: ICD-10-CM

## 2022-10-27 DIAGNOSIS — M48.061 SPINAL STENOSIS OF LUMBAR REGION, UNSPECIFIED WHETHER NEUROGENIC CLAUDICATION PRESENT: ICD-10-CM

## 2022-10-27 DIAGNOSIS — M51.36 DDD (DEGENERATIVE DISC DISEASE), LUMBAR: ICD-10-CM

## 2022-10-27 PROCEDURE — 99213 OFFICE O/P EST LOW 20 MIN: CPT | Performed by: ANESTHESIOLOGY

## 2022-10-27 PROCEDURE — 3074F SYST BP LT 130 MM HG: CPT | Performed by: ANESTHESIOLOGY

## 2022-10-27 PROCEDURE — 3078F DIAST BP <80 MM HG: CPT | Performed by: ANESTHESIOLOGY

## 2022-10-27 PROCEDURE — 1123F ACP DISCUSS/DSCN MKR DOCD: CPT | Performed by: ANESTHESIOLOGY

## 2022-10-27 NOTE — PROGRESS NOTES
[facility] Pain Management        [address] [address] [address]  [facility], [address] [facility]  Dept: [phone]      Follow up Note      Humble Burleson . Date of Visit:  10/27/2022    CC:  Patient presents for follow up   Chief Complaint   Patient presents with    Follow-up     Low back pain LUMBAR TRANSFORAMINAL EPIDURAL STEROID INJECTION RIGHT L4 & LEFT L3 UNDER FLUOROSCOPIC GUIDANCE                 HPI:  Chronic low back pain for many years. Prior lumbar spine surgery in 2007 by Dr. John Esipnosa. Having low back and B/l LE pain. Has been evaluated by [facility] Dr. Makayla Willis. Nursing notes and details of the pain history reviewed. Please see intake notes for details. S/P TFESI - has helped pain. Ono palsy + right. Previous treatments:   Physical Therapy/ HEP : yes      Medications: - NSAID's : yes                        - Membrane stabilizers : no                       - Opioids : no                       - Adjuvants or Others : yes  Spine Surgeries: yes     He has been on anticoagulation medications yes and include Warfarin (H/o A.fib)     H/O Smoking: no  H/O alcohol abuse : no  H/O Illicit drug use : no  Imaging:      MRI of LS spine: 8/15/2022:      FINDINGS:   BONES/ALIGNMENT: Loss of the normal lordotic curvature. Trace degenerative   retrolisthesis of L3 on 4. Mixed opposing endplate degenerative signal   changes throughout the lumbar spine with moderate to severe disc height loss   at L2-3 and L3-4. No osseous edema. Vertebral body heights are maintained. Prior decompressive laminectomies at L2-3 and L3-4. SPINAL CORD:  The conus terminates normally. SOFT TISSUES:  No paraspinal mass identified. L1-L2: Disc bulge along with facet arthropathy and prominent dorsal epidural   fat again noted contributing to moderate to severe canal narrowing. Moderate   left and severe right foraminal stenosis. L2-L3: Prior laminectomy.   Disc bulge along with facet arthropathy along with   epidural lipomatosis contributing to severe canal stenosis. Small amount of   enhancing granulation tissue along the right spinal canal.  Moderate left and   severe right foraminal stenosis. L3-L4: Prior laminectomy. Disc bulging and degenerative facet changes along   with epidural lipomatosis contributing to severe canal stenosis. Severe   bilateral foraminal stenosis. L4-L5: Disc bulge along with facet arthropathy and ligamentous flavum   hypertrophy resulting in severe canal stenosis, moderate right and severe   left foraminal stenosis. L5-S1: Disc bulge, facet arthropathy along with ligamentous flavum   hypertrophy resulting in moderate canal stenosis, moderate to severe   bilateral foraminal stenosis. Impression   No significant change compared to MR lumbar spine from 06/28/2022 with   multiple levels of severe canal and foraminal stenosis, as above. MRI of LS spine: 6/28/2022:  Impression   1. No fracture or bony destructive lesion. .   2. Evidence of prior hemilaminectomies at L2-3 and L3-4.   3. Severe central canal stenoses at L2-3 and L4-5. Moderate grade stenoses   at L1-2, L3-4 and L5-S1.   4. Severe neural foraminal stenoses throughout the lumbar spine. OARRS report[de-identified] reviewed.     Past Medical History:   Diagnosis Date    Atrial fibrillation ([address] 10/2012    now on Coumadin    Bladder tumor     Diabetes mellitus ([address]     Hyperlipidemia     Hypertension     LONG TERM ANTICOAGULENT USE     Lumbar pain     CONCHITA on CPAP        Past Surgical History:   Procedure Laterality Date    BACK SURGERY  2007    repair of herniated disc    CARDIOVASCULAR STRESS TEST  11/21/2006    findings of inferior and anterior ischemia     CYSTOSCOPY N/A 10/22/2021    CYSTOSCOPY RETROGRADE PYELOGRAM TRANSURETHRAL RESECTION OF BLADDER TUMOR WITH INSTALLATION OF GEMCITIBINE performed by Kristina SINGH Gama, DO at [address] [facility] CATH LAB PROCEDURE  12/13/2006    No obstructive CAD and mild dilated cardiomyopathy with EF 45-50%    PAIN MANAGEMENT PROCEDURE Bilateral 9/29/2022    LUMBAR TRANSFORAMINAL EPIDURAL STEROID INJECTION RIGHT L4 & LEFT L3 UNDER FLUOROSCOPIC GUIDANCE performed by Mehul Gomez MD at [facility] OR    TURP N/A 4/7/2021    CYSTOSCOPY, URETHRAL DILITATION, PLASMA BUTTON TRANSURETHRAL RESECTION BLADDER TUMOR WITH FULGURATION, EXCHANGE GABRIEL CATHETER performed by Prieto Walden MD at [address]    TURP N/A 4/21/2021    CYSTOSCOPY RETROGRADE PYELOGRAM PLASMA LOOP TRANSURETHRAL RESECTION PROSTATE performed by Karen Malloy DO at [address] [facility]       Prior to Admission medications    Medication Sig Start Date End Date Taking? Authorizing Provider   metFORMIN (GLUCOPHAGE-XR) 500 MG extended release tablet TAKE 2 TABLETS TWICE A DAY 8/26/22  Yes Garrel Killer, DO   blood glucose monitor kit and supplies Dispense sufficient amount for indicated testing frequency plus additional to accommodate PRN testing needs. Dispense all needed supplies to include: monitor, strips, lancing device, lancets, control solutions, alcohol swabs. 7/11/22  Yes Garrel Killer, DO   blood glucose monitor strips Test once a day & as needed for symptoms of irregular blood glucose. Dispense sufficient amount for indicated testing frequency plus additional to accommodate PRN testing needs.  7/11/22  Yes Garrel Killer, DO   Lancets MISC 1 each by Does not apply route daily 7/11/22  Yes Garrel Killer, DO   allopurinol (ZYLOPRIM) 100 MG tablet Take 1 tablet by mouth daily 6/8/22  Yes Garrel Killer, DO   furosemide (LASIX) 20 MG tablet Take 1 tablet by mouth daily 6/8/22  Yes Garrel Killer, DO   lisinopril (PRINIVIL;ZESTRIL) 20 MG tablet Take 1 tablet by mouth daily 6/8/22  Yes Garrel Killer, DO   atorvastatin (LIPITOR) 40 MG tablet Take 0.5 tablets by mouth every evening 6/8/22  Yes Garrel Killer, DO   empagliflozin (JARDIANCE) 25 MG tablet Take 1 tablet by mouth daily 6/8/22  Yes Rakesh Shoulders, DO   JANTOVEN 3 MG tablet Take 1 tablet by mouth daily 22  Yes Rakesh Shoulders, DO   carvedilol (COREG) 6.25 MG tablet Take 1 tablet by mouth 2 times daily (with meals) 22  Yes Rakesh Shoulders, DO   Multiple Vitamins-Minerals (THERAPEUTIC MULTIVITAMIN-MINERALS) tablet Take 1 tablet by mouth daily   Yes Historical Provider, MD       No Known Allergies    Social History     Socioeconomic History    Marital status:      Spouse name: Not on file    Number of children: Not on file    Years of education: Not on file    Highest education level: Not on file   Occupational History    Not on file   Tobacco Use    Smoking status: Former     Packs/day: 1.00     Years: 30.00     Pack years: 30.00     Types: Cigarettes     Quit date: 1999     Years since quittin.8    Smokeless tobacco: Never   Vaping Use    Vaping Use: Never used   Substance and Sexual Activity    Alcohol use: No    Drug use: No    Sexual activity: Not on file   Other Topics Concern    Not on file   Social History Narrative    Drinks 2 cups coffee most days     Social Determinants of Health     Financial Resource Strain: Not on file   Food Insecurity: Not on file   Transportation Needs: Not on file   Physical Activity: Not on file   Stress: Not on file   Social Connections: Not on file   Intimate Partner Violence: Not on file   Housing Stability: Not on file       Family History   Problem Relation Age of Onset    Prostate Cancer Father 62    Cancer Father     Hypertension Mother         and sister     Arthritis Mother        REVIEW OF SYSTEMS:     Jadyn Chance denies fever/chills, chest pain, shortness of breath, new bowel or bladder complaints. All other review of systems was negative.     PHYSICAL EXAMINATION:      BP (!) 142/72   Temp 99 °F (37.2 °C) (Oral)   Resp 16   Ht 5' 7\" (1.702 m)   Wt 250 lb (113.4 kg)   SpO2 (!) 66%   BMI 39.16 kg/m²     General:       General appearance:  Pleasant and well-hydrated, in no distress and A & O x 3  Build:Obese  Function: Rises from seated position easily     HEENT:     Head:normocephalic, atraumatic     Lungs:     Breathing:normal breathing pattern      CVS:     RRR     Abdomen:     Shape:obese, non-distended, and normal     Cervical spine:     Inspection:normal     Thoracic spine:                Spine inspection:normal   Palpation:No tenderness over the midline and paraspinal area, bilaterally  Range of motion:normal in flexion, extension rotation bilateral and is not painful. Lumbar spine:     Spine inspection: Normal   Palpation: Tenderness paravertebral muscles Yes bilaterally  Range of motion: Decreased,  Sacroiliac joint tenderness No   SLR : negative bilaterally     Musculoskeletal:     Trigger points no     Extremities:     Tremors:None bilaterally upper and lower  Edema: no     Neurological:     Sensory: Normal to light touch      Motor: No changes     Dermatology:     Skin:no rashes or lesions noted     Assessment/Plan:       Diagnosis Orders   1. DDD (degenerative disc disease), lumbar          2. Lumbar radicular pain          3. Lumbosacral spondylosis without myelopathy          4. Spinal stenosis of lumbar region, unspecified whether neurogenic claudication present          5. Postlaminectomy syndrome, lumbar          6. Anticoagulated on Coumadin                69 y.o. male with h/o low back and LE pain. Prior lumbar laminectomy/ decompression in 2007. Has been evaluated by [facility] Dr. Amanda Mackenzie. MRI LS spine reviewed. On Coumadin     Tried conservative treatment    S/p Lumbar TFESI on 9/29/2022- good pain relief of > 75%. PLAN:  PT/ HEP    Aquatherapy. Consider adjuvants-patient not too keen on meds. Can repeat RUBEN if pain recurs. ( Need to hold coumadin for spine interventions). If no long lasting pain relief, recommend reeval by [facility].    F/U prn.      Counseling : Patient encouraged to stay active and to watch/lose weight     Encouraged to continue Regular home exercise program as tolerated - stretching / strengthening. Treatment plan discussed with the patient including medication and procedure side effects. Controlled Substances Monitoring: OARRS reviewed.      Cheikh Rivera MD

## 2022-10-27 NOTE — PROGRESS NOTES
Do you currently have any of the following:    Fever: No  Headache:  No  Cough: No  Shortness of breath: No  Exposed to anyone with these symptoms: No         Veronica Mariusz Real  presents to the [place] [facility] on 10/27/2022. Simon Johnson is complaining of pain in his low back. The pain is constant. The pain is described as aching and miserable. Pain is rated on his best day at a 0, on his worst day at a 8, and on average at a 6 on the VAS scale. Any procedures since your last visit: Yes, with 75 % relief. Pacemaker or defibrillator: No    He is not on NSAIDS and is  on anticoagulation medications to include jantoven and is managed by Berk[facility] Bustamante. José Gallegos,        Medication Contract and Consent for Opioid Use Documents Filed        No documents found                    BP (!) 142/72   Temp 99 °F (37.2 °C) (Oral)   Resp 16   Ht 5' 7\" (1.702 m)   Wt 250 lb (113.4 kg)   SpO2 (!) 66%   BMI 39.16 kg/m²      No LMP for male patient.

## 2022-12-13 DIAGNOSIS — I10 ESSENTIAL HYPERTENSION: ICD-10-CM

## 2022-12-13 RX ORDER — CARVEDILOL 6.25 MG/1
TABLET ORAL
Qty: 180 TABLET | Refills: 1 | OUTPATIENT
Start: 2022-12-13

## 2023-01-06 ENCOUNTER — COMMUNITY OUTREACH (OUTPATIENT)
Dept: FAMILY MEDICINE CLINIC | Age: 68
End: 2023-01-06

## 2023-01-06 DIAGNOSIS — M1A.9XX0 CHRONIC GOUT WITHOUT TOPHUS, UNSPECIFIED CAUSE, UNSPECIFIED SITE: ICD-10-CM

## 2023-01-06 DIAGNOSIS — I10 ESSENTIAL HYPERTENSION: ICD-10-CM

## 2023-01-08 RX ORDER — ALLOPURINOL 100 MG/1
100 TABLET ORAL DAILY
Qty: 90 TABLET | Refills: 0 | Status: SHIPPED | OUTPATIENT
Start: 2023-01-08

## 2023-01-08 RX ORDER — LISINOPRIL 20 MG/1
20 TABLET ORAL DAILY
Qty: 90 TABLET | Refills: 0 | Status: SHIPPED | OUTPATIENT
Start: 2023-01-08

## 2023-02-08 ENCOUNTER — OFFICE VISIT (OUTPATIENT)
Dept: FAMILY MEDICINE CLINIC | Age: 68
End: 2023-02-08
Payer: MEDICARE

## 2023-02-08 VITALS
TEMPERATURE: 97.1 F | BODY MASS INDEX: 39.16 KG/M2 | RESPIRATION RATE: 19 BRPM | HEART RATE: 95 BPM | SYSTOLIC BLOOD PRESSURE: 133 MMHG | DIASTOLIC BLOOD PRESSURE: 92 MMHG | HEIGHT: 67 IN | OXYGEN SATURATION: 95 %

## 2023-02-08 DIAGNOSIS — M54.42 CHRONIC BILATERAL LOW BACK PAIN WITH BILATERAL SCIATICA: ICD-10-CM

## 2023-02-08 DIAGNOSIS — E78.5 HYPERLIPIDEMIA, UNSPECIFIED HYPERLIPIDEMIA TYPE: ICD-10-CM

## 2023-02-08 DIAGNOSIS — I48.91 ATRIAL FIBRILLATION, UNSPECIFIED TYPE (HCC): ICD-10-CM

## 2023-02-08 DIAGNOSIS — E11.9 TYPE 2 DIABETES MELLITUS WITHOUT COMPLICATION, WITHOUT LONG-TERM CURRENT USE OF INSULIN (HCC): Primary | ICD-10-CM

## 2023-02-08 DIAGNOSIS — I10 ESSENTIAL HYPERTENSION: ICD-10-CM

## 2023-02-08 DIAGNOSIS — G89.29 CHRONIC BILATERAL LOW BACK PAIN WITH BILATERAL SCIATICA: ICD-10-CM

## 2023-02-08 DIAGNOSIS — M54.41 CHRONIC BILATERAL LOW BACK PAIN WITH BILATERAL SCIATICA: ICD-10-CM

## 2023-02-08 LAB
HBA1C MFR BLD: 8.5 %
INTERNATIONAL NORMALIZATION RATIO, POC: 2.5
PROTHROMBIN TIME, POC: 29.8

## 2023-02-08 PROCEDURE — 3052F HG A1C>EQUAL 8.0%<EQUAL 9.0%: CPT | Performed by: FAMILY MEDICINE

## 2023-02-08 PROCEDURE — 1123F ACP DISCUSS/DSCN MKR DOCD: CPT | Performed by: FAMILY MEDICINE

## 2023-02-08 PROCEDURE — 99214 OFFICE O/P EST MOD 30 MIN: CPT | Performed by: FAMILY MEDICINE

## 2023-02-08 PROCEDURE — 3074F SYST BP LT 130 MM HG: CPT | Performed by: FAMILY MEDICINE

## 2023-02-08 PROCEDURE — 85610 PROTHROMBIN TIME: CPT | Performed by: FAMILY MEDICINE

## 2023-02-08 PROCEDURE — 3078F DIAST BP <80 MM HG: CPT | Performed by: FAMILY MEDICINE

## 2023-02-08 PROCEDURE — 83036 HEMOGLOBIN GLYCOSYLATED A1C: CPT | Performed by: FAMILY MEDICINE

## 2023-02-08 RX ORDER — METFORMIN HYDROCHLORIDE 500 MG/1
TABLET, EXTENDED RELEASE ORAL
Qty: 360 TABLET | Refills: 1 | Status: SHIPPED | OUTPATIENT
Start: 2023-02-08

## 2023-02-08 RX ORDER — FUROSEMIDE 40 MG/1
20 TABLET ORAL DAILY
Qty: 90 TABLET | Refills: 1 | Status: SHIPPED
Start: 2023-02-08 | End: 2023-02-13 | Stop reason: SDUPTHER

## 2023-02-08 RX ORDER — CARVEDILOL 6.25 MG/1
6.25 TABLET ORAL 2 TIMES DAILY WITH MEALS
Qty: 180 TABLET | Refills: 1 | Status: SHIPPED | OUTPATIENT
Start: 2023-02-08

## 2023-02-08 RX ORDER — WARFARIN SODIUM 3 MG/1
3 TABLET ORAL DAILY
Qty: 90 TABLET | Refills: 1 | Status: SHIPPED | OUTPATIENT
Start: 2023-02-08

## 2023-02-08 RX ORDER — ATORVASTATIN CALCIUM 40 MG/1
20 TABLET, FILM COATED ORAL EVERY EVENING
Qty: 45 TABLET | Refills: 1 | Status: SHIPPED | OUTPATIENT
Start: 2023-02-08

## 2023-02-08 SDOH — ECONOMIC STABILITY: HOUSING INSECURITY
IN THE LAST 12 MONTHS, WAS THERE A TIME WHEN YOU DID NOT HAVE A STEADY PLACE TO SLEEP OR SLEPT IN A SHELTER (INCLUDING NOW)?: NO

## 2023-02-08 SDOH — ECONOMIC STABILITY: FOOD INSECURITY: WITHIN THE PAST 12 MONTHS, YOU WORRIED THAT YOUR FOOD WOULD RUN OUT BEFORE YOU GOT MONEY TO BUY MORE.: NEVER TRUE

## 2023-02-08 SDOH — ECONOMIC STABILITY: INCOME INSECURITY: HOW HARD IS IT FOR YOU TO PAY FOR THE VERY BASICS LIKE FOOD, HOUSING, MEDICAL CARE, AND HEATING?: NOT HARD AT ALL

## 2023-02-08 SDOH — ECONOMIC STABILITY: FOOD INSECURITY: WITHIN THE PAST 12 MONTHS, THE FOOD YOU BOUGHT JUST DIDN'T LAST AND YOU DIDN'T HAVE MONEY TO GET MORE.: NEVER TRUE

## 2023-02-08 ASSESSMENT — PATIENT HEALTH QUESTIONNAIRE - PHQ9
SUM OF ALL RESPONSES TO PHQ QUESTIONS 1-9: 0
SUM OF ALL RESPONSES TO PHQ QUESTIONS 1-9: 0
1. LITTLE INTEREST OR PLEASURE IN DOING THINGS: 0
SUM OF ALL RESPONSES TO PHQ9 QUESTIONS 1 & 2: 0
SUM OF ALL RESPONSES TO PHQ QUESTIONS 1-9: 0
2. FEELING DOWN, DEPRESSED OR HOPELESS: 0
SUM OF ALL RESPONSES TO PHQ QUESTIONS 1-9: 0

## 2023-02-13 PROBLEM — G89.29 CHRONIC BILATERAL LOW BACK PAIN WITH BILATERAL SCIATICA: Status: ACTIVE | Noted: 2023-02-13

## 2023-02-13 PROBLEM — M54.41 CHRONIC BILATERAL LOW BACK PAIN WITH BILATERAL SCIATICA: Status: ACTIVE | Noted: 2023-02-13

## 2023-02-13 PROBLEM — M54.42 CHRONIC BILATERAL LOW BACK PAIN WITH BILATERAL SCIATICA: Status: ACTIVE | Noted: 2023-02-13

## 2023-02-13 RX ORDER — FUROSEMIDE 40 MG/1
40 TABLET ORAL DAILY
Qty: 90 TABLET | Refills: 1 | Status: SHIPPED | OUTPATIENT
Start: 2023-02-13

## 2023-02-13 ASSESSMENT — ENCOUNTER SYMPTOMS
EYE DISCHARGE: 0
GASTROINTESTINAL NEGATIVE: 1
BACK PAIN: 1
COUGH: 0
RHINORRHEA: 0
EYE REDNESS: 0
PHOTOPHOBIA: 0
SHORTNESS OF BREATH: 0
SINUS PAIN: 0

## 2023-02-13 NOTE — PROGRESS NOTES
2023    Subhash Real . Chief Complaint   Patient presents with    Diabetes    Office Visit for Anticoagulation Management    Swelling     foot swelling for the past couple months        HPI  History was obtained from patient. Mily Coronel is a 69 y.o. male who presents today with the followin. Type 2 diabetes mellitus without complication, without long-term current use of insulin ([address]    2. Chronic bilateral low back pain with bilateral sciatica    3. Atrial fibrillation, unspecified type ([address]    4. Essential hypertension    5. Hyperlipidemia, unspecified hyperlipidemia type       Patient is here for follow-up of atrial fibrillation type 2 diabetes chronic low back pain hypertension hyperlipidemia. Patient is taking all of his medications as prescribed. Patient does not get his INR checked frequently although he is always subtherapeutic when it is checked. Patient is asked if he would like to switch to Eliquis which does not require monitoring and he declines. He is offered home INR checks and he is agreeable to trying that. Patient does notice increased swelling of his lower extremities over the last couple months. He has had no increasing shortness of breath and no chest pain. Patient does have chronic low back pain with radiation to his extremities secondary to spinal stenosis. REVIEW OF SYMPTOMS    Review of Systems   Constitutional:  Negative for chills, fatigue and fever. HENT:  Negative for congestion, mouth sores, postnasal drip, rhinorrhea and sinus pain. Eyes:  Negative for photophobia, discharge and redness. Respiratory:  Negative for cough and shortness of breath. Cardiovascular:  Negative for chest pain. Gastrointestinal: Negative. Genitourinary:  Negative for difficulty urinating, dysuria, hematuria and urgency. Musculoskeletal:  Positive for back pain. Neurological:  Negative for headaches. Psychiatric/Behavioral:  Negative for sleep disturbance.       PAST MEDICAL HISTORY  Past Medical History:   Diagnosis Date    Atrial [address] [address] 10/2012    now on Coumadin    Bladder tumor     Diabetes mellitus ([facility] [address]     Hyperlipidemia     Hypertension     LONG TERM ANTICOAGULENT USE     Lumbar pain     CONCHITA on CPAP        FAMILY HISTORY  Family History   Problem Relation Age of Onset    Prostate Cancer Father 62    Cancer Father     Hypertension Mother         and sister     Arthritis Mother        SOCIAL HISTORY  Social History     Socioeconomic History    Marital status:      Spouse name: None    Number of children: None    Years of education: None    Highest education level: None   Tobacco Use    Smoking status: Former     Packs/day: 1.00     Years: 30.00     Pack years: 30.00     Types: Cigarettes     Quit date: 1999     Years since quittin.1    Smokeless tobacco: Never   Vaping Use    Vaping Use: Never used   Substance and Sexual Activity    Alcohol use: No    Drug use: No   Social History Narrative    Drinks 2 cups coffee most days     Social Determinants of Health     Financial Resource Strain: Low Risk     Difficulty of Paying Living Expenses: Not hard at all   Food Insecurity: No Food Insecurity    Worried About Running Out of Food in the Last Year: Never true    Ran Out of Food in the Last Year: Never true   Transportation Needs: Unknown    Lack of Transportation (Non-Medical): No   Housing Stability: Unknown    Unstable Housing in the Last Year: No        SURGICAL HISTORY  Past Surgical History:   Procedure Laterality Date    BACK SURGERY  2007    repair of herniated disc    CARDIOVASCULAR STRESS TEST  2006    findings of inferior and anterior ischemia     CYSTOSCOPY N/A 10/22/2021    CYSTOSCOPY RETROGRADE PYELOGRAM TRANSURETHRAL RESECTION OF BLADDER TUMOR WITH INSTALLATION OF GEMCITIBINE performed by Oksana SINGH Gama, DO at [address] [facility] CATH LAB PROCEDURE  2006    No obstructive CAD and mild dilated cardiomyopathy with EF 45-50%    PAIN MANAGEMENT PROCEDURE Bilateral 9/29/2022    LUMBAR TRANSFORAMINAL EPIDURAL STEROID INJECTION RIGHT L4 & LEFT L3 UNDER FLUOROSCOPIC GUIDANCE performed by Marleen Friedman MD at [address] [facility]    TURP N/A 4/7/2021    CYSTOSCOPY, URETHRAL DILITATION, PLASMA BUTTON TRANSURETHRAL RESECTION BLADDER TUMOR WITH FULGURATION, EXCHANGE GABRIEL CATHETER performed by Rick Murray MD at [address]    TURP N/A 4/21/2021    CYSTOSCOPY RETROGRADE PYELOGRAM PLASMA LOOP TRANSURETHRAL RESECTION PROSTATE performed by Barney Malloy DO at [facility] OR                 CURRENT MEDICATIONS  Current Outpatient Medications   Medication Sig Dispense Refill    furosemide (LASIX) 40 MG tablet Take 1 tablet by mouth daily 90 tablet 1    JANTOVEN 3 MG tablet Take 1 tablet by mouth daily 90 tablet 1    carvedilol (COREG) 6.25 MG tablet Take 1 tablet by mouth 2 times daily (with meals) 180 tablet 1    empagliflozin (JARDIANCE) 25 MG tablet Take 1 tablet by mouth daily 90 tablet 1    atorvastatin (LIPITOR) 40 MG tablet Take 0.5 tablets by mouth every evening 45 tablet 1    metFORMIN (GLUCOPHAGE-XR) 500 MG extended release tablet TAKE 2 TABLETS TWICE A  tablet 1    SITagliptin (JANUVIA) 100 MG tablet Take 1 tablet by mouth daily 90 tablet 1    lisinopril (PRINIVIL;ZESTRIL) 20 MG tablet Take 1 tablet by mouth daily 90 tablet 0    allopurinol (ZYLOPRIM) 100 MG tablet Take 1 tablet by mouth daily 90 tablet 0    blood glucose monitor kit and supplies Dispense sufficient amount for indicated testing frequency plus additional to accommodate PRN testing needs. Dispense all needed supplies to include: monitor, strips, lancing device, lancets, control solutions, alcohol swabs. 1 kit 0    blood glucose monitor strips Test once a day & as needed for symptoms of irregular blood glucose. Dispense sufficient amount for indicated testing frequency plus additional to accommodate PRN testing needs.  100 strip 3    Lancets MISC 1 each by Does not apply route daily 100 each 5    Multiple Vitamins-Minerals (THERAPEUTIC MULTIVITAMIN-MINERALS) tablet Take 1 tablet by mouth daily       No current facility-administered medications for this visit. ALLERGIES  No Known Allergies    PHYSICAL EXAM    BP (!) 133/92   Pulse 95   Temp 97.1 °F (36.2 °C)   Resp 19   Ht 5' 7\" (1.702 m)   SpO2 95%   BMI 39.16 kg/m²     Physical Exam  Vitals and nursing note reviewed. Constitutional:       Appearance: Normal appearance. HENT:      Nose: No congestion or rhinorrhea. Mouth/Throat:      Mouth: Mucous membranes are moist.      Pharynx: Oropharynx is clear. Eyes:      General: No scleral icterus. Conjunctiva/sclera: Conjunctivae normal.   Cardiovascular:      Rate and Rhythm: Normal rate and regular rhythm. Heart sounds: Normal heart sounds. Pulmonary:      Effort: Pulmonary effort is normal.      Breath sounds: Normal breath sounds. Abdominal:      Palpations: Abdomen is soft. Tenderness: There is no abdominal tenderness. Musculoskeletal:      Cervical back: Neck supple. Right lower leg: Edema present. Left lower leg: Edema present. Skin:     General: Skin is warm. Neurological:      Mental Status: He is alert and oriented to person, place, and time. Psychiatric:         Mood and Affect: Mood normal.       ASSESSMENT & PLAN    Syed Guzman was seen today for diabetes, office visit for anticoagulation management and swelling. Diagnoses and all orders for this visit:    Type 2 diabetes mellitus without complication, without long-term current use of insulin ([facility])  -     POCT glycosylated hemoglobin (Hb A1C)  -     empagliflozin (JARDIANCE) 25 MG tablet; Take 1 tablet by mouth daily  -     metFORMIN (GLUCOPHAGE-XR) 500 MG extended release tablet; TAKE 2 TABLETS TWICE A DAY  -     Diabetic Foot Exam  -     SITagliptin (JANUVIA) 100 MG tablet; Take 1 tablet by mouth daily  -     Comprehensive Metabolic Panel;  Future  Patient's hemoglobin A1c is 8.5 today. I have added Januvia to the patient's Jardiance and metformin. Patient's goal is to have his hemoglobin A1c less than 7. Patient reminded to be as compliant as possible with a diabetic diet. Weight loss would also be helpful. Chronic bilateral low back pain with bilateral sciatica  Patient will follow-up with pain management. Atrial fibrillation, unspecified type ([address]  -     POCT INR  -     JANTOVEN 3 MG tablet; Take 1 tablet by mouth daily  Patient will continue with his warfarin 3 mg. INR today is 2.5. Papers will be sent for home INR machine. Essential hypertension  -     carvedilol (COREG) 6.25 MG tablet; Take 1 tablet by mouth 2 times daily (with meals)  -     Discontinue: furosemide (LASIX) 40 MG tablet; Take 0.5 tablets by mouth daily  -     furosemide (LASIX) 40 MG tablet; Take 1 tablet by mouth daily  Patient's Lasix has been increased from 20 to 40 mg daily. Patient will continue with his Coreg and lisinopril. Hyperlipidemia, unspecified hyperlipidemia type  -     atorvastatin (LIPITOR) 40 MG tablet; Take 0.5 tablets by mouth every evening  Patient's most recent LDL cholesterol was in June of last year and was 58. Patient will continue with his Lipitor 20 mg daily. Return in about 2 months (around 4/8/2023). Electronically signed by Sandrita Wilson.  Lindsay Das DO on 2/13/2023

## 2023-02-17 ENCOUNTER — TELEPHONE (OUTPATIENT)
Dept: FAMILY MEDICINE CLINIC | Age: 68
End: 2023-02-17

## 2023-02-17 NOTE — TELEPHONE ENCOUNTER
Pt is confused as to why his lasix states 40mg, but his directions still say take a half tablet. Please clarifiy with pt. Yes

## 2023-02-19 NOTE — TELEPHONE ENCOUNTER
Prescription for Lasix 40 mg 1 tablet daily was written on February 13. Prescription specifically gave instructions to the pharmacist to discontinue the 40 mg 1/2 tablet daily.

## 2023-03-08 LAB — INR BLD: 2.9

## 2023-03-09 ENCOUNTER — ANTI-COAG VISIT (OUTPATIENT)
Dept: FAMILY MEDICINE CLINIC | Age: 68
End: 2023-03-09

## 2023-03-13 ENCOUNTER — TELEPHONE (OUTPATIENT)
Dept: FAMILY MEDICINE CLINIC | Age: 68
End: 2023-03-13

## 2023-03-13 DIAGNOSIS — E11.9 TYPE 2 DIABETES MELLITUS WITHOUT COMPLICATION, WITHOUT LONG-TERM CURRENT USE OF INSULIN (HCC): ICD-10-CM

## 2023-03-13 DIAGNOSIS — I10 ESSENTIAL HYPERTENSION: ICD-10-CM

## 2023-03-13 RX ORDER — FUROSEMIDE 20 MG/1
TABLET ORAL
Qty: 90 TABLET | Refills: 1 | OUTPATIENT
Start: 2023-03-13

## 2023-03-13 NOTE — TELEPHONE ENCOUNTER
Pt called to say he gets his Jardiance from [facility] [employer], not [email or web address] mail in. Will need sent to correct pharmacy.     Jefferson Mandujano   P [phone]    empagliflozin (JARDIANCE) 25 MG tablet

## 2023-03-14 DIAGNOSIS — E11.9 TYPE 2 DIABETES MELLITUS WITHOUT COMPLICATION, WITHOUT LONG-TERM CURRENT USE OF INSULIN (HCC): ICD-10-CM

## 2023-03-15 DIAGNOSIS — E11.9 TYPE 2 DIABETES MELLITUS WITHOUT COMPLICATION, WITHOUT LONG-TERM CURRENT USE OF INSULIN (HCC): ICD-10-CM

## 2023-03-15 LAB — INR BLD: 2.7

## 2023-03-16 ENCOUNTER — ANTI-COAG VISIT (OUTPATIENT)
Dept: FAMILY MEDICINE CLINIC | Age: 68
End: 2023-03-16

## 2023-03-29 DIAGNOSIS — I10 ESSENTIAL HYPERTENSION: ICD-10-CM

## 2023-03-29 RX ORDER — FUROSEMIDE 20 MG/1
TABLET ORAL
Qty: 90 TABLET | Refills: 1 | OUTPATIENT
Start: 2023-03-29

## 2023-04-22 DIAGNOSIS — E11.9 TYPE 2 DIABETES MELLITUS WITHOUT COMPLICATION, WITHOUT LONG-TERM CURRENT USE OF INSULIN (HCC): ICD-10-CM

## 2023-04-24 RX ORDER — BLOOD-GLUCOSE METER
KIT MISCELLANEOUS
Qty: 1 KIT | Refills: 0 | OUTPATIENT
Start: 2023-04-24

## 2023-04-25 RX ORDER — BLOOD-GLUCOSE CONTROL, NORMAL
EACH MISCELLANEOUS
Qty: 1 EACH | Refills: 1 | Status: SHIPPED | OUTPATIENT
Start: 2023-04-25

## 2023-04-25 RX ORDER — ISOPROPYL ALCOHOL 0.75 G/1
SWAB TOPICAL
Qty: 300 EACH | Refills: 3 | Status: SHIPPED | OUTPATIENT
Start: 2023-04-25

## 2023-05-08 LAB — INR BLD: 3

## 2023-05-09 ENCOUNTER — ANTI-COAG VISIT (OUTPATIENT)
Dept: FAMILY MEDICINE CLINIC | Age: 68
End: 2023-05-09

## 2023-05-10 DIAGNOSIS — E11.9 TYPE 2 DIABETES MELLITUS WITHOUT COMPLICATION, WITHOUT LONG-TERM CURRENT USE OF INSULIN (HCC): ICD-10-CM

## 2023-05-10 RX ORDER — BLOOD-GLUCOSE METER
KIT MISCELLANEOUS
Qty: 1 KIT | Refills: 0 | Status: SHIPPED | OUTPATIENT
Start: 2023-05-10

## 2023-05-20 LAB — INR BLD: 3

## 2023-05-23 ENCOUNTER — ANTI-COAG VISIT (OUTPATIENT)
Dept: FAMILY MEDICINE CLINIC | Age: 68
End: 2023-05-23

## 2023-05-31 NOTE — PROGRESS NOTES
Patient's INR is 3.0 today. Patient will continue with Coumadin 3 mg daily. Recheck an INR in 1 month.

## 2023-06-20 ENCOUNTER — ANTI-COAG VISIT (OUTPATIENT)
Dept: FAMILY MEDICINE CLINIC | Age: 68
End: 2023-06-20

## 2023-06-20 LAB — INR BLD: 3.5

## 2023-06-23 ENCOUNTER — TELEPHONE (OUTPATIENT)
Dept: FAMILY MEDICINE CLINIC | Age: 68
End: 2023-06-23

## 2023-06-26 ENCOUNTER — TELEPHONE (OUTPATIENT)
Dept: FAMILY MEDICINE CLINIC | Age: 68
End: 2023-06-26

## 2023-06-26 DIAGNOSIS — N30.00 ACUTE CYSTITIS WITHOUT HEMATURIA: Primary | ICD-10-CM

## 2023-06-26 DIAGNOSIS — I10 ESSENTIAL HYPERTENSION: ICD-10-CM

## 2023-06-26 RX ORDER — CEFDINIR 300 MG/1
300 CAPSULE ORAL 2 TIMES DAILY
Qty: 14 CAPSULE | Refills: 0 | Status: SHIPPED | OUTPATIENT
Start: 2023-06-26 | End: 2023-07-03

## 2023-06-26 RX ORDER — FUROSEMIDE 40 MG/1
40 TABLET ORAL DAILY
Qty: 90 TABLET | Refills: 1 | Status: SHIPPED | OUTPATIENT
Start: 2023-06-26

## 2023-06-27 ENCOUNTER — ANTI-COAG VISIT (OUTPATIENT)
Dept: FAMILY MEDICINE CLINIC | Age: 68
End: 2023-06-27

## 2023-06-27 LAB — INR BLD: 2.6

## 2023-06-29 DIAGNOSIS — I10 ESSENTIAL HYPERTENSION: ICD-10-CM

## 2023-06-30 NOTE — TELEPHONE ENCOUNTER
Last Appointment:  4/10/2023  Future Appointments   Date Time Provider [address]   7/10/2023 11:30 AM DO Nolan Martinez [facility]   4/15/2024 11:00 AM DO Nolan Martinez [facility]      Medication Dispense Information    CARVEDILOL 6.25MG TAB   Dispensed: 4/19/2023 12:00 AM   Unit strength: 6.25   Unit form: Tablet Therapy Pack   Days supply: 90   Quantity: 180 tablet   Pharmacy: [facility]   Authorizing provider: Kenzie Norris DO   [facility]    [address] [address]   Phone: [phone]   Fax: [phone]   Received from: [facility] (Claim History, Ambulatory)   Brand or Generic:  Generic

## 2023-07-01 RX ORDER — CARVEDILOL 6.25 MG/1
TABLET ORAL
Qty: 180 TABLET | Refills: 1 | OUTPATIENT
Start: 2023-07-01

## 2023-07-25 DIAGNOSIS — E11.9 TYPE 2 DIABETES MELLITUS WITHOUT COMPLICATION, WITHOUT LONG-TERM CURRENT USE OF INSULIN (HCC): ICD-10-CM

## 2023-07-25 LAB — INR BLD: 3.2

## 2023-07-26 ENCOUNTER — ANTI-COAG VISIT (OUTPATIENT)
Dept: FAMILY MEDICINE CLINIC | Age: 68
End: 2023-07-26

## 2023-07-26 RX ORDER — SITAGLIPTIN 100 MG/1
TABLET, FILM COATED ORAL
Qty: 90 TABLET | Refills: 1 | OUTPATIENT
Start: 2023-07-26

## 2023-07-26 NOTE — PROGRESS NOTES
Anti-coag visit    2023  [facility]     Anticoagulation Summary  As of 2023  INR goal:  2.0-3.0   TTR:  53.9 % (1.7 y)   INR used for dosin.60 (2023)   Warfarin maintenance plan:  3 mg (3 mg x 1) every day   Weekly warfarin total:  21 mg   Plan last modified:  Derick Wheeler MA (2022)   Next INR check:  2023   Target end date:

## 2023-08-09 DIAGNOSIS — E11.9 TYPE 2 DIABETES MELLITUS WITHOUT COMPLICATION, WITHOUT LONG-TERM CURRENT USE OF INSULIN (HCC): ICD-10-CM

## 2023-08-10 LAB — INR BLD: 4.9

## 2023-08-14 ENCOUNTER — ANTI-COAG VISIT (OUTPATIENT)
Dept: FAMILY MEDICINE CLINIC | Age: 68
End: 2023-08-14

## 2023-08-14 DIAGNOSIS — E78.5 HYPERLIPIDEMIA, UNSPECIFIED HYPERLIPIDEMIA TYPE: ICD-10-CM

## 2023-08-14 DIAGNOSIS — E11.9 TYPE 2 DIABETES MELLITUS WITHOUT COMPLICATION, WITHOUT LONG-TERM CURRENT USE OF INSULIN (HCC): ICD-10-CM

## 2023-08-14 NOTE — PROGRESS NOTES
Anticoagulation Summary  As of 7/26/2023  INR goal:  2.0-3.0   TTR:  54.5 % (1.7 y)   INR used for dosing:  3.20 High  (7/25/2023)   Warfarin maintenance plan:  3 mg (3 mg x 1) every day   Weekly warfarin total:  21 mg   No change documented:  Ruddy Galindo DO   Plan last modified:  Eddie Milligan MA (6/8/2022)   Next INR check:  8/28/2023   Priority:  Maintenance   Target end date:

## 2023-08-15 RX ORDER — METFORMIN HYDROCHLORIDE 500 MG/1
TABLET, EXTENDED RELEASE ORAL
Qty: 360 TABLET | Refills: 0 | Status: SHIPPED | OUTPATIENT
Start: 2023-08-15

## 2023-08-15 RX ORDER — ATORVASTATIN CALCIUM 40 MG/1
TABLET, FILM COATED ORAL
Qty: 45 TABLET | Refills: 0 | Status: SHIPPED | OUTPATIENT
Start: 2023-08-15

## 2023-08-15 NOTE — PROGRESS NOTES
Patient had an elevated INR 4.9 a couple days ago. Yesterday's at 3.2. He can continue with his 3 mg Coumadin dose.

## 2023-08-29 ENCOUNTER — ANTI-COAG VISIT (OUTPATIENT)
Dept: FAMILY MEDICINE CLINIC | Age: 68
End: 2023-08-29

## 2023-08-31 DIAGNOSIS — I48.91 ATRIAL FIBRILLATION, UNSPECIFIED TYPE (HCC): ICD-10-CM

## 2023-08-31 RX ORDER — WARFARIN SODIUM 3 MG/1
TABLET ORAL
Qty: 90 TABLET | Refills: 1 | Status: SHIPPED | OUTPATIENT
Start: 2023-08-31

## 2023-08-31 NOTE — TELEPHONE ENCOUNTER
Last Appointment:  4/10/2023  Future Appointments   Date Time Provider [address]   4/15/2024 11:00 AM DO Nolan Green [facility]

## 2023-09-04 LAB — INR BLD: 3.8

## 2023-09-06 ENCOUNTER — ANTI-COAG VISIT (OUTPATIENT)
Dept: FAMILY MEDICINE CLINIC | Age: 68
End: 2023-09-06

## 2023-09-07 DIAGNOSIS — E11.9 TYPE 2 DIABETES MELLITUS WITHOUT COMPLICATION, WITHOUT LONG-TERM CURRENT USE OF INSULIN (HCC): ICD-10-CM

## 2023-09-07 NOTE — TELEPHONE ENCOUNTER
Contacted patient. Patient states that she never received Januvia script. RX was sent to [facility]. He would like it to go to [facility] instead. Please send new rx.

## 2023-09-12 ENCOUNTER — ANTI-COAG VISIT (OUTPATIENT)
Dept: FAMILY MEDICINE CLINIC | Age: 68
End: 2023-09-12

## 2023-09-12 LAB — INR BLD: 3.7

## 2023-09-13 ENCOUNTER — TELEPHONE (OUTPATIENT)
Dept: FAMILY MEDICINE CLINIC | Age: 68
End: 2023-09-13

## 2023-09-13 NOTE — TELEPHONE ENCOUNTER
.Left message for patient to call office to discuss insurance issue. Starting 10/1/23 [facility] will no longer be accepted. The insurance will need to be changed or will need to find new PCP. Patient to advise office of choice.

## 2023-09-18 ENCOUNTER — ANTI-COAG VISIT (OUTPATIENT)
Dept: FAMILY MEDICINE CLINIC | Age: 68
End: 2023-09-18

## 2023-09-18 DIAGNOSIS — M1A.9XX0 CHRONIC GOUT WITHOUT TOPHUS, UNSPECIFIED CAUSE, UNSPECIFIED SITE: ICD-10-CM

## 2023-09-18 DIAGNOSIS — I10 ESSENTIAL HYPERTENSION: ICD-10-CM

## 2023-09-18 DIAGNOSIS — E11.9 TYPE 2 DIABETES MELLITUS WITHOUT COMPLICATION, WITHOUT LONG-TERM CURRENT USE OF INSULIN (HCC): ICD-10-CM

## 2023-09-18 LAB — INR BLD: 4.2

## 2023-09-20 RX ORDER — ALLOPURINOL 100 MG/1
100 TABLET ORAL DAILY
Qty: 90 TABLET | Refills: 1 | Status: SHIPPED | OUTPATIENT
Start: 2023-09-20

## 2023-09-20 RX ORDER — LISINOPRIL 20 MG/1
20 TABLET ORAL DAILY
Qty: 90 TABLET | Refills: 1 | Status: SHIPPED | OUTPATIENT
Start: 2023-09-20

## 2023-09-20 RX ORDER — LANCETS 33 GAUGE
EACH MISCELLANEOUS
Qty: 100 EACH | Refills: 3 | Status: SHIPPED | OUTPATIENT
Start: 2023-09-20

## 2023-09-20 RX ORDER — BLOOD SUGAR DIAGNOSTIC
STRIP MISCELLANEOUS
Qty: 100 STRIP | Refills: 3 | Status: SHIPPED | OUTPATIENT
Start: 2023-09-20

## 2023-09-25 ENCOUNTER — ANTI-COAG VISIT (OUTPATIENT)
Dept: FAMILY MEDICINE CLINIC | Age: 68
End: 2023-09-25

## 2023-09-25 LAB — INR BLD: 3.3

## 2023-10-02 ENCOUNTER — ANTI-COAG VISIT (OUTPATIENT)
Dept: FAMILY MEDICINE CLINIC | Age: 68
End: 2023-10-02

## 2023-10-02 LAB — INR BLD: 4.3

## 2023-10-03 DIAGNOSIS — E11.9 TYPE 2 DIABETES MELLITUS WITHOUT COMPLICATION, WITHOUT LONG-TERM CURRENT USE OF INSULIN (HCC): ICD-10-CM

## 2023-10-03 RX ORDER — WARFARIN SODIUM 2.5 MG/1
2.5 TABLET ORAL DAILY
Qty: 30 TABLET | Refills: 3 | Status: SHIPPED | OUTPATIENT
Start: 2023-10-03

## 2023-10-03 NOTE — PROGRESS NOTES
Please advise patient that I have decreased his Coumadin dose to 2.5 mg daily. His INR has consistently been too high for the last 2 months and he requires a lower dose. He is to recheck an INR in 1 week.

## 2023-10-04 NOTE — PROGRESS NOTES
Spoke with pt. Pt states he is not going to change dosage at this time.   He feels he has not been eating correctly and he will discuss changes after his next INR in 1 week

## 2023-10-05 RX ORDER — BLOOD-GLUCOSE CONTROL, NORMAL
EACH MISCELLANEOUS
Qty: 1 EACH | Refills: 0 | Status: SHIPPED | OUTPATIENT
Start: 2023-10-05

## 2023-10-05 RX ORDER — BLOOD-GLUCOSE METER
KIT MISCELLANEOUS
Qty: 1 KIT | Refills: 0 | Status: SHIPPED | OUTPATIENT
Start: 2023-10-05

## 2023-10-09 ENCOUNTER — ANTI-COAG VISIT (OUTPATIENT)
Dept: FAMILY MEDICINE CLINIC | Age: 68
End: 2023-10-09
Payer: MEDICARE

## 2023-10-09 DIAGNOSIS — I48.11 LONGSTANDING PERSISTENT ATRIAL FIBRILLATION (HCC): Primary | ICD-10-CM

## 2023-10-09 LAB — INR BLD: 1.4

## 2023-10-09 PROCEDURE — 93793 ANTICOAG MGMT PT WARFARIN: CPT | Performed by: FAMILY MEDICINE

## 2023-10-11 LAB — DIABETIC RETINOPATHY: NEGATIVE

## 2023-10-16 ENCOUNTER — TELEPHONE (OUTPATIENT)
Dept: FAMILY MEDICINE CLINIC | Age: 68
End: 2023-10-16

## 2023-10-16 DIAGNOSIS — E11.9 TYPE 2 DIABETES MELLITUS WITHOUT COMPLICATION, WITHOUT LONG-TERM CURRENT USE OF INSULIN (HCC): ICD-10-CM

## 2023-10-16 RX ORDER — BLOOD SUGAR DIAGNOSTIC
STRIP MISCELLANEOUS
Qty: 100 STRIP | Refills: 3 | Status: SHIPPED | OUTPATIENT
Start: 2023-10-16

## 2023-10-16 NOTE — TELEPHONE ENCOUNTER
Blood Glucose Monitoring Suppl (ONETOUCH VERIO REFLECT) w/Device KIT   [facility] called script is on back order.  Ref number [id number] [phone]/OXG touch flex is available

## 2023-10-17 DIAGNOSIS — E11.9 TYPE 2 DIABETES MELLITUS WITHOUT COMPLICATION, WITHOUT LONG-TERM CURRENT USE OF INSULIN (HCC): ICD-10-CM

## 2023-10-17 RX ORDER — BLOOD-GLUCOSE METER
KIT MISCELLANEOUS
Qty: 1 KIT | Refills: 0 | Status: SHIPPED | OUTPATIENT
Start: 2023-10-17

## 2023-10-23 LAB — INR BLD: 2.6

## 2023-10-24 ENCOUNTER — ANTI-COAG VISIT (OUTPATIENT)
Dept: FAMILY MEDICINE CLINIC | Age: 68
End: 2023-10-24

## 2023-10-25 ENCOUNTER — TELEPHONE (OUTPATIENT)
Dept: FAMILY MEDICINE CLINIC | Age: 68
End: 2023-10-25

## 2023-10-25 NOTE — TELEPHONE ENCOUNTER
Blood glucose monitor verio reflect is out of stock with no restock date. One touch verio flex is in stock and uses the same test strips. If doctor wants to change order, it can be called in or sent electronically.     [phone]  Ref #[id number]

## 2023-10-25 NOTE — TELEPHONE ENCOUNTER
Prescription written on 10/17/2021 for glucose monitor that stated may switch to brand of insurance preference. They should be able to use that prescription to fill what ever monitor is available.

## 2023-11-06 LAB — INR BLD: 2.7

## 2023-11-08 ENCOUNTER — HOSPITAL ENCOUNTER (INPATIENT)
Age: 68
LOS: 9 days | Discharge: OTHER INSTITUTION | End: 2023-11-17
Attending: EMERGENCY MEDICINE | Admitting: INTERNAL MEDICINE
Payer: MEDICARE

## 2023-11-08 ENCOUNTER — ANTI-COAG VISIT (OUTPATIENT)
Dept: FAMILY MEDICINE CLINIC | Age: 68
End: 2023-11-08

## 2023-11-08 DIAGNOSIS — E87.5 HYPERKALEMIA: Primary | ICD-10-CM

## 2023-11-08 DIAGNOSIS — N17.9 ACUTE RENAL FAILURE, UNSPECIFIED ACUTE RENAL FAILURE TYPE (HCC): ICD-10-CM

## 2023-11-08 DIAGNOSIS — N13.9 OBSTRUCTIVE UROPATHY: ICD-10-CM

## 2023-11-08 DIAGNOSIS — I42.0 DILATED CARDIOMYOPATHY (HCC): ICD-10-CM

## 2023-11-08 DIAGNOSIS — Z01.810 PREOP CARDIOVASCULAR EXAM: ICD-10-CM

## 2023-11-08 PROBLEM — R33.9 URINARY RETENTION: Status: ACTIVE | Noted: 2023-11-08

## 2023-11-08 LAB
ANION GAP SERPL CALCULATED.3IONS-SCNC: 13 MMOL/L (ref 7–16)
ANION GAP SERPL CALCULATED.3IONS-SCNC: 14 MMOL/L (ref 7–16)
BACTERIA URNS QL MICRO: ABNORMAL
BASOPHILS # BLD: 0.05 K/UL (ref 0–0.2)
BASOPHILS NFR BLD: 1 % (ref 0–2)
BILIRUB UR QL STRIP: NEGATIVE
BUN SERPL-MCNC: 137 MG/DL (ref 6–23)
BUN SERPL-MCNC: 149 MG/DL (ref 6–23)
CALCIUM SERPL-MCNC: 8.7 MG/DL (ref 8.6–10.2)
CALCIUM SERPL-MCNC: 9.3 MG/DL (ref 8.6–10.2)
CHLORIDE SERPL-SCNC: 108 MMOL/L (ref 98–107)
CHLORIDE SERPL-SCNC: 115 MMOL/L (ref 98–107)
CLARITY UR: ABNORMAL
CO2 SERPL-SCNC: 13 MMOL/L (ref 22–29)
CO2 SERPL-SCNC: 14 MMOL/L (ref 22–29)
COLOR UR: ABNORMAL
CREAT SERPL-MCNC: 5.7 MG/DL (ref 0.7–1.2)
CREAT SERPL-MCNC: 6.4 MG/DL (ref 0.7–1.2)
EKG ATRIAL RATE: 375 BPM
EKG Q-T INTERVAL: 358 MS
EKG QRS DURATION: 90 MS
EKG QTC CALCULATION (BAZETT): 412 MS
EKG R AXIS: -32 DEGREES
EKG T AXIS: 31 DEGREES
EKG VENTRICULAR RATE: 80 BPM
EOSINOPHIL # BLD: 0.41 K/UL (ref 0.05–0.5)
EOSINOPHILS RELATIVE PERCENT: 4 % (ref 0–6)
EPI CELLS #/AREA URNS HPF: ABNORMAL /HPF
ERYTHROCYTE [DISTWIDTH] IN BLOOD BY AUTOMATED COUNT: 15.2 % (ref 11.5–15)
GFR SERPL CREATININE-BSD FRML MDRD: 10 ML/MIN/1.73M2
GFR SERPL CREATININE-BSD FRML MDRD: 9 ML/MIN/1.73M2
GLUCOSE BLD-MCNC: 179 MG/DL (ref 74–99)
GLUCOSE BLD-MCNC: 84 MG/DL (ref 74–99)
GLUCOSE SERPL-MCNC: 114 MG/DL (ref 74–99)
GLUCOSE SERPL-MCNC: 94 MG/DL (ref 74–99)
GLUCOSE UR STRIP-MCNC: 250 MG/DL
HCT VFR BLD AUTO: 40.6 % (ref 37–54)
HGB BLD-MCNC: 13.3 G/DL (ref 12.5–16.5)
HGB UR QL STRIP.AUTO: ABNORMAL
IMM GRANULOCYTES # BLD AUTO: <0.03 K/UL (ref 0–0.58)
IMM GRANULOCYTES NFR BLD: 0 % (ref 0–5)
INR PPP: 3.4
KETONES UR STRIP-MCNC: ABNORMAL MG/DL
LEUKOCYTE ESTERASE UR QL STRIP: ABNORMAL
LYMPHOCYTES NFR BLD: 1.81 K/UL (ref 1.5–4)
LYMPHOCYTES RELATIVE PERCENT: 18 % (ref 20–42)
MCH RBC QN AUTO: 29.4 PG (ref 26–35)
MCHC RBC AUTO-ENTMCNC: 32.8 G/DL (ref 32–34.5)
MCV RBC AUTO: 89.6 FL (ref 80–99.9)
MONOCYTES NFR BLD: 0.92 K/UL (ref 0.1–0.95)
MONOCYTES NFR BLD: 9 % (ref 2–12)
NEUTROPHILS NFR BLD: 68 % (ref 43–80)
NEUTS SEG NFR BLD: 6.9 K/UL (ref 1.8–7.3)
NITRITE UR QL STRIP: POSITIVE
PH UR STRIP: 5.5 [PH] (ref 5–9)
PLATELET # BLD AUTO: 260 K/UL (ref 130–450)
PMV BLD AUTO: 11.7 FL (ref 7–12)
POTASSIUM SERPL-SCNC: 5.7 MMOL/L (ref 3.5–5)
POTASSIUM SERPL-SCNC: 6.2 MMOL/L (ref 3.5–5)
PROT UR STRIP-MCNC: >=300 MG/DL
PROTHROMBIN TIME: 37.7 SEC (ref 9.3–12.4)
RBC # BLD AUTO: 4.53 M/UL (ref 3.8–5.8)
RBC #/AREA URNS HPF: ABNORMAL /HPF
SODIUM SERPL-SCNC: 136 MMOL/L (ref 132–146)
SODIUM SERPL-SCNC: 141 MMOL/L (ref 132–146)
SP GR UR STRIP: 1.01 (ref 1–1.03)
UROBILINOGEN UR STRIP-ACNC: 1 EU/DL (ref 0–1)
WBC #/AREA URNS HPF: ABNORMAL /HPF
WBC OTHER # BLD: 10.1 K/UL (ref 4.5–11.5)

## 2023-11-08 PROCEDURE — 6360000002 HC RX W HCPCS: Performed by: INTERNAL MEDICINE

## 2023-11-08 PROCEDURE — 2500000003 HC RX 250 WO HCPCS: Performed by: INTERNAL MEDICINE

## 2023-11-08 PROCEDURE — 1200000000 HC SEMI PRIVATE

## 2023-11-08 PROCEDURE — 93005 ELECTROCARDIOGRAM TRACING: CPT

## 2023-11-08 PROCEDURE — 6360000002 HC RX W HCPCS

## 2023-11-08 PROCEDURE — 51702 INSERT TEMP BLADDER CATH: CPT

## 2023-11-08 PROCEDURE — 96361 HYDRATE IV INFUSION ADD-ON: CPT

## 2023-11-08 PROCEDURE — 80048 BASIC METABOLIC PNL TOTAL CA: CPT

## 2023-11-08 PROCEDURE — 6370000000 HC RX 637 (ALT 250 FOR IP): Performed by: INTERNAL MEDICINE

## 2023-11-08 PROCEDURE — 93010 ELECTROCARDIOGRAM REPORT: CPT | Performed by: INTERNAL MEDICINE

## 2023-11-08 PROCEDURE — 99223 1ST HOSP IP/OBS HIGH 75: CPT | Performed by: INTERNAL MEDICINE

## 2023-11-08 PROCEDURE — 81001 URINALYSIS AUTO W/SCOPE: CPT

## 2023-11-08 PROCEDURE — 87086 URINE CULTURE/COLONY COUNT: CPT

## 2023-11-08 PROCEDURE — 85610 PROTHROMBIN TIME: CPT

## 2023-11-08 PROCEDURE — 2580000003 HC RX 258: Performed by: INTERNAL MEDICINE

## 2023-11-08 PROCEDURE — 82962 GLUCOSE BLOOD TEST: CPT

## 2023-11-08 PROCEDURE — 2580000003 HC RX 258

## 2023-11-08 PROCEDURE — 96374 THER/PROPH/DIAG INJ IV PUSH: CPT

## 2023-11-08 PROCEDURE — 6370000000 HC RX 637 (ALT 250 FOR IP)

## 2023-11-08 PROCEDURE — 85025 COMPLETE CBC W/AUTO DIFF WBC: CPT

## 2023-11-08 PROCEDURE — 99285 EMERGENCY DEPT VISIT HI MDM: CPT

## 2023-11-08 RX ORDER — CARVEDILOL 6.25 MG/1
6.25 TABLET ORAL 2 TIMES DAILY WITH MEALS
Status: DISCONTINUED | OUTPATIENT
Start: 2023-11-08 | End: 2023-11-17 | Stop reason: HOSPADM

## 2023-11-08 RX ORDER — ATORVASTATIN CALCIUM 20 MG/1
20 TABLET, FILM COATED ORAL NIGHTLY
Status: DISCONTINUED | OUTPATIENT
Start: 2023-11-08 | End: 2023-11-17 | Stop reason: HOSPADM

## 2023-11-08 RX ORDER — WARFARIN SODIUM 3 MG/1
3 TABLET ORAL DAILY
Status: DISCONTINUED | OUTPATIENT
Start: 2023-11-09 | End: 2023-11-08

## 2023-11-08 RX ORDER — INSULIN LISPRO 100 [IU]/ML
0-8 INJECTION, SOLUTION INTRAVENOUS; SUBCUTANEOUS
Status: DISCONTINUED | OUTPATIENT
Start: 2023-11-08 | End: 2023-11-17 | Stop reason: HOSPADM

## 2023-11-08 RX ORDER — LISINOPRIL 20 MG/1
20 TABLET ORAL EVERY MORNING
COMMUNITY

## 2023-11-08 RX ORDER — FUROSEMIDE 40 MG/1
40 TABLET ORAL EVERY MORNING
COMMUNITY

## 2023-11-08 RX ORDER — DEXTROSE MONOHYDRATE 100 MG/ML
INJECTION, SOLUTION INTRAVENOUS CONTINUOUS PRN
Status: DISCONTINUED | OUTPATIENT
Start: 2023-11-08 | End: 2023-11-17 | Stop reason: HOSPADM

## 2023-11-08 RX ORDER — INSULIN LISPRO 100 [IU]/ML
0-4 INJECTION, SOLUTION INTRAVENOUS; SUBCUTANEOUS NIGHTLY
Status: DISCONTINUED | OUTPATIENT
Start: 2023-11-08 | End: 2023-11-17 | Stop reason: HOSPADM

## 2023-11-08 RX ORDER — CEFDINIR 300 MG/1
300 CAPSULE ORAL ONCE
Status: COMPLETED | OUTPATIENT
Start: 2023-11-08 | End: 2023-11-08

## 2023-11-08 RX ORDER — DEXTROSE MONOHYDRATE 25 G/50ML
25 INJECTION, SOLUTION INTRAVENOUS ONCE
Status: DISCONTINUED | OUTPATIENT
Start: 2023-11-08 | End: 2023-11-08 | Stop reason: ALTCHOICE

## 2023-11-08 RX ORDER — WARFARIN SODIUM 3 MG/1
3 TABLET ORAL EVERY MORNING
COMMUNITY

## 2023-11-08 RX ORDER — 0.9 % SODIUM CHLORIDE 0.9 %
1000 INTRAVENOUS SOLUTION INTRAVENOUS ONCE
Status: COMPLETED | OUTPATIENT
Start: 2023-11-08 | End: 2023-11-08

## 2023-11-08 RX ORDER — ALLOPURINOL 100 MG/1
100 TABLET ORAL EVERY MORNING
COMMUNITY

## 2023-11-08 RX ORDER — METFORMIN HYDROCHLORIDE 500 MG/1
500 TABLET, EXTENDED RELEASE ORAL 2 TIMES DAILY
COMMUNITY

## 2023-11-08 RX ORDER — ACETAMINOPHEN 650 MG/1
650 SUPPOSITORY RECTAL EVERY 6 HOURS PRN
Status: DISCONTINUED | OUTPATIENT
Start: 2023-11-08 | End: 2023-11-17 | Stop reason: HOSPADM

## 2023-11-08 RX ORDER — SODIUM CHLORIDE 0.9 % (FLUSH) 0.9 %
5-40 SYRINGE (ML) INJECTION EVERY 12 HOURS SCHEDULED
Status: DISCONTINUED | OUTPATIENT
Start: 2023-11-08 | End: 2023-11-17 | Stop reason: HOSPADM

## 2023-11-08 RX ORDER — CALCIUM GLUCONATE 94 MG/ML
1000 INJECTION, SOLUTION INTRAVENOUS ONCE
Status: COMPLETED | OUTPATIENT
Start: 2023-11-08 | End: 2023-11-08

## 2023-11-08 RX ORDER — ONDANSETRON 2 MG/ML
4 INJECTION INTRAMUSCULAR; INTRAVENOUS EVERY 6 HOURS PRN
Status: DISCONTINUED | OUTPATIENT
Start: 2023-11-08 | End: 2023-11-17 | Stop reason: HOSPADM

## 2023-11-08 RX ORDER — ATORVASTATIN CALCIUM 40 MG/1
20 TABLET, FILM COATED ORAL NIGHTLY
Status: ON HOLD | COMMUNITY
End: 2023-11-13

## 2023-11-08 RX ORDER — SODIUM CHLORIDE 9 MG/ML
INJECTION, SOLUTION INTRAVENOUS PRN
Status: DISCONTINUED | OUTPATIENT
Start: 2023-11-08 | End: 2023-11-17 | Stop reason: HOSPADM

## 2023-11-08 RX ORDER — POLYETHYLENE GLYCOL 3350 17 G/17G
17 POWDER, FOR SOLUTION ORAL DAILY PRN
Status: DISCONTINUED | OUTPATIENT
Start: 2023-11-08 | End: 2023-11-17 | Stop reason: HOSPADM

## 2023-11-08 RX ORDER — ACETAMINOPHEN 325 MG/1
650 TABLET ORAL EVERY 6 HOURS PRN
Status: DISCONTINUED | OUTPATIENT
Start: 2023-11-08 | End: 2023-11-17 | Stop reason: HOSPADM

## 2023-11-08 RX ORDER — HEPARIN SODIUM 10000 [USP'U]/ML
5000 INJECTION, SOLUTION INTRAVENOUS; SUBCUTANEOUS EVERY 8 HOURS SCHEDULED
Status: DISCONTINUED | OUTPATIENT
Start: 2023-11-08 | End: 2023-11-08

## 2023-11-08 RX ORDER — CALCIUM GLUCONATE 20 MG/ML
1000 INJECTION, SOLUTION INTRAVENOUS ONCE
Status: COMPLETED | OUTPATIENT
Start: 2023-11-08 | End: 2023-11-08

## 2023-11-08 RX ORDER — SODIUM CHLORIDE 0.9 % (FLUSH) 0.9 %
5-40 SYRINGE (ML) INJECTION PRN
Status: DISCONTINUED | OUTPATIENT
Start: 2023-11-08 | End: 2023-11-17 | Stop reason: HOSPADM

## 2023-11-08 RX ORDER — METFORMIN HYDROCHLORIDE 500 MG/1
500 TABLET, EXTENDED RELEASE ORAL 2 TIMES DAILY
Status: DISCONTINUED | OUTPATIENT
Start: 2023-11-08 | End: 2023-11-17 | Stop reason: HOSPADM

## 2023-11-08 RX ORDER — ONDANSETRON 4 MG/1
4 TABLET, ORALLY DISINTEGRATING ORAL EVERY 8 HOURS PRN
Status: DISCONTINUED | OUTPATIENT
Start: 2023-11-08 | End: 2023-11-17 | Stop reason: HOSPADM

## 2023-11-08 RX ADMIN — CALCIUM GLUCONATE 1000 MG: 20 INJECTION, SOLUTION INTRAVENOUS at 17:54

## 2023-11-08 RX ADMIN — SODIUM CHLORIDE, PRESERVATIVE FREE 10 ML: 5 INJECTION INTRAVENOUS at 17:34

## 2023-11-08 RX ADMIN — SODIUM ZIRCONIUM CYCLOSILICATE 10 G: 10 POWDER, FOR SUSPENSION ORAL at 22:22

## 2023-11-08 RX ADMIN — SODIUM ZIRCONIUM CYCLOSILICATE 10 G: 10 POWDER, FOR SUSPENSION ORAL at 17:33

## 2023-11-08 RX ADMIN — WATER 1000 MG: 1 INJECTION INTRAMUSCULAR; INTRAVENOUS; SUBCUTANEOUS at 17:33

## 2023-11-08 RX ADMIN — DEXTROSE MONOHYDRATE 250 ML: 100 INJECTION, SOLUTION INTRAVENOUS at 17:35

## 2023-11-08 RX ADMIN — SODIUM ZIRCONIUM CYCLOSILICATE 10 G: 10 POWDER, FOR SUSPENSION ORAL at 12:51

## 2023-11-08 RX ADMIN — SODIUM CHLORIDE 1000 ML: 9 INJECTION, SOLUTION INTRAVENOUS at 15:52

## 2023-11-08 RX ADMIN — ATORVASTATIN CALCIUM 20 MG: 20 TABLET, FILM COATED ORAL at 22:22

## 2023-11-08 RX ADMIN — SODIUM CHLORIDE, PRESERVATIVE FREE 10 ML: 5 INJECTION INTRAVENOUS at 22:22

## 2023-11-08 RX ADMIN — CARVEDILOL 6.25 MG: 6.25 TABLET, FILM COATED ORAL at 18:11

## 2023-11-08 RX ADMIN — SODIUM CHLORIDE 1000 ML: 9 INJECTION, SOLUTION INTRAVENOUS at 12:55

## 2023-11-08 RX ADMIN — INSULIN HUMAN 10 UNITS: 100 INJECTION, SOLUTION PARENTERAL at 17:50

## 2023-11-08 RX ADMIN — CEFDINIR 300 MG: 300 CAPSULE ORAL at 10:33

## 2023-11-08 RX ADMIN — CALCIUM GLUCONATE 1000 MG: 98 INJECTION, SOLUTION INTRAVENOUS at 12:51

## 2023-11-08 ASSESSMENT — PAIN - FUNCTIONAL ASSESSMENT
PAIN_FUNCTIONAL_ASSESSMENT: NONE - DENIES PAIN
PAIN_FUNCTIONAL_ASSESSMENT: NONE - DENIES PAIN
PAIN_FUNCTIONAL_ASSESSMENT: 0-10

## 2023-11-08 ASSESSMENT — ENCOUNTER SYMPTOMS
BACK PAIN: 0
ABDOMINAL DISTENTION: 1
CHEST TIGHTNESS: 0
APNEA: 0
CONSTIPATION: 0
COUGH: 0
SORE THROAT: 0
NAUSEA: 0
ABDOMINAL PAIN: 0
VOMITING: 0
DIARRHEA: 0
WHEEZING: 0
SHORTNESS OF BREATH: 0

## 2023-11-08 ASSESSMENT — PAIN DESCRIPTION - DESCRIPTORS: DESCRIPTORS: ACHING

## 2023-11-08 ASSESSMENT — LIFESTYLE VARIABLES
HOW OFTEN DO YOU HAVE A DRINK CONTAINING ALCOHOL: NEVER
HOW MANY STANDARD DRINKS CONTAINING ALCOHOL DO YOU HAVE ON A TYPICAL DAY: PATIENT DOES NOT DRINK

## 2023-11-08 ASSESSMENT — PAIN DESCRIPTION - ORIENTATION: ORIENTATION: MID;LOWER

## 2023-11-08 ASSESSMENT — PAIN DESCRIPTION - LOCATION: LOCATION: ABDOMEN

## 2023-11-08 NOTE — ED NOTES
Medication List Obtained from [employer], Reviewed with Patient at Bedside and Updated. Multiple Medication Clarifications still needed. Patient sates he takes COREG 6.25mg 1TPOBID, but it was last filled for a 90 day supply. I also believe that the Patient is NOW taking Januvia not Jardiance. JARDIANCE 25MG 1TPOQD was last filled for a 90 day supply 04/12/2023. The Patient's JANUVIA 100 1TPOQD last filled with 09/18/2023, 09/07/2023, and 08/09/2023 for a 90 days supply. Please Clarify with Home Medication Bottles.  Electronically signed by Chelsy Ariza on 11/8/23 at 1:20 PM EST

## 2023-11-08 NOTE — PROGRESS NOTES
Database initiated. Patient is A&O from home alone. States he uses a walker and is RA at baseline. He is having diarrhea.

## 2023-11-08 NOTE — H&P
[facility]   History and Physical      CHIEF COMPLAINT: Urinary retention/abdominal pain/hematuria    History of Present Illness:  77 y.o. male with a history of atrial fibrillation, DM, HLD, HTN, CONCHITA presents with urinary retention, abdominal pain, hematuria. Patient notes he has had hematuria for the last 2 weeks and difficulty urinating. Stated this morning bladder felt full. Was having difficulty going. Developed lower quadrant abdominal pain for the last couple of weeks worsening yesterday. Does have a history of bladder cyst.  Follows with Dr. Dallas Kaufman. Last seen 2 years ago or so. Denies any fevers, CP, SOB, N/V/D. Pt received Calcium Gluconate 1000mg IV, Cefdinir 300mg, Dextrose 50% 25g IV, Humulin R 10 units IV, 1L NSS bolus in ED course, Decision to admit pt for further evaluation and treatment. Informant(s) for H&P:Pt and EMR    REVIEW OF SYSTEMS:  Admits to urinary retention, abdominal pain, hematuria. Denies  fevers, chills, cp, sob, n/v, ha, vision/hearing changes, wt changes, hot/cold flashes, other open skin lesions, diarrhea, constipation, dysuria unless noted in HPI. Complete ROS performed with the patient and is otherwise negative.       PMH:  Past Medical History:   Diagnosis Date    Atrial fibrillation ([address]) 10/2012    now on Coumadin    Bladder tumor     Diabetes mellitus ([address])     Hyperlipidemia     Hypertension     LONG TERM ANTICOAGULENT USE     Lumbar pain     CONCHITA on CPAP        Surgical History:  Past Surgical History:   Procedure Laterality Date    BACK SURGERY  2007    repair of herniated disc    CARDIOVASCULAR STRESS TEST  11/21/2006    findings of inferior and anterior ischemia     CYSTOSCOPY N/A 10/22/2021    CYSTOSCOPY RETROGRADE PYELOGRAM TRANSURETHRAL RESECTION OF BLADDER TUMOR WITH INSTALLATION OF GEMCITIBINE performed by Reza SINGH Memo, DO at [address] [facility] CATH LAB PROCEDURE  12/13/2006    No obstructive CAD and mild dilated Problem:    Urinary retention  Resolved Problems:    * No resolved hospital problems. *        My findings/plan include:     Urinary retention S/P cummings placement - Good UOP at this time. Will consult urology  Non oliguric DAISY on CKDIII likely postrenal - Baseline renal function is 1.6. Hold nephrotoxins. Hyperkalemia - Received lokelma 10 g times 1. Will treat with calcium gluconate, regular insulin, dextrose, and lokelma. Will check daily labs  NAGMA  Acute cystitis without hematuria - Continue rocephin and follow up with UCx. Supratherapeutic INR - Will hold coumadin. Pharmacy consulted     NOTE: This report was transcribed using voice recognition software. Every effort was made to ensure accuracy; however, inadvertent computerized transcription errors may be present.      Electronically signed by Suha Almaguer DO on 11/8/2023 at 4:30 PM

## 2023-11-08 NOTE — PROGRESS NOTES
Pharmacy Consultation Note  (Warfarin Dosing and Monitoring)    Initial consult date: 11/8  Consulting Provider: Heath Gipson. is a 77 y.o. male for whom pharmacy has been asked to manage warfarin therapy. Weight:   Wt Readings from Last 1 Encounters:   11/08/23 99.8 kg (220 lb)       TSH:    Lab Results   Component Value Date/Time    TSH 0.970 10/17/2012 11:59 AM       Hepatic Function Panel:                            Lab Results   Component Value Date/Time    ALKPHOS 77 02/08/2023 12:00 PM    ALT 40 02/08/2023 12:00 PM    AST 34 02/08/2023 12:00 PM    PROT 7.4 02/08/2023 12:00 PM    BILITOT 0.5 02/08/2023 12:00 PM    LABALBU 3.9 02/08/2023 12:00 PM       Current warfarin drug-drug interactions include:   -Allopurinol: may SIGNIFICANTLY enhance the anticoagulatory effect of warfarin.  -Metformin: may diminish the anticoagulatory effect of warfarin    Recent Labs     11/08/23  0938   HGB 13.3        Date Warfarin Dose INR Heparin or LMWH Comment   11/8 NO DOSE 3.4 X                                  Assessment:  Patient is a 77 y.o. male on warfarin for Atrial Fibrillation. Patient's home warfarin dosing regimen is 3 mg once daily per med rec and Dr. Moose Gonsalez note on 10/24/23. Goal INR 2 - 3  INR 3.4 today    Plan:  No warfarin tonight given unknown trajectory of INR.   Daily PT/INR until the INR is stable within the therapeutic range  Pharmacist will follow and monitor/adjust dosing as necessary      Nona Underwood [facility] 11/8/2023 4:36 PM

## 2023-11-08 NOTE — ED PROVIDER NOTES
[facility] ENCOUNTER        Pt Name: Bentley Hunt MRN: [id number]  Birthdate 1955  Date of evaluation: 11/8/2023  Provider: Norma Frances DO  PCP: Navarro Murray DO  Note Started: 9:10 AM EST 11/8/23    CHIEF COMPLAINT       Chief Complaint   Patient presents with    Urinary Retention     Difficulty urinating  with hematuria over the last two weeks. This morning his bladder feels full but he cannot go. Abdominal Pain     Lower quadrant abdominal pain over the last two weeks worsening yesterday. Hematuria       HISTORY OF PRESENT ILLNESS: 1 or more Elements   History From: Patient      Bentley Hunt is a 77 y.o. male who presents to the emergency department for evaluation of urinary retention as well as hematuria. Patient states that over the last 2 weeks has had increasing difficulty with urination. Patient states that he has had to have a Frazier placed before due to urinary retention. Patient is on anticoagulation at this time. Patient states that he did have a history of a bladder cyst.  Patient follows with Dr. Clarice Harrell with urology. Review of Systems   Constitutional:  Negative for activity change, appetite change, chills, fatigue and fever. HENT:  Negative for congestion and sore throat. Eyes:  Negative for visual disturbance. Respiratory:  Negative for apnea, cough, chest tightness, shortness of breath and wheezing. Cardiovascular:  Negative for chest pain. Gastrointestinal:  Positive for abdominal distention. Negative for abdominal pain, constipation, diarrhea, nausea and vomiting. Endocrine: Negative for polyuria. Genitourinary:  Positive for decreased urine volume, difficulty urinating and dysuria. Negative for flank pain and urgency. Musculoskeletal:  Negative for back pain. Skin:  Negative for rash. Neurological:  Negative for dizziness, weakness, light-headedness, numbness and headaches.          Nursing Notes were all administration in time range)   sodium zirconium cyclosilicate (LOKELMA) oral suspension 10 g (10 g Oral Given 11/8/23 1733)   cefdinir (OMNICEF) capsule 300 mg (300 mg Oral Given 11/8/23 1033)   sodium chloride 0.9 % bolus 1,000 mL (0 mLs IntraVENous Stopped 11/8/23 1408)   calcium gluconate 10 % injection 1,000 mg (1,000 mg IntraVENous Given 11/8/23 1251)   sodium zirconium cyclosilicate (LOKELMA) oral suspension 10 g (10 g Oral Given 11/8/23 1251)   sodium chloride 0.9 % bolus 1,000 mL (0 mLs IntraVENous Stopped 11/8/23 1817)   insulin regular (HUMULIN R;NOVOLIN R) injection 10 Units (10 Units SubCUTAneous Given 11/8/23 1750)   calcium gluconate 1,000 mg in sodium chloride 50 mL (0 mg IntraVENous Stopped 11/8/23 1817)   dextrose bolus 10% 250 mL (0 mLs IntraVENous Stopped 11/8/23 1817)       Medical Decision Making/Differential Diagnosis:  CC/HPI Summary, Social Determinants of health, Records Reviewed, DDx, testing done/not done, ED Course, Reassessment, disposition considerations/shared decision making with patient, consults, disposition:        CC/HPI Summary, DDx, ED Course, Reassessment, Tests Considered, Patient expectation:   Patient presents the emergency department for concerns of urinary retention. Patient has known history. Patient was found to have a large elevation in his BUN secondary to outlet obstruction. Patient was given fluids and BMP was rechecked. Patient improvement in his BMP. Patient potassium was 6.2 initially. Patient had no EKG changes. Patient was not bradycardic. Patient was given calcium gluconate out of abundance of caution. Patient was also given Tharon Phy. Patient was found to have urinary tract infection as well. Patient was given Omnicef in the emergency department. Patient will was rehydrated and plan is to monitor electrolytes. Patient will be admitted. Patient had resolution of his symptoms with the administration of a Frazier catheter.   Large volume urine was

## 2023-11-08 NOTE — PROGRESS NOTES
4 Eyes Skin Assessment     NAME:  Nick Ortega YOB: 1955  MEDICAL RECORD NUMBER:  [id number]    The patient is being assessed for  Admission    I agree that at least one RN has performed a thorough Head to Toe Skin Assessment on the patient. ALL assessment sites listed below have been assessed. Areas assessed by both nurses:    Head, Face, Ears, Shoulders, Back, Chest, Arms, Elbows, Hands, Sacrum. Buttock, Coccyx, Ischium, Legs. Feet and Heels, and Under Medical Devices         Does the Patient have a Wound?  No noted wound(s)       Saul Prevention initiated by RN: No  Wound Care Orders initiated by RN: No    Pressure Injury (Stage 3,4, Unstageable, DTI, NWPT, and Complex wounds) if present, place Wound referral order by RN under : No    New Ostomies, if present place, Ostomy referral order under : No     Nurse 1 eSignature: Electronically signed by Louis Oliveira RN on 11/8/23 at 6:40 PM EST    **SHARE this note so that the co-signing nurse can place an eSignature**    Nurse 2 eSignature: Electronically signed by Ben Haskins RN on 11/8/23 at 6:41 PM EST

## 2023-11-09 ENCOUNTER — APPOINTMENT (OUTPATIENT)
Dept: CT IMAGING | Age: 68
End: 2023-11-09
Payer: MEDICARE

## 2023-11-09 LAB
ANION GAP SERPL CALCULATED.3IONS-SCNC: 12 MMOL/L (ref 7–16)
BASOPHILS # BLD: 0.04 K/UL (ref 0–0.2)
BASOPHILS NFR BLD: 0 % (ref 0–2)
BUN SERPL-MCNC: 129 MG/DL (ref 6–23)
CALCIUM SERPL-MCNC: 9.1 MG/DL (ref 8.6–10.2)
CHLORIDE SERPL-SCNC: 114 MMOL/L (ref 98–107)
CO2 SERPL-SCNC: 12 MMOL/L (ref 22–29)
CREAT SERPL-MCNC: 5.1 MG/DL (ref 0.7–1.2)
EOSINOPHIL # BLD: 0.47 K/UL (ref 0.05–0.5)
EOSINOPHILS RELATIVE PERCENT: 4 % (ref 0–6)
ERYTHROCYTE [DISTWIDTH] IN BLOOD BY AUTOMATED COUNT: 15.1 % (ref 11.5–15)
GFR SERPL CREATININE-BSD FRML MDRD: 11 ML/MIN/1.73M2
GLUCOSE BLD-MCNC: 100 MG/DL (ref 74–99)
GLUCOSE BLD-MCNC: 104 MG/DL (ref 74–99)
GLUCOSE BLD-MCNC: 105 MG/DL (ref 74–99)
GLUCOSE BLD-MCNC: 162 MG/DL (ref 74–99)
GLUCOSE SERPL-MCNC: 119 MG/DL (ref 74–99)
HBA1C MFR BLD: 6.4 % (ref 4–5.6)
HCT VFR BLD AUTO: 39 % (ref 37–54)
HGB BLD-MCNC: 12.4 G/DL (ref 12.5–16.5)
IMM GRANULOCYTES # BLD AUTO: 0.03 K/UL (ref 0–0.58)
IMM GRANULOCYTES NFR BLD: 0 % (ref 0–5)
INR PPP: 3.5
LYMPHOCYTES NFR BLD: 1.52 K/UL (ref 1.5–4)
LYMPHOCYTES RELATIVE PERCENT: 14 % (ref 20–42)
MCH RBC QN AUTO: 29 PG (ref 26–35)
MCHC RBC AUTO-ENTMCNC: 31.8 G/DL (ref 32–34.5)
MCV RBC AUTO: 91.1 FL (ref 80–99.9)
MONOCYTES NFR BLD: 1.1 K/UL (ref 0.1–0.95)
MONOCYTES NFR BLD: 10 % (ref 2–12)
NEUTROPHILS NFR BLD: 72 % (ref 43–80)
NEUTS SEG NFR BLD: 8.1 K/UL (ref 1.8–7.3)
PLATELET # BLD AUTO: 249 K/UL (ref 130–450)
PMV BLD AUTO: 11.7 FL (ref 7–12)
POTASSIUM SERPL-SCNC: 5.4 MMOL/L (ref 3.5–5)
PROTHROMBIN TIME: 39.6 SEC (ref 9.3–12.4)
RBC # BLD AUTO: 4.28 M/UL (ref 3.8–5.8)
SODIUM SERPL-SCNC: 138 MMOL/L (ref 132–146)
WBC OTHER # BLD: 11.3 K/UL (ref 4.5–11.5)

## 2023-11-09 PROCEDURE — 1200000000 HC SEMI PRIVATE

## 2023-11-09 PROCEDURE — 6370000000 HC RX 637 (ALT 250 FOR IP): Performed by: INTERNAL MEDICINE

## 2023-11-09 PROCEDURE — 82962 GLUCOSE BLOOD TEST: CPT

## 2023-11-09 PROCEDURE — 6360000002 HC RX W HCPCS: Performed by: INTERNAL MEDICINE

## 2023-11-09 PROCEDURE — 88112 CYTOPATH CELL ENHANCE TECH: CPT

## 2023-11-09 PROCEDURE — 80048 BASIC METABOLIC PNL TOTAL CA: CPT

## 2023-11-09 PROCEDURE — 85610 PROTHROMBIN TIME: CPT

## 2023-11-09 PROCEDURE — 2580000003 HC RX 258: Performed by: INTERNAL MEDICINE

## 2023-11-09 PROCEDURE — 83036 HEMOGLOBIN GLYCOSYLATED A1C: CPT

## 2023-11-09 PROCEDURE — 74176 CT ABD & PELVIS W/O CONTRAST: CPT

## 2023-11-09 PROCEDURE — 99233 SBSQ HOSP IP/OBS HIGH 50: CPT | Performed by: INTERNAL MEDICINE

## 2023-11-09 PROCEDURE — 85025 COMPLETE CBC W/AUTO DIFF WBC: CPT

## 2023-11-09 RX ORDER — LIDOCAINE HYDROCHLORIDE 20 MG/ML
JELLY TOPICAL PRN
Status: DISCONTINUED | OUTPATIENT
Start: 2023-11-09 | End: 2023-11-17 | Stop reason: HOSPADM

## 2023-11-09 RX ORDER — SODIUM CHLORIDE, SODIUM LACTATE, POTASSIUM CHLORIDE, CALCIUM CHLORIDE 600; 310; 30; 20 MG/100ML; MG/100ML; MG/100ML; MG/100ML
INJECTION, SOLUTION INTRAVENOUS CONTINUOUS
Status: DISCONTINUED | OUTPATIENT
Start: 2023-11-09 | End: 2023-11-16

## 2023-11-09 RX ADMIN — SODIUM CHLORIDE, POTASSIUM CHLORIDE, SODIUM LACTATE AND CALCIUM CHLORIDE: 600; 310; 30; 20 INJECTION, SOLUTION INTRAVENOUS at 09:42

## 2023-11-09 RX ADMIN — PETROLATUM: 420 OINTMENT TOPICAL at 20:57

## 2023-11-09 RX ADMIN — WATER 1000 MG: 1 INJECTION INTRAMUSCULAR; INTRAVENOUS; SUBCUTANEOUS at 16:55

## 2023-11-09 RX ADMIN — CARVEDILOL 6.25 MG: 6.25 TABLET, FILM COATED ORAL at 16:55

## 2023-11-09 RX ADMIN — CARVEDILOL 6.25 MG: 6.25 TABLET, FILM COATED ORAL at 09:21

## 2023-11-09 RX ADMIN — ATORVASTATIN CALCIUM 20 MG: 20 TABLET, FILM COATED ORAL at 20:57

## 2023-11-09 RX ADMIN — METFORMIN HYDROCHLORIDE 500 MG: 500 TABLET, EXTENDED RELEASE ORAL at 09:21

## 2023-11-09 RX ADMIN — SODIUM ZIRCONIUM CYCLOSILICATE 10 G: 10 POWDER, FOR SUSPENSION ORAL at 09:21

## 2023-11-09 RX ADMIN — SODIUM CHLORIDE, POTASSIUM CHLORIDE, SODIUM LACTATE AND CALCIUM CHLORIDE: 600; 310; 30; 20 INJECTION, SOLUTION INTRAVENOUS at 16:57

## 2023-11-09 RX ADMIN — SODIUM CHLORIDE, PRESERVATIVE FREE 10 ML: 5 INJECTION INTRAVENOUS at 09:21

## 2023-11-09 RX ADMIN — METFORMIN HYDROCHLORIDE 500 MG: 500 TABLET, EXTENDED RELEASE ORAL at 20:57

## 2023-11-09 NOTE — ACP (ADVANCE CARE PLANNING)
11/9/2023  Social Work Discharge Planning: Urinary retention. This worker met with t to discuss  role and transition of care/discharge planning. Pt is form home alone and independent and on room air. IV ATB. Pt has used [facility] [address] in the [id number]- if HHC is needed for IV ATB. Family will transport at discharge. Pt has a ww and cane.  Electronically signed by TEENA Juan on 11/9/2023 at 12:30 PM

## 2023-11-09 NOTE — PROGRESS NOTES
Pharmacy Consultation Note  (Warfarin Dosing and Monitoring)    Initial consult date: 11/8  Consulting Provider: Heath Gipson. is a 77 y.o. male for whom pharmacy has been asked to manage warfarin therapy. Weight:   Wt Readings from Last 1 Encounters:   11/09/23 104.5 kg (230 lb 6.4 oz)       TSH:    Lab Results   Component Value Date/Time    TSH 0.970 10/17/2012 11:59 AM       Hepatic Function Panel:                            Lab Results   Component Value Date/Time    ALKPHOS 77 02/08/2023 12:00 PM    ALT 40 02/08/2023 12:00 PM    AST 34 02/08/2023 12:00 PM    PROT 7.4 02/08/2023 12:00 PM    BILITOT 0.5 02/08/2023 12:00 PM    LABALBU 3.9 02/08/2023 12:00 PM       Current warfarin drug-drug interactions include:   -Allopurinol: may SIGNIFICANTLY enhance the anticoagulatory effect of warfarin. [Home med not re-ordered at this time]  -Metformin: may diminish the anticoagulatory effect of warfarin    Recent Labs     11/08/23  0938 11/09/23  0135   HGB 13.3 12.4*    249       Date Warfarin Dose INR Heparin or LMWH Comment   11/8 NO DOSE 3.4 X    11/9 NO DOSE 3.5 X                           Assessment:  Patient is a 77 y.o. male on warfarin for Atrial Fibrillation. Patient's home warfarin dosing regimen is 3 mg once daily per med rec and Dr. Moose Gonsalez note on 10/24/23. Goal INR 2 - 3  INR 3.5 today    Plan:  No warfarin tonight given unknown trajectory of INR.   Daily PT/INR until the INR is stable within the therapeutic range  Pharmacist will follow and monitor/adjust dosing as necessary      ROSALIA Beckman [facility] 11/9/2023 11:10 AM

## 2023-11-09 NOTE — PATIENT CARE CONFERENCE
P Quality Flow/Interdisciplinary Rounds Progress Note        Quality Flow Rounds held on November 9, 2023    Disciplines Attending:   and Nursing Unit Leadership    Rudy Jeronimo. was admitted on 11/8/2023  8:37 AM    Anticipated Discharge Date:       Disposition:    Saul Score:  Saul Scale Score: 20    Readmission Risk              Risk of Unplanned Readmission:  16           Discussed patient goal for the day, patient clinical progression, and barriers to discharge.   The following Goal(s) of the Day/Commitment(s) have been identified:  Labs - Report Results      Johan Hand RN  November 9, 2023

## 2023-11-10 LAB
ANION GAP SERPL CALCULATED.3IONS-SCNC: 11 MMOL/L (ref 7–16)
BASOPHILS # BLD: 0.05 K/UL (ref 0–0.2)
BASOPHILS NFR BLD: 1 % (ref 0–2)
BUN SERPL-MCNC: 107 MG/DL (ref 6–23)
CALCIUM SERPL-MCNC: 8.7 MG/DL (ref 8.6–10.2)
CHLORIDE SERPL-SCNC: 114 MMOL/L (ref 98–107)
CO2 SERPL-SCNC: 14 MMOL/L (ref 22–29)
CREAT SERPL-MCNC: 4.6 MG/DL (ref 0.7–1.2)
EOSINOPHIL # BLD: 0.56 K/UL (ref 0.05–0.5)
EOSINOPHILS RELATIVE PERCENT: 5 % (ref 0–6)
ERYTHROCYTE [DISTWIDTH] IN BLOOD BY AUTOMATED COUNT: 15 % (ref 11.5–15)
GFR SERPL CREATININE-BSD FRML MDRD: 13 ML/MIN/1.73M2
GLUCOSE BLD-MCNC: 103 MG/DL (ref 74–99)
GLUCOSE BLD-MCNC: 106 MG/DL (ref 74–99)
GLUCOSE BLD-MCNC: 110 MG/DL (ref 74–99)
GLUCOSE BLD-MCNC: 90 MG/DL (ref 74–99)
GLUCOSE SERPL-MCNC: 98 MG/DL (ref 74–99)
HCT VFR BLD AUTO: 35.3 % (ref 37–54)
HGB BLD-MCNC: 11.2 G/DL (ref 12.5–16.5)
IMM GRANULOCYTES # BLD AUTO: 0.04 K/UL (ref 0–0.58)
IMM GRANULOCYTES NFR BLD: 0 % (ref 0–5)
INR PPP: 2.3
LYMPHOCYTES NFR BLD: 1.94 K/UL (ref 1.5–4)
LYMPHOCYTES RELATIVE PERCENT: 18 % (ref 20–42)
MCH RBC QN AUTO: 28.6 PG (ref 26–35)
MCHC RBC AUTO-ENTMCNC: 31.7 G/DL (ref 32–34.5)
MCV RBC AUTO: 90.3 FL (ref 80–99.9)
MICROORGANISM SPEC CULT: NO GROWTH
MONOCYTES NFR BLD: 1.2 K/UL (ref 0.1–0.95)
MONOCYTES NFR BLD: 11 % (ref 2–12)
NEUTROPHILS NFR BLD: 66 % (ref 43–80)
NEUTS SEG NFR BLD: 7.31 K/UL (ref 1.8–7.3)
PLATELET # BLD AUTO: 234 K/UL (ref 130–450)
PMV BLD AUTO: 11.9 FL (ref 7–12)
POTASSIUM SERPL-SCNC: 4.9 MMOL/L (ref 3.5–5)
PROTHROMBIN TIME: 26.2 SEC (ref 9.3–12.4)
RBC # BLD AUTO: 3.91 M/UL (ref 3.8–5.8)
SODIUM SERPL-SCNC: 139 MMOL/L (ref 132–146)
SPECIMEN DESCRIPTION: NORMAL
WBC OTHER # BLD: 11.1 K/UL (ref 4.5–11.5)

## 2023-11-10 PROCEDURE — 6370000000 HC RX 637 (ALT 250 FOR IP): Performed by: INTERNAL MEDICINE

## 2023-11-10 PROCEDURE — 80048 BASIC METABOLIC PNL TOTAL CA: CPT

## 2023-11-10 PROCEDURE — 1200000000 HC SEMI PRIVATE

## 2023-11-10 PROCEDURE — 85025 COMPLETE CBC W/AUTO DIFF WBC: CPT

## 2023-11-10 PROCEDURE — 6360000002 HC RX W HCPCS: Performed by: INTERNAL MEDICINE

## 2023-11-10 PROCEDURE — 2580000003 HC RX 258: Performed by: INTERNAL MEDICINE

## 2023-11-10 PROCEDURE — 82962 GLUCOSE BLOOD TEST: CPT

## 2023-11-10 PROCEDURE — 99232 SBSQ HOSP IP/OBS MODERATE 35: CPT | Performed by: INTERNAL MEDICINE

## 2023-11-10 PROCEDURE — 85610 PROTHROMBIN TIME: CPT

## 2023-11-10 RX ORDER — WARFARIN SODIUM 5 MG/1
5 TABLET ORAL
Status: DISCONTINUED | OUTPATIENT
Start: 2023-11-10 | End: 2023-11-10

## 2023-11-10 RX ADMIN — SODIUM CHLORIDE, POTASSIUM CHLORIDE, SODIUM LACTATE AND CALCIUM CHLORIDE: 600; 310; 30; 20 INJECTION, SOLUTION INTRAVENOUS at 22:18

## 2023-11-10 RX ADMIN — ATORVASTATIN CALCIUM 20 MG: 20 TABLET, FILM COATED ORAL at 22:18

## 2023-11-10 RX ADMIN — PETROLATUM: 420 OINTMENT TOPICAL at 08:25

## 2023-11-10 RX ADMIN — PETROLATUM: 420 OINTMENT TOPICAL at 22:18

## 2023-11-10 RX ADMIN — CARVEDILOL 6.25 MG: 6.25 TABLET, FILM COATED ORAL at 17:01

## 2023-11-10 RX ADMIN — WATER 1000 MG: 1 INJECTION INTRAMUSCULAR; INTRAVENOUS; SUBCUTANEOUS at 16:55

## 2023-11-10 RX ADMIN — SODIUM CHLORIDE, PRESERVATIVE FREE 10 ML: 5 INJECTION INTRAVENOUS at 16:57

## 2023-11-10 RX ADMIN — METFORMIN HYDROCHLORIDE 500 MG: 500 TABLET, EXTENDED RELEASE ORAL at 22:18

## 2023-11-10 RX ADMIN — METFORMIN HYDROCHLORIDE 500 MG: 500 TABLET, EXTENDED RELEASE ORAL at 08:23

## 2023-11-10 RX ADMIN — SODIUM CHLORIDE, POTASSIUM CHLORIDE, SODIUM LACTATE AND CALCIUM CHLORIDE: 600; 310; 30; 20 INJECTION, SOLUTION INTRAVENOUS at 12:37

## 2023-11-10 RX ADMIN — CARVEDILOL 6.25 MG: 6.25 TABLET, FILM COATED ORAL at 08:23

## 2023-11-10 NOTE — PROGRESS NOTES
Hounsfield attenuation of 14 and 16. Findings are compatible with cortical cysts. Lesions were present on the  study of 04/02/2021. GI/Bowel: No evidence of bowel obstruction. High density contrast material  is noted in the mid to distal small bowel and right colon. Sigmoid colon  appears normal.     Pelvis: There is a lobulated contracted appearance of the urinary bladder  with wall thickening and slight Bridget cystic inflammation. There is a Cummings  catheter bulb within the urinary bladder. There is a soft tissue density  filling the distal left ureter likely causing the obstruction on the left,  consider urothelial carcinoma. Peritoneum/Retroperitoneum: No retroperitoneal mass or adenopathy. Aorta is  nonaneurysmal.  Several nonenlarged lymph nodes in the left periaortic region. Bones/Soft Tissues: Degenerative changes, moderate to severe in the lumbar  spine. No lytic or blastic lesions. Pelvis appears intact. Small fat  containing hernia at the umbilicus. IMPRESSION:  1. Moderate to severe left hydronephrosis and hydroureter. There is a soft  tissue density 2 cm rounded opacity in the distal left ureter, consider  urothelial carcinoma. 2.  Right nephrolithiasis without obstruction       Assessment/Plan:  High-grade papillary urothelial carcinoma (TURBT last done 8/22)   Left hydronephrosis and hydroureter  Left renal cysts  Right non obstructive nephrolithiasis   Gross hematuria  Urinary retention  DAISY    Continue the cummings catheter  Irrigate every 2 hours and prn to keep patent  Consider CBI if hematuria worsens  Coumadin is on hold   Continue to watch the creatinine 6.4>>>4.6  Would benefit from nephrology consult  He will need a cystoscopy, retrograde pyelogram, left ureteroscopy   In the future, date to be determined. Updated family.             LINDA Solano - CNP   [facility]  Urology    Agree with above  Seen and examined  Agree with the plan and treatment  Needs C&P on this admission  Has been non-compliant with JEREMIAH Hughes Memo, DO

## 2023-11-10 NOTE — CARE COORDINATION
11/10/2023 Social Work Discharge Planning:Pt is from home alone and independent and on room air. IV ATB. Pt has used [place] [address] in the [id number]- if [facility] is needed for IV ATB. Family will transport at discharge. Pt has a ww and cane.  Electronically signed by TEENA Abdullahi on 11/10/2023 at 9:31 AM

## 2023-11-10 NOTE — PATIENT CARE CONFERENCE
[facility] Quality Flow/Interdisciplinary Rounds Progress Note        Quality Flow Rounds held on November 10, 2023    Disciplines Attending:  Bedside Nurse, , and Nursing Unit Leadership    Emerita Cuellar was admitted on 11/8/2023  8:37 AM    Anticipated Discharge Date:  Expected Discharge Date: 11/11/23    Disposition:    Saul Score:  Saul Scale Score: 20    Readmission Risk              Risk of Unplanned Readmission:  18           Discussed patient goal for the day, patient clinical progression, and barriers to discharge.   The following Goal(s) of the Day/Commitment(s) have been identified:  Labs - Report Results      Liliya Goodman RN  November 10, 2023

## 2023-11-10 NOTE — PROGRESS NOTES
Pharmacy Consultation Note  (Warfarin Dosing and Monitoring)    Initial consult date: 11/8  Consulting Provider: Kimmy Cline. is a 77 y.o. male for whom pharmacy has been asked to manage warfarin therapy. Weight:   Wt Readings from Last 1 Encounters:   11/10/23 106.3 kg (234 lb 6.4 oz)       TSH:    Lab Results   Component Value Date/Time    TSH 0.970 10/17/2012 11:59 AM       Hepatic Function Panel:                            Lab Results   Component Value Date/Time    ALKPHOS 77 02/08/2023 12:00 PM    ALT 40 02/08/2023 12:00 PM    AST 34 02/08/2023 12:00 PM    PROT 7.4 02/08/2023 12:00 PM    BILITOT 0.5 02/08/2023 12:00 PM    LABALBU 3.9 02/08/2023 12:00 PM       Current warfarin drug-drug interactions include:   -Allopurinol: may SIGNIFICANTLY enhance the anticoagulatory effect of warfarin. [Home med not re-ordered at this time]  -Metformin: may diminish the anticoagulatory effect of warfarin    Recent Labs     11/08/23  0938 11/09/23  0135 11/10/23  0310   HGB 13.3 12.4* 11.2*    249 234       Date Warfarin Dose INR Heparin or LMWH Comment   11/8 NO DOSE 3.4 X    11/9 NO DOSE 3.5 X    11/10 5 mg 2.3 X                    Assessment:  Patient is a 77 y.o. male on warfarin for Atrial Fibrillation. Patient's home warfarin dosing regimen is 3 mg once daily per med rec and Dr. Kelvin Mills note on 10/24/23. Goal INR 2 - 3  INR 2.3 today  Given significant drop in INR from 3.5 yesterday to 2.3 today, will order warfarin 5 mg once tonight and likely resume home regimen on 11/11. Plan:  Warfarin 5 mg po x 1 tonight.   Daily PT/INR until the INR is stable within the therapeutic range  Pharmacist will follow and monitor/adjust dosing as necessary      ROSALIA Lucas TERESA [facility] 11/10/2023 10:25 AM

## 2023-11-11 LAB
ANION GAP SERPL CALCULATED.3IONS-SCNC: 11 MMOL/L (ref 7–16)
BASOPHILS # BLD: 0.07 K/UL (ref 0–0.2)
BASOPHILS NFR BLD: 1 % (ref 0–2)
BUN SERPL-MCNC: 86 MG/DL (ref 6–23)
CALCIUM SERPL-MCNC: 9 MG/DL (ref 8.6–10.2)
CHLORIDE SERPL-SCNC: 116 MMOL/L (ref 98–107)
CO2 SERPL-SCNC: 15 MMOL/L (ref 22–29)
CREAT SERPL-MCNC: 3.7 MG/DL (ref 0.7–1.2)
EOSINOPHIL # BLD: 0.8 K/UL (ref 0.05–0.5)
EOSINOPHILS RELATIVE PERCENT: 7 % (ref 0–6)
ERYTHROCYTE [DISTWIDTH] IN BLOOD BY AUTOMATED COUNT: 15.1 % (ref 11.5–15)
GFR SERPL CREATININE-BSD FRML MDRD: 17 ML/MIN/1.73M2
GLUCOSE BLD-MCNC: 80 MG/DL (ref 74–99)
GLUCOSE BLD-MCNC: 91 MG/DL (ref 74–99)
GLUCOSE BLD-MCNC: 92 MG/DL (ref 74–99)
GLUCOSE BLD-MCNC: 94 MG/DL (ref 74–99)
GLUCOSE SERPL-MCNC: 93 MG/DL (ref 74–99)
HCT VFR BLD AUTO: 36.9 % (ref 37–54)
HGB BLD-MCNC: 11.8 G/DL (ref 12.5–16.5)
IMM GRANULOCYTES # BLD AUTO: 0.04 K/UL (ref 0–0.58)
IMM GRANULOCYTES NFR BLD: 0 % (ref 0–5)
INR PPP: 1.6
LYMPHOCYTES NFR BLD: 1.77 K/UL (ref 1.5–4)
LYMPHOCYTES RELATIVE PERCENT: 16 % (ref 20–42)
MCH RBC QN AUTO: 28.9 PG (ref 26–35)
MCHC RBC AUTO-ENTMCNC: 32 G/DL (ref 32–34.5)
MCV RBC AUTO: 90.4 FL (ref 80–99.9)
MONOCYTES NFR BLD: 1.11 K/UL (ref 0.1–0.95)
MONOCYTES NFR BLD: 10 % (ref 2–12)
NEUTROPHILS NFR BLD: 65 % (ref 43–80)
NEUTS SEG NFR BLD: 7.04 K/UL (ref 1.8–7.3)
PLATELET # BLD AUTO: 227 K/UL (ref 130–450)
PMV BLD AUTO: 11.6 FL (ref 7–12)
POTASSIUM SERPL-SCNC: 5.2 MMOL/L (ref 3.5–5)
PROTHROMBIN TIME: 18.6 SEC (ref 9.3–12.4)
RBC # BLD AUTO: 4.08 M/UL (ref 3.8–5.8)
SODIUM SERPL-SCNC: 142 MMOL/L (ref 132–146)
WBC OTHER # BLD: 10.8 K/UL (ref 4.5–11.5)

## 2023-11-11 PROCEDURE — 2580000003 HC RX 258: Performed by: INTERNAL MEDICINE

## 2023-11-11 PROCEDURE — 82962 GLUCOSE BLOOD TEST: CPT

## 2023-11-11 PROCEDURE — 99232 SBSQ HOSP IP/OBS MODERATE 35: CPT | Performed by: INTERNAL MEDICINE

## 2023-11-11 PROCEDURE — 85610 PROTHROMBIN TIME: CPT

## 2023-11-11 PROCEDURE — 6370000000 HC RX 637 (ALT 250 FOR IP): Performed by: NURSE PRACTITIONER

## 2023-11-11 PROCEDURE — 80048 BASIC METABOLIC PNL TOTAL CA: CPT

## 2023-11-11 PROCEDURE — 6370000000 HC RX 637 (ALT 250 FOR IP): Performed by: INTERNAL MEDICINE

## 2023-11-11 PROCEDURE — 6360000002 HC RX W HCPCS: Performed by: INTERNAL MEDICINE

## 2023-11-11 PROCEDURE — 1200000000 HC SEMI PRIVATE

## 2023-11-11 PROCEDURE — 85025 COMPLETE CBC W/AUTO DIFF WBC: CPT

## 2023-11-11 RX ADMIN — PETROLATUM: 420 OINTMENT TOPICAL at 08:02

## 2023-11-11 RX ADMIN — CARVEDILOL 6.25 MG: 6.25 TABLET, FILM COATED ORAL at 16:19

## 2023-11-11 RX ADMIN — ATORVASTATIN CALCIUM 20 MG: 20 TABLET, FILM COATED ORAL at 21:34

## 2023-11-11 RX ADMIN — SODIUM CHLORIDE, POTASSIUM CHLORIDE, SODIUM LACTATE AND CALCIUM CHLORIDE: 600; 310; 30; 20 INJECTION, SOLUTION INTRAVENOUS at 16:12

## 2023-11-11 RX ADMIN — PETROLATUM: 420 OINTMENT TOPICAL at 21:35

## 2023-11-11 RX ADMIN — METFORMIN HYDROCHLORIDE 500 MG: 500 TABLET, EXTENDED RELEASE ORAL at 21:34

## 2023-11-11 RX ADMIN — CARVEDILOL 6.25 MG: 6.25 TABLET, FILM COATED ORAL at 08:01

## 2023-11-11 RX ADMIN — METFORMIN HYDROCHLORIDE 500 MG: 500 TABLET, EXTENDED RELEASE ORAL at 08:01

## 2023-11-11 RX ADMIN — WATER 1000 MG: 1 INJECTION INTRAMUSCULAR; INTRAVENOUS; SUBCUTANEOUS at 16:17

## 2023-11-11 RX ADMIN — SODIUM CHLORIDE, POTASSIUM CHLORIDE, SODIUM LACTATE AND CALCIUM CHLORIDE: 600; 310; 30; 20 INJECTION, SOLUTION INTRAVENOUS at 08:04

## 2023-11-11 RX ADMIN — SODIUM CHLORIDE, PRESERVATIVE FREE 10 ML: 5 INJECTION INTRAVENOUS at 16:19

## 2023-11-11 RX ADMIN — SODIUM ZIRCONIUM CYCLOSILICATE 10 G: 10 POWDER, FOR SUSPENSION ORAL at 03:42

## 2023-11-11 NOTE — PROGRESS NOTES
Potassium 5.2 this AM. Stephanie Multani NP with new order received for [facility] one dose.

## 2023-11-11 NOTE — PLAN OF CARE
Problem: Safety - Adult  Goal: Free from fall injury  11/11/2023 1050 by Roman Dias RN  Outcome: Progressing  11/10/2023 2307 by Emma Orr  Outcome: Progressing     Problem: ABCDS Injury Assessment  Goal: Absence of physical injury  11/11/2023 1050 by Roman Dias RN  Outcome: Progressing  11/10/2023 2307 by Emma Orr  Outcome: Progressing     Problem: Chronic Conditions and Co-morbidities  Goal: Patient's chronic conditions and co-morbidity symptoms are monitored and maintained or improved  11/11/2023 1050 by Roman Dias RN  Outcome: Progressing  11/10/2023 2307 by Emma Orr  Outcome: Progressing

## 2023-11-11 NOTE — PLAN OF CARE
Problem: Safety - Adult  Goal: Free from fall injury  11/10/2023 2307 by Pedro Mueller  Outcome: Progressing  11/10/2023 1337 by Ariana Cordoba RN  Outcome: Progressing     Problem: ABCDS Injury Assessment  Goal: Absence of physical injury  11/10/2023 2307 by Pedro Mueller  Outcome: Progressing  11/10/2023 1337 by Ariana Cordoba RN  Outcome: Progressing     Problem: Chronic Conditions and Co-morbidities  Goal: Patient's chronic conditions and co-morbidity symptoms are monitored and maintained or improved  11/10/2023 2307 by Pedro Mueller  Outcome: Progressing  11/10/2023 1337 by Ariana Cordoba RN  Outcome: Progressing

## 2023-11-12 LAB
ANION GAP SERPL CALCULATED.3IONS-SCNC: 10 MMOL/L (ref 7–16)
BUN SERPL-MCNC: 64 MG/DL (ref 6–23)
CALCIUM SERPL-MCNC: 8.6 MG/DL (ref 8.6–10.2)
CHLORIDE SERPL-SCNC: 113 MMOL/L (ref 98–107)
CO2 SERPL-SCNC: 18 MMOL/L (ref 22–29)
CREAT SERPL-MCNC: 3 MG/DL (ref 0.7–1.2)
ERYTHROCYTE [DISTWIDTH] IN BLOOD BY AUTOMATED COUNT: 14.9 % (ref 11.5–15)
GFR SERPL CREATININE-BSD FRML MDRD: 22 ML/MIN/1.73M2
GLUCOSE BLD-MCNC: 125 MG/DL (ref 74–99)
GLUCOSE BLD-MCNC: 84 MG/DL (ref 74–99)
GLUCOSE BLD-MCNC: 91 MG/DL (ref 74–99)
GLUCOSE BLD-MCNC: 93 MG/DL (ref 74–99)
GLUCOSE SERPL-MCNC: 109 MG/DL (ref 74–99)
HCT VFR BLD AUTO: 34.9 % (ref 37–54)
HGB BLD-MCNC: 11.3 G/DL (ref 12.5–16.5)
INR PPP: 1.4
MCH RBC QN AUTO: 29.3 PG (ref 26–35)
MCHC RBC AUTO-ENTMCNC: 32.4 G/DL (ref 32–34.5)
MCV RBC AUTO: 90.4 FL (ref 80–99.9)
PLATELET # BLD AUTO: 249 K/UL (ref 130–450)
PMV BLD AUTO: 11.7 FL (ref 7–12)
POTASSIUM SERPL-SCNC: 4.6 MMOL/L (ref 3.5–5)
PROTHROMBIN TIME: 16.1 SEC (ref 9.3–12.4)
RBC # BLD AUTO: 3.86 M/UL (ref 3.8–5.8)
SODIUM SERPL-SCNC: 141 MMOL/L (ref 132–146)
WBC OTHER # BLD: 12.2 K/UL (ref 4.5–11.5)

## 2023-11-12 PROCEDURE — 80048 BASIC METABOLIC PNL TOTAL CA: CPT

## 2023-11-12 PROCEDURE — 2580000003 HC RX 258: Performed by: INTERNAL MEDICINE

## 2023-11-12 PROCEDURE — 1200000000 HC SEMI PRIVATE

## 2023-11-12 PROCEDURE — 6360000002 HC RX W HCPCS: Performed by: INTERNAL MEDICINE

## 2023-11-12 PROCEDURE — 6370000000 HC RX 637 (ALT 250 FOR IP): Performed by: INTERNAL MEDICINE

## 2023-11-12 PROCEDURE — 85610 PROTHROMBIN TIME: CPT

## 2023-11-12 PROCEDURE — 85027 COMPLETE CBC AUTOMATED: CPT

## 2023-11-12 PROCEDURE — 82962 GLUCOSE BLOOD TEST: CPT

## 2023-11-12 PROCEDURE — 99232 SBSQ HOSP IP/OBS MODERATE 35: CPT | Performed by: INTERNAL MEDICINE

## 2023-11-12 RX ADMIN — PETROLATUM: 420 OINTMENT TOPICAL at 20:46

## 2023-11-12 RX ADMIN — WATER 1000 MG: 1 INJECTION INTRAMUSCULAR; INTRAVENOUS; SUBCUTANEOUS at 16:45

## 2023-11-12 RX ADMIN — SODIUM CHLORIDE, POTASSIUM CHLORIDE, SODIUM LACTATE AND CALCIUM CHLORIDE: 600; 310; 30; 20 INJECTION, SOLUTION INTRAVENOUS at 00:10

## 2023-11-12 RX ADMIN — ATORVASTATIN CALCIUM 20 MG: 20 TABLET, FILM COATED ORAL at 20:45

## 2023-11-12 RX ADMIN — PETROLATUM: 420 OINTMENT TOPICAL at 08:46

## 2023-11-12 RX ADMIN — CARVEDILOL 6.25 MG: 6.25 TABLET, FILM COATED ORAL at 08:46

## 2023-11-12 RX ADMIN — CARVEDILOL 6.25 MG: 6.25 TABLET, FILM COATED ORAL at 16:45

## 2023-11-12 RX ADMIN — METFORMIN HYDROCHLORIDE 500 MG: 500 TABLET, EXTENDED RELEASE ORAL at 20:45

## 2023-11-12 RX ADMIN — METFORMIN HYDROCHLORIDE 500 MG: 500 TABLET, EXTENDED RELEASE ORAL at 08:46

## 2023-11-12 RX ADMIN — SODIUM CHLORIDE, POTASSIUM CHLORIDE, SODIUM LACTATE AND CALCIUM CHLORIDE: 600; 310; 30; 20 INJECTION, SOLUTION INTRAVENOUS at 16:45

## 2023-11-12 ASSESSMENT — PAIN DESCRIPTION - DESCRIPTORS: DESCRIPTORS: SORE

## 2023-11-12 ASSESSMENT — PAIN SCALES - GENERAL: PAINLEVEL_OUTOF10: 6

## 2023-11-12 ASSESSMENT — PAIN DESCRIPTION - LOCATION: LOCATION: PENIS

## 2023-11-12 ASSESSMENT — PAIN DESCRIPTION - PAIN TYPE: TYPE: ACUTE PAIN

## 2023-11-12 ASSESSMENT — PAIN DESCRIPTION - ONSET: ONSET: GRADUAL

## 2023-11-13 LAB
ANION GAP SERPL CALCULATED.3IONS-SCNC: 8 MMOL/L (ref 7–16)
BUN SERPL-MCNC: 50 MG/DL (ref 6–23)
CALCIUM SERPL-MCNC: 8.7 MG/DL (ref 8.6–10.2)
CHLORIDE SERPL-SCNC: 116 MMOL/L (ref 98–107)
CO2 SERPL-SCNC: 18 MMOL/L (ref 22–29)
CREAT SERPL-MCNC: 2.7 MG/DL (ref 0.7–1.2)
ERYTHROCYTE [DISTWIDTH] IN BLOOD BY AUTOMATED COUNT: 15.1 % (ref 11.5–15)
GFR SERPL CREATININE-BSD FRML MDRD: 25 ML/MIN/1.73M2
GLUCOSE BLD-MCNC: 103 MG/DL (ref 74–99)
GLUCOSE BLD-MCNC: 82 MG/DL (ref 74–99)
GLUCOSE BLD-MCNC: 90 MG/DL (ref 74–99)
GLUCOSE SERPL-MCNC: 95 MG/DL (ref 74–99)
HCT VFR BLD AUTO: 35.1 % (ref 37–54)
HGB BLD-MCNC: 11.1 G/DL (ref 12.5–16.5)
INR PPP: 1.3
MCH RBC QN AUTO: 28.9 PG (ref 26–35)
MCHC RBC AUTO-ENTMCNC: 31.6 G/DL (ref 32–34.5)
MCV RBC AUTO: 91.4 FL (ref 80–99.9)
NON-GYN CYTOLOGY REPORT: NORMAL
PLATELET # BLD AUTO: 230 K/UL (ref 130–450)
PMV BLD AUTO: 11.3 FL (ref 7–12)
POTASSIUM SERPL-SCNC: 4.8 MMOL/L (ref 3.5–5)
PROTHROMBIN TIME: 14.6 SEC (ref 9.3–12.4)
RBC # BLD AUTO: 3.84 M/UL (ref 3.8–5.8)
SODIUM SERPL-SCNC: 142 MMOL/L (ref 132–146)
WBC OTHER # BLD: 12 K/UL (ref 4.5–11.5)

## 2023-11-13 PROCEDURE — 85027 COMPLETE CBC AUTOMATED: CPT

## 2023-11-13 PROCEDURE — 6370000000 HC RX 637 (ALT 250 FOR IP): Performed by: INTERNAL MEDICINE

## 2023-11-13 PROCEDURE — 80048 BASIC METABOLIC PNL TOTAL CA: CPT

## 2023-11-13 PROCEDURE — 82962 GLUCOSE BLOOD TEST: CPT

## 2023-11-13 PROCEDURE — 85610 PROTHROMBIN TIME: CPT

## 2023-11-13 PROCEDURE — 99233 SBSQ HOSP IP/OBS HIGH 50: CPT | Performed by: INTERNAL MEDICINE

## 2023-11-13 PROCEDURE — 2580000003 HC RX 258: Performed by: INTERNAL MEDICINE

## 2023-11-13 PROCEDURE — 1200000000 HC SEMI PRIVATE

## 2023-11-13 RX ORDER — ATORVASTATIN CALCIUM 40 MG/1
TABLET, FILM COATED ORAL
Qty: 45 TABLET | Refills: 0 | OUTPATIENT
Start: 2023-11-13

## 2023-11-13 RX ORDER — METFORMIN HYDROCHLORIDE 500 MG/1
1000 TABLET, EXTENDED RELEASE ORAL 2 TIMES DAILY
Qty: 360 TABLET | Refills: 1 | OUTPATIENT
Start: 2023-11-13

## 2023-11-13 RX ADMIN — PETROLATUM: 420 OINTMENT TOPICAL at 08:58

## 2023-11-13 RX ADMIN — PETROLATUM: 420 OINTMENT TOPICAL at 21:45

## 2023-11-13 RX ADMIN — METFORMIN HYDROCHLORIDE 500 MG: 500 TABLET, EXTENDED RELEASE ORAL at 08:57

## 2023-11-13 RX ADMIN — SODIUM CHLORIDE, POTASSIUM CHLORIDE, SODIUM LACTATE AND CALCIUM CHLORIDE: 600; 310; 30; 20 INJECTION, SOLUTION INTRAVENOUS at 17:17

## 2023-11-13 RX ADMIN — METFORMIN HYDROCHLORIDE 500 MG: 500 TABLET, EXTENDED RELEASE ORAL at 21:35

## 2023-11-13 RX ADMIN — CARVEDILOL 6.25 MG: 6.25 TABLET, FILM COATED ORAL at 17:17

## 2023-11-13 RX ADMIN — ATORVASTATIN CALCIUM 20 MG: 20 TABLET, FILM COATED ORAL at 21:34

## 2023-11-13 RX ADMIN — CARVEDILOL 6.25 MG: 6.25 TABLET, FILM COATED ORAL at 08:57

## 2023-11-13 ASSESSMENT — PAIN SCALES - GENERAL: PAINLEVEL_OUTOF10: 0

## 2023-11-13 NOTE — CARE COORDINATION
11/13/2023 Social Work Discharge Planning:Pt is no longer on IV ATB. Pt declines needing HHC. Plan is home alone at discharge. Family will transport. Pt has used [place] [address] in the [id number]. Electronically signed by TEENA Manning on 11/13/2023 at 10:10 AM

## 2023-11-13 NOTE — PATIENT CARE CONFERENCE
[facility] Quality Flow/Interdisciplinary Rounds Progress Note        Quality Flow Rounds held on November 13, 2023    Disciplines Attending:  Bedside Nurse, , , and Nursing Unit Leadership    Ligia Wheeler. was admitted on 11/8/2023  8:37 AM    Anticipated Discharge Date:  Expected Discharge Date: 11/11/23    Disposition:    Saul Score:  Saul Scale Score: 20    Readmission Risk              Risk of Unplanned Readmission:  19           Discussed patient goal for the day, patient clinical progression, and barriers to discharge.   The following Goal(s) of the Day/Commitment(s) have been identified:  Labs - Report Results      Noelia Boxer, RN  November 13, 2023

## 2023-11-13 NOTE — PLAN OF CARE
Problem: Safety - Adult  Goal: Free from fall injury  11/13/2023 1132 by Maribel Harris RN  Outcome: Progressing  11/12/2023 2236 by Lina Gutiérrez RN  Outcome: Progressing

## 2023-11-14 LAB
ANION GAP SERPL CALCULATED.3IONS-SCNC: 10 MMOL/L (ref 7–16)
BUN SERPL-MCNC: 44 MG/DL (ref 6–23)
CALCIUM SERPL-MCNC: 8.6 MG/DL (ref 8.6–10.2)
CHLORIDE SERPL-SCNC: 116 MMOL/L (ref 98–107)
CO2 SERPL-SCNC: 19 MMOL/L (ref 22–29)
CREAT SERPL-MCNC: 2.6 MG/DL (ref 0.7–1.2)
ERYTHROCYTE [DISTWIDTH] IN BLOOD BY AUTOMATED COUNT: 15.1 % (ref 11.5–15)
GFR SERPL CREATININE-BSD FRML MDRD: 25 ML/MIN/1.73M2
GLUCOSE BLD-MCNC: 91 MG/DL (ref 74–99)
GLUCOSE BLD-MCNC: 92 MG/DL (ref 74–99)
GLUCOSE BLD-MCNC: 92 MG/DL (ref 74–99)
GLUCOSE BLD-MCNC: 93 MG/DL (ref 74–99)
GLUCOSE SERPL-MCNC: 93 MG/DL (ref 74–99)
HCT VFR BLD AUTO: 34.8 % (ref 37–54)
HGB BLD-MCNC: 11.1 G/DL (ref 12.5–16.5)
INR PPP: 1.2
MCH RBC QN AUTO: 29.4 PG (ref 26–35)
MCHC RBC AUTO-ENTMCNC: 31.9 G/DL (ref 32–34.5)
MCV RBC AUTO: 92.1 FL (ref 80–99.9)
PLATELET # BLD AUTO: 237 K/UL (ref 130–450)
PMV BLD AUTO: 11.5 FL (ref 7–12)
POTASSIUM SERPL-SCNC: 4.6 MMOL/L (ref 3.5–5)
PROTHROMBIN TIME: 13.7 SEC (ref 9.3–12.4)
RBC # BLD AUTO: 3.78 M/UL (ref 3.8–5.8)
SODIUM SERPL-SCNC: 145 MMOL/L (ref 132–146)
WBC OTHER # BLD: 11.4 K/UL (ref 4.5–11.5)

## 2023-11-14 PROCEDURE — 6370000000 HC RX 637 (ALT 250 FOR IP): Performed by: INTERNAL MEDICINE

## 2023-11-14 PROCEDURE — 1200000000 HC SEMI PRIVATE

## 2023-11-14 PROCEDURE — 99233 SBSQ HOSP IP/OBS HIGH 50: CPT | Performed by: INTERNAL MEDICINE

## 2023-11-14 PROCEDURE — 97530 THERAPEUTIC ACTIVITIES: CPT

## 2023-11-14 PROCEDURE — 2580000003 HC RX 258: Performed by: INTERNAL MEDICINE

## 2023-11-14 PROCEDURE — 97161 PT EVAL LOW COMPLEX 20 MIN: CPT

## 2023-11-14 PROCEDURE — 85027 COMPLETE CBC AUTOMATED: CPT

## 2023-11-14 PROCEDURE — 80048 BASIC METABOLIC PNL TOTAL CA: CPT

## 2023-11-14 PROCEDURE — 97165 OT EVAL LOW COMPLEX 30 MIN: CPT

## 2023-11-14 PROCEDURE — 85610 PROTHROMBIN TIME: CPT

## 2023-11-14 PROCEDURE — 36415 COLL VENOUS BLD VENIPUNCTURE: CPT

## 2023-11-14 PROCEDURE — 82962 GLUCOSE BLOOD TEST: CPT

## 2023-11-14 RX ADMIN — METFORMIN HYDROCHLORIDE 500 MG: 500 TABLET, EXTENDED RELEASE ORAL at 08:31

## 2023-11-14 RX ADMIN — CARVEDILOL 6.25 MG: 6.25 TABLET, FILM COATED ORAL at 08:31

## 2023-11-14 RX ADMIN — METFORMIN HYDROCHLORIDE 500 MG: 500 TABLET, EXTENDED RELEASE ORAL at 21:18

## 2023-11-14 RX ADMIN — CARVEDILOL 6.25 MG: 6.25 TABLET, FILM COATED ORAL at 16:30

## 2023-11-14 RX ADMIN — SODIUM CHLORIDE, POTASSIUM CHLORIDE, SODIUM LACTATE AND CALCIUM CHLORIDE: 600; 310; 30; 20 INJECTION, SOLUTION INTRAVENOUS at 05:44

## 2023-11-14 RX ADMIN — PETROLATUM: 420 OINTMENT TOPICAL at 21:18

## 2023-11-14 RX ADMIN — ATORVASTATIN CALCIUM 20 MG: 20 TABLET, FILM COATED ORAL at 21:17

## 2023-11-14 RX ADMIN — PETROLATUM: 420 OINTMENT TOPICAL at 11:03

## 2023-11-14 NOTE — PATIENT CARE CONFERENCE
[facility] Quality Flow/Interdisciplinary Rounds Progress Note        Quality Flow Rounds held on November 14, 2023    Disciplines Attending:  Bedside Nurse, , , and Nursing Unit Leadership    Seng Dipti was admitted on 11/8/2023  8:37 AM    Anticipated Discharge Date:  Expected Discharge Date: 11/15/23    Disposition:    Saul Score:  Saul Scale Score: 14    Readmission Risk              Risk of Unplanned Readmission:  19           Discussed patient goal for the day, patient clinical progression, and barriers to discharge.   The following Goal(s) of the Day/Commitment(s) have been identified:  Labs - Report Results      Leonela Castillo RN  November 14, 2023

## 2023-11-14 NOTE — PROGRESS NOTES
OCCUPATIONAL THERAPY INITIAL EVALUATION    [address]   [address] [facility], [address] [place] [address]        NTIE:                                                  Patient Name: Yunior Contreras MRN: [id number]    : 1955    Room: [address] [place] [address]     Evaluating OT: Mable Shock OTR/L #[id number]    Referring Provider:  Rosa Elena Bianchi MD     Specific Provider Orders/Date:  OT Eval and Treat, 21     Diagnosis:   1. Hyperkalemia    2. Acute renal failure, unspecified acute renal failure type ([address])    3.  Obstructive uropathy         Surgery: None     Pertinent Medical History: HTN, A-fib, DM, back surgery       Precautions:  Fall Risk, alarm      Assessment of current deficits    [x] Functional mobility  [x]ADLs  [x] Strength               []Cognition    [x] Functional transfers   [x] IADLs         [x] Safety Awareness   [x]Endurance    [] Fine Coordination              [x] Balance      [] Vision/perception   []Sensation     []Gross Motor Coordination  [] ROM  [] Delirium                   [] Motor Control     OT PLAN OF CARE   OT POC based on physician orders, patient diagnosis and results of clinical assessment    Frequency/Duration 1-3 days/wk for 2 weeks PRN     Specific OT Treatment Interventions to include:   * Instruction/training on adapted ADL techniques and AE recommendations to increase functional independence within precautions       * Training on energy conservation strategies, correct breathing pattern and techniques to improve independence/tolerance for self-care routine  * Functional transfer/mobility training/DME recommendations for increased independence, safety, and fall prevention  * Patient/Family education to increase follow through with safety techniques and functional independence  * Recommendation of environmental modifications for increased safety with functional transfers/mobility and ADLs  * Therapeutic exercise to improve motor endurance, ROM, and functional strength for ADLs/functional transfers  * Therapeutic activities to facilitate/challenge dynamic balance, stand tolerance for increased safety and independence with ADLs  * Positioning to improve skin integrity, interaction with environment and functional independence    Recommended Adaptive Equipment: TBD      Home Living: Lives alone, single family home, 1 story with basement dwelling, 2 steps to enter without rail. Laundry and walk-in shower in basement. Bathroom set-up: Walk-in shower in basement (pt's preference), tub/shower on 1st floor         Equipment owned: Rollators, elevated commode on 1st floor, shower chair in basement    Prior Level of Function: Modified [place] with ADLs and IADLs; ambulated independently with rollator. Driving: Yes     Pain Level: No c/o pain , just bladder pressure , RN aware      Cognition: A&O: 4/4; Follows 3 step directions   Memory: intact   Sequencing: intact   Problem solving: intact   Judgement/safety: fair +     Functional Assessment: AM-PAC Daily Activity Raw Score: 15/24   Initial Eval Status  Date: 11/14/23   Treatment Status  Date:  STGs = LTGs  Time frame: 10-14 days   Feeding Supervision     Independent    Grooming Stand by Assist     Modified Kendall    UB Dressing Minimal Assist    Modified Kendall    LB Dressing Moderate Assist     Modified Kendall    Bathing Moderate Assist     Modified Kendall    Toileting Maximal Assist   Pt has cummings catheter     Modified Kendall    Bed Mobility  Supine to sit: Minimal Assist   Sit to supine:  N/A  Rolling: Stand by Assist     Supine to sit: Independent   Sit to supine: Independent    Functional Transfers Sit to stand: Minimal Assist   Stand to sit: Minimal Assist    Transfer training with verbal cues for hand placement to improve safety. Independent   Functional Mobility CGA for safety with wheeled walker few steps to pivot from EOB to chair.

## 2023-11-14 NOTE — PROGRESS NOTES
Physical Therapy  Facility/Department: [facility]  Physical Therapy Initial Assessment    Name: Rosetta Piña.  : 1955  MRN: [id number]  Date of Service: 2023    Attending Provider:  Negra Bear MD    Evaluating PT:  José Antonio Colvin P.T. Room #:  8811/3193-E  Diagnosis:  Hyperkalemia [E87.5]  Urinary retention [R33.9]  Obstructive uropathy [N13.9]  Acute renal failure, unspecified acute renal failure type ([address]) [N17.9]  Precautions:  falls, bed/chair alarm    SUBJECTIVE:    Pt lives alone in a 1 story home with 22 stairs and can hold onto post on porch to enter. Pt ambulated with a rollator ww and reports he has multiple ww/rollators at home. OBJECTIVE:   Initial Evaluation  Date: 23 Treatment Short Term/ Long Term   Goals   Was pt agreeable to Eval/treatment? yes     Does pt have pain? No c/o pain     Bed Mobility  NA, pt was found and left sitting up in chair    Independent   Transfers Sit to stand: MIN A   Stand to sit: MIN A  Stand pivot: NA  Independent   Ambulation   2 feet forward and back with ww MIN A  100 feet with ww Independent    Stair negotiation: ascended and descended NA, pt c/o fatigue that limited amb distance. 2 steps with 1 rail Independent    AM-PAC 6 Clicks 59/09       BLE ROM is WFL. BLE strength is grossly 3-/5 to 4-/5.    Sensation:  Pt c/o numbness and tingling to B feet and hands  Balance: sitting is supervision and standing with ww is MIN A  Endurance: fair-    Patient education  Pt educated on transfers    Patient response to education:   Pt verbalized understanding Pt demonstrated skill Pt requires further education in this area   yes yes yes     ASSESSMENT:    Conditions Requiring Skilled Therapeutic Intervention:    [x]Decreased strength     []Decreased ROM  [x]Decreased functional mobility  [x]Decreased balance   [x]Decreased endurance   []Decreased posture  [x]Decreased sensation  []Decreased coordination   []Decreased

## 2023-11-14 NOTE — CARE COORDINATION
11/14/2023  Social Work Discharge Planning:Plan is home alone . Family will transport Pt. Currently no needs. Room air. Electronically signed by TEENA Franklin on 11/14/2023 at 10:10 AM      11/14/2023  Social Work Discharge Planning:SW discussed AMPAC of 15/24. Pt feels he needs NITHIN and cannot return home and is very concerned about the tingling in his feet. Referral was made to [facility]. Waiting reply. Electronically signed by TEENA Franklin on 11/14/2023 at 3:22 PM    11/14/2023  Social Work Discharge Planning:Sw tried to call Pts children but had to leave vm with daughter and unable to leave a message with his son.  Electronically signed by TEENA Franklin on 11/14/2023 at 3:28 PM

## 2023-11-15 LAB
ANION GAP SERPL CALCULATED.3IONS-SCNC: 11 MMOL/L (ref 7–16)
BUN SERPL-MCNC: 39 MG/DL (ref 6–23)
CALCIUM SERPL-MCNC: 8.5 MG/DL (ref 8.6–10.2)
CHLORIDE SERPL-SCNC: 113 MMOL/L (ref 98–107)
CO2 SERPL-SCNC: 18 MMOL/L (ref 22–29)
CREAT SERPL-MCNC: 2.6 MG/DL (ref 0.7–1.2)
ERYTHROCYTE [DISTWIDTH] IN BLOOD BY AUTOMATED COUNT: 15 % (ref 11.5–15)
GFR SERPL CREATININE-BSD FRML MDRD: 25 ML/MIN/1.73M2
GLUCOSE BLD-MCNC: 104 MG/DL (ref 74–99)
GLUCOSE BLD-MCNC: 83 MG/DL (ref 74–99)
GLUCOSE BLD-MCNC: 87 MG/DL (ref 74–99)
GLUCOSE BLD-MCNC: 87 MG/DL (ref 74–99)
GLUCOSE SERPL-MCNC: 81 MG/DL (ref 74–99)
HCT VFR BLD AUTO: 33.2 % (ref 37–54)
HGB BLD-MCNC: 10.6 G/DL (ref 12.5–16.5)
INR PPP: 1.2
MCH RBC QN AUTO: 29.3 PG (ref 26–35)
MCHC RBC AUTO-ENTMCNC: 31.9 G/DL (ref 32–34.5)
MCV RBC AUTO: 91.7 FL (ref 80–99.9)
PLATELET # BLD AUTO: 236 K/UL (ref 130–450)
PMV BLD AUTO: 11.4 FL (ref 7–12)
POTASSIUM SERPL-SCNC: 4.6 MMOL/L (ref 3.5–5)
PROTHROMBIN TIME: 13.6 SEC (ref 9.3–12.4)
RBC # BLD AUTO: 3.62 M/UL (ref 3.8–5.8)
SODIUM SERPL-SCNC: 142 MMOL/L (ref 132–146)
WBC OTHER # BLD: 11.9 K/UL (ref 4.5–11.5)

## 2023-11-15 PROCEDURE — 85027 COMPLETE CBC AUTOMATED: CPT

## 2023-11-15 PROCEDURE — 6370000000 HC RX 637 (ALT 250 FOR IP): Performed by: INTERNAL MEDICINE

## 2023-11-15 PROCEDURE — 85610 PROTHROMBIN TIME: CPT

## 2023-11-15 PROCEDURE — 99232 SBSQ HOSP IP/OBS MODERATE 35: CPT | Performed by: INTERNAL MEDICINE

## 2023-11-15 PROCEDURE — 1200000000 HC SEMI PRIVATE

## 2023-11-15 PROCEDURE — 2580000003 HC RX 258: Performed by: INTERNAL MEDICINE

## 2023-11-15 PROCEDURE — 80048 BASIC METABOLIC PNL TOTAL CA: CPT

## 2023-11-15 PROCEDURE — 36415 COLL VENOUS BLD VENIPUNCTURE: CPT

## 2023-11-15 PROCEDURE — 82962 GLUCOSE BLOOD TEST: CPT

## 2023-11-15 RX ADMIN — PETROLATUM: 420 OINTMENT TOPICAL at 08:18

## 2023-11-15 RX ADMIN — PETROLATUM: 420 OINTMENT TOPICAL at 20:24

## 2023-11-15 RX ADMIN — POLYETHYLENE GLYCOL 3350 17 G: 17 POWDER, FOR SOLUTION ORAL at 17:41

## 2023-11-15 RX ADMIN — SODIUM CHLORIDE, POTASSIUM CHLORIDE, SODIUM LACTATE AND CALCIUM CHLORIDE: 600; 310; 30; 20 INJECTION, SOLUTION INTRAVENOUS at 00:20

## 2023-11-15 RX ADMIN — METFORMIN HYDROCHLORIDE 500 MG: 500 TABLET, EXTENDED RELEASE ORAL at 08:15

## 2023-11-15 RX ADMIN — CARVEDILOL 6.25 MG: 6.25 TABLET, FILM COATED ORAL at 08:15

## 2023-11-15 RX ADMIN — ATORVASTATIN CALCIUM 20 MG: 20 TABLET, FILM COATED ORAL at 20:25

## 2023-11-15 RX ADMIN — CARVEDILOL 6.25 MG: 6.25 TABLET, FILM COATED ORAL at 16:34

## 2023-11-15 RX ADMIN — METFORMIN HYDROCHLORIDE 500 MG: 500 TABLET, EXTENDED RELEASE ORAL at 20:25

## 2023-11-15 NOTE — DISCHARGE INSTRUCTIONS
Continue to hold warfarin, lisinopril until surgery   Lasix as needed for now       Call Dr. Watkins/Gama/Jeanette's office to confirm time of procedure scheduled for 11/17/23 at [facility] with Dr. Mili Malloy--[phone].

## 2023-11-15 NOTE — PROGRESS NOTES
Date:11/15/2023  Patient Name: Rosetta Piña. MRN: [id number]  : 1955  ROOM #: [id number]    Occupational Therapy treatment attempted this date. Patient declined due to just getting back to bed and being up prior to OT arrival. Will re-attempt OT treatment at a later time. Thank you.      Caremn Chappell OTR/JORDANA #[id number]

## 2023-11-15 NOTE — DISCHARGE INSTR - COC
Continuity of Care Form    Patient Name: Garry Cueva   :  1955  MRN:  [id number]    Admit date:  2023  Discharge date:  23    Code Status Order: Full Code   Advance Directives:     Admitting Physician:  Odette Diana DO  PCP: Nery Lange DO    Discharging Nurse: Electronically signed by Rivera Danielson RN on 2023 at 11:20 [address] [facility] Unit/Room#: 0507/0507-A  Discharging Unit Phone Number: [phone]    Emergency Contact:   Extended Emergency Contact Information  Primary Emergency Contact: [facility]  Address: [address] [facility], [address] [place] [address] Christa Mealing of [address] [employer] Phone: [phone]  Mobile Phone: [phone]  Relation: Child  Preferred language: English  Secondary Emergency Contact: Alberto Real  Address: [address] [place] [address] Christa Mealing of [address] [employer] Phone: [phone]  Mobile Phone: [phone]  Relation: Child  Preferred language: English   needed?  No    Past Surgical History:  Past Surgical History:   Procedure Laterality Date    BACK SURGERY  2007    repair of herniated disc    CARDIOVASCULAR STRESS TEST  2006    findings of inferior and anterior ischemia     CYSTOSCOPY N/A 10/22/2021    CYSTOSCOPY RETROGRADE PYELOGRAM TRANSURETHRAL RESECTION OF BLADDER TUMOR WITH INSTALLATION OF GEMCITIBINE performed by Betzaida Malloy DO at [address] [place] [address] CATH LAB PROCEDURE  2006    No obstructive CAD and mild dilated cardiomyopathy with EF 45-50%    PAIN MANAGEMENT PROCEDURE Bilateral 2022    LUMBAR TRANSFORAMINAL EPIDURAL STEROID INJECTION RIGHT L4 & LEFT L3 UNDER FLUOROSCOPIC GUIDANCE performed by Evaristo Paulson MD at [facility] OR    TURP N/A 2021    CYSTOSCOPY, URETHRAL DILITATION, PLASMA BUTTON TRANSURETHRAL RESECTION BLADDER TUMOR WITH FULGURATION, EXCHANGE GABRIEL CATHETER performed by Gladis Myers MD at [address]    TURP N/A 2021    CYSTOSCOPY

## 2023-11-15 NOTE — PATIENT CARE CONFERENCE
[facility] Quality Flow/Interdisciplinary Rounds Progress Note        Quality Flow Rounds held on November 15, 2023    Disciplines Attending:  Bedside Nurse, , , and Nursing Unit Leadership    Syed January. was admitted on 11/8/2023  8:37 AM    Anticipated Discharge Date:  Expected Discharge Date: 11/14/23    Disposition:    Saul Score:  Saul Scale Score: 14    Readmission Risk              Risk of Unplanned Readmission:  19           Discussed patient goal for the day, patient clinical progression, and barriers to discharge.   The following Goal(s) of the Day/Commitment(s) have been identified:  Discharge - Obtain Order      Leigh Ann Coleman RN  November 15, 2023

## 2023-11-15 NOTE — PROGRESS NOTES
Comprehensive Nutrition Assessment    Type and Reason for Visit:  Initial, RD Nutrition Re-Screen/LOS    Nutrition Recommendations/Plan:   Continue current Carb Controlled diet  Continue inpatient monitoring     Nutrition Assessment:    Pt admit 2/2 Gross hematuria, Urinary retention/Recurrent bladder cancer, Non-obstructive right nephrolithiasis/Left hydroureteronephrosis. Plan for cystoscopy, retrograde pyelography, left ureteroscopy with biopsy and stent insertion 11/17. DAISY on CKD improving. PMH includes DM, with good glycemic control at this time. Discharge planning in progress for rehab. Recommend continue current Carb Controlled diet (4 carb/meal) and will continue inpatient monitoring    Nutrition Related Findings:    A&Ox2, hematuria, soft obese abd +BS, missing teeth, +2 edema, +I/O 3.8L Wound Type: Venous Stasis (reddened buttocks)       Current Nutrition Intake & Therapies:    Average Meal Intake: 26-50%, 51-75%  Average Supplements Intake: None Ordered  ADULT DIET; Regular; 4 carb choices (60 gm/meal)    Anthropometric Measures:  Height: 170.2 cm (5' 7\")  Ideal Body Weight (IBW): 148 lbs (67 kg)    Admission Body Weight: 104.5 kg (230 lb 6.1 oz) (11/9 bedscale)  Current Body Weight: 112.9 kg (248 lb 14.4 oz), 168.2 % IBW.  Weight Source: Bed Scale (11/15)  Current BMI (kg/m2): 39  Usual Body Weight:  (SNEHA - d/t lack of hx of actual wts)                       BMI Categories: Obese Class 2 (BMI 35.0 -39.9)    Nutrition Interventions:   Food and/or Nutrient Delivery: Continue Current Diet  Nutrition Education/Counseling: No recommendation at this time  Coordination of Nutrition Care: Continue to monitor while inpatient       Goals:     Goals: PO intake 75% or greater, by next RD assessment       Nutrition Monitoring and Evaluation:   Behavioral-Environmental Outcomes: None Identified  Food/Nutrient Intake Outcomes: Food and Nutrient Intake  Physical Signs/Symptoms Outcomes: Biochemical Data, GI Status, Fluid Status or Edema, Nutrition Focused Physical Findings, Skin, Weight    Discharge Planning:    No discharge needs at this time     Marysol Tate RD, CNSC, LD  Contact: x [phone]

## 2023-11-15 NOTE — PLAN OF CARE
Problem: Safety - Adult  Goal: Free from fall injury  11/14/2023 2325 by Cassandra Vee  Outcome: Progressing  11/14/2023 1052 by Yuko Ramirez RN  Outcome: Progressing     Problem: ABCDS Injury Assessment  Goal: Absence of physical injury  11/14/2023 2325 by Cassandra Vee  Outcome: Progressing  11/14/2023 1052 by Yuko Ramirez RN  Outcome: Progressing     Problem: Chronic Conditions and Co-morbidities  Goal: Patient's chronic conditions and co-morbidity symptoms are monitored and maintained or improved  Outcome: Progressing     Problem: Pain  Goal: Verbalizes/displays adequate comfort level or baseline comfort level  11/14/2023 2325 by Cassandra Vee  Outcome: Progressing  11/14/2023 1052 by Yuko Ramirez RN  Outcome: Progressing     Problem: Skin/Tissue Integrity  Goal: Absence of new skin breakdown  Description: 1. Monitor for areas of redness and/or skin breakdown  2. Assess vascular access sites hourly  3. Every 4-6 hours minimum:  Change oxygen saturation probe site  4. Every 4-6 hours:  If on nasal continuous positive airway pressure, respiratory therapy assess nares and determine need for appliance change or resting period.   Outcome: Progressing

## 2023-11-15 NOTE — PROGRESS NOTES
transcription errors may be present.   Electronically signed by Crissy Palacios MD on 11/15/2023 at 5:39 PM

## 2023-11-15 NOTE — CARE COORDINATION
11/15/2023  Social Work Discharge Planning:Sw discussed NITHIN choices with Pts kelsie Nino- he gave N[facility] choices of [facility], [place] and [facility]however, is adamant about speaking to physician regarding Pts discharge prior to surgery Friday and has questions. [facility] referrals were given to the SARs and awaiting call back from [place]. [facility] is able to accept Pt if all are agreeable. Electronically signed by TEENA Hemphill on 11/15/2023 at 10:05 AM      11/15/2023  Social Work Discharge Planning:[facility] Awaiting precert. . MATT, 7000 and transport form are competed.  Electronically signed by TEENA Hemphill on 11/15/2023 at 12:31 PM

## 2023-11-16 ENCOUNTER — APPOINTMENT (OUTPATIENT)
Age: 68
End: 2023-11-16
Payer: MEDICARE

## 2023-11-16 VITALS
HEART RATE: 78 BPM | TEMPERATURE: 98.1 F | SYSTOLIC BLOOD PRESSURE: 120 MMHG | HEIGHT: 67 IN | WEIGHT: 249 LBS | DIASTOLIC BLOOD PRESSURE: 74 MMHG | OXYGEN SATURATION: 96 % | BODY MASS INDEX: 39.08 KG/M2 | RESPIRATION RATE: 16 BRPM

## 2023-11-16 PROBLEM — E87.5 HYPERKALEMIA: Status: ACTIVE | Noted: 2023-11-16

## 2023-11-16 PROBLEM — I42.0 DILATED CARDIOMYOPATHY (HCC): Status: ACTIVE | Noted: 2023-11-16

## 2023-11-16 PROBLEM — Z01.810 PREOP CARDIOVASCULAR EXAM: Status: ACTIVE | Noted: 2023-11-16

## 2023-11-16 LAB
ANION GAP SERPL CALCULATED.3IONS-SCNC: 9 MMOL/L (ref 7–16)
BUN SERPL-MCNC: 36 MG/DL (ref 6–23)
CALCIUM SERPL-MCNC: 8.4 MG/DL (ref 8.6–10.2)
CHLORIDE SERPL-SCNC: 114 MMOL/L (ref 98–107)
CO2 SERPL-SCNC: 19 MMOL/L (ref 22–29)
CREAT SERPL-MCNC: 2.6 MG/DL (ref 0.7–1.2)
ECHO AO ASC DIAM: 4.3 CM
ECHO AO ASCENDING AORTA INDEX: 1.94 CM/M2
ECHO AO ROOT DIAM: 4.1 CM
ECHO AO ROOT INDEX: 1.85 CM/M2
ECHO AV AREA PEAK VELOCITY: 2.5 CM2
ECHO AV AREA VTI: 2.6 CM2
ECHO AV AREA/BSA PEAK VELOCITY: 1.1 CM2/M2
ECHO AV AREA/BSA VTI: 1.2 CM2/M2
ECHO AV CUSP MM: 2.6 CM
ECHO AV MEAN GRADIENT: 3 MMHG
ECHO AV MEAN VELOCITY: 0.7 M/S
ECHO AV PEAK GRADIENT: 5 MMHG
ECHO AV PEAK VELOCITY: 1.1 M/S
ECHO AV VELOCITY RATIO: 0.73
ECHO AV VTI: 20.6 CM
ECHO BSA: 2.17 M2
ECHO EST RA PRESSURE: 3 MMHG
ECHO LA DIAMETER INDEX: 2.07 CM/M2
ECHO LA DIAMETER: 4.6 CM
ECHO LA TO AORTIC ROOT RATIO: 1.12
ECHO LA VOL A-L A2C: 156 ML (ref 18–58)
ECHO LA VOL A-L A4C: 131 ML (ref 18–58)
ECHO LA VOL MOD A2C: 151 ML (ref 18–58)
ECHO LA VOL MOD A4C: 127 ML (ref 18–58)
ECHO LA VOLUME AREA LENGTH: 148 ML
ECHO LA VOLUME INDEX A-L A2C: 70 ML/M2 (ref 16–34)
ECHO LA VOLUME INDEX A-L A4C: 59 ML/M2 (ref 16–34)
ECHO LA VOLUME INDEX AREA LENGTH: 67 ML/M2 (ref 16–34)
ECHO LA VOLUME INDEX MOD A2C: 68 ML/M2 (ref 16–34)
ECHO LA VOLUME INDEX MOD A4C: 57 ML/M2 (ref 16–34)
ECHO LV EDV A4C: 111 ML
ECHO LV EDV INDEX A4C: 50 ML/M2
ECHO LV EJECTION FRACTION A4C: 73 %
ECHO LV ESV A4C: 30 ML
ECHO LV ESV INDEX A4C: 14 ML/M2
ECHO LV FRACTIONAL SHORTENING: 29 % (ref 28–44)
ECHO LV INTERNAL DIMENSION DIASTOLE INDEX: 1.85 CM/M2
ECHO LV INTERNAL DIMENSION DIASTOLIC: 4.1 CM (ref 4.2–5.9)
ECHO LV INTERNAL DIMENSION SYSTOLIC INDEX: 1.31 CM/M2
ECHO LV INTERNAL DIMENSION SYSTOLIC: 2.9 CM
ECHO LV ISOVOLUMETRIC RELAXATION TIME (IVRT): 70.4 MS
ECHO LV IVSD: 1.1 CM (ref 0.6–1)
ECHO LV IVSS: 1.6 CM
ECHO LV MASS 2D: 193.5 G (ref 88–224)
ECHO LV MASS INDEX 2D: 87.2 G/M2 (ref 49–115)
ECHO LV POSTERIOR WALL DIASTOLIC: 1.5 CM (ref 0.6–1)
ECHO LV POSTERIOR WALL SYSTOLIC: 1.6 CM
ECHO LV RELATIVE WALL THICKNESS RATIO: 0.73
ECHO LVOT AREA: 3.8 CM2
ECHO LVOT AV VTI INDEX: 0.69
ECHO LVOT DIAM: 2.2 CM
ECHO LVOT MEAN GRADIENT: 1 MMHG
ECHO LVOT PEAK GRADIENT: 2 MMHG
ECHO LVOT PEAK VELOCITY: 0.8 M/S
ECHO LVOT STROKE VOLUME INDEX: 24.3 ML/M2
ECHO LVOT SV: 54 ML
ECHO LVOT VTI: 14.2 CM
ECHO MV AREA PHT: 5.1 CM2
ECHO MV AREA VTI: 2.4 CM2
ECHO MV LVOT VTI INDEX: 1.61
ECHO MV MAX VELOCITY: 1.2 M/S
ECHO MV MEAN GRADIENT: 2 MMHG
ECHO MV MEAN VELOCITY: 0.6 M/S
ECHO MV PEAK GRADIENT: 6 MMHG
ECHO MV PRESSURE HALF TIME (PHT): 42.9 MS
ECHO MV VTI: 22.8 CM
ECHO PV MAX VELOCITY: 0.8 M/S
ECHO PV MEAN GRADIENT: 1 MMHG
ECHO PV MEAN VELOCITY: 0.6 M/S
ECHO PV PEAK GRADIENT: 2 MMHG
ECHO PV VTI: 15.6 CM
ECHO RIGHT VENTRICULAR SYSTOLIC PRESSURE (RVSP): 22 MMHG
ECHO RV TAPSE: 2 CM (ref 1.7–?)
ECHO TV REGURGITANT MAX VELOCITY: 2.17 M/S
ECHO TV REGURGITANT PEAK GRADIENT: 19 MMHG
ERYTHROCYTE [DISTWIDTH] IN BLOOD BY AUTOMATED COUNT: 15 % (ref 11.5–15)
GFR SERPL CREATININE-BSD FRML MDRD: 26 ML/MIN/1.73M2
GLUCOSE BLD-MCNC: 132 MG/DL (ref 74–99)
GLUCOSE BLD-MCNC: 86 MG/DL (ref 74–99)
GLUCOSE BLD-MCNC: 86 MG/DL (ref 74–99)
GLUCOSE BLD-MCNC: 88 MG/DL (ref 74–99)
GLUCOSE SERPL-MCNC: 86 MG/DL (ref 74–99)
HCT VFR BLD AUTO: 32.1 % (ref 37–54)
HGB BLD-MCNC: 10.2 G/DL (ref 12.5–16.5)
INR PPP: 1.3
MCH RBC QN AUTO: 28.9 PG (ref 26–35)
MCHC RBC AUTO-ENTMCNC: 31.8 G/DL (ref 32–34.5)
MCV RBC AUTO: 90.9 FL (ref 80–99.9)
PLATELET # BLD AUTO: 235 K/UL (ref 130–450)
PMV BLD AUTO: 11.3 FL (ref 7–12)
POTASSIUM SERPL-SCNC: 4.5 MMOL/L (ref 3.5–5)
PROTHROMBIN TIME: 14.2 SEC (ref 9.3–12.4)
RBC # BLD AUTO: 3.53 M/UL (ref 3.8–5.8)
SODIUM SERPL-SCNC: 142 MMOL/L (ref 132–146)
WBC OTHER # BLD: 12 K/UL (ref 4.5–11.5)

## 2023-11-16 PROCEDURE — 2580000003 HC RX 258: Performed by: INTERNAL MEDICINE

## 2023-11-16 PROCEDURE — APPSS60 APP SPLIT SHARED TIME 46-60 MINUTES

## 2023-11-16 PROCEDURE — 82962 GLUCOSE BLOOD TEST: CPT

## 2023-11-16 PROCEDURE — 80048 BASIC METABOLIC PNL TOTAL CA: CPT

## 2023-11-16 PROCEDURE — 93306 TTE W/DOPPLER COMPLETE: CPT | Performed by: INTERNAL MEDICINE

## 2023-11-16 PROCEDURE — 99223 1ST HOSP IP/OBS HIGH 75: CPT | Performed by: INTERNAL MEDICINE

## 2023-11-16 PROCEDURE — 85610 PROTHROMBIN TIME: CPT

## 2023-11-16 PROCEDURE — 99239 HOSP IP/OBS DSCHRG MGMT >30: CPT | Performed by: INTERNAL MEDICINE

## 2023-11-16 PROCEDURE — 85027 COMPLETE CBC AUTOMATED: CPT

## 2023-11-16 PROCEDURE — 1200000000 HC SEMI PRIVATE

## 2023-11-16 PROCEDURE — 97530 THERAPEUTIC ACTIVITIES: CPT

## 2023-11-16 PROCEDURE — 6370000000 HC RX 637 (ALT 250 FOR IP): Performed by: INTERNAL MEDICINE

## 2023-11-16 PROCEDURE — 93306 TTE W/DOPPLER COMPLETE: CPT

## 2023-11-16 RX ORDER — ATORVASTATIN CALCIUM 40 MG/1
20 TABLET, FILM COATED ORAL NIGHTLY
Qty: 30 TABLET | Refills: 3 | DISCHARGE
Start: 2023-11-16

## 2023-11-16 RX ADMIN — ATORVASTATIN CALCIUM 20 MG: 20 TABLET, FILM COATED ORAL at 19:57

## 2023-11-16 RX ADMIN — METFORMIN HYDROCHLORIDE 500 MG: 500 TABLET, EXTENDED RELEASE ORAL at 10:02

## 2023-11-16 RX ADMIN — PETROLATUM: 420 OINTMENT TOPICAL at 19:59

## 2023-11-16 RX ADMIN — CARVEDILOL 6.25 MG: 6.25 TABLET, FILM COATED ORAL at 10:02

## 2023-11-16 RX ADMIN — METFORMIN HYDROCHLORIDE 500 MG: 500 TABLET, EXTENDED RELEASE ORAL at 19:57

## 2023-11-16 RX ADMIN — SODIUM CHLORIDE, PRESERVATIVE FREE 10 ML: 5 INJECTION INTRAVENOUS at 19:58

## 2023-11-16 RX ADMIN — PETROLATUM: 420 OINTMENT TOPICAL at 11:40

## 2023-11-16 RX ADMIN — CARVEDILOL 6.25 MG: 6.25 TABLET, FILM COATED ORAL at 15:57

## 2023-11-16 ASSESSMENT — PAIN SCALES - GENERAL
PAINLEVEL_OUTOF10: 0
PAINLEVEL_OUTOF10: 0

## 2023-11-16 NOTE — DISCHARGE SUMMARY
[facility] Physician Discharge Summary       [address]  [address] [facility], [facility] Shaista MD  [address] [facility]  [address] [facility]  [phone]    Schedule an appointment as soon as possible for a visit      Gama, Caprice Quiñonez DO  [address] [address] [facility] [address] [facility]  [phone]    Schedule an appointment as soon as possible for a visit        Activity level: As tolerated     Dispo: CAprice      Condition on discharge: Stable     Patient ID:  Emerita Cuellar  [id number]  77 y.o.  1955    Admit date: 11/8/2023    Discharge date and time:  11/16/2023  3:30 PM    Admission Diagnoses: Principal Problem:    Urinary retention  Resolved Problems:    * No resolved hospital problems. *      Discharge Diagnoses: Principal Problem:    Urinary retention  Resolved Problems:    * No resolved hospital problems. *      Consults:  IP CONSULT TO FAMILY MEDICINE  IP CONSULT TO UROLOGY  IP CONSULT TO CARDIOLOGY    Procedures: none    Hospital Course:   Patient Emerita Cuellar is a 77 y.o. presented with Hyperkalemia [E87.5]  Urinary retention [R33.9]  Obstructive uropathy [N13.9]  Acute renal failure, unspecified acute renal failure type [facility]) [N17.9]    Patient is a 70-year-old male who presented with gross hematuria and urinary retention. Noted to have a 2 cm density in distal left ureter. He has Frazier catheter inserted and manual irrigation was started. Hemoglobin remained stable and urine has remained light red. Urology following and plan for cystoscopy next week. Patient also had nonoliguric DAISY on CKD which improved with IVF. Patient also completed 5 days of antibiotics for acute cystitis. Patient presented with supratherapeutic INR, Coumadin remains on hold for possible procedure. Prior to discharge, cardiac clearance was requested by Juliette Dhillon. Cardiology consulted, echo ordered and completed. Cardiology to follow results. There is a soft tissue density 2 cm rounded opacity in the distal left ureter, consider urothelial carcinoma. 2.  Right nephrolithiasis without obstruction       Patient Instructions:      Medication List        CONTINUE taking these medications      atorvastatin 40 MG tablet  Commonly known as: LIPITOR  Take 0.5 tablets by mouth nightly     carvedilol 6.25 MG tablet  Commonly known as: COREG  Take 1 tablet by mouth 2 times daily (with meals)     empagliflozin 25 MG tablet  Commonly known as: Jardiance  Take 1 tablet by mouth daily     furosemide 40 MG tablet  Commonly known as: LASIX     lisinopril 20 MG tablet  Commonly known as: PRINIVIL;ZESTRIL     metFORMIN 500 MG extended release tablet  Commonly known as: GLUCOPHAGE-XR     SITagliptin 100 MG tablet  Commonly known as: Januvia  Take 1 tablet by mouth daily     therapeutic multivitamin-minerals tablet     warfarin 3 MG tablet  Commonly known as: COUMADIN  Take as directed. If you are unsure how to take this medication, talk to your nurse or doctor.      Zyloprim 100 MG tablet  Generic drug: allopurinol               Where to Get Your Medications        Information about where to get these medications is not yet available    Ask your nurse or doctor about these medications  atorvastatin 40 MG tablet           Note that more than 30 minutes was spent in preparing discharge papers, discussing discharge with patient, medication review, etc.    Signed:  Electronically signed by Sveta Malloy MD on 11/16/2023 at 3:30 PM

## 2023-11-16 NOTE — CONSULTS
11/9/2023 2:04 PM  Pedrito Wick .  [id number]     Chief Complaint:    Urinary retention, gross hematuria, bladder cancer      History of Present Illness: The patient is a 77 y.o. male patient who presented to the hospital with complaints of gross hematuria and urinary retention. He was found to have DAISY with a serum creatinine of 6.4. He had a cummings inserted in the ER for 500cc with some relief. He takes Coumadin for history of atrial fibrillation. He is known to Dr. Ashley Malloy. He has a history of BPH s/p TURP in 2021, bladder diverticulum, and bladder cancer s/p TURBT in 2021 x2 and 2022 x2. He was last seen in our office in July of last year. Bladder tumor was identified on the cystoscopy again at that time. He the underwent TURBT at [facility] in August of 2023. Pathology from this showed high-grade papillary urothelial carcinoma. He has not been seen in our office since that time.      Past Medical History:   Diagnosis Date    Atrial fibrillation ([address]) 10/2012    now on Coumadin    Bladder tumor     Diabetes mellitus ([address])     Hyperlipidemia     Hypertension     LONG TERM ANTICOAGULENT USE     Lumbar pain     CONCHITA on CPAP          Past Surgical History:   Procedure Laterality Date    BACK SURGERY  2007    repair of herniated disc    CARDIOVASCULAR STRESS TEST  11/21/2006    findings of inferior and anterior ischemia     CYSTOSCOPY N/A 10/22/2021    CYSTOSCOPY RETROGRADE PYELOGRAM TRANSURETHRAL RESECTION OF BLADDER TUMOR WITH INSTALLATION OF GEMCITIBINE performed by Ashley Malloy DO at [address] [place] [address] CATH LAB PROCEDURE  12/13/2006    No obstructive CAD and mild dilated cardiomyopathy with EF 45-50%    PAIN MANAGEMENT PROCEDURE Bilateral 9/29/2022    LUMBAR TRANSFORAMINAL EPIDURAL STEROID INJECTION RIGHT L4 & LEFT L3 UNDER FLUOROSCOPIC GUIDANCE performed by Marian Ibanez MD at [facility] OR    TURP N/A 4/7/2021    CYSTOSCOPY, URETHRAL DILITATION, PLASMA BUTTON TRANSURETHRAL RESECTION
I independently interviewed and examined the patient. I have reviewed the above documentation completed by the [place]. I spent > 51% of the total time involved with completing the encounter. Please see my additional contributions to the HPI, physical exam, and assessment / medical decision making. Reason for consult: Preop clearance    Patient was previously known to Dr. Jose J Ratliff service at [facility]) cardiology and was seen as a new patient on 10/20/2021      Mr. Becka Bauer is a 42-year-old  male with history of type 2 diabetes without complications, bladder cancer, urinary retention secondary to BPH, status post bladder tumor excision with fulguration on 4/8/2021, neurogenic bladder, on chronic anticoagulation therapy, hyperlipidemia, essential hypertension and atrial fibrillation. Patient has history of obstructive sleep apnea and noncompliant with CPAP use. Patient was admitted through emergency room on 11/8/23 with hematuria, acute urinary retention for about 2 weeks. He was found to have a acute kidney injury with BUN of 149 creatinine 6.4.. Patient was found to have a obstructed bladder and Frazier catheter was placed. He was also treated for hyperkalemia and UTI. Patient was diagnosed with obstructive uropathy. Patient had a CT of the abdomen pelvis showed left ureteral mass in the distal segment concerning for neoplasm. Patient is scheduled for cystoscopy and ureteral stent placement along with retropyelogram as an outpatient. Patient was scheduled for surgery next week on next week. Prior to hospitalizations patient denied any chest pain, dyspnea, palpitations,, orthopnea or PND. Patient ambulates with walker.      Review of Systems:  Cardiac: As per HPI  General: No fever, chills  Pulmonary: As per HPI  GI: No nausea, vomiting  Musculoskeletal: YU x 4, no focal motor deficits  Skin: Intact, no rashes  Neuro/Psych: No headache or seizures    Physical Exam:  /79   Pulse 78   Temp 98.6
abnormalities         Assessment:  Preop cardiac evaluation  Essential hypertension  Dilated cardiomyopathy secondary to nonischemic cardiomyopathy, appears euvolemic. Atrial fibrillation, rate controlled. [id number] score- 4 on warfarin,   DAISY secondary to obstructive uropathy, creatinine 6.5>> 2.6, improving  Bladder cancer status post tumor excision fulguration-4/8/2021  Type 2 diabetes not on insulin  Bladder outlet obstruction secondary to BPH  Neurogenic bladder  Hyperlipidemia  CONCHITA, untreated. Severe obesity. Plan:   Patient's revised cardiac risk index is low (2), class III risk, 6.6% risk of major perioperative cardiac events. Currently, patient denies any active cardiac symptoms including chest pain, decompensated failure, uncontrolled arrhythmias or obstructive valve lesions. Hold anticoagulation therapy restart 24 hours after the procedure if there is no concern for bleeding. Heart rate is well controlled. Continue beta-blocker therapy perioperatively. I would recommend obtaining an echocardiogram to assess LV function prior to cystoscopy and retrograde pyelogram. If echo shows normal function then he is stable for discharge. Patient not on any guideline directed medical therapy for LV dysfunction except for Coreg. Continue coreg 6.25 mg po twice a day. Rest of the management as per urology and primary service. Continue to hold anticoagulation therapy with warfarin due to ongoing hematuria. Continue home medications        Thank you for consulting and allowing us to participate in Mr. Zachery Wilson MD, [facility].   [facility]) Cardiology

## 2023-11-16 NOTE — PROGRESS NOTES
Attempted to call nurse to nurse to [facility].  stated she would have them call back for report. Floor number was given. Electronically signed by Mellissa Quevedo RN on 11/16/2023 at 11:02 Tio Crowe RN from [facility] called and report was given. All questions were answered.  Will fax paperwork to [phone] per Krys's request. Electronically signed by Mellissa Quevedo RN on 11/16/2023 at 11:19 AM

## 2023-11-16 NOTE — PROGRESS NOTES
Notified Alexa that patient needs Echo prior to DC now. Wheelchair Jenelle Patelalgo cancelled with them. Physician's ambulette arranged for 8 pm pending ECHO can be completed today.     Electronically signed by Titus Bob RN on 11/16/2023 at 4:00 PM

## 2023-11-16 NOTE — CARE COORDINATION
11/16/2023  Social Work Discharge Planning:Auth received for Pt to go to [facility] stated they will pick Pt up at 1pm. Nurse here and son were notified. Electronically signed by Radames Kehr, LSW on 11/16/2023 at 10:36 AM    11/16/2023  Social Work Discharge Planning:SW notified Alexa WELLER that we need to move transport time to 4pm due to phys wants Pt to see cardiologist  for surgery on Friday.  Electronically signed by Radames Kehr, LSW on 11/16/2023 at 2:10 PM

## 2023-11-16 NOTE — PROGRESS NOTES
Physical Therapy  Facility/Department: [facility] MED SURG  Physical Therapy Treatment Note    Name: Bernadette Delgadillo.  : 1955  MRN: [id number]  Date of Service: 2023    Attending Provider:  Rebecca Blackwood MD    Evaluating PT:  Alejandra Read, P.T. Room #:  0671/4624-T  Diagnosis:  Hyperkalemia [E87.5]  Urinary retention [R33.9]  Obstructive uropathy [N13.9]  Acute renal failure, unspecified acute renal failure type ([address]) [N17.9]  Precautions:  falls, bed/chair alarm    SUBJECTIVE:    Pt lives alone in a 1 story home with 22 stairs and can hold onto post on porch to enter. Pt ambulated with a rollator ww and reports he has multiple ww/rollators at home. OBJECTIVE:   Initial Evaluation  Date: 23 Treatment  2023 Short Term/ Long Term   Goals   Was pt agreeable to Eval/treatment? yes yes    Does pt have pain? No c/o pain No complaints    Bed Mobility  NA, pt was found and left sitting up in chair   Rolling: Mod A  Supine to sit: Mod A  Scooting: Mod A seated to EOB Independent   Transfers Sit to stand: MIN A   Stand to sit: MIN A  Stand pivot: NA Sit < >stand: Min A  Stand Pivot: Min A with [employer] Independent   Ambulation   2 feet forward and back with ww MIN A 6 small steps with [employer] Min A 100 feet with ww Independent    Stair negotiation: ascended and descended NA, pt c/o fatigue that limited amb distance. NT 2 steps with 1 rail Independent    AM-PAC 6 Clicks 58/78 30/81      Pt is alert, following instruction  Balance: poor during brief mobility with [employer]    Pt performed therapeutic exercise of the following: NT    Patient education/treatment  Pt was educated on UE usage for transfer safety    Patient response to education:   Pt verbalized understanding Pt demonstrated skill Pt requires further education in this area   yes With instruction yes     ASSESSMENT:   Comments: Nurse ok with Rx. Pt found in bed, assisted to EOB, sat EOB SBA for balance. Brief mobility performed.  Noted slow LE advancement. Pt states feels unable to walk farther, agreed to sit in the chair. Pt was left in a bedside chair with call light in reach    Chair/bed alarm: chair active    Time in 0806   Time out 0821   Total Treatment Time 15 minutes   CPT codes:     Therapeutic activities 94073 15 minutes   Therapeutic exercises 34296 0 minutes       Pt is making consistent progress toward established Physical Therapy goals. Continue with physical therapy current plan of care.     [email or web address] PTA   License Number: [id number]

## 2023-11-16 NOTE — DISCHARGE SUMMARY
[facility] Physician Discharge Summary       [address]  [address] [facility] Shaista MD  [address] [facility]  [address] [facility]  [phone]    Schedule an appointment as soon as possible for a visit      Gama, Godfrey Perez DO  [address] [address] [facility] [address] [facility]  [phone]    Schedule an appointment as soon as possible for a visit        Activity level: As tolerated     Dispo: caprice      Condition on discharge: Stable     Patient ID:  Odalis Ramsey  [id number]  77 y.o.  1955    Admit date: 11/8/2023    Discharge date and time:  11/16/2023  11:46 AM    Admission Diagnoses: Principal Problem:    Urinary retention  Resolved Problems:    * No resolved hospital problems. *      Discharge Diagnoses: Principal Problem:    Urinary retention  Resolved Problems:    * No resolved hospital problems. *      Consults:  IP CONSULT TO FAMILY MEDICINE  IP CONSULT TO UROLOGY  IP CONSULT TO CARDIOLOGY    Procedures: none    Hospital Course:   Patient Odalis Ramsey is a 77 y.o. presented with Hyperkalemia [E87.5]  Urinary retention [R33.9]  Obstructive uropathy [N13.9]  Acute renal failure, unspecified acute renal failure type [facility]) [N17.9]    Patient is a 70-year-old male who presented with gross hematuria and urinary retention. Noted to have a 2 cm density in distal left ureter. He has Frazier catheter inserted and manual irrigation was started. Hemoglobin remained stable and urine has remained light red. Urology following and plan for cystoscopy next week. Patient also had nonoliguric DAISY on CKD which improved with IVF. Patient also completed 5 days of antibiotics for acute cystitis. Patient presented with supratherapeutic INR, Coumadin remains on hold for possible procedure. Patient will be discharged to facility today before he can go to [facility] for surgery.     Discharge Exam:    General Appearance: alert and oriented to obstruction       Patient Instructions:      Medication List        CONTINUE taking these medications      atorvastatin 40 MG tablet  Commonly known as: LIPITOR  Take 0.5 tablets by mouth nightly     carvedilol 6.25 MG tablet  Commonly known as: COREG  Take 1 tablet by mouth 2 times daily (with meals)     empagliflozin 25 MG tablet  Commonly known as: Jardiance  Take 1 tablet by mouth daily     furosemide 40 MG tablet  Commonly known as: LASIX     lisinopril 20 MG tablet  Commonly known as: PRINIVIL;ZESTRIL     metFORMIN 500 MG extended release tablet  Commonly known as: GLUCOPHAGE-XR     SITagliptin 100 MG tablet  Commonly known as: Januvia  Take 1 tablet by mouth daily     therapeutic multivitamin-minerals tablet     warfarin 3 MG tablet  Commonly known as: COUMADIN  Take as directed. If you are unsure how to take this medication, talk to your nurse or doctor.      Zyloprim 100 MG tablet  Generic drug: allopurinol               Where to Get Your Medications        Information about where to get these medications is not yet available    Ask your nurse or doctor about these medications  atorvastatin 40 MG tablet           Note that more than 30 minutes was spent in preparing discharge papers, discussing discharge with patient, medication review, etc.    Signed:  Electronically signed by Gilberto Vega MD on 11/16/2023 at 11:46 AM

## 2023-11-16 NOTE — PLAN OF CARE
Problem: Safety - Adult  Goal: Free from fall injury  11/15/2023 2317 by Nidia Encinas  Outcome: Progressing  11/15/2023 1004 by Ana Kahn RN  Outcome: Progressing     Problem: ABCDS Injury Assessment  Goal: Absence of physical injury  11/15/2023 2317 by Nidia Encinas  Outcome: Progressing  11/15/2023 1004 by Ana Kahn RN  Outcome: Progressing     Problem: Chronic Conditions and Co-morbidities  Goal: Patient's chronic conditions and co-morbidity symptoms are monitored and maintained or improved  Outcome: Progressing     Problem: Pain  Goal: Verbalizes/displays adequate comfort level or baseline comfort level  Outcome: Progressing     Problem: Skin/Tissue Integrity  Goal: Absence of new skin breakdown  Description: 1. Monitor for areas of redness and/or skin breakdown  2. Assess vascular access sites hourly  3. Every 4-6 hours minimum:  Change oxygen saturation probe site  4. Every 4-6 hours:  If on nasal continuous positive airway pressure, respiratory therapy assess nares and determine need for appliance change or resting period.   Outcome: Progressing

## 2023-11-16 NOTE — PATIENT CARE CONFERENCE
[facility] Quality Flow/Interdisciplinary Rounds Progress Note        Quality Flow Rounds held on November 16, 2023    Disciplines Attending:  Bedside Nurse, , , and Nursing Unit Leadership    Odalis Ramsey was admitted on 11/8/2023  8:37 AM    Anticipated Discharge Date:  Expected Discharge Date: 11/17/23    Disposition:    Saul Score:  Saul Scale Score: 21    Readmission Risk              Risk of Unplanned Readmission:  22           Discussed patient goal for the day, patient clinical progression, and barriers to discharge.   The following Goal(s) of the Day/Commitment(s) have been identified:  Discharge - Obtain Order      Daina Umana RN  November 16, 2023

## 2023-11-16 NOTE — PROGRESS NOTES
Pt requesting bedside RN to call and notify daughter, Vasquez Sanabria, of cardiology plans and new  time.  Vasquez Sanabria was called and notified of pt receiving echo for cardiac clearance and new  time for discharge of 8pm. Electronically signed by Jose Carlos Prince RN on 11/16/2023 at 5:10 PM

## 2023-11-17 ENCOUNTER — TELEPHONE (OUTPATIENT)
Dept: ADMINISTRATIVE | Age: 68
End: 2023-11-17

## 2023-11-17 NOTE — PROGRESS NOTES
[facility] called and said patients pickup ETA is delayed 3 hours.     Electronically signed by Case Burnett RN on 11/16/2023 at 8:04 PM

## 2023-11-17 NOTE — TELEPHONE ENCOUNTER
Pt at [facility] is calling to schedule HFU with Dr Brasher Every.   [phone]

## 2023-11-17 NOTE — PROGRESS NOTES
Called [facility] for another update and they state it will be another 1-2 hour delay, patient and family updated.  Leann Stauffer RN

## 2023-11-21 ENCOUNTER — PREP FOR PROCEDURE (OUTPATIENT)
Dept: UROLOGY | Age: 68
End: 2023-11-21

## 2023-11-21 RX ORDER — SODIUM CHLORIDE 9 MG/ML
INJECTION, SOLUTION INTRAVENOUS PRN
Status: CANCELLED | OUTPATIENT
Start: 2023-11-21

## 2023-11-21 RX ORDER — SODIUM CHLORIDE 0.9 % (FLUSH) 0.9 %
5-40 SYRINGE (ML) INJECTION PRN
Status: CANCELLED | OUTPATIENT
Start: 2023-11-21

## 2023-11-21 RX ORDER — SODIUM CHLORIDE 0.9 % (FLUSH) 0.9 %
5-40 SYRINGE (ML) INJECTION EVERY 12 HOURS SCHEDULED
Status: CANCELLED | OUTPATIENT
Start: 2023-11-21

## 2023-11-21 RX ORDER — SODIUM CHLORIDE 9 MG/ML
INJECTION, SOLUTION INTRAVENOUS CONTINUOUS
Status: CANCELLED | OUTPATIENT
Start: 2023-11-21

## 2023-11-22 ENCOUNTER — ANESTHESIA EVENT (OUTPATIENT)
Dept: OPERATING ROOM | Age: 68
End: 2023-11-22
Payer: MEDICARE

## 2023-11-22 ENCOUNTER — HOSPITAL ENCOUNTER (OUTPATIENT)
Dept: GENERAL RADIOLOGY | Age: 68
Discharge: HOME | End: 2023-11-24
Attending: UROLOGY
Payer: MEDICARE

## 2023-11-22 ENCOUNTER — ANESTHESIA (OUTPATIENT)
Dept: OPERATING ROOM | Age: 68
End: 2023-11-22
Payer: MEDICARE

## 2023-11-22 ENCOUNTER — HOSPITAL ENCOUNTER (OUTPATIENT)
Age: 68
Setting detail: OUTPATIENT SURGERY
Discharge: HOME | End: 2023-11-22
Attending: UROLOGY | Admitting: UROLOGY
Payer: MEDICARE

## 2023-11-22 VITALS
RESPIRATION RATE: 18 BRPM | SYSTOLIC BLOOD PRESSURE: 142 MMHG | OXYGEN SATURATION: 96 % | TEMPERATURE: 97.6 F | HEART RATE: 82 BPM | DIASTOLIC BLOOD PRESSURE: 68 MMHG

## 2023-11-22 DIAGNOSIS — R52 PAIN: ICD-10-CM

## 2023-11-22 DIAGNOSIS — N20.0 KIDNEY STONE: ICD-10-CM

## 2023-11-22 LAB — GLUCOSE BLD-MCNC: 93 MG/DL (ref 74–99)

## 2023-11-22 PROCEDURE — 88305 TISSUE EXAM BY PATHOLOGIST: CPT

## 2023-11-22 PROCEDURE — 6360000002 HC RX W HCPCS: Performed by: NURSE ANESTHETIST, CERTIFIED REGISTERED

## 2023-11-22 PROCEDURE — 3600000013 HC SURGERY LEVEL 3 ADDTL 15MIN: Performed by: UROLOGY

## 2023-11-22 PROCEDURE — 2709999900 HC NON-CHARGEABLE SUPPLY: Performed by: UROLOGY

## 2023-11-22 PROCEDURE — 2580000003 HC RX 258: Performed by: NURSE PRACTITIONER

## 2023-11-22 PROCEDURE — 7100000010 HC PHASE II RECOVERY - FIRST 15 MIN: Performed by: UROLOGY

## 2023-11-22 PROCEDURE — 7100000011 HC PHASE II RECOVERY - ADDTL 15 MIN: Performed by: UROLOGY

## 2023-11-22 PROCEDURE — 6360000004 HC RX CONTRAST MEDICATION: Performed by: UROLOGY

## 2023-11-22 PROCEDURE — 7100000000 HC PACU RECOVERY - FIRST 15 MIN: Performed by: UROLOGY

## 2023-11-22 PROCEDURE — 3700000000 HC ANESTHESIA ATTENDED CARE: Performed by: UROLOGY

## 2023-11-22 PROCEDURE — 6360000002 HC RX W HCPCS: Performed by: NURSE PRACTITIONER

## 2023-11-22 PROCEDURE — 3600000003 HC SURGERY LEVEL 3 BASE: Performed by: UROLOGY

## 2023-11-22 PROCEDURE — 2720000010 HC SURG SUPPLY STERILE: Performed by: UROLOGY

## 2023-11-22 PROCEDURE — 74420 UROGRAPHY RTRGR +-KUB: CPT

## 2023-11-22 PROCEDURE — 82962 GLUCOSE BLOOD TEST: CPT

## 2023-11-22 PROCEDURE — 7100000001 HC PACU RECOVERY - ADDTL 15 MIN: Performed by: UROLOGY

## 2023-11-22 PROCEDURE — 3700000001 HC ADD 15 MINUTES (ANESTHESIA): Performed by: UROLOGY

## 2023-11-22 RX ORDER — NITROFURANTOIN 25; 75 MG/1; MG/1
100 CAPSULE ORAL 2 TIMES DAILY
Qty: 10 CAPSULE | Refills: 0 | Status: SHIPPED | OUTPATIENT
Start: 2023-11-22 | End: 2023-11-27

## 2023-11-22 RX ORDER — ONDANSETRON 2 MG/ML
4 INJECTION INTRAMUSCULAR; INTRAVENOUS
Status: DISCONTINUED | OUTPATIENT
Start: 2023-11-22 | End: 2023-11-22 | Stop reason: HOSPADM

## 2023-11-22 RX ORDER — DEXTROSE MONOHYDRATE 100 MG/ML
INJECTION, SOLUTION INTRAVENOUS CONTINUOUS PRN
Status: DISCONTINUED | OUTPATIENT
Start: 2023-11-22 | End: 2023-11-22 | Stop reason: HOSPADM

## 2023-11-22 RX ORDER — SODIUM CHLORIDE, SODIUM LACTATE, POTASSIUM CHLORIDE, CALCIUM CHLORIDE 600; 310; 30; 20 MG/100ML; MG/100ML; MG/100ML; MG/100ML
INJECTION, SOLUTION INTRAVENOUS CONTINUOUS
Status: DISCONTINUED | OUTPATIENT
Start: 2023-11-22 | End: 2023-11-22 | Stop reason: HOSPADM

## 2023-11-22 RX ORDER — SODIUM CHLORIDE 0.9 % (FLUSH) 0.9 %
5-40 SYRINGE (ML) INJECTION EVERY 12 HOURS SCHEDULED
Status: DISCONTINUED | OUTPATIENT
Start: 2023-11-22 | End: 2023-11-22 | Stop reason: HOSPADM

## 2023-11-22 RX ORDER — SODIUM CHLORIDE 9 MG/ML
INJECTION, SOLUTION INTRAVENOUS PRN
Status: DISCONTINUED | OUTPATIENT
Start: 2023-11-22 | End: 2023-11-22 | Stop reason: HOSPADM

## 2023-11-22 RX ORDER — DROPERIDOL 2.5 MG/ML
0.62 INJECTION, SOLUTION INTRAMUSCULAR; INTRAVENOUS
Status: DISCONTINUED | OUTPATIENT
Start: 2023-11-22 | End: 2023-11-22 | Stop reason: HOSPADM

## 2023-11-22 RX ORDER — LABETALOL HYDROCHLORIDE 5 MG/ML
10 INJECTION, SOLUTION INTRAVENOUS
Status: DISCONTINUED | OUTPATIENT
Start: 2023-11-22 | End: 2023-11-22 | Stop reason: HOSPADM

## 2023-11-22 RX ORDER — OXYCODONE HYDROCHLORIDE 5 MG/1
5 TABLET ORAL
Status: DISCONTINUED | OUTPATIENT
Start: 2023-11-22 | End: 2023-11-22 | Stop reason: HOSPADM

## 2023-11-22 RX ORDER — SODIUM CHLORIDE 0.9 % (FLUSH) 0.9 %
5-40 SYRINGE (ML) INJECTION PRN
Status: DISCONTINUED | OUTPATIENT
Start: 2023-11-22 | End: 2023-11-22 | Stop reason: HOSPADM

## 2023-11-22 RX ORDER — ONDANSETRON 2 MG/ML
INJECTION INTRAMUSCULAR; INTRAVENOUS PRN
Status: DISCONTINUED | OUTPATIENT
Start: 2023-11-22 | End: 2023-11-22 | Stop reason: SDUPTHER

## 2023-11-22 RX ORDER — PROPOFOL 10 MG/ML
INJECTION, EMULSION INTRAVENOUS CONTINUOUS PRN
Status: DISCONTINUED | OUTPATIENT
Start: 2023-11-22 | End: 2023-11-22 | Stop reason: SDUPTHER

## 2023-11-22 RX ORDER — ACETAMINOPHEN 325 MG/1
650 TABLET ORAL
Status: DISCONTINUED | OUTPATIENT
Start: 2023-11-22 | End: 2023-11-22 | Stop reason: HOSPADM

## 2023-11-22 RX ORDER — SODIUM CHLORIDE 9 MG/ML
INJECTION, SOLUTION INTRAVENOUS CONTINUOUS
Status: DISCONTINUED | OUTPATIENT
Start: 2023-11-22 | End: 2023-11-22 | Stop reason: HOSPADM

## 2023-11-22 RX ORDER — DIPHENHYDRAMINE HYDROCHLORIDE 50 MG/ML
12.5 INJECTION INTRAMUSCULAR; INTRAVENOUS
Status: DISCONTINUED | OUTPATIENT
Start: 2023-11-22 | End: 2023-11-22 | Stop reason: HOSPADM

## 2023-11-22 RX ORDER — HYDRALAZINE HYDROCHLORIDE 20 MG/ML
10 INJECTION INTRAMUSCULAR; INTRAVENOUS
Status: DISCONTINUED | OUTPATIENT
Start: 2023-11-22 | End: 2023-11-22 | Stop reason: HOSPADM

## 2023-11-22 RX ORDER — FENTANYL CITRATE 50 UG/ML
INJECTION, SOLUTION INTRAMUSCULAR; INTRAVENOUS PRN
Status: DISCONTINUED | OUTPATIENT
Start: 2023-11-22 | End: 2023-11-22 | Stop reason: SDUPTHER

## 2023-11-22 RX ADMIN — FENTANYL CITRATE 25 MCG: 50 INJECTION, SOLUTION INTRAMUSCULAR; INTRAVENOUS at 08:57

## 2023-11-22 RX ADMIN — FENTANYL CITRATE 25 MCG: 50 INJECTION, SOLUTION INTRAMUSCULAR; INTRAVENOUS at 08:38

## 2023-11-22 RX ADMIN — PROPOFOL 80 MCG/KG/MIN: 10 INJECTION, EMULSION INTRAVENOUS at 08:41

## 2023-11-22 RX ADMIN — ONDANSETRON 4 MG: 2 INJECTION INTRAMUSCULAR; INTRAVENOUS at 08:56

## 2023-11-22 RX ADMIN — FENTANYL CITRATE 25 MCG: 50 INJECTION, SOLUTION INTRAMUSCULAR; INTRAVENOUS at 08:41

## 2023-11-22 RX ADMIN — WATER 2000 MG: 1 INJECTION INTRAMUSCULAR; INTRAVENOUS; SUBCUTANEOUS at 08:33

## 2023-11-22 RX ADMIN — SODIUM CHLORIDE: 9 INJECTION, SOLUTION INTRAVENOUS at 08:36

## 2023-11-22 RX ADMIN — FENTANYL CITRATE 50 MCG: 50 INJECTION, SOLUTION INTRAMUSCULAR; INTRAVENOUS at 08:47

## 2023-11-22 ASSESSMENT — PAIN - FUNCTIONAL ASSESSMENT: PAIN_FUNCTIONAL_ASSESSMENT: 0-10

## 2023-11-22 ASSESSMENT — PAIN SCALES - GENERAL: PAINLEVEL_OUTOF10: 0

## 2023-11-22 NOTE — PROGRESS NOTES
8484 Patient arrived to PACU bay 4. Arrived in AFIB. Resting comfortably. 25 F three way catheter in place.
Discharge paperwork given to patient and copy to family. Family has antibiotic. Report faxed to Alexa- confirmation successful, telephone call unsuccessful no answer.
Patient refused leg bag at discharge, leg bad and standard bag sent with patient. Standard bag on patient with leg strap.
Patient was given Jardiance on 11/21/23, Spoke with kendra Quevedo to proceed with procedure on 11/22 no new orders at this time. Patient is a resident at [facility] telephone assessment completed with Denver Harris LPN. A&O: YES  Code Status: FULL  Ambulatory: 1 ASSIST  Oxygen: no  Hard of Hearing: no  Call light: YES  Signs Documents? YES  POA: Daughter - Chanazucena Light (per facility)  Transport: [facility]  Wounds: none  Isolation: none  Lines/Drains/Implanted devices: GABRIEL    COMPLETE PAT ASSESSMENT INCLUDING ALLERGIES, MEDICATIONS AND HEALTH HISTORY DONE OVER PHONE WITH Denver Harris LPN AT [address] [facility]. Verified arrival time of 0715 with facility. General instructions and medication instructions faxed to facility.
comments    [x] Please notify surgeon if you develop any illness between now and time of surgery (cold, cough, sore throat, fever, nausea, vomiting) or any signs of infections  including skin, wounds, and dental.    [x]  The Outpatient Pharmacy is available to fill your prescription here on your day of surgery, ask your preop nurse for details    [x] Other instructions: ARRIVAL TIME 0715 AT [facility] [place] [address].

## 2023-11-22 NOTE — DISCHARGE INSTRUCTIONS
Follow up Dr. Nate Monreal Gama in 1-4 week, [phone]     Place the patients cummings to leg bag on discharge from the hospital.  Please give the patient a night bag (large bag) and cummings instructions upon discharge. Please have the patient contact the office for cummings removal instructions. The catheter may go red (hematuria), may go clear, and may go red. This is a normal process, as long as the catheter is draining. Call the office if any concerns should arise for further instructions, [phone].

## 2023-11-22 NOTE — BRIEF OP NOTE
Brief Postoperative Note      Patient: Nikkie Vega. YOB: 1955  MRN: [id number]    Date of Procedure: 11/22/2023    Pre-Op Diagnosis Codes:     * Kidney stone [N20.0]    Post-Op Diagnosis: Same       Procedure(s):  CYSTOSCOPY RETROGRADE PYELOGRAM URETEROSCOPY J STENT LASER LITHOTRIPSY LEFT POSSIBLE BLADDER BIOPSY    Surgeon(s):  Brit Malloy DO    Assistant:  * No surgical staff found *    Anesthesia: Monitor Anesthesia Care    Estimated Blood Loss (mL): Minimal    Complications: None    Specimens:   * No specimens in log *    Implants:  * No implants in log *      Drains:   [REMOVED] Urinary Catheter 11/08/23 Frazier (Removed)   $ Urethral catheter insertion $ Not inserted for procedure 11/08/23 0931   Catheter Indications Urinary retention (acute or chronic), continuous bladder irrigation or bladder outlet obstruction 11/16/23 2347   Site Assessment Red 11/16/23 2347   Urine Color Bloody 11/16/23 2347   Urine Appearance Cloudy 11/16/23 2347   Urine Odor Other (Comment) 11/16/23 1154   Collection Container Standard 11/16/23 2347   Securement Method Securing device (Describe) 11/16/23 2347   Catheter Care  Perineal wipes 11/15/23 2025   Catheter Best Practices  Drainage tube clipped to bed;Catheter secured to thigh; Bag below bladder;Bag not on floor; Lack of dependent loop in tubing;Drainage bag less than half full 11/16/23 2347   Status Manually irrigated;Patent;Draining 11/16/23 2347   Manual Irrigation Volume Input (mL) 100 mL 11/14/23 2115   Output (mL) 450 mL 11/16/23 1759       Findings: see operative report       Electronically signed by Brit Malloy DO on 11/22/2023 at 7:43 AM

## 2023-11-22 NOTE — H&P
11/22/2023 7:34 AM  Service: Urology  Group: [facility] (Gama/Jeanette/Roland)    Jonh Mcclellan.  [id number]     Chief Complaint: left ureteral tumor      History of Present Illness: The patient is a 77 y.o. 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 The risk of the anesthesia and loss of airway. Cardiovascular risks, myocardial infraction, cerebral vascular accident, pulmonary embolism, deep vein thrombosis. Injury risk, bowel injury, bladder injury, ureteral injury, blood vessel injury, delayed presentation of the injury (ie scar tissue or stricture formation). These can lead to bleeding, requiring transfusion. He risk of infection with can lead to sepsis. The risk of death. The may be other issues that present that are not listed above which can occur. The patient understood these risks, they understood the benefits, they understood the alternatives and fully consent to proceed. The needs for a second procedure may also be required.      Past Medical History:   Diagnosis Date    Atrial fibrillation ([address]) 10/2012    now on Coumadin    Bladder tumor     Diabetes mellitus ([address])     Hyperlipidemia     Hypertension     LONG TERM ANTICOAGULENT USE     Lumbar pain     CONCHITA on CPAP        Past Surgical History:   Procedure Laterality Date    BACK SURGERY  2007    repair of herniated disc    CARDIOVASCULAR STRESS TEST  11/21/2006    findings of inferior and anterior ischemia     CYSTOSCOPY N/A 10/22/2021    CYSTOSCOPY RETROGRADE PYELOGRAM TRANSURETHRAL RESECTION OF BLADDER TUMOR WITH INSTALLATION OF GEMCITIBINE performed by Veronica Malloy DO at [address] [place] [address] outpatient H&P  All options were discussed  The patient family is present  Progress to the OR for C&P, TURBT, poss ureteroscopy, poss bx, poss stent  The risks, benefits, and alternatives were discussed  NPO  DVT prophylaxis  Pre-op antibiotics     Cole Braun Gama, DO   [facility]

## 2023-11-22 NOTE — ANESTHESIA PRE PROCEDURE
Department of Anesthesiology  Preprocedure Note       Name:  Rosetta Piña.   Age:  77 y.o.  :  1955                                          MRN:  [id number]         Date:  2023      Surgeon: Army Novak):  Usha Malloy Single, DO    Procedure: Procedure(s):  CYSTOSCOPY RETROGRADE PYELOGRAM URETEROSCOPY J STENT LASER LITHOTRIPSY LEFT POSSIBLE BLADDER BIOPSY    Medications prior to admission:   Prior to Admission medications    Medication Sig Start Date End Date Taking?  Authorizing Provider   nitrofurantoin, macrocrystal-monohydrate, (MACROBID) 100 MG capsule Take 1 capsule by mouth 2 times daily for 5 days 23  Jonh Malloy DO   atorvastatin (LIPITOR) 40 MG tablet Take 0.5 tablets by mouth nightly  Patient not taking: Reported on 2023   Negra Bear MD   allopurinol (ZYLOPRIM) 100 MG tablet Take 1 tablet by mouth every morning    Angelina Perera MD   furosemide (LASIX) 40 MG tablet Take 1 tablet by mouth every morning    Angelina Perera MD   lisinopril (PRINIVIL;ZESTRIL) 20 MG tablet Take 1 tablet by mouth every morning    Angelina Perera MD   metFORMIN (GLUCOPHAGE-XR) 500 MG extended release tablet Take 1 tablet by mouth 2 times daily    Angelina Perera MD   warfarin (COUMADIN) 3 MG tablet Take 1 tablet by mouth every morning MANAGED BY DR. Juan Diego Tracy  LAST INR CHECK: 2023  NEXT INR CHECK DUE: 2023  LAST INR VALUE: 2.7    Angelina Perera MD   SITagliptin (JANUVIA) 100 MG tablet Take 1 tablet by mouth daily 23   Daya Osei DO   empagliflozin (JARDIANCE) 25 MG tablet Take 1 tablet by mouth daily 3/15/23   Daya Osei DO   carvedilol (COREG) 6.25 MG tablet Take 1 tablet by mouth 2 times daily (with meals) 23   Daya Osei DO   Multiple Vitamins-Minerals (THERAPEUTIC MULTIVITAMIN-MINERALS) tablet Take 1 tablet by mouth every morning    Angelina Perera MD       Current medications:    No current

## 2023-11-23 NOTE — OP NOTE
[address]                  [address] [facility], [address] [place] [address]                                OPERATIVE REPORT    PATIENT NAME: Rivera Marti                    :        1955  MED REC NO:   [id number]                            ROOM:  ACCOUNT NO:   [de-identified]                           ADMIT DATE: 2023  PROVIDER:     Mili Malloy DO    DATE OF PROCEDURE:  2023    PREOPERATIVE DIAGNOSES:  1. History of bladder cancer, status post transurethral resection of  bladder tumor. 2.  History of BPH, status post TURP. 3.  Noncompliance. 4.  Gross hematuria. 5.  Renal insufficiency. 6.  History of bladder diverticulum. 7.  CT showing tumor in the distal left ureter, but also potentially  within the bladder diverticulum. POSTOPERATIVE DIAGNOSES:  1. History of bladder cancer, status post transurethral resection of  bladder tumor. 2.  History of BPH, status post TURP. 3.  Noncompliance. 4.  Gross hematuria. 5.  Renal insufficiency. 6.  History of bladder diverticulum. 7.  CT showing tumor in the distal left ureter, but also potentially  within the bladder diverticulum. 8.  Hutch diverticulum. PROCEDURES PERFORMED:  The patient had:  1. Cystoscopy. 2.  Right retrograde pyelogram performed under fluoroscopy. 3.  I was unable to do a left retrograde. 4.  Transurethral resection of a bladder tumor, large, greater than 5  cm, in a hutch diverticulum. 5.  Digital rectal exam under anesthesia. 6.  Frazier catheter placement. 7.  Cystogram.    STORY ON THIS PATIENT:  This is actually a pleasant 78-year-old   male, known to me. Initially, he had a resection a couple of  years ago by my dad Dr. Yara Malloy and was found to have a tumor  within a diverticulum. He underwent a complete resection.   The patient  postop had developed retention and then subsequently required second  procedure with a transurethral resection of the

## 2023-11-23 NOTE — ANESTHESIA POSTPROCEDURE EVALUATION
Department of Anesthesiology  Postprocedure Note    Patient: Syed JanuaryNova   MRN: [id number]  YOB: 1955  Date of evaluation: 11/22/2023      Procedure Summary     Date: 11/22/23 Room / Location: [facility] 06 / [facility],[address] [place] [address]    Anesthesia Start: 9572 Anesthesia Stop: 3528    Procedure: CYSTOSCOPY RETROGRADE PYELOGRAM TRANSURETHRAL RESECTION OF BLADDER TUMOR (Left) Diagnosis:       Kidney stone      (Kidney stone [N20.0])    Surgeons: Willam Carpio DO Responsible Provider: Shanika Gunderson MD    Anesthesia Type: MAC ASA Status: 3          Anesthesia Type: MAC    Eunice Phase I: Eunice Score: 8    Eunice Phase II: Eunice Score: 10      Anesthesia Post Evaluation    Patient location during evaluation: PACU  Patient participation: complete - patient participated  Level of consciousness: awake and alert  Airway patency: patent  Nausea & Vomiting: no nausea and no vomiting  Complications: no  Cardiovascular status: blood pressure returned to baseline  Respiratory status: acceptable  Hydration status: euvolemic  Pain management: adequate

## 2023-11-28 LAB — SURGICAL PATHOLOGY REPORT: NORMAL

## 2023-12-11 RX ORDER — FUROSEMIDE 40 MG/1
40 TABLET ORAL DAILY
Qty: 90 TABLET | Refills: 1 | Status: SHIPPED | OUTPATIENT
Start: 2023-12-11

## 2023-12-14 ENCOUNTER — TELEPHONE (OUTPATIENT)
Dept: FAMILY MEDICINE CLINIC | Age: 68
End: 2023-12-14

## 2023-12-14 NOTE — TELEPHONE ENCOUNTER
Called and l/m for patients daughter to call me back to discuss her dads care. Mandi Perkins notes in his chart to hold coumdin but we received and INR today. Mandi Perkins also messages about going to a facility and home care so not sure where the patient is currently.  Will wait for her call back to discuss

## 2023-12-16 PROBLEM — Z01.810 PREOP CARDIOVASCULAR EXAM: Status: RESOLVED | Noted: 2023-11-16 | Resolved: 2023-12-16

## 2023-12-20 ENCOUNTER — OFFICE VISIT (OUTPATIENT)
Dept: FAMILY MEDICINE CLINIC | Age: 68
End: 2023-12-20
Payer: MEDICARE

## 2023-12-20 VITALS
DIASTOLIC BLOOD PRESSURE: 69 MMHG | OXYGEN SATURATION: 98 % | HEART RATE: 96 BPM | SYSTOLIC BLOOD PRESSURE: 110 MMHG | BODY MASS INDEX: 33.9 KG/M2 | TEMPERATURE: 97 F | WEIGHT: 216 LBS | HEIGHT: 67 IN

## 2023-12-20 DIAGNOSIS — E11.9 TYPE 2 DIABETES MELLITUS WITHOUT COMPLICATION, WITHOUT LONG-TERM CURRENT USE OF INSULIN (HCC): Primary | ICD-10-CM

## 2023-12-20 DIAGNOSIS — I48.11 LONGSTANDING PERSISTENT ATRIAL FIBRILLATION (HCC): ICD-10-CM

## 2023-12-20 DIAGNOSIS — E78.5 HYPERLIPIDEMIA, UNSPECIFIED HYPERLIPIDEMIA TYPE: ICD-10-CM

## 2023-12-20 DIAGNOSIS — I10 ESSENTIAL HYPERTENSION: ICD-10-CM

## 2023-12-20 DIAGNOSIS — C67.9 MALIGNANT NEOPLASM OF URINARY BLADDER, UNSPECIFIED SITE (HCC): ICD-10-CM

## 2023-12-20 PROCEDURE — 99215 OFFICE O/P EST HI 40 MIN: CPT | Performed by: FAMILY MEDICINE

## 2023-12-20 PROCEDURE — 3078F DIAST BP <80 MM HG: CPT | Performed by: FAMILY MEDICINE

## 2023-12-20 PROCEDURE — 3074F SYST BP LT 130 MM HG: CPT | Performed by: FAMILY MEDICINE

## 2023-12-20 PROCEDURE — 1123F ACP DISCUSS/DSCN MKR DOCD: CPT | Performed by: FAMILY MEDICINE

## 2023-12-20 RX ORDER — INSULIN GLARGINE 100 [IU]/ML
10 INJECTION, SOLUTION SUBCUTANEOUS NIGHTLY
Qty: 2 ADJUSTABLE DOSE PRE-FILLED PEN SYRINGE | Refills: 3 | Status: SHIPPED | OUTPATIENT
Start: 2023-12-20

## 2023-12-20 RX ORDER — PEN NEEDLE, DIABETIC 30 GX5/16"
1 NEEDLE, DISPOSABLE MISCELLANEOUS DAILY
Qty: 100 EACH | Refills: 3 | Status: SHIPPED | OUTPATIENT
Start: 2023-12-20

## 2023-12-20 RX ORDER — METFORMIN HYDROCHLORIDE 500 MG/1
500 TABLET, EXTENDED RELEASE ORAL 2 TIMES DAILY
Qty: 180 TABLET | Refills: 1 | Status: SHIPPED
Start: 2023-12-20 | End: 2023-12-20

## 2023-12-20 RX ORDER — LISINOPRIL 20 MG/1
20 TABLET ORAL EVERY MORNING
Qty: 90 TABLET | Refills: 1 | Status: SHIPPED | OUTPATIENT
Start: 2023-12-20

## 2023-12-20 NOTE — PROGRESS NOTES
Order was sent to [facility] and his niece Cece is helping him. IN todays note please document the need for the home therapy to help him get stronger because the nurse from [facility] just called and they need todays not to get therapy started.

## 2023-12-20 NOTE — PROGRESS NOTES
2024    Clemente SINGH Addie .    Chief Complaint   Patient presents with    Follow-Up from Hospital     Pt needs order for physical therapy    Discuss Medications     Pt states he is taking 1000 mg of metformin, our record shows 500mg.        HPI  History was obtained from patient.  Clemente is a 68 y.o. male who presents today with the followin. Type 2 diabetes mellitus without complication, without long-term current use of insulin (HCC)    2. Longstanding persistent atrial fibrillation (HCC)    3. Essential hypertension    4. Malignant neoplasm of urinary bladder, unspecified site (HCC)    5. Hyperlipidemia, unspecified hyperlipidemia type    Patient presents for follow-up of diabetes atrial fibrillation hypertension.  Patient recently hospitalized for hematuria and urinary retention.  Since patient's hospitalization and a has found that he is generally weak and has trouble getting around in his house because of this weakness.  Patient is following with urology for his bladder cancer.  Patient has been on Coumadin but is decided now he would rather be on Eliquis as long as it is not real expensive.  Patient's niece is with him today and states that she can get him a coupon to get this medication for $10 a month and patient is agreeable to make the switch.  Patient is taking all his other medications listed in his chart.     REVIEW OF SYMPTOMS    Review of Systems   Constitutional:  Positive for fatigue. Negative for chills and fever.   HENT:  Negative for congestion, mouth sores, postnasal drip, rhinorrhea and sinus pain.    Eyes:  Negative for photophobia, discharge and redness.   Respiratory:  Negative for cough and shortness of breath.    Cardiovascular:  Negative for chest pain.   Gastrointestinal: Negative.    Endocrine: Negative for polydipsia and polyuria.   Genitourinary:  Negative for difficulty urinating, dysuria, hematuria and urgency.   Neurological:  Positive for weakness (Lower extremities).

## 2023-12-26 ENCOUNTER — TELEPHONE (OUTPATIENT)
Dept: FAMILY MEDICINE CLINIC | Age: 68
End: 2023-12-26

## 2023-12-26 NOTE — TELEPHONE ENCOUNTER
Spoke with pharmacist and there is concern about interaction with the warfarin. I explained that medication is being discontinued as the patient is being changed over to eliquis. I notified patient that I did take care of this

## 2023-12-26 NOTE — TELEPHONE ENCOUNTER
Pt called and states that Maikel \"needs more info\" on his Eliquis rx. He wants someone to call pharmacy so this can be taken care of

## 2024-01-01 ASSESSMENT — ENCOUNTER SYMPTOMS
GASTROINTESTINAL NEGATIVE: 1
COUGH: 0
EYE REDNESS: 0
SINUS PAIN: 0
SHORTNESS OF BREATH: 0
RHINORRHEA: 0
EYE DISCHARGE: 0
PHOTOPHOBIA: 0

## 2024-01-05 ENCOUNTER — TELEPHONE (OUTPATIENT)
Dept: FAMILY MEDICINE CLINIC | Age: 69
End: 2024-01-05

## 2024-01-05 NOTE — TELEPHONE ENCOUNTER
Patients niece is calling and asking if patient can have an order for a lift chair. Is that ok to order ?

## 2024-01-08 ENCOUNTER — APPOINTMENT (OUTPATIENT)
Dept: GENERAL RADIOLOGY | Age: 69
DRG: 638 | End: 2024-01-08
Payer: MEDICARE

## 2024-01-08 ENCOUNTER — HOSPITAL ENCOUNTER (INPATIENT)
Age: 69
LOS: 4 days | Discharge: NURSING, REHAB OR LONG-TERM CARE | DRG: 638 | End: 2024-01-12
Attending: EMERGENCY MEDICINE | Admitting: STUDENT IN AN ORGANIZED HEALTH CARE EDUCATION/TRAINING PROGRAM
Payer: MEDICARE

## 2024-01-08 DIAGNOSIS — N18.9 ACUTE KIDNEY INJURY SUPERIMPOSED ON CHRONIC KIDNEY DISEASE (HCC): ICD-10-CM

## 2024-01-08 DIAGNOSIS — L03.116 CELLULITIS OF LEFT LOWER EXTREMITY: Primary | ICD-10-CM

## 2024-01-08 DIAGNOSIS — N17.9 ACUTE KIDNEY INJURY SUPERIMPOSED ON CHRONIC KIDNEY DISEASE (HCC): ICD-10-CM

## 2024-01-08 DIAGNOSIS — D72.829 LEUKOCYTOSIS, UNSPECIFIED TYPE: ICD-10-CM

## 2024-01-08 PROBLEM — G47.33 OSA (OBSTRUCTIVE SLEEP APNEA): Status: ACTIVE | Noted: 2024-01-08

## 2024-01-08 LAB
ALBUMIN SERPL-MCNC: 3.2 G/DL (ref 3.5–5.2)
ALP SERPL-CCNC: 157 U/L (ref 40–129)
ALT SERPL-CCNC: 40 U/L (ref 0–40)
ANION GAP SERPL CALCULATED.3IONS-SCNC: 13 MMOL/L (ref 7–16)
AST SERPL-CCNC: 45 U/L (ref 0–39)
B-OH-BUTYR SERPL-MCNC: 0.04 MMOL/L (ref 0.02–0.27)
BASOPHILS # BLD: 0 K/UL (ref 0–0.2)
BASOPHILS NFR BLD: 0 % (ref 0–2)
BILIRUB SERPL-MCNC: 0.4 MG/DL (ref 0–1.2)
BUN SERPL-MCNC: 75 MG/DL (ref 6–23)
CALCIUM SERPL-MCNC: 9 MG/DL (ref 8.6–10.2)
CHLORIDE SERPL-SCNC: 103 MMOL/L (ref 98–107)
CHLORIDE UR-SCNC: 79 MMOL/L
CO2 SERPL-SCNC: 21 MMOL/L (ref 22–29)
CREAT SERPL-MCNC: 4.3 MG/DL (ref 0.7–1.2)
CREAT UR-MCNC: 47.5 MG/DL (ref 40–278)
CREAT UR-MCNC: 48.2 MG/DL (ref 40–278)
EOSINOPHIL # BLD: 0.44 K/UL (ref 0.05–0.5)
EOSINOPHILS RELATIVE PERCENT: 2 % (ref 0–6)
ERYTHROCYTE [DISTWIDTH] IN BLOOD BY AUTOMATED COUNT: 16.1 % (ref 11.5–15)
GFR SERPL CREATININE-BSD FRML MDRD: 14 ML/MIN/1.73M2
GLUCOSE BLD-MCNC: 142 MG/DL (ref 74–99)
GLUCOSE SERPL-MCNC: 136 MG/DL (ref 74–99)
HCT VFR BLD AUTO: 32.8 % (ref 37–54)
HGB BLD-MCNC: 10.4 G/DL (ref 12.5–16.5)
LACTATE BLDV-SCNC: 1.2 MMOL/L (ref 0.5–2.2)
LYMPHOCYTES NFR BLD: 1.77 K/UL (ref 1.5–4)
LYMPHOCYTES RELATIVE PERCENT: 7 % (ref 20–42)
MCH RBC QN AUTO: 28.4 PG (ref 26–35)
MCHC RBC AUTO-ENTMCNC: 31.7 G/DL (ref 32–34.5)
MCV RBC AUTO: 89.6 FL (ref 80–99.9)
MONOCYTES NFR BLD: 1.33 K/UL (ref 0.1–0.95)
MONOCYTES NFR BLD: 5 % (ref 2–12)
NEUTROPHILS NFR BLD: 86 % (ref 43–80)
NEUTS SEG NFR BLD: 21.95 K/UL (ref 1.8–7.3)
OSMOLALITY UR: 391 MOSM/KG (ref 300–900)
PH VENOUS: 7.39 (ref 7.35–7.45)
PLATELET # BLD AUTO: 310 K/UL (ref 130–450)
PMV BLD AUTO: 10.7 FL (ref 7–12)
POTASSIUM SERPL-SCNC: 4.4 MMOL/L (ref 3.5–5)
POTASSIUM, UR: 13.9 MMOL/L
PROT SERPL-MCNC: 7.5 G/DL (ref 6.4–8.3)
RBC # BLD AUTO: 3.66 M/UL (ref 3.8–5.8)
RBC # BLD: NORMAL 10*6/UL
SODIUM SERPL-SCNC: 137 MMOL/L (ref 132–146)
SODIUM UR-SCNC: 88 MMOL/L
TOTAL PROTEIN, URINE: 42 MG/DL (ref 0–12)
TOTAL PROTEIN, URINE: 44 MG/DL (ref 0–12)
URINE TOTAL PROTEIN CREATININE RATIO: 0.92 (ref 0–0.2)
WBC OTHER # BLD: 25.5 K/UL (ref 4.5–11.5)

## 2024-01-08 PROCEDURE — 84300 ASSAY OF URINE SODIUM: CPT

## 2024-01-08 PROCEDURE — 6360000002 HC RX W HCPCS: Performed by: EMERGENCY MEDICINE

## 2024-01-08 PROCEDURE — 83605 ASSAY OF LACTIC ACID: CPT

## 2024-01-08 PROCEDURE — 73590 X-RAY EXAM OF LOWER LEG: CPT

## 2024-01-08 PROCEDURE — 82436 ASSAY OF URINE CHLORIDE: CPT

## 2024-01-08 PROCEDURE — 96375 TX/PRO/DX INJ NEW DRUG ADDON: CPT

## 2024-01-08 PROCEDURE — 96376 TX/PRO/DX INJ SAME DRUG ADON: CPT

## 2024-01-08 PROCEDURE — 99285 EMERGENCY DEPT VISIT HI MDM: CPT

## 2024-01-08 PROCEDURE — 99223 1ST HOSP IP/OBS HIGH 75: CPT | Performed by: INTERNAL MEDICINE

## 2024-01-08 PROCEDURE — 83935 ASSAY OF URINE OSMOLALITY: CPT

## 2024-01-08 PROCEDURE — 82010 KETONE BODYS QUAN: CPT

## 2024-01-08 PROCEDURE — 82570 ASSAY OF URINE CREATININE: CPT

## 2024-01-08 PROCEDURE — 73610 X-RAY EXAM OF ANKLE: CPT

## 2024-01-08 PROCEDURE — 85025 COMPLETE CBC W/AUTO DIFF WBC: CPT

## 2024-01-08 PROCEDURE — 84133 ASSAY OF URINE POTASSIUM: CPT

## 2024-01-08 PROCEDURE — APPSS45 APP SPLIT SHARED TIME 31-45 MINUTES

## 2024-01-08 PROCEDURE — 82962 GLUCOSE BLOOD TEST: CPT

## 2024-01-08 PROCEDURE — 80053 COMPREHEN METABOLIC PANEL: CPT

## 2024-01-08 PROCEDURE — 1200000000 HC SEMI PRIVATE

## 2024-01-08 PROCEDURE — 73630 X-RAY EXAM OF FOOT: CPT

## 2024-01-08 PROCEDURE — 2580000003 HC RX 258: Performed by: EMERGENCY MEDICINE

## 2024-01-08 PROCEDURE — 2580000003 HC RX 258

## 2024-01-08 PROCEDURE — 96365 THER/PROPH/DIAG IV INF INIT: CPT

## 2024-01-08 PROCEDURE — 87040 BLOOD CULTURE FOR BACTERIA: CPT

## 2024-01-08 PROCEDURE — 82800 BLOOD PH: CPT

## 2024-01-08 PROCEDURE — 84156 ASSAY OF PROTEIN URINE: CPT

## 2024-01-08 RX ORDER — SODIUM CHLORIDE 9 MG/ML
INJECTION, SOLUTION INTRAVENOUS CONTINUOUS
Status: ACTIVE | OUTPATIENT
Start: 2024-01-08 | End: 2024-01-10

## 2024-01-08 RX ORDER — SODIUM CHLORIDE 0.9 % (FLUSH) 0.9 %
5-40 SYRINGE (ML) INJECTION EVERY 12 HOURS SCHEDULED
Status: DISCONTINUED | OUTPATIENT
Start: 2024-01-08 | End: 2024-01-12 | Stop reason: HOSPADM

## 2024-01-08 RX ORDER — POLYETHYLENE GLYCOL 3350 17 G/17G
17 POWDER, FOR SOLUTION ORAL DAILY PRN
Status: DISCONTINUED | OUTPATIENT
Start: 2024-01-08 | End: 2024-01-12 | Stop reason: HOSPADM

## 2024-01-08 RX ORDER — 0.9 % SODIUM CHLORIDE 0.9 %
500 INTRAVENOUS SOLUTION INTRAVENOUS ONCE
Status: COMPLETED | OUTPATIENT
Start: 2024-01-08 | End: 2024-01-08

## 2024-01-08 RX ORDER — FENTANYL CITRATE 50 UG/ML
50 INJECTION, SOLUTION INTRAMUSCULAR; INTRAVENOUS ONCE
Status: COMPLETED | OUTPATIENT
Start: 2024-01-08 | End: 2024-01-08

## 2024-01-08 RX ORDER — SODIUM CHLORIDE 0.9 % (FLUSH) 0.9 %
5-40 SYRINGE (ML) INJECTION PRN
Status: DISCONTINUED | OUTPATIENT
Start: 2024-01-08 | End: 2024-01-12 | Stop reason: HOSPADM

## 2024-01-08 RX ORDER — SODIUM CHLORIDE 9 MG/ML
INJECTION, SOLUTION INTRAVENOUS PRN
Status: DISCONTINUED | OUTPATIENT
Start: 2024-01-08 | End: 2024-01-12 | Stop reason: HOSPADM

## 2024-01-08 RX ORDER — 0.9 % SODIUM CHLORIDE 0.9 %
1000 INTRAVENOUS SOLUTION INTRAVENOUS ONCE
Status: COMPLETED | OUTPATIENT
Start: 2024-01-08 | End: 2024-01-08

## 2024-01-08 RX ORDER — FENTANYL CITRATE 50 UG/ML
25 INJECTION, SOLUTION INTRAMUSCULAR; INTRAVENOUS ONCE
Status: COMPLETED | OUTPATIENT
Start: 2024-01-08 | End: 2024-01-08

## 2024-01-08 RX ADMIN — CEFEPIME 2000 MG: 2 INJECTION, POWDER, FOR SOLUTION INTRAVENOUS at 16:42

## 2024-01-08 RX ADMIN — SODIUM CHLORIDE: 9 INJECTION, SOLUTION INTRAVENOUS at 22:08

## 2024-01-08 RX ADMIN — FENTANYL CITRATE 50 MCG: 50 INJECTION, SOLUTION INTRAMUSCULAR; INTRAVENOUS at 16:49

## 2024-01-08 RX ADMIN — SODIUM CHLORIDE 1000 ML: 9 INJECTION, SOLUTION INTRAVENOUS at 16:42

## 2024-01-08 RX ADMIN — SODIUM CHLORIDE 500 ML: 9 INJECTION, SOLUTION INTRAVENOUS at 15:46

## 2024-01-08 RX ADMIN — FENTANYL CITRATE 25 MCG: 50 INJECTION, SOLUTION INTRAMUSCULAR; INTRAVENOUS at 15:43

## 2024-01-08 RX ADMIN — VANCOMYCIN HYDROCHLORIDE 2000 MG: 10 INJECTION, POWDER, LYOPHILIZED, FOR SOLUTION INTRAVENOUS at 17:55

## 2024-01-08 ASSESSMENT — PAIN DESCRIPTION - DESCRIPTORS
DESCRIPTORS: ACHING;SORE;TENDER
DESCRIPTORS: SHARP
DESCRIPTORS: TENDER
DESCRIPTORS: CRAMPING

## 2024-01-08 ASSESSMENT — PAIN DESCRIPTION - LOCATION
LOCATION: LEG

## 2024-01-08 ASSESSMENT — PAIN DESCRIPTION - ORIENTATION
ORIENTATION: LEFT

## 2024-01-08 ASSESSMENT — PAIN - FUNCTIONAL ASSESSMENT: PAIN_FUNCTIONAL_ASSESSMENT: 0-10

## 2024-01-08 ASSESSMENT — PAIN SCALES - GENERAL
PAINLEVEL_OUTOF10: 10
PAINLEVEL_OUTOF10: 5
PAINLEVEL_OUTOF10: 9
PAINLEVEL_OUTOF10: 5

## 2024-01-08 ASSESSMENT — LIFESTYLE VARIABLES
HOW MANY STANDARD DRINKS CONTAINING ALCOHOL DO YOU HAVE ON A TYPICAL DAY: PATIENT DOES NOT DRINK
HOW OFTEN DO YOU HAVE A DRINK CONTAINING ALCOHOL: NEVER

## 2024-01-08 ASSESSMENT — PAIN DESCRIPTION - PAIN TYPE: TYPE: ACUTE PAIN

## 2024-01-08 ASSESSMENT — PAIN DESCRIPTION - FREQUENCY: FREQUENCY: CONTINUOUS

## 2024-01-08 NOTE — PROGRESS NOTES
Database initiated. Patient is A&O comes in from home alone. States he uses a walker and is RA at baseline. He is having diarrhea.

## 2024-01-08 NOTE — ED PROVIDER NOTES
[facility]  EMERGENCY DEPARTMENT ENCOUNTER        Pt Name: Clemente Real Sr.  MRN: [id number]  Birthdate 1955  Date of evaluation: 1/8/2024  Provider: Marialuisa Husain MD  PCP: Gerry Cuellar DO  Note Started: 2:56 PM EST 1/8/24    CHIEF COMPLAINT       Chief Complaint   Patient presents with    Leg Swelling     Left leg pain/swelling/redness        HISTORY OF PRESENT ILLNESS: 1 or more Elements   History From:  patient    Limitations to history : None    Clemente Real Sr. is a 68 y.o. male who presents emergency department with complaints of redness swelling and pain to left foot ankle and lower leg for the past week.  He uses a walker to get around at home but states he been having worsening trouble doing so no falls or trauma.  Patient is an insulin-dependent diabetic.  Does not regularly check his sugars at home no fevers or chills.  Patient states he was recently switched from metformin to insulin just this past week.  He also states he was switched from Coumadin to Eliquis.  Patient does have a small wound to the inner aspect posterior calf area.  No purulence.  Patient states the wound was from the scratching chronic itching to lower extremity. Denies nausea or vomiting.  Came in from EMS.  No chest pain or shortness of breath.    Nursing Notes were all reviewed and agreed with or any disagreements were addressed in the HPI.      REVIEW OF EXTERNAL NOTE :       Echocardiogram from 11/16/2023 showed an EF of 60 to 65%.    REVIEW OF SYSTEMS :           Positives and Pertinent negatives as per HPI.     SURGICAL HISTORY     Past Surgical History:   Procedure Laterality Date    BACK SURGERY  2007    repair of herniated disc    CARDIOVASCULAR STRESS TEST  11/21/2006    findings of inferior and anterior ischemia     CYSTOSCOPY N/A 10/22/2021    CYSTOSCOPY RETROGRADE PYELOGRAM TRANSURETHRAL RESECTION OF BLADDER TUMOR WITH INSTALLATION OF GEMCITIBINE performed by Pablo Malloy DO at [facility] OR     Urine 42 (H) 0 - 12 mg/dL   Protein / creatinine ratio, urine   Result Value Ref Range    Total Protein, Urine 44 (H) 0 - 12 mg/dL    Creatinine, Ur 47.5 40.0 - 278.0 mg/dL    Urine Total Protein Creatinine Ratio 0.92 (H) 0.0 - 0.2   POCT Glucose   Result Value Ref Range    POC Glucose 142 (H) 74 - 99 mg/dL   POCT Glucose   Result Value Ref Range    POC Glucose 149 (H) 74 - 99 mg/dL                 RADIOLOGY:   Non-plain film images such as CT, Ultrasound and MRI are read by the radiologist. Plain radiographic images are visualized and preliminarily interpreted by the ED Provider with the below findings:    No signs of osteomyelitis on xrays of the left tib-fib or left ankle.    Interpretation per the Radiologist below, if available at the time of this note:    XR FOOT LEFT (MIN 3 VIEWS)   Final Result   Diffuse soft tissue swelling predominant in the metatarsal region dorsal and   plantar area.      No intrinsic bone or joint abnormalities.         XR TIBIA FIBULA LEFT (2 VIEWS)   Final Result   1. No acute osseous findings.   2. Soft tissue swelling.         XR ANKLE LEFT (MIN 3 VIEWS)   Final Result   1. No acute osseous findings.   2. Soft tissue swelling.         Vascular duplex lower extremity venous left    (Results Pending)     No results found.    No results found.    PROCEDURES   Unless otherwise noted below, none          CRITICAL CARE TIME (.cct)   none    PAST MEDICAL HISTORY/Chronic Conditions Affecting Care      has a past medical history of Atrial fibrillation (HCC) (10/2012), Bladder tumor, Diabetes mellitus (HCC), Hyperlipidemia, Hypertension, LONG TERM ANTICOAGULENT USE, Lumbar pain, and CONCHITA on CPAP.     EMERGENCY DEPARTMENT COURSE    Vitals:    Vitals:    01/08/24 1834 01/08/24 1946 01/08/24 2117 01/09/24 0740   BP: 106/77 112/86 117/74 111/60   Pulse: 95 94 96 99   Resp: 16 16 18 16   Temp: 99.9 °F (37.7 °C) 99.1 °F (37.3 °C) 97.9 °F (36.6 °C) 100.2 °F (37.9 °C)   TempSrc: Oral Oral Oral Oral

## 2024-01-09 ENCOUNTER — APPOINTMENT (OUTPATIENT)
Dept: ULTRASOUND IMAGING | Age: 69
DRG: 638 | End: 2024-01-09
Payer: MEDICARE

## 2024-01-09 ENCOUNTER — APPOINTMENT (OUTPATIENT)
Dept: CT IMAGING | Age: 69
DRG: 638 | End: 2024-01-09
Payer: MEDICARE

## 2024-01-09 ENCOUNTER — APPOINTMENT (OUTPATIENT)
Dept: MRI IMAGING | Age: 69
DRG: 638 | End: 2024-01-09
Payer: MEDICARE

## 2024-01-09 DIAGNOSIS — R53.81 PHYSICAL DECONDITIONING: ICD-10-CM

## 2024-01-09 DIAGNOSIS — G89.29 CHRONIC BILATERAL LOW BACK PAIN WITH BILATERAL SCIATICA: Primary | ICD-10-CM

## 2024-01-09 DIAGNOSIS — M54.42 CHRONIC BILATERAL LOW BACK PAIN WITH BILATERAL SCIATICA: Primary | ICD-10-CM

## 2024-01-09 DIAGNOSIS — M54.41 CHRONIC BILATERAL LOW BACK PAIN WITH BILATERAL SCIATICA: Primary | ICD-10-CM

## 2024-01-09 LAB
ALBUMIN SERPL-MCNC: 2.7 G/DL (ref 3.5–5.2)
ALP SERPL-CCNC: 181 U/L (ref 40–129)
ALT SERPL-CCNC: 41 U/L (ref 0–40)
ANION GAP SERPL CALCULATED.3IONS-SCNC: 13 MMOL/L (ref 7–16)
AST SERPL-CCNC: 40 U/L (ref 0–39)
BASOPHILS # BLD: 0 K/UL (ref 0–0.2)
BASOPHILS NFR BLD: 0 % (ref 0–2)
BILIRUB SERPL-MCNC: 0.4 MG/DL (ref 0–1.2)
BUN SERPL-MCNC: 65 MG/DL (ref 6–23)
CALCIUM SERPL-MCNC: 8.3 MG/DL (ref 8.6–10.2)
CHLORIDE SERPL-SCNC: 106 MMOL/L (ref 98–107)
CO2 SERPL-SCNC: 17 MMOL/L (ref 22–29)
CREAT SERPL-MCNC: 3.7 MG/DL (ref 0.7–1.2)
EOSINOPHIL # BLD: 0.23 K/UL (ref 0.05–0.5)
EOSINOPHILS RELATIVE PERCENT: 1 % (ref 0–6)
ERYTHROCYTE [DISTWIDTH] IN BLOOD BY AUTOMATED COUNT: 16.1 % (ref 11.5–15)
GFR SERPL CREATININE-BSD FRML MDRD: 17 ML/MIN/1.73M2
GLUCOSE BLD-MCNC: 122 MG/DL (ref 74–99)
GLUCOSE BLD-MCNC: 149 MG/DL (ref 74–99)
GLUCOSE BLD-MCNC: 150 MG/DL (ref 74–99)
GLUCOSE BLD-MCNC: 203 MG/DL (ref 74–99)
GLUCOSE SERPL-MCNC: 139 MG/DL (ref 74–99)
HCT VFR BLD AUTO: 28.4 % (ref 37–54)
HGB BLD-MCNC: 9 G/DL (ref 12.5–16.5)
LYMPHOCYTES NFR BLD: 1.63 K/UL (ref 1.5–4)
LYMPHOCYTES RELATIVE PERCENT: 6 % (ref 20–42)
MCH RBC QN AUTO: 28.2 PG (ref 26–35)
MCHC RBC AUTO-ENTMCNC: 31.7 G/DL (ref 32–34.5)
MCV RBC AUTO: 89 FL (ref 80–99.9)
MONOCYTES NFR BLD: 2.09 K/UL (ref 0.1–0.95)
MONOCYTES NFR BLD: 8 % (ref 2–12)
MYELOCYTES ABSOLUTE COUNT: 0.23 K/UL
MYELOCYTES: 1 %
NEUTROPHILS NFR BLD: 84 % (ref 43–80)
NEUTS SEG NFR BLD: 22.52 K/UL (ref 1.8–7.3)
PLATELET # BLD AUTO: 308 K/UL (ref 130–450)
PMV BLD AUTO: 11 FL (ref 7–12)
POTASSIUM SERPL-SCNC: 4.1 MMOL/L (ref 3.5–5)
PROT SERPL-MCNC: 6.5 G/DL (ref 6.4–8.3)
RBC # BLD AUTO: 3.19 M/UL (ref 3.8–5.8)
RBC # BLD: ABNORMAL 10*6/UL
SODIUM SERPL-SCNC: 136 MMOL/L (ref 132–146)
VANCOMYCIN SERPL-MCNC: 18.1 UG/ML (ref 5–40)
WBC OTHER # BLD: 26.7 K/UL (ref 4.5–11.5)

## 2024-01-09 PROCEDURE — 1200000000 HC SEMI PRIVATE

## 2024-01-09 PROCEDURE — 99232 SBSQ HOSP IP/OBS MODERATE 35: CPT | Performed by: INTERNAL MEDICINE

## 2024-01-09 PROCEDURE — 6370000000 HC RX 637 (ALT 250 FOR IP): Performed by: INTERNAL MEDICINE

## 2024-01-09 PROCEDURE — 2580000003 HC RX 258: Performed by: STUDENT IN AN ORGANIZED HEALTH CARE EDUCATION/TRAINING PROGRAM

## 2024-01-09 PROCEDURE — 93971 EXTREMITY STUDY: CPT

## 2024-01-09 PROCEDURE — 6360000002 HC RX W HCPCS: Performed by: STUDENT IN AN ORGANIZED HEALTH CARE EDUCATION/TRAINING PROGRAM

## 2024-01-09 PROCEDURE — 85025 COMPLETE CBC W/AUTO DIFF WBC: CPT

## 2024-01-09 PROCEDURE — 80053 COMPREHEN METABOLIC PANEL: CPT

## 2024-01-09 PROCEDURE — 73700 CT LOWER EXTREMITY W/O DYE: CPT

## 2024-01-09 PROCEDURE — 82962 GLUCOSE BLOOD TEST: CPT

## 2024-01-09 PROCEDURE — 97161 PT EVAL LOW COMPLEX 20 MIN: CPT

## 2024-01-09 PROCEDURE — 6370000000 HC RX 637 (ALT 250 FOR IP)

## 2024-01-09 PROCEDURE — 97165 OT EVAL LOW COMPLEX 30 MIN: CPT

## 2024-01-09 PROCEDURE — 80202 ASSAY OF VANCOMYCIN: CPT

## 2024-01-09 PROCEDURE — 87081 CULTURE SCREEN ONLY: CPT

## 2024-01-09 PROCEDURE — 36415 COLL VENOUS BLD VENIPUNCTURE: CPT

## 2024-01-09 PROCEDURE — 73721 MRI JNT OF LWR EXTRE W/O DYE: CPT

## 2024-01-09 RX ORDER — CARVEDILOL 6.25 MG/1
6.25 TABLET ORAL 2 TIMES DAILY WITH MEALS
Status: DISCONTINUED | OUTPATIENT
Start: 2024-01-09 | End: 2024-01-12 | Stop reason: HOSPADM

## 2024-01-09 RX ORDER — ATORVASTATIN CALCIUM 20 MG/1
20 TABLET, FILM COATED ORAL NIGHTLY
Status: DISCONTINUED | OUTPATIENT
Start: 2024-01-09 | End: 2024-01-12 | Stop reason: HOSPADM

## 2024-01-09 RX ORDER — DEXTROSE MONOHYDRATE 100 MG/ML
INJECTION, SOLUTION INTRAVENOUS CONTINUOUS PRN
Status: DISCONTINUED | OUTPATIENT
Start: 2024-01-09 | End: 2024-01-12 | Stop reason: HOSPADM

## 2024-01-09 RX ORDER — INSULIN GLARGINE 100 [IU]/ML
7 INJECTION, SOLUTION SUBCUTANEOUS NIGHTLY
Status: DISCONTINUED | OUTPATIENT
Start: 2024-01-09 | End: 2024-01-12 | Stop reason: HOSPADM

## 2024-01-09 RX ORDER — INSULIN LISPRO 100 [IU]/ML
0-4 INJECTION, SOLUTION INTRAVENOUS; SUBCUTANEOUS NIGHTLY
Status: DISCONTINUED | OUTPATIENT
Start: 2024-01-09 | End: 2024-01-12 | Stop reason: HOSPADM

## 2024-01-09 RX ORDER — OXYCODONE HYDROCHLORIDE AND ACETAMINOPHEN 5; 325 MG/1; MG/1
1 TABLET ORAL EVERY 6 HOURS PRN
Status: DISCONTINUED | OUTPATIENT
Start: 2024-01-09 | End: 2024-01-12 | Stop reason: HOSPADM

## 2024-01-09 RX ORDER — INSULIN LISPRO 100 [IU]/ML
0-4 INJECTION, SOLUTION INTRAVENOUS; SUBCUTANEOUS
Status: DISCONTINUED | OUTPATIENT
Start: 2024-01-09 | End: 2024-01-12 | Stop reason: HOSPADM

## 2024-01-09 RX ADMIN — CARVEDILOL 6.25 MG: 6.25 TABLET, FILM COATED ORAL at 16:58

## 2024-01-09 RX ADMIN — INSULIN LISPRO 1 UNITS: 100 INJECTION, SOLUTION INTRAVENOUS; SUBCUTANEOUS at 11:15

## 2024-01-09 RX ADMIN — VANCOMYCIN HYDROCHLORIDE 1000 MG: 1 INJECTION, POWDER, LYOPHILIZED, FOR SOLUTION INTRAVENOUS at 17:00

## 2024-01-09 RX ADMIN — INSULIN GLARGINE 7 UNITS: 100 INJECTION, SOLUTION SUBCUTANEOUS at 23:20

## 2024-01-09 RX ADMIN — APIXABAN 5 MG: 5 TABLET, FILM COATED ORAL at 07:53

## 2024-01-09 RX ADMIN — OXYCODONE AND ACETAMINOPHEN 1 TABLET: 5; 325 TABLET ORAL at 16:58

## 2024-01-09 RX ADMIN — ATORVASTATIN CALCIUM 20 MG: 20 TABLET, FILM COATED ORAL at 23:20

## 2024-01-09 RX ADMIN — CARVEDILOL 6.25 MG: 6.25 TABLET, FILM COATED ORAL at 07:53

## 2024-01-09 RX ADMIN — OXYCODONE AND ACETAMINOPHEN 1 TABLET: 5; 325 TABLET ORAL at 23:19

## 2024-01-09 RX ADMIN — APIXABAN 5 MG: 5 TABLET, FILM COATED ORAL at 23:32

## 2024-01-09 ASSESSMENT — PAIN DESCRIPTION - ORIENTATION
ORIENTATION: LEFT
ORIENTATION: LEFT

## 2024-01-09 ASSESSMENT — PAIN SCALES - GENERAL
PAINLEVEL_OUTOF10: 6
PAINLEVEL_OUTOF10: 6

## 2024-01-09 ASSESSMENT — PAIN DESCRIPTION - DESCRIPTORS
DESCRIPTORS: SPASM
DESCRIPTORS: ACHING;SPASM

## 2024-01-09 ASSESSMENT — PAIN DESCRIPTION - LOCATION
LOCATION: LEG
LOCATION: LEG

## 2024-01-09 ASSESSMENT — PAIN - FUNCTIONAL ASSESSMENT: PAIN_FUNCTIONAL_ASSESSMENT: PREVENTS OR INTERFERES SOME ACTIVE ACTIVITIES AND ADLS

## 2024-01-09 NOTE — CARE COORDINATION
1/9/2024  Social Work Discharge Planning:FLAKITO spoke to Pts son who informed that Pt needs someone from the hospital to call Dr. Bynum to inform of Pts admit here.Sw notified Merrit at doctors office.SW also informed that since Pt would have a copay of over $300 a day that Pt would need to return home with [facility] since Pt said he can't afford to stay. Son states Pt isn't able to be alone at this time and all his siblings and he work. Sw will continue to follow.Son is currently out of the room.Electronically signed by TEENA Benz on 1/9/2024 at 1:36 PM

## 2024-01-09 NOTE — H&P
[facility] Hospitalist Group History and Physical      CHIEF COMPLAINT:  Left leg pain and swelling    History of Present Illness:  This is a 68 year old male with a past medical history of diabetes mellitus, hypertension, hyperlipidemia, and atrial fibrillation on Eliquis who presents to the ED with left leg pain and swelling.  Patient states he began to have pain to his left leg, which has been worsening.  He is now having difficulty ambulating due to the pain.  He complains of erythema and edema to the left lower extremity.  He states he has a wound on the back of his left calf.  Denies purulent drainage, states he has had some scant serous drainage.  He denies any trauma to the leg, believes he had a scabbed area that he picked off due to chronic itching of the legs.  He denies fever or chills, nausea, or vomiting.  He complains of diarrhea for 2 days, approximately 2 loose stools daily.  Denies blood in stool.  Patient reports history of diabetes, states he was started on Lantus recently.    Vitals on admission were 99.1, 93, 16, 100/67, 98% on room air labs were notable for CO2 21, BUN 75, creatinine 4.3, GFR 14, WBCs 25.8, percent neutrophils 86, lactic acid 1.2.  Imaging was notable for soft tissue swelling with no acute osseous findings.  Patient was treated with cefepime 2000 mg IV and vancomycin 2000 mg IV in ED, as well as 1500 mL normal saline.  Patient was admitted for further evaluation and treatment.    Informant(s) for H&P: Patient and chart review    REVIEW OF SYSTEMS:  A comprehensive review of systems was negative except for: what is in the HPI    PMH:  Past Medical History:   Diagnosis Date    Atrial fibrillation (HCC) 10/2012    now on Coumadin    Bladder tumor     Diabetes mellitus (HCC)     Hyperlipidemia     Hypertension     LONG TERM ANTICOAGULENT USE     Lumbar pain     CONCHITA on CPAP        Surgical History:  Past Surgical History:   Procedure Laterality Date    BACK SURGERY  2007     repair of herniated disc    CARDIOVASCULAR STRESS TEST  11/21/2006    findings of inferior and anterior ischemia     CYSTOSCOPY N/A 10/22/2021    CYSTOSCOPY RETROGRADE PYELOGRAM TRANSURETHRAL RESECTION OF BLADDER TUMOR WITH INSTALLATION OF GEMCITIBINE performed by Pablo Malloy DO at [facility] OR    DIAGNOSTIC CARDIAC CATH LAB PROCEDURE  12/13/2006    No obstructive CAD and mild dilated cardiomyopathy with EF 45-50%    LITHOTRIPSY Left 11/22/2023    CYSTOSCOPY RETROGRADE PYELOGRAM TRANSURETHRAL RESECTION OF BLADDER TUMOR performed by Pablo Malloy DO at [facility] OR    PAIN MANAGEMENT PROCEDURE Bilateral 9/29/2022    LUMBAR TRANSFORAMINAL EPIDURAL STEROID INJECTION RIGHT L4 & LEFT L3 UNDER FLUOROSCOPIC GUIDANCE performed by Nathan Moffett MD at [facility] OR    TURP N/A 4/7/2021    CYSTOSCOPY, URETHRAL DILITATION, PLASMA BUTTON TRANSURETHRAL RESECTION BLADDER TUMOR WITH FULGURATION, EXCHANGE GABRIEL CATHETER performed by Jamie Malloy MD at [facility] OR    TURP N/A 4/21/2021    CYSTOSCOPY RETROGRADE PYELOGRAM PLASMA LOOP TRANSURETHRAL RESECTION PROSTATE performed by Pablo Malloy DO at [facility] OR       Medications Prior to Admission:    Prior to Admission medications    Medication Sig Start Date End Date Taking? Authorizing Provider   empagliflozin (JARDIANCE) 25 MG tablet Take 1 tablet by mouth daily 12/20/23   Gerry Cuellar DO   lisinopril (PRINIVIL;ZESTRIL) 20 MG tablet Take 1 tablet by mouth every morning 12/20/23   Gerry Cuellar DO   apixaban (ELIQUIS) 5 MG TABS tablet Take 1 tablet by mouth 2 times daily 12/20/23   Gerry Cuellar DO   insulin glargine (LANTUS SOLOSTAR) 100 UNIT/ML injection pen Inject 10 Units into the skin nightly 12/20/23   Gerry Cuellar DO   Insulin Pen Needle (PEN NEEDLES 3/16\") 31G X 5 MM MISC 1 each by Does not apply route daily 12/20/23   Gerry Cuellar DO   furosemide (LASIX) 40 MG tablet TAKE 1 TABLET DAILY 12/11/23   Gerry Cuellar DO   atorvastatin (LIPITOR) 40 MG tablet Take

## 2024-01-09 NOTE — PROGRESS NOTES
Unable to obtain wound culture, no drainage present. Electronically signed by Cece Talavera RN on 1/9/2024 at 11:20 AM

## 2024-01-09 NOTE — PROGRESS NOTES
OCCUPATIONAL THERAPY INITIAL EVALUATION    [facility]   [address] [facility]        Date:2024                                                  Patient Name: Clemente Real Sr.    MRN: [id number]    : 1955    Room: [address] [place] [address]     Evaluating OT: Diandra Ledezma OTR/L #[id number]    Referring Provider:  Codie Salvador APRN - CNP     Specific Provider Orders/Date:  OT Eval and Treat, 23     Diagnosis:   1. Cellulitis of left lower extremity    2. Leukocytosis, unspecified type    3. Acute kidney injury superimposed on chronic kidney disease (HCC)         Surgery: None       Pertinent Medical History: HTN, DM, A-fib, DM, CONCHITA on CPAP, back surgery       Precautions:  Fall Risk, alarm      Assessment of current deficits    [x] Functional mobility  [x]ADLs  [x] Strength               []Cognition    [x] Functional transfers   [x] IADLs         [x] Safety Awareness   [x]Endurance    [] Fine Coordination              [x] Balance      [] Vision/perception   []Sensation     []Gross Motor Coordination  [] ROM  [] Delirium                   [] Motor Control     OT PLAN OF CARE   OT POC based on physician orders, patient diagnosis and results of clinical assessment    Frequency/Duration 1-3 days/wk for 2 weeks PRN     Specific OT Treatment Interventions to include:   * Instruction/training on adapted ADL techniques and AE recommendations to increase functional independence within precautions       * Training on energy conservation strategies, correct breathing pattern and techniques to improve independence/tolerance for self-care routine  * Functional transfer/mobility training/DME recommendations for increased independence, safety, and fall prevention  * Patient/Family education to increase follow through with safety techniques and functional independence  * Recommendation of environmental modifications for increased safety with functional  Units   OT Eval Low 97165  X  1    OT Eval Medium 04053      OT Eval High 03960      OT Re-Eval 36522            ADL/Self Care 63698     Therapeutic Activities 19465       Therapeutic Ex 79825       Orthotic Management 16416       Manual 13250     Neuro Re-Ed 39912       Non-Billable Time        Evaluation Time additionally includes thorough review of current medical information, gathering information on past medical history/social history and prior level of function, interpretation of standardized testing/informal observation of tasks, assessment of data and development of plan of care and goals.        Evaluating OT: Diandra Ledezma OTR/L #[id number]

## 2024-01-09 NOTE — PROGRESS NOTES
4 Eyes Skin Assessment     NAME:  Clemente Real .  YOB: 1955  MEDICAL RECORD NUMBER:  [id number]    The patient is being assessed for  Admission    I agree that at least one RN has performed a thorough Head to Toe Skin Assessment on the patient. ALL assessment sites listed below have been assessed.      Areas assessed by both nurses:    Head, Face, Ears, Shoulders, Back, Chest, Arms, Elbows, Hands, Sacrum. Buttock, Coccyx, Ischium, and Legs. Feet and Heels        Does the Patient have a Wound? Yes wound(s) were present on assessment. LDA wound assessment was Initiated and completed by RN       Saul Prevention initiated by RN: Yes  Wound Care Orders initiated by RN: No    Pressure Injury (Stage 3,4, Unstageable, DTI, NWPT, and Complex wounds) if present, place Wound referral order by RN under : No    New Ostomies, if present place, Ostomy referral order under : No     Nurse 1 eSignature: Electronically signed by Candace Salinas RN on 1/9/24 at 12:57 AM EST    **SHARE this note so that the co-signing nurse can place an eSignature**    Nurse 2 eSignature: {Esignature:[id number]}

## 2024-01-09 NOTE — TELEPHONE ENCOUNTER
Pt alessandra called wants a order for a lift chair. Please [facility] to [facility], if they wont be able to supply please try [facility] or [facility]

## 2024-01-09 NOTE — PROGRESS NOTES
Bedside RN verified with CLIFF Mackay if patient needed tele order and she stated to discontinue tele order, patient is OK on med-surg no tele.    Electronically signed by Candace Salinas RN on 1/8/2024 at 9:25 PM

## 2024-01-09 NOTE — PROGRESS NOTES
Pharmacy Consultation Note  (Antibiotic Dosing and Monitoring)    Initial consult date: 1/9  Consulting physician/provider: Lianet  Drug: Vancomycin  Indication: SSTI    Age/  Gender Height Weight IBW  Allergy Information   68 y.o./male 170.2 cm (5' 7\") 100.7 kg (222 lb)     Ideal body weight: 66.1 kg (145 lb 11.6 oz)  Adjusted ideal body weight: 73.2 kg (161 lb 6.9 oz)   Patient has no known allergies.      Renal Function:  Recent Labs     01/08/24  1515 01/09/24  0615   BUN 75* 65*   CREATININE 4.3* 3.7*       Intake/Output Summary (Last 24 hours) at 1/9/2024 1532  Last data filed at 1/9/2024 1436  Gross per 24 hour   Intake 1520.18 ml   Output --   Net 1520.18 ml       Vancomycin Monitoring:  Trough:  No results for input(s): \"VANCOTROUGH\" in the last 72 hours.  Random:    Recent Labs     01/09/24  0615   VANCORANDOM 18.1       Vancomycin Administration Times:  Recent vancomycin administrations                     vancomycin (VANCOCIN) 2000 mg in 0.9% sodium chloride 500 mL IVPB (mg) 2,000 mg New Bag 01/08/24 7835                    Assessment:  Patient is a 68 y.o. male who has been initiated on vancomycin  Estimated Creatinine Clearance: 19 mL/min (A) (based on SCr of 3.7 mg/dL (H)).  To dose vancomycin, pharmacy will be utilizing dosing based off of levels because of patient's renal impairment/insufficiency    Plan:  Will continue vancomycin 1000 mg IV every 24 hours  Will check vancomycin levels when appropriate  Will continue to monitor renal function   Pharmacy to follow      Leonor Chan RPH 1/9/2024 3:32 PM

## 2024-01-09 NOTE — PATIENT CARE CONFERENCE
[facility] Quality Flow/Interdisciplinary Rounds Progress Note        Quality Flow Rounds held on January 9, 2024    Disciplines Attending:  Bedside Nurse, , , and Nursing Unit Leadership    Clemente Real Sr. was admitted on 1/8/2024  2:10 PM    Anticipated Discharge Date:       Disposition:    Saul Score:  Saul Scale Score: 19    Readmission Risk              Risk of Unplanned Readmission:  22           Discussed patient goal for the day, patient clinical progression, and barriers to discharge.  The following Goal(s) of the Day/Commitment(s) have been identified:  Labs - Report Results      Josephine Harry RN  January 9, 2024         34 y/o  @ 37.5 wks gestation with PROM @ 1240 for po cytotec IOL   GBS- neg 2023  marginal cord insertion   GDMA1   plan of care d/w dr kirk  admit to l&D  see admission orders

## 2024-01-09 NOTE — PROGRESS NOTES
[facility] Hospitalist Progress Note    Admitting Date and Time: 1/8/2024  2:10 PM  Admit Dx: Cellulitis of left lower extremity [L03.116]  Acute kidney injury superimposed on chronic kidney disease (HCC) [N17.9, N18.9]  Leukocytosis, unspecified type [D72.829]    Subjective:  Patient is being followed for Cellulitis of left lower extremity [L03.116]  Acute kidney injury superimposed on chronic kidney disease (HCC) [N17.9, N18.9]  Leukocytosis, unspecified type [D72.829]   Pt seen and examined this morning  No acute events overnight  States pain in his lower extremity is much better today  Denies any other complaints    ROS: denies fever, chills, cp, sob, n/v, HA unless stated above.      atorvastatin  20 mg Oral Nightly    carvedilol  6.25 mg Oral BID WC    insulin glargine  7 Units SubCUTAneous Nightly    insulin lispro  0-4 Units SubCUTAneous TID WC    insulin lispro  0-4 Units SubCUTAneous Nightly    apixaban  5 mg Oral BID    sodium chloride flush  5-40 mL IntraVENous 2 times per day    cefepime  1,000 mg IntraVENous Q24H     dextrose bolus, 125 mL, PRN   Or  dextrose bolus, 250 mL, PRN  glucagon (rDNA), 1 mg, PRN  dextrose, , Continuous PRN  sodium chloride flush, 5-40 mL, PRN  sodium chloride, , PRN  polyethylene glycol, 17 g, Daily PRN         Objective:    /60   Pulse 99   Temp 100.2 °F (37.9 °C) (Oral) Comment: pt was eating breakfast  Resp 16   Ht 1.702 m (5' 7\")   Wt 83.9 kg (185 lb)   SpO2 95%   BMI 28.98 kg/m²     General Appearance: alert and oriented to person, place and time and in no acute distress  Skin: warm and dry  Head: normocephalic and atraumatic  Eyes: pupils equal, round, and reactive to light, extraocular eye movements intact, conjunctivae normal  Neck: neck supple and non tender without mass   Pulmonary/Chest: clear to auscultation bilaterally- no wheezes, rales or rhonchi, normal air movement, no respiratory distress  Cardiovascular: normal rate, normal S1 and S2

## 2024-01-09 NOTE — CONSULTS
IntraVENous PRN Codie Salvador APRN - CNP        glucagon injection 1 mg  1 mg SubCUTAneous PRN Codie Salvador APRN - CNP        dextrose 10 % infusion   IntraVENous Continuous PRN Codie Salvador APRN - CNP        insulin lispro (HUMALOG) injection vial 0-4 Units  0-4 Units SubCUTAneous TID WC Codie Salvador APRN - CNP   1 Units at 01/09/24 1115    insulin lispro (HUMALOG) injection vial 0-4 Units  0-4 Units SubCUTAneous Nightly Codie Salvador APRN - CNP        apixaban (ELIQUIS) tablet 5 mg  5 mg Oral BID Codie Salvador APRN - CNP   5 mg at 01/09/24 0753    sodium chloride flush 0.9 % injection 5-40 mL  5-40 mL IntraVENous 2 times per day Codie Salvador APRN - CNP        sodium chloride flush 0.9 % injection 5-40 mL  5-40 mL IntraVENous PRN Codie Salvador APRN - CNP        0.9 % sodium chloride infusion   IntraVENous PRN Codie Salvador APRN - CNP        polyethylene glycol (GLYCOLAX) packet 17 g  17 g Oral Daily PRN Codie Salvador APRN - CNP        0.9 % sodium chloride infusion   IntraVENous Continuous Codie Salvador APRN - CNP 75 mL/hr at 01/08/24 2208 New Bag at 01/08/24 2208    cefepime (MAXIPIME) 1,000 mg in sodium chloride 0.9 % 50 mL IVPB  1,000 mg IntraVENous Q24H Codie Salvador APRN - CNP            Lab Results   Component Value Date    WBC 26.7 (H) 01/09/2024    HCT 28.4 (L) 01/09/2024    HGB 9.0 (L) 01/09/2024     01/09/2024     01/09/2024    K 4.1 01/09/2024     01/09/2024    CO2 17 (L) 01/09/2024    BUN 65 (H) 01/09/2024    CREATININE 3.7 (H) 01/09/2024    GLUCOSE 139 (H) 01/09/2024         Radiographs:    ASSESSMENT:  Cellulitis of left lower extremity  Essential hypertension  Atrial fibrillation (HCC)  Chronic anticoagulation  Type 2 diabetes mellitus without complication, without long-term current use of insulin (HCC)  DAISY (acute kidney injury) (HCC)  Leukocytosis  CONCHITA (obstructive sleep apnea)    PLAN:  -Patient examined  and evaluated at bedside  -All pertinent labs, charts, and imaging reviewed prior to encounter   -WBC 26.7 today  -Antibiotics per IM/ID  -Left foot ankle and tibia-fibula x-rays: No acute osseous findings.  Soft tissue swelling  -Venous ultrasound pending  -Compressive dressing to be applied once DVT ruled out  -Will continue to follow patient while in house  D/W:   Gopi Talbot DPM FACFAS  Fellowship-Trained Foot and Ankle Surgeon  Diplomate, [employer] Board of Foot and Ankle Surgeons  [phone]        Thank you for involving podiatry in this patients care. Please do not hesitate to call with any questions or concerns       Akil Comer DPM  PGY3 Podiatry Resident   1/9/2024   11:29 AM

## 2024-01-09 NOTE — PROGRESS NOTES
Called and notified Dr. Black of pt complaining of pain. See new order. Electronically signed by Cece Talavera RN on 1/9/2024 at 4:07 PM

## 2024-01-09 NOTE — ED NOTES
ED to Inpatient Handoff Report    Notified 5th floor Yolanda that electronic handoff available and patient ready for transport to room 524.    Safety Risks: Risk of falls    Patient in Restraints: no    Constant Observer or Patient : no    Telemetry Monitoring Ordered :No           Order to transfer to unit without monitor:N/A    Last MEWS: 1 Time completed: 1946    Deterioration Index Score:   Predictive Model Details          23 (Normal)  Factor Value    Calculated 1/8/2024 19:47 52% Age 68 years old    Deterioration Index Model 17% WBC count abnormal (25.5 k/uL)     11% Potassium 4.4 mmol/L     6% Pulse oximetry 100 %     5% Pulse 94     4% BUN abnormal (75 mg/dL)     2% Hematocrit abnormal (32.8 %)     2% Sodium 137 mmol/L     1% Temperature 99.1 °F (37.3 °C)     0% Blood pH 7.390     0% Respiratory rate 16     0% Systolic 112        Vitals:    01/08/24 1416 01/08/24 1834 01/08/24 1946   BP: 100/67 106/77 112/86   Pulse: 93 95 94   Resp: 16 16 16   Temp: 99.1 °F (37.3 °C) 99.9 °F (37.7 °C) 99.1 °F (37.3 °C)   TempSrc: Oral Oral Oral   SpO2: 98% 98% 100%   Weight: 100.7 kg (222 lb)     Height: 1.702 m (5' 7\")           Opportunity for questions and clarification was provided.

## 2024-01-09 NOTE — CARE COORDINATION
Introduced my self and provided explanation of CM role to patient. Patient is awake, alert, and aware of current diagnosis and discharge planning. Patient is from home alone. Patient has a walker and a shower chair. PCP is Dr. Cuellar. Patient states his discharge plan is to go to rehab. Patient was recently at [facility] and he would like to return. Karma from [facility] called, a referral was made. Await input. PT/OT ordered for evals. Patient admitted with cellulitis. ID consulted, await assessment.   Electronically signed by Carol Palacios RN on 1/9/2024 at 10:35 AM

## 2024-01-09 NOTE — CONSULTS
[facility]  [facility]    Consultation Note     Admit Date: 1/8/2024  2:10 PM    Reason for Consult:   Left lower extremity cellulitis    Attending Physician:  Brody Black MD     Chief Complaint: left leg infection.     HISTORY OF PRESENT ILLNESS:   The patient is a 68 y.o.  male not known to the Infectious Diseases service. The patient presented to the ER yesterday, presented to the ER with left leg pain and swelling and redness..  Patient has history of diabetes hypertension and A-fib on Eliquis. He had left leg redness/swelling atleast for a week and he was being stubborn and did not get checked until he came to ER. No fever or chills or abdominal pain or nausea/vomiting. He says his sugars are controlled but has kidney disease. No hx of trauma. He had an ulcer on the back of the leg which healed with triple antibiotic cream but he scratched the leg which resulted in infection. No pets at home.    Of note on 11/22/23 patient underwent cystoscopy, right retrograde pyelogram which was unsuccessful and underwent transurethral resection of the bladder tumor and Frazier placement.    Since admission he had a Tmax of 100.2 hemodynamically stable saturating well on room air.  Labs showed white count of 26.7 hemoglobin is 9 today platelet count is 308 LFTs are abnormal.  Creatinine is 4.3 yesterday today is 3.7/BUN of 65. lactic acid is normal.  Blood cultures are in process.  X-ray left ankle and foot showed soft tissue swelling.  Ultrasound duplex of the bilateral lower extremities was negative.  Patient is on IV vancomycin and cefepime and I got consult further recommendations.    Past Medical History:        Diagnosis Date    Atrial fibrillation (HCC) 10/2012    now on Coumadin    Bladder tumor     Diabetes mellitus (HCC)     Hyperlipidemia     Hypertension     LONG TERM ANTICOAGULENT USE     Lumbar pain     CONCHITA on CPAP      Past Surgical History:        Procedure Laterality Date

## 2024-01-09 NOTE — PROGRESS NOTES
Podiatry consult completed via perfect serve answering service to Dr. Talbot.     Electronically signed by Candace Salinas RN on 1/9/2024 at 6:41 AM

## 2024-01-09 NOTE — ACP (ADVANCE CARE PLANNING)
Advance Care Planning   Healthcare Decision Maker:    Primary Decision Maker (Active): Krys Real - Child - [phone]    Secondary Decision Maker: Alberto Real - Norbert - [phone]    Click here to complete Healthcare Decision Makers including selection of the Healthcare Decision Maker Relationship (ie \"Primary\").  Today we documented Decision Maker(s) consistent with Legal Next of Kin hierarchy.

## 2024-01-10 LAB
ALBUMIN SERPL-MCNC: 2.8 G/DL (ref 3.5–5.2)
ALP SERPL-CCNC: 144 U/L (ref 40–129)
ALT SERPL-CCNC: 40 U/L (ref 0–40)
ANION GAP SERPL CALCULATED.3IONS-SCNC: 15 MMOL/L (ref 7–16)
ASO AB SERPL-ACNC: 103 IU/ML (ref 0–200)
AST SERPL-CCNC: 31 U/L (ref 0–39)
ATYPICAL LYMPHOCYTE ABSOLUTE COUNT: 0.21 K/UL (ref 0–0.46)
ATYPICAL LYMPHOCYTES: 1 % (ref 0–4)
BASOPHILS # BLD: 0 K/UL (ref 0–0.2)
BASOPHILS NFR BLD: 0 % (ref 0–2)
BILIRUB SERPL-MCNC: 0.4 MG/DL (ref 0–1.2)
BUN SERPL-MCNC: 63 MG/DL (ref 6–23)
CALCIUM SERPL-MCNC: 8.8 MG/DL (ref 8.6–10.2)
CHLORIDE SERPL-SCNC: 105 MMOL/L (ref 98–107)
CO2 SERPL-SCNC: 17 MMOL/L (ref 22–29)
CREAT SERPL-MCNC: 3.3 MG/DL (ref 0.7–1.2)
CRP SERPL HS-MCNC: 238 MG/L (ref 0–5)
EOSINOPHIL # BLD: 0.42 K/UL (ref 0.05–0.5)
EOSINOPHILS RELATIVE PERCENT: 2 % (ref 0–6)
ERYTHROCYTE [DISTWIDTH] IN BLOOD BY AUTOMATED COUNT: 16.4 % (ref 11.5–15)
GFR SERPL CREATININE-BSD FRML MDRD: 19 ML/MIN/1.73M2
GLUCOSE BLD-MCNC: 133 MG/DL (ref 74–99)
GLUCOSE BLD-MCNC: 139 MG/DL (ref 74–99)
GLUCOSE BLD-MCNC: 153 MG/DL (ref 74–99)
GLUCOSE BLD-MCNC: 97 MG/DL (ref 74–99)
GLUCOSE SERPL-MCNC: 104 MG/DL (ref 74–99)
HBA1C MFR BLD: 5.9 % (ref 4–5.6)
HCT VFR BLD AUTO: 31.2 % (ref 37–54)
HGB BLD-MCNC: 9.9 G/DL (ref 12.5–16.5)
LYMPHOCYTES NFR BLD: 1.9 K/UL (ref 1.5–4)
LYMPHOCYTES RELATIVE PERCENT: 8 % (ref 20–42)
MCH RBC QN AUTO: 28.2 PG (ref 26–35)
MCHC RBC AUTO-ENTMCNC: 31.7 G/DL (ref 32–34.5)
MCV RBC AUTO: 88.9 FL (ref 80–99.9)
METAMYELOCYTES ABSOLUTE COUNT: 0.21 K/UL (ref 0–0.12)
METAMYELOCYTES: 1 % (ref 0–1)
MONOCYTES NFR BLD: 1.06 K/UL (ref 0.1–0.95)
MONOCYTES NFR BLD: 4 % (ref 2–12)
MYELOCYTES ABSOLUTE COUNT: 0.21 K/UL
MYELOCYTES: 1 %
NEUTROPHILS NFR BLD: 84 % (ref 43–80)
NEUTS SEG NFR BLD: 20.29 K/UL (ref 1.8–7.3)
PLATELET # BLD AUTO: 354 K/UL (ref 130–450)
PMV BLD AUTO: 10.7 FL (ref 7–12)
POTASSIUM SERPL-SCNC: 4 MMOL/L (ref 3.5–5)
PROCALCITONIN SERPL-MCNC: 2.22 NG/ML (ref 0–0.08)
PROT SERPL-MCNC: 7 G/DL (ref 6.4–8.3)
RBC # BLD AUTO: 3.51 M/UL (ref 3.8–5.8)
RBC # BLD: ABNORMAL 10*6/UL
SODIUM SERPL-SCNC: 137 MMOL/L (ref 132–146)
WBC OTHER # BLD: 24.3 K/UL (ref 4.5–11.5)

## 2024-01-10 PROCEDURE — 83036 HEMOGLOBIN GLYCOSYLATED A1C: CPT

## 2024-01-10 PROCEDURE — 82962 GLUCOSE BLOOD TEST: CPT

## 2024-01-10 PROCEDURE — 84145 PROCALCITONIN (PCT): CPT

## 2024-01-10 PROCEDURE — 6370000000 HC RX 637 (ALT 250 FOR IP)

## 2024-01-10 PROCEDURE — 99232 SBSQ HOSP IP/OBS MODERATE 35: CPT | Performed by: INTERNAL MEDICINE

## 2024-01-10 PROCEDURE — 86063 ANTISTREPTOLYSIN O SCREEN: CPT

## 2024-01-10 PROCEDURE — 36415 COLL VENOUS BLD VENIPUNCTURE: CPT

## 2024-01-10 PROCEDURE — 2580000003 HC RX 258

## 2024-01-10 PROCEDURE — 6360000002 HC RX W HCPCS: Performed by: STUDENT IN AN ORGANIZED HEALTH CARE EDUCATION/TRAINING PROGRAM

## 2024-01-10 PROCEDURE — 1200000000 HC SEMI PRIVATE

## 2024-01-10 PROCEDURE — 2580000003 HC RX 258: Performed by: STUDENT IN AN ORGANIZED HEALTH CARE EDUCATION/TRAINING PROGRAM

## 2024-01-10 PROCEDURE — 6370000000 HC RX 637 (ALT 250 FOR IP): Performed by: INTERNAL MEDICINE

## 2024-01-10 PROCEDURE — 80053 COMPREHEN METABOLIC PANEL: CPT

## 2024-01-10 PROCEDURE — 85025 COMPLETE CBC W/AUTO DIFF WBC: CPT

## 2024-01-10 PROCEDURE — 86140 C-REACTIVE PROTEIN: CPT

## 2024-01-10 RX ADMIN — SODIUM CHLORIDE: 9 INJECTION, SOLUTION INTRAVENOUS at 04:30

## 2024-01-10 RX ADMIN — CARVEDILOL 6.25 MG: 6.25 TABLET, FILM COATED ORAL at 16:56

## 2024-01-10 RX ADMIN — VANCOMYCIN HYDROCHLORIDE 1000 MG: 1 INJECTION, POWDER, LYOPHILIZED, FOR SOLUTION INTRAVENOUS at 17:41

## 2024-01-10 RX ADMIN — OXYCODONE AND ACETAMINOPHEN 1 TABLET: 5; 325 TABLET ORAL at 19:30

## 2024-01-10 RX ADMIN — APIXABAN 5 MG: 5 TABLET, FILM COATED ORAL at 21:40

## 2024-01-10 RX ADMIN — INSULIN GLARGINE 7 UNITS: 100 INJECTION, SOLUTION SUBCUTANEOUS at 21:44

## 2024-01-10 RX ADMIN — OXYCODONE AND ACETAMINOPHEN 1 TABLET: 5; 325 TABLET ORAL at 08:49

## 2024-01-10 RX ADMIN — CARVEDILOL 6.25 MG: 6.25 TABLET, FILM COATED ORAL at 08:50

## 2024-01-10 RX ADMIN — APIXABAN 5 MG: 5 TABLET, FILM COATED ORAL at 08:50

## 2024-01-10 RX ADMIN — ATORVASTATIN CALCIUM 20 MG: 20 TABLET, FILM COATED ORAL at 21:40

## 2024-01-10 ASSESSMENT — PAIN DESCRIPTION - ORIENTATION
ORIENTATION: LEFT
ORIENTATION: LEFT

## 2024-01-10 ASSESSMENT — PAIN DESCRIPTION - LOCATION
LOCATION: LEG
LOCATION: LEG

## 2024-01-10 ASSESSMENT — PAIN DESCRIPTION - DESCRIPTORS
DESCRIPTORS: ACHING
DESCRIPTORS: ACHING;SPASM

## 2024-01-10 ASSESSMENT — PAIN - FUNCTIONAL ASSESSMENT
PAIN_FUNCTIONAL_ASSESSMENT: PREVENTS OR INTERFERES SOME ACTIVE ACTIVITIES AND ADLS
PAIN_FUNCTIONAL_ASSESSMENT: PREVENTS OR INTERFERES SOME ACTIVE ACTIVITIES AND ADLS

## 2024-01-10 ASSESSMENT — PAIN SCALES - GENERAL: PAINLEVEL_OUTOF10: 5

## 2024-01-10 NOTE — PLAN OF CARE
Problem: ABCDS Injury Assessment  Goal: Absence of physical injury  Outcome: Progressing     Problem: Pain  Goal: Verbalizes/displays adequate comfort level or baseline comfort level  Outcome: Progressing     Problem: Safety - Adult  Goal: Free from fall injury  Outcome: Progressing

## 2024-01-10 NOTE — PROGRESS NOTES
Infectious Disease  Progress Note  [facility]    Chief Complaint: left leg cellulitis    Subjective:  he has pain left leg other wise feeling ok. No fever. Tolerating antibiotics well.    Scheduled Meds:   atorvastatin  20 mg Oral Nightly    carvedilol  6.25 mg Oral BID WC    insulin glargine  7 Units SubCUTAneous Nightly    insulin lispro  0-4 Units SubCUTAneous TID WC    insulin lispro  0-4 Units SubCUTAneous Nightly    apixaban  5 mg Oral BID    vancomycin  1,000 mg IntraVENous Q24H    sodium chloride flush  5-40 mL IntraVENous 2 times per day     Continuous Infusions:   dextrose      sodium chloride      sodium chloride 75 mL/hr at 01/10/24 0430     PRN Meds:dextrose bolus **OR** dextrose bolus, glucagon (rDNA), dextrose, oxyCODONE-acetaminophen, sodium chloride flush, sodium chloride, polyethylene glycol    Prior to Admission medications    Medication Sig Start Date End Date Taking? Authorizing Provider   empagliflozin (JARDIANCE) 25 MG tablet Take 1 tablet by mouth daily 12/20/23   Gerry Cuellar DO   lisinopril (PRINIVIL;ZESTRIL) 20 MG tablet Take 1 tablet by mouth every morning 12/20/23   Gerry Cuellar DO   apixaban (ELIQUIS) 5 MG TABS tablet Take 1 tablet by mouth 2 times daily 12/20/23   Gerry Cuellar DO   insulin glargine (LANTUS SOLOSTAR) 100 UNIT/ML injection pen Inject 10 Units into the skin nightly 12/20/23   Gerry Cuellar DO   Insulin Pen Needle (PEN NEEDLES 3/16\") 31G X 5 MM MISC 1 each by Does not apply route daily 12/20/23   Gerry Cuellar DO   furosemide (LASIX) 40 MG tablet TAKE 1 TABLET DAILY 12/11/23   Gerry Cuellar DO   atorvastatin (LIPITOR) 40 MG tablet Take 0.5 tablets by mouth nightly 11/16/23   Brody Black MD   allopurinol (ZYLOPRIM) 100 MG tablet Take 1 tablet by mouth every morning    ProviderAngelina MD   warfarin (COUMADIN) 3 MG tablet Take 1 tablet by mouth every morning MANAGED BY DR. CUELLAR  LAST INR CHECK: 11/06/2023  NEXT INR CHECK DUE:

## 2024-01-10 NOTE — PROGRESS NOTES
Podiatry Progress Note  1/10/2024   Clemente Real Sr.       SUBJECTIVE: Pt is a diabetic 68 y.o. male seen bedside for left leg edema and pain.  Pt denies any current NVFC.  ID following for abx management, continue IV vanc.  Patient has no additional pedal questions or complaints at this time.        Past Medical History:   Diagnosis Date    Atrial fibrillation (HCC) 10/2012    now on Coumadin    Bladder tumor     Diabetes mellitus (HCC)     Hyperlipidemia     Hypertension     LONG TERM ANTICOAGULENT USE     Lumbar pain     CONCHITA on CPAP         Past Surgical History:   Procedure Laterality Date    BACK SURGERY  2007    repair of herniated disc    CARDIOVASCULAR STRESS TEST  11/21/2006    findings of inferior and anterior ischemia     CYSTOSCOPY N/A 10/22/2021    CYSTOSCOPY RETROGRADE PYELOGRAM TRANSURETHRAL RESECTION OF BLADDER TUMOR WITH INSTALLATION OF GEMCITIBINE performed by Pablo Malloy DO at [facility] OR    DIAGNOSTIC CARDIAC CATH LAB PROCEDURE  12/13/2006    No obstructive CAD and mild dilated cardiomyopathy with EF 45-50%    LITHOTRIPSY Left 11/22/2023    CYSTOSCOPY RETROGRADE PYELOGRAM TRANSURETHRAL RESECTION OF BLADDER TUMOR performed by Pablo Malloy DO at [facility] OR    PAIN MANAGEMENT PROCEDURE Bilateral 9/29/2022    LUMBAR TRANSFORAMINAL EPIDURAL STEROID INJECTION RIGHT L4 & LEFT L3 UNDER FLUOROSCOPIC GUIDANCE performed by Nathan Moffett MD at [facility] OR    TURP N/A 4/7/2021    CYSTOSCOPY, URETHRAL DILITATION, PLASMA BUTTON TRANSURETHRAL RESECTION BLADDER TUMOR WITH FULGURATION, EXCHANGE GABRIEL CATHETER performed by Jamie Malloy MD at [facility] [place]    TURP N/A 4/21/2021    CYSTOSCOPY RETROGRADE PYELOGRAM PLASMA LOOP TRANSURETHRAL RESECTION PROSTATE performed by Pablo Malloy DO at [facility] OR         Family History   Problem Relation Age of Onset    Prostate Cancer Father 58    Cancer Father     Hypertension Mother         and sister     Arthritis Mother         Social History     Tobacco Use    Smoking          OBJECTIVE:        Vitals:    01/10/24 0849   BP:    Pulse:    Resp: 18   Temp:    SpO2:               EXAM:        Pt is AAOx3, NAD    Vascular Exam:  DP and PT pulses are palpable bilateral.  CFT is <4 seconds bilateral lower extremity.  Edema is present to left lower extremity.  Skin temp warm to warm.    Neuro Exam:  Light touch and protective sensation intact    Dermatologic Exam: Global erythema and edema from mid leg to mid foot.  No open lesions present.  No drainage noted.  Some peeling skin noted to leg.  No malodor, crepitus, fluctuance.  Clinical pictures below:    MSK: Muscle strength 4/5.  Limited ankle joint range of motion.  There is pain with calf squeeze and palpation of left foot and ankle.              Current Facility-Administered Medications   Medication Dose Route Frequency Provider Last Rate Last Admin    atorvastatin (LIPITOR) tablet 20 mg  20 mg Oral Nightly Codie Salvador APRN - CNP   20 mg at 01/09/24 2320    carvedilol (COREG) tablet 6.25 mg  6.25 mg Oral BID  Codie Salvador APRN - CNP   6.25 mg at 01/10/24 0850    insulin glargine (LANTUS) injection vial 7 Units  7 Units SubCUTAneous Nightly Codie Salvador APRN - CNP   7 Units at 01/09/24 2320    dextrose bolus 10% 125 mL  125 mL IntraVENous PRN Codie Salvador APRN - CNP        Or    dextrose bolus 10% 250 mL  250 mL IntraVENous PRN Codie Salvador APRN - CNP        glucagon injection 1 mg  1 mg SubCUTAneous PRN Codie Salvador APRN - CNP        dextrose 10 % infusion   IntraVENous Continuous PRN Codie Salvador APRN - CNP        insulin lispro (HUMALOG) injection vial 0-4 Units  0-4 Units SubCUTAneous TID  Codie Salvador APRN - CNP   1 Units at 01/09/24 1115    insulin lispro (HUMALOG) injection vial 0-4 Units  0-4 Units SubCUTAneous Nightly Codie Salvador APRN - CNP        apixaban (ELIQUIS) tablet 5 mg  5 mg Oral BID Codie Salvador APRN - CNP   5 mg at 01/10/24 0850

## 2024-01-10 NOTE — PLAN OF CARE
Problem: ABCDS Injury Assessment  Goal: Absence of physical injury  1/10/2024 0325 by Cahrmaine Oliveira RN  Outcome: Progressing     Problem: Pain  Goal: Verbalizes/displays adequate comfort level or baseline comfort level  1/10/2024 0325 by Charmaine Oliveira RN  Outcome: Progressing     Problem: Safety - Adult  Goal: Free from fall injury  1/10/2024 0325 by Charmaine Oliveira, RN  Outcome: Progressing

## 2024-01-10 NOTE — CARE COORDINATION
1/10/2024  Social Work Discharge Planning:FLAKITO's discussed discharge planning with Pts alessandra Zhao per son Soledad request. Cece asked if we can make a referral to [facility] and see if Pt is eligible for medicaid. FLAKITO contacted Lito with public [employer] and [facility]. If Pt returns home by chance, he would need a 10 foot ramp and wc, per alessandra Zhao.Electronically signed by TEENA Benz on 1/10/2024 at 2:05 PM

## 2024-01-10 NOTE — PROGRESS NOTES
[facility] Hospitalist Progress Note    Admitting Date and Time: 1/8/2024  2:10 PM  Admit Dx: Cellulitis of left lower extremity [L03.116]  Acute kidney injury superimposed on chronic kidney disease (HCC) [N17.9, N18.9]  Leukocytosis, unspecified type [D72.829]    Subjective:  Patient is being followed for Cellulitis of left lower extremity [L03.116]  Acute kidney injury superimposed on chronic kidney disease (HCC) [N17.9, N18.9]  Leukocytosis, unspecified type [D72.829]   Pt seen and examined this morning  No acute events overnight  States is like seen better today, swelling is going down.  Pain is much better.  It was read by podiatry earlier, but per imaging seems that erythema is improving as well    ROS: denies fever, chills, cp, sob, n/v, HA unless stated above.      atorvastatin  20 mg Oral Nightly    carvedilol  6.25 mg Oral BID WC    insulin glargine  7 Units SubCUTAneous Nightly    insulin lispro  0-4 Units SubCUTAneous TID WC    insulin lispro  0-4 Units SubCUTAneous Nightly    apixaban  5 mg Oral BID    vancomycin  1,000 mg IntraVENous Q24H    sodium chloride flush  5-40 mL IntraVENous 2 times per day     dextrose bolus, 125 mL, PRN   Or  dextrose bolus, 250 mL, PRN  glucagon (rDNA), 1 mg, PRN  dextrose, , Continuous PRN  oxyCODONE-acetaminophen, 1 tablet, Q6H PRN  sodium chloride flush, 5-40 mL, PRN  sodium chloride, , PRN  polyethylene glycol, 17 g, Daily PRN         Objective:    /69   Pulse 95   Temp 97.6 °F (36.4 °C) (Oral)   Resp 18   Ht 1.702 m (5' 7\")   Wt 101 kg (222 lb 11.2 oz)   SpO2 94%   BMI 34.88 kg/m²     General Appearance: alert and oriented to person, place and time and in no acute distress  Skin: warm and dry  Head: normocephalic and atraumatic  Eyes: pupils equal, round, and reactive to light, extraocular eye movements intact, conjunctivae normal  Neck: neck supple and non tender without mass   Pulmonary/Chest: clear to auscultation bilaterally- no wheezes, rales  staff/consultants/patient/family aprox 35 mins.     NOTE: This report was transcribed using voice recognition software. Every effort was made to ensure accuracy; however, inadvertent computerized transcription errors may be present.  Electronically signed by Brody Black MD on 1/10/2024 at 11:41 AM

## 2024-01-10 NOTE — PROGRESS NOTES
Pharmacy Consultation Note  (Antibiotic Dosing and Monitoring)    Initial consult date: 1/9  Consulting physician/provider: Lianet  Drug: Vancomycin  Indication: SSTI    Age/  Gender Height Weight IBW  Allergy Information   68 y.o./male 170.2 cm (5' 7\") 100.7 kg (222 lb)     Ideal body weight: 66.1 kg (145 lb 11.6 oz)  Adjusted ideal body weight: 80.1 kg (176 lb 8.2 oz)   Patient has no known allergies.      Renal Function:  Recent Labs     01/08/24  1515 01/09/24  0615 01/10/24  0813   BUN 75* 65* 63*   CREATININE 4.3* 3.7* 3.3*         Intake/Output Summary (Last 24 hours) at 1/10/2024 1527  Last data filed at 1/10/2024 1320  Gross per 24 hour   Intake 360 ml   Output 1400 ml   Net -1040 ml         Vancomycin Monitoring:  Trough:  No results for input(s): \"VANCOTROUGH\" in the last 72 hours.  Random:    Recent Labs     01/09/24  0615   VANCORANDOM 18.1         Vancomycin Administration Times:  Recent vancomycin administrations                     vancomycin 1000 mg IVPB in 250 mL NS addavial (mg) 1,000 mg New Bag 01/09/24 1700    vancomycin (VANCOCIN) 2000 mg in 0.9% sodium chloride 500 mL IVPB (mg) 2,000 mg New Bag 01/08/24 1755                       Assessment:  Patient is a 68 y.o. male who has been initiated on vancomycin  Estimated Creatinine Clearance: 24 mL/min (A) (based on SCr of 3.3 mg/dL (H)).  To dose vancomycin, pharmacy will be utilizing dosing based off of levels because of patient's renal impairment/insufficiency    Plan:  Will continue vancomycin 1000 mg IV every 24 hours  Will check vancomycin levels when appropriate - random tomorrow AM  Will continue to monitor renal function   Pharmacy to follow      Leonor Chan RPH 1/10/2024 3:27 PM

## 2024-01-10 NOTE — PROGRESS NOTES
Physician Progress Note      PATIENT:               MYESHA VEGA  CSN #:                  [id number]  :                       1955  ADMIT DATE:       2024 2:10 PM  DISCH DATE:  RESPONDING  PROVIDER #:        Brody Black MD          QUERY TEXT:    Pt admitted with Cellulitis of left lower extremity. Pt noted to have DM 2. If   possible, please document in progress notes and discharge summary the   relationship, if any, between cellulitis and DM.    The medical record reflects the following:  Risk Factors: LLE cellulitis, DM2  Clinical Indicators: Per H&P \"Cellulitis of left lower extremity, Type 2   diabetes mellitus\", Glucose 203-122  Treatment:  IV Cefepime, vancomycin, lab, insulin glargine    Thank you,  Claudia Gamez RN, CCDS  Clinical Documentation   [email or web address]  [phone]  (6am-2:30 pm)  Options provided:  -- LLE cellulitis associated with Diabetes  -- LLE cellulitis unrelated to Diabetes  -- Other - I will add my own diagnosis  -- Disagree - Not applicable / Not valid  -- Disagree - Clinically unable to determine / Unknown  -- Refer to Clinical Documentation Reviewer    PROVIDER RESPONSE TEXT:    LLE cellulitis associated with Diabetes.    Query created by: Claudia Gamez on 1/10/2024 1:23 PM      Electronically signed by:  Brody Black MD 1/10/2024 1:53 PM

## 2024-01-10 NOTE — PROGRESS NOTES
[facility] Quality Flow/Interdisciplinary Rounds Progress Note        Quality Flow Rounds held on January 10, 2024    Disciplines Attending:  Bedside Nurse, , , and Nursing Unit Leadership    Clemente Real Sr. was admitted on 1/8/2024  2:10 PM    Anticipated Discharge Date:       Disposition:    Saul Score:  Saul Scale Score: 19    Readmission Risk              Risk of Unplanned Readmission:  25           Discussed patient goal for the day, patient clinical progression, and barriers to discharge.  The following Goal(s) of the Day/Commitment(s) have been identified:  Diagnostics - Report Results and Labs - Report Results      Michaelle Alanis RN  January 10, 2024

## 2024-01-11 PROBLEM — N18.4 CKD (CHRONIC KIDNEY DISEASE) STAGE 4, GFR 15-29 ML/MIN (HCC): Status: ACTIVE | Noted: 2024-01-11

## 2024-01-11 LAB
ALBUMIN SERPL-MCNC: 2.5 G/DL (ref 3.5–5.2)
ALP SERPL-CCNC: 198 U/L (ref 40–129)
ALT SERPL-CCNC: 73 U/L (ref 0–40)
ANION GAP SERPL CALCULATED.3IONS-SCNC: 13 MMOL/L (ref 7–16)
AST SERPL-CCNC: 71 U/L (ref 0–39)
BASOPHILS # BLD: 0.11 K/UL (ref 0–0.2)
BASOPHILS NFR BLD: 1 % (ref 0–2)
BILIRUB SERPL-MCNC: 0.3 MG/DL (ref 0–1.2)
BUN SERPL-MCNC: 62 MG/DL (ref 6–23)
CALCIUM SERPL-MCNC: 8.3 MG/DL (ref 8.6–10.2)
CHLORIDE SERPL-SCNC: 108 MMOL/L (ref 98–107)
CO2 SERPL-SCNC: 17 MMOL/L (ref 22–29)
CREAT SERPL-MCNC: 3.1 MG/DL (ref 0.7–1.2)
EOSINOPHIL # BLD: 0.74 K/UL (ref 0.05–0.5)
EOSINOPHILS RELATIVE PERCENT: 3 % (ref 0–6)
ERYTHROCYTE [DISTWIDTH] IN BLOOD BY AUTOMATED COUNT: 16.4 % (ref 11.5–15)
GFR SERPL CREATININE-BSD FRML MDRD: 21 ML/MIN/1.73M2
GLUCOSE BLD-MCNC: 114 MG/DL (ref 74–99)
GLUCOSE BLD-MCNC: 131 MG/DL (ref 74–99)
GLUCOSE BLD-MCNC: 169 MG/DL (ref 74–99)
GLUCOSE BLD-MCNC: 98 MG/DL (ref 74–99)
GLUCOSE SERPL-MCNC: 94 MG/DL (ref 74–99)
HCT VFR BLD AUTO: 28.3 % (ref 37–54)
HGB BLD-MCNC: 8.9 G/DL (ref 12.5–16.5)
IMM GRANULOCYTES # BLD AUTO: 0.86 K/UL (ref 0–0.58)
IMM GRANULOCYTES NFR BLD: 4 % (ref 0–5)
LYMPHOCYTES NFR BLD: 2.31 K/UL (ref 1.5–4)
LYMPHOCYTES RELATIVE PERCENT: 10 % (ref 20–42)
MCH RBC QN AUTO: 28.2 PG (ref 26–35)
MCHC RBC AUTO-ENTMCNC: 31.4 G/DL (ref 32–34.5)
MCV RBC AUTO: 89.6 FL (ref 80–99.9)
MICROORGANISM SPEC CULT: ABNORMAL
MONOCYTES NFR BLD: 1.47 K/UL (ref 0.1–0.95)
MONOCYTES NFR BLD: 7 % (ref 2–12)
NEUTROPHILS NFR BLD: 76 % (ref 43–80)
NEUTS SEG NFR BLD: 17.04 K/UL (ref 1.8–7.3)
PLATELET # BLD AUTO: 377 K/UL (ref 130–450)
PMV BLD AUTO: 10.5 FL (ref 7–12)
POTASSIUM SERPL-SCNC: 4.4 MMOL/L (ref 3.5–5)
PROT SERPL-MCNC: 6.6 G/DL (ref 6.4–8.3)
RBC # BLD AUTO: 3.16 M/UL (ref 3.8–5.8)
SODIUM SERPL-SCNC: 138 MMOL/L (ref 132–146)
SPECIMEN DESCRIPTION: ABNORMAL
VANCOMYCIN SERPL-MCNC: 23.5 UG/ML (ref 5–40)
WBC OTHER # BLD: 22.5 K/UL (ref 4.5–11.5)

## 2024-01-11 PROCEDURE — 97530 THERAPEUTIC ACTIVITIES: CPT

## 2024-01-11 PROCEDURE — 6370000000 HC RX 637 (ALT 250 FOR IP): Performed by: INTERNAL MEDICINE

## 2024-01-11 PROCEDURE — 80053 COMPREHEN METABOLIC PANEL: CPT

## 2024-01-11 PROCEDURE — 6370000000 HC RX 637 (ALT 250 FOR IP)

## 2024-01-11 PROCEDURE — 87077 CULTURE AEROBIC IDENTIFY: CPT

## 2024-01-11 PROCEDURE — 97535 SELF CARE MNGMENT TRAINING: CPT

## 2024-01-11 PROCEDURE — 1200000000 HC SEMI PRIVATE

## 2024-01-11 PROCEDURE — 87205 SMEAR GRAM STAIN: CPT

## 2024-01-11 PROCEDURE — 82962 GLUCOSE BLOOD TEST: CPT

## 2024-01-11 PROCEDURE — 99232 SBSQ HOSP IP/OBS MODERATE 35: CPT | Performed by: INTERNAL MEDICINE

## 2024-01-11 PROCEDURE — 36415 COLL VENOUS BLD VENIPUNCTURE: CPT

## 2024-01-11 PROCEDURE — 80202 ASSAY OF VANCOMYCIN: CPT

## 2024-01-11 PROCEDURE — 85025 COMPLETE CBC W/AUTO DIFF WBC: CPT

## 2024-01-11 PROCEDURE — 2580000003 HC RX 258

## 2024-01-11 PROCEDURE — 87070 CULTURE OTHR SPECIMN AEROBIC: CPT

## 2024-01-11 RX ADMIN — CARVEDILOL 6.25 MG: 6.25 TABLET, FILM COATED ORAL at 16:49

## 2024-01-11 RX ADMIN — CARVEDILOL 6.25 MG: 6.25 TABLET, FILM COATED ORAL at 09:11

## 2024-01-11 RX ADMIN — SODIUM CHLORIDE, PRESERVATIVE FREE 10 ML: 5 INJECTION INTRAVENOUS at 09:11

## 2024-01-11 RX ADMIN — INSULIN GLARGINE 7 UNITS: 100 INJECTION, SOLUTION SUBCUTANEOUS at 21:19

## 2024-01-11 RX ADMIN — ATORVASTATIN CALCIUM 20 MG: 20 TABLET, FILM COATED ORAL at 21:17

## 2024-01-11 RX ADMIN — OXYCODONE AND ACETAMINOPHEN 1 TABLET: 5; 325 TABLET ORAL at 03:02

## 2024-01-11 RX ADMIN — OXYCODONE AND ACETAMINOPHEN 1 TABLET: 5; 325 TABLET ORAL at 16:48

## 2024-01-11 RX ADMIN — OXYCODONE AND ACETAMINOPHEN 1 TABLET: 5; 325 TABLET ORAL at 10:50

## 2024-01-11 RX ADMIN — APIXABAN 5 MG: 5 TABLET, FILM COATED ORAL at 21:17

## 2024-01-11 RX ADMIN — SODIUM CHLORIDE, PRESERVATIVE FREE 10 ML: 5 INJECTION INTRAVENOUS at 21:17

## 2024-01-11 RX ADMIN — APIXABAN 5 MG: 5 TABLET, FILM COATED ORAL at 09:11

## 2024-01-11 ASSESSMENT — PAIN DESCRIPTION - ORIENTATION
ORIENTATION: LEFT

## 2024-01-11 ASSESSMENT — PAIN - FUNCTIONAL ASSESSMENT: PAIN_FUNCTIONAL_ASSESSMENT: PREVENTS OR INTERFERES SOME ACTIVE ACTIVITIES AND ADLS

## 2024-01-11 ASSESSMENT — PAIN DESCRIPTION - DESCRIPTORS
DESCRIPTORS: ACHING
DESCRIPTORS: ACHING;SORE
DESCRIPTORS: SORE;ACHING

## 2024-01-11 ASSESSMENT — PAIN SCALES - GENERAL
PAINLEVEL_OUTOF10: 5
PAINLEVEL_OUTOF10: 6
PAINLEVEL_OUTOF10: 4

## 2024-01-11 ASSESSMENT — PAIN DESCRIPTION - LOCATION
LOCATION: LEG
LOCATION: LEG
LOCATION: FOOT

## 2024-01-11 NOTE — PROGRESS NOTES
Podiatry Progress Note  1/11/2024   Clemente Real Sr.       SUBJECTIVE: Pt is a diabetic 68 y.o. male seen bedside for left leg edema and pain.  Pain improving but still there. Pt denies any current NVFC.  ID following for abx management, continue IV vanc.  Compressive dressing applied yesterday, tolerating well. Patient has no additional pedal questions or complaints at this time.        Past Medical History:   Diagnosis Date    Atrial fibrillation (HCC) 10/2012    now on Coumadin    Bladder tumor     Diabetes mellitus (HCC)     Hyperlipidemia     Hypertension     LONG TERM ANTICOAGULENT USE     Lumbar pain     CONCHITA on CPAP         Past Surgical History:   Procedure Laterality Date    BACK SURGERY  2007    repair of herniated disc    CARDIOVASCULAR STRESS TEST  11/21/2006    findings of inferior and anterior ischemia     CYSTOSCOPY N/A 10/22/2021    CYSTOSCOPY RETROGRADE PYELOGRAM TRANSURETHRAL RESECTION OF BLADDER TUMOR WITH INSTALLATION OF GEMCITIBINE performed by Pablo Malloy DO at [facility] OR    DIAGNOSTIC CARDIAC CATH LAB PROCEDURE  12/13/2006    No obstructive CAD and mild dilated cardiomyopathy with EF 45-50%    LITHOTRIPSY Left 11/22/2023    CYSTOSCOPY RETROGRADE PYELOGRAM TRANSURETHRAL RESECTION OF BLADDER TUMOR performed by Pablo Malloy DO at [facility] OR    PAIN MANAGEMENT PROCEDURE Bilateral 9/29/2022    LUMBAR TRANSFORAMINAL EPIDURAL STEROID INJECTION RIGHT L4 & LEFT L3 UNDER FLUOROSCOPIC GUIDANCE performed by Nathan Moffett MD at [facility] OR    TURP N/A 4/7/2021    CYSTOSCOPY, URETHRAL DILITATION, PLASMA BUTTON TRANSURETHRAL RESECTION BLADDER TUMOR WITH FULGURATION, EXCHANGE GABRIEL CATHETER performed by Jamie Malloy MD at [facility] OR    TURP N/A 4/21/2021    CYSTOSCOPY RETROGRADE PYELOGRAM PLASMA LOOP TRANSURETHRAL RESECTION PROSTATE performed by Pablo Malloy DO at [facility] OR         Family History   Problem Relation Age of Onset    Prostate Cancer Father 58    Cancer Father     Hypertension Mother          every morning    Provider, MD Angelina        Patient has no known allergies.         OBJECTIVE:        Vitals:    01/11/24 0302   BP:    Pulse:    Resp: 18   Temp:    SpO2:               EXAM:        Pt is AAOx3, NAD    Vascular Exam:  DP and PT pulses are palpable bilateral.  CFT is <4 seconds bilateral lower extremity.  Edema is present to left lower extremity.  Skin temp warm to warm.    Neuro Exam:  Light touch and protective sensation intact    Dermatologic Exam: Global erythema and edema from mid leg to mid foot.  No open lesions present.  No drainage noted.  Some peeling skin noted to leg.  No malodor, crepitus, fluctuance.  Clinical pictures below:    MSK: Muscle strength 4/5.  Limited ankle joint range of motion.  There is pain with calf squeeze and palpation of left foot and ankle.              Current Facility-Administered Medications   Medication Dose Route Frequency Provider Last Rate Last Admin    atorvastatin (LIPITOR) tablet 20 mg  20 mg Oral Nightly Codie Salvador APRN - CNP   20 mg at 01/10/24 2140    carvedilol (COREG) tablet 6.25 mg  6.25 mg Oral BID  Codie Salvador APRN - CNP   6.25 mg at 01/10/24 1656    insulin glargine (LANTUS) injection vial 7 Units  7 Units SubCUTAneous Nightly Codie Salvador APRN - CNP   7 Units at 01/10/24 2144    dextrose bolus 10% 125 mL  125 mL IntraVENous PRN Codie Salvador APRN - CNP        Or    dextrose bolus 10% 250 mL  250 mL IntraVENous PRN Codie Salvador APRN - CNP        glucagon injection 1 mg  1 mg SubCUTAneous PRN Codie Salvador APRN - CNP        dextrose 10 % infusion   IntraVENous Continuous PRN Codie Slavador APRN - CNP        insulin lispro (HUMALOG) injection vial 0-4 Units  0-4 Units SubCUTAneous TID  Codie Salvador APRN - CNP   1 Units at 01/09/24 1115    insulin lispro (HUMALOG) injection vial 0-4 Units  0-4 Units SubCUTAneous Nightly Codie Salvador APRN - CNP        apixaban (ELIQUIS) tablet 5 mg

## 2024-01-11 NOTE — PROGRESS NOTES
Occupational Therapy  OT BEDSIDE TREATMENT NOTE      Date:2024  Patient Name: Clemente Real Sr.  MRN: [id number]  : 1955  Room: [address] [place] [address]      Evaluating OT: Diandra Ledezma OTR/L #[id number]     Referring Provider:  Codie Salvador APRN - CNP      Specific Provider Orders/Date:  OT Eval and Treat, 23      Diagnosis:   1. Cellulitis of left lower extremity    2. Leukocytosis, unspecified type    3. Acute kidney injury superimposed on chronic kidney disease (HCC)         Surgery: None        Pertinent Medical History: HTN, DM, A-fib, DM, CONCHITA on CPAP, back surgery       Precautions:  Fall Risk, alarm       Assessment of current deficits    [x] Functional mobility            [x]ADLs           [x] Strength                   []Cognition    [x] Functional transfers          [x] IADLs          [x] Safety Awareness   [x]Endurance    [] Fine Coordination              [x] Balance      [] Vision/perception    []Sensation      []Gross Motor Coordination  [] ROM           [] Delirium                   [] Motor Control      OT PLAN OF CARE   OT POC based on physician orders, patient diagnosis and results of clinical assessment     Frequency/Duration 1-3 days/wk for 2 weeks PRN      Specific OT Treatment Interventions to include:   * Instruction/training on adapted ADL techniques and AE recommendations to increase functional independence within precautions       * Training on energy conservation strategies, correct breathing pattern and techniques to improve independence/tolerance for self-care routine  * Functional transfer/mobility training/DME recommendations for increased independence, safety, and fall prevention  * Patient/Family education to increase follow through with safety techniques and functional independence  * Recommendation of environmental modifications for increased safety with functional transfers/mobility and ADLs  * Therapeutic exercise to improve motor endurance, ROM, and functional strength  wheeled walker to improve balance few steps along side of bed, verbal cues for walker sequence and safety.     CGA with WW to/from bathroom  Supervision with use of wheeled walker    Balance Sitting:     Static: fair plus     Dynamic: fair plus   Standing: fair with ww    Sitting balance independent  Standing balance SBA  Sitting:     Static: good    Dynamic: good  Standing: good with ww   Activity Tolerance fair   fair  Increase standing tolerance >3 minutes for improved engagement with functional transfers and indep in ADLs     Comments:  patient cleared with nursing and agreeable to session with encouragement. Niece present. Patient fatigued and limited by pain, cues required for safety with fair carry over. However improvement demonstrated. Patient in bed with call light in reach.      Education/treatment: ADL and functional transfer/activity performed to increase safety and independence during self care tasks. Education provided on safety awareness, adl reeducation, functional transfer training    Pt has made progress towards set goals.     Time In: 2:15  Time Out: 2:40     Min Units   Therapeutic Ex 29909     Therapeutic Activities 63923 15 1   ADL/Self Care 67423 10 1   Orthotic Management 34023     Neuro Re-Ed 35341     Non-Billable Time     TOTAL TIMED TREATMENT 25 2       Zenobia SANCHEZ/JORDANA 16285

## 2024-01-11 NOTE — PROGRESS NOTES
Physical Therapy  Facility/Department: [address] MED SURG  Physical Therapy Treatment Note    Name: Clemente Real Sr.  : 1955  MRN: [id number]  Date of Service: 2024        Patient Diagnosis(es): The primary encounter diagnosis was Cellulitis of left lower extremity. Diagnoses of Leukocytosis, unspecified type and Acute kidney injury superimposed on chronic kidney disease (HCC) were also pertinent to this visit.  Past Medical History:  has a past medical history of Atrial fibrillation (HCC), Bladder tumor, Diabetes mellitus (HCC), Hyperlipidemia, Hypertension, LONG TERM ANTICOAGULENT USE, Lumbar pain, and CONCHITA on CPAP.  Past Surgical History:  has a past surgical history that includes back surgery (); Diagnostic Cardiac Cath Lab Procedure (2006); cardiovascular stress test (2006); TURP (N/A, 2021); TURP (N/A, 2021); Cystoscopy (N/A, 10/22/2021); Pain management procedure (Bilateral, 2022); and Lithotripsy (Left, 2023).      Referring provider:  Lior Stroud MD    PT Order:  PT eval and treat     Evaluating PT:  Cece Prajapati PT, DPT PT [id number]    Room #:  0524/0524-A  Diagnosis:  Cellulitis of left lower extremity [L03.116]  Acute kidney injury superimposed on chronic kidney disease (HCC) [N17.9, N18.9]  Leukocytosis, unspecified type [D72.829]  Precautions:  fall risk  Equipment Needs:  none.  Pt reported owning a rollator.    SUBJECTIVE:    Pt lives alone in a 1 story home with 2 stairs to enter and 0 rail.  Bed and bath is on first floor.  Pt uses the shower in the basement with a  full flight of steps down.   Pt ambulated with rollators PTA.    OBJECTIVE:   Initial Evaluation  Date:  Treatment  2024 Short Term/ Long Term   Goals   Was pt agreeable to Eval/treatment? yes With encouragement    Does pt have pain? Left LE pain L LE distal pain from cellulitis    Bed Mobility  Rolling: SBA  Supine to sit: SBA  Sit to supine: SBA  Scooting: SBA along the side  minutes       Pt is making fair progress toward established Physical Therapy goals.  Continue with physical therapy current plan of care.    Jovon Harris PTA   License Number: [id number]

## 2024-01-11 NOTE — PLAN OF CARE
Problem: ABCDS Injury Assessment  Goal: Absence of physical injury  Outcome: Progressing     Problem: Discharge Planning  Goal: Discharge to home or other facility with appropriate resources  Outcome: Progressing     Problem: Pain  Goal: Verbalizes/displays adequate comfort level or baseline comfort level  Outcome: Progressing     Problem: Safety - Adult  Goal: Free from fall injury  Outcome: Progressing     Problem: Chronic Conditions and Co-morbidities  Goal: Patient's chronic conditions and co-morbidity symptoms are monitored and maintained or improved  Outcome: Progressing

## 2024-01-11 NOTE — DISCHARGE INSTR - COC
Continuity of Care Form    Patient Name: Clemente Real SrNova   :  1955  MRN:  [id number]    Admit date:  2024  Discharge date:  24      Code Status Order: Full Code   Advance Directives:     Admitting Physician:  Katina Mallory MD  PCP: Gerry Cuellar DO    Discharging Nurse: Maria Victoria Garcia RN    Discharging Hospital Unit/Room#: 0524/0524-A  Discharging Unit Phone Number: [phone]    Emergency Contact:   Extended Emergency Contact Information  Primary Emergency Contact: Krys Real  Address: [address] [place], [place] [address] [place] [address]  Home Phone: [phone]  Mobile Phone: [phone]  Relation: Child  Preferred language: English  Secondary Emergency Contact: Alberto Real  Address: [facility] [place], [place] [address] [place] [address]  Home Phone: [phone]  Mobile Phone: [phone]  Relation: Child  Preferred language: English   needed? No    Past Surgical History:  Past Surgical History:   Procedure Laterality Date    BACK SURGERY  2007    repair of herniated disc    CARDIOVASCULAR STRESS TEST  2006    findings of inferior and anterior ischemia     CYSTOSCOPY N/A 10/22/2021    CYSTOSCOPY RETROGRADE PYELOGRAM TRANSURETHRAL RESECTION OF BLADDER TUMOR WITH INSTALLATION OF GEMCITIBINE performed by Pablo Malloy DO at [facility] OR    DIAGNOSTIC CARDIAC CATH LAB PROCEDURE  2006    No obstructive CAD and mild dilated cardiomyopathy with EF 45-50%    LITHOTRIPSY Left 2023    CYSTOSCOPY RETROGRADE PYELOGRAM TRANSURETHRAL RESECTION OF BLADDER TUMOR performed by Pablo Malloy DO at [facility] OR    PAIN MANAGEMENT PROCEDURE Bilateral 2022    LUMBAR TRANSFORAMINAL EPIDURAL STEROID INJECTION RIGHT L4 & LEFT L3 UNDER FLUOROSCOPIC GUIDANCE performed by Nathan Moffett MD at [facility] OR    TURP N/A 2021    CYSTOSCOPY, URETHRAL DILITATION, PLASMA BUTTON TRANSURETHRAL RESECTION BLADDER TUMOR WITH FULGURATION,

## 2024-01-11 NOTE — PROGRESS NOTES
Pharmacy Consultation Note  (Antibiotic Dosing and Monitoring)    Initial consult date: 1/9  Consulting physician/provider: Lianet  Drug: Vancomycin  Indication: SSTI    Age/  Gender Height Weight IBW  Allergy Information   68 y.o./male 170.2 cm (5' 7\") 100.7 kg (222 lb)     Ideal body weight: 66.1 kg (145 lb 11.6 oz)  Adjusted ideal body weight: 79.9 kg (176 lb 1.2 oz)   Patient has no known allergies.      Renal Function:  Recent Labs     01/09/24  0615 01/10/24  0813 01/11/24  0543   BUN 65* 63* 62*   CREATININE 3.7* 3.3* 3.1*         Intake/Output Summary (Last 24 hours) at 1/11/2024 1151  Last data filed at 1/10/2024 2136  Gross per 24 hour   Intake 480 ml   Output 1200 ml   Net -720 ml         Vancomycin Monitoring:  Trough:  No results for input(s): \"VANCOTROUGH\" in the last 72 hours.  Random:    Recent Labs     01/09/24  0615 01/11/24  0543   VANCORANDOM 18.1 23.5         Vancomycin Administration Times:  Recent vancomycin administrations                     vancomycin 1000 mg IVPB in 250 mL NS addavial (mg) 1,000 mg New Bag 01/10/24 1741     1,000 mg New Bag 01/09/24 1700    vancomycin (VANCOCIN) 2000 mg in 0.9% sodium chloride 500 mL IVPB (mg) 2,000 mg New Bag 01/08/24 1755                     Assessment:  Patient is a 68 y.o. male who has been initiated on vancomycin  Estimated Creatinine Clearance: 25 mL/min (A) (based on SCr of 3.1 mg/dL (H)).  To dose vancomycin, pharmacy will be utilizing dosing based off of levels because of patient's renal impairment/insufficiency    Plan:  Will HOLD dose today and determine further dosing based on tomorrow's level  Will check vancomycin levels when appropriate  Will continue to monitor renal function   Pharmacy to follow    Sarabjit De La Rosa, PharmD Candidate 2024  Leonor Chan, AllieD, BCPS 1/11/2024 1:15 PM   Ext: [phone]

## 2024-01-11 NOTE — CARE COORDINATION
1/11/2024  Social Work Discharge Planning:Waiting to hear back from [place] with Public [employer] as well as [place]  liaison [facility] for discharge planning. Pt is from home alone and active with [facility]. GERARDO order would be needed if appropriate at discharge.  Pt would need a 10 foot ramp and wc if he returns home.Electronically signed by TEENA Benz on 1/11/2024 at 9:32 AM      1/11/2024  Social Work Discharge Planning: notified [facility] that alessandra Zhao informed that her mother(Pts sister) is going to pay for Pts copay at [facility]. They accepted and will start precert as soon as therapy updates are in. MATT,7000 and transport form are completed. Electronically signed by TEENA Benz on 1/11/2024 at 1:54 PM  .

## 2024-01-11 NOTE — PROGRESS NOTES
Pt refusing bed alarm at this time. Wants to be able to get up independently as needed. States he will still call if he needs assistance. Electronically signed by Cece Talavera RN on 1/11/2024 at 9:16 AM

## 2024-01-11 NOTE — PROGRESS NOTES
non-distended, normal bowel sounds, no masses or organomegaly  Extremities: no cyanosis, no clubbing.  LLE wrapped  Neurologic: no cranial nerve deficit and speech normal        Recent Labs     01/09/24  0615 01/10/24  0813 01/11/24  0543    137 138   K 4.1 4.0 4.4    105 108*   CO2 17* 17* 17*   BUN 65* 63* 62*   CREATININE 3.7* 3.3* 3.1*   GLUCOSE 139* 104* 94   CALCIUM 8.3* 8.8 8.3*         Recent Labs     01/09/24  0615 01/10/24  0813 01/11/24  0543   WBC 26.7* 24.3* 22.5*   RBC 3.19* 3.51* 3.16*   HGB 9.0* 9.9* 8.9*   HCT 28.4* 31.2* 28.3*   MCV 89.0 88.9 89.6   MCH 28.2 28.2 28.2   MCHC 31.7* 31.7* 31.4*   RDW 16.1* 16.4* 16.4*    354 377   MPV 11.0 10.7 10.5           Assessment and Plan:     Principal Problem:    Cellulitis of left lower extremity  Active Problems:    Essential hypertension    Atrial fibrillation (HCC)    Chronic anticoagulation    Type 2 diabetes mellitus without complication, without long-term current use of insulin (HCC)    DAISY (acute kidney injury) (HCC)    Leukocytosis    CONCHITA (obstructive sleep apnea)  Resolved Problems:    * No resolved hospital problems. *    Left lower extremity cellulitis.  Imaging showed soft tissue swelling.  US negative for DVT.  Podiatry following.  Continue vancomycin per ID.  Follow blood cultures.  Appreciate podiatry's input.  Improving  DAISY on CKD.  Creatinine 4.3 on admission.  Down to 3.3 today.  Continue IVF.  History of urethral carcinoma.  Follows with hematology and urology as an outpatient.  Plan to start immunotherapy  Leukocytosis.  Likely 2/2 #1.  Continue antibiotics and monitor  A-fib.  Currently rate controlled.  On Eliquis  DM 2.  Controlled.  Continue same    Disposition: Anticipate SNF 24 to 48 hours  NOTE: This report was transcribed using voice recognition software. Every effort was made to ensure accuracy; however, inadvertent computerized transcription errors may be present.  Electronically signed by Lior Stroud  MD on 1/11/2024 at 9:59 AM

## 2024-01-11 NOTE — PROGRESS NOTES
Infectious Disease  Progress Note  [facility]    Chief Complaint: left leg cellulitis    Subjective:  he has pain left leg . Says doesnot want me to open the dressing as podiatry resident did I&D on left ankle and did lot of dressings on. No fever tolerating antibiotics well.    Scheduled Meds:   atorvastatin  20 mg Oral Nightly    carvedilol  6.25 mg Oral BID WC    insulin glargine  7 Units SubCUTAneous Nightly    insulin lispro  0-4 Units SubCUTAneous TID WC    insulin lispro  0-4 Units SubCUTAneous Nightly    apixaban  5 mg Oral BID    sodium chloride flush  5-40 mL IntraVENous 2 times per day     Continuous Infusions:   dextrose      sodium chloride       PRN Meds:dextrose bolus **OR** dextrose bolus, glucagon (rDNA), dextrose, oxyCODONE-acetaminophen, sodium chloride flush, sodium chloride, polyethylene glycol    Prior to Admission medications    Medication Sig Start Date End Date Taking? Authorizing Provider   empagliflozin (JARDIANCE) 25 MG tablet Take 1 tablet by mouth daily 12/20/23   Gerry Cuellar DO   lisinopril (PRINIVIL;ZESTRIL) 20 MG tablet Take 1 tablet by mouth every morning 12/20/23   Gerry Cuellar DO   apixaban (ELIQUIS) 5 MG TABS tablet Take 1 tablet by mouth 2 times daily 12/20/23   Gerry Cuellar DO   insulin glargine (LANTUS SOLOSTAR) 100 UNIT/ML injection pen Inject 10 Units into the skin nightly 12/20/23   Gerry Cuellar DO   Insulin Pen Needle (PEN NEEDLES 3/16\") 31G X 5 MM MISC 1 each by Does not apply route daily 12/20/23   Gerry Cuellar DO   furosemide (LASIX) 40 MG tablet TAKE 1 TABLET DAILY 12/11/23   Gerry Cuellar DO   atorvastatin (LIPITOR) 40 MG tablet Take 0.5 tablets by mouth nightly 11/16/23   Brody Black MD   allopurinol (ZYLOPRIM) 100 MG tablet Take 1 tablet by mouth every morning    Provider, MD Angelina   warfarin (COUMADIN) 3 MG tablet Take 1 tablet by mouth every morning MANAGED BY DR. CUELLAR  LAST INR CHECK: 11/06/2023  NEXT INR CHECK DUE:

## 2024-01-12 VITALS
DIASTOLIC BLOOD PRESSURE: 77 MMHG | HEIGHT: 67 IN | HEART RATE: 93 BPM | RESPIRATION RATE: 18 BRPM | BODY MASS INDEX: 36.08 KG/M2 | SYSTOLIC BLOOD PRESSURE: 128 MMHG | WEIGHT: 229.9 LBS | TEMPERATURE: 98.1 F | OXYGEN SATURATION: 94 %

## 2024-01-12 LAB
BASOPHILS # BLD: 0 K/UL (ref 0–0.2)
BASOPHILS NFR BLD: 0 % (ref 0–2)
EOSINOPHIL # BLD: 0.35 K/UL (ref 0.05–0.5)
EOSINOPHILS RELATIVE PERCENT: 2 % (ref 0–6)
ERYTHROCYTE [DISTWIDTH] IN BLOOD BY AUTOMATED COUNT: 16.4 % (ref 11.5–15)
GLUCOSE BLD-MCNC: 106 MG/DL (ref 74–99)
GLUCOSE BLD-MCNC: 140 MG/DL (ref 74–99)
HCT VFR BLD AUTO: 29.7 % (ref 37–54)
HGB BLD-MCNC: 9.4 G/DL (ref 12.5–16.5)
LYMPHOCYTES NFR BLD: 2.11 K/UL (ref 1.5–4)
LYMPHOCYTES RELATIVE PERCENT: 10 % (ref 20–42)
MCH RBC QN AUTO: 28.2 PG (ref 26–35)
MCHC RBC AUTO-ENTMCNC: 31.6 G/DL (ref 32–34.5)
MCV RBC AUTO: 89.2 FL (ref 80–99.9)
MONOCYTES NFR BLD: 1.41 K/UL (ref 0.1–0.95)
MONOCYTES NFR BLD: 7 % (ref 2–12)
NEUTROPHILS NFR BLD: 81 % (ref 43–80)
NEUTS SEG NFR BLD: 16.34 K/UL (ref 1.8–7.3)
PLATELET # BLD AUTO: 470 K/UL (ref 130–450)
PMV BLD AUTO: 10.3 FL (ref 7–12)
RBC # BLD AUTO: 3.33 M/UL (ref 3.8–5.8)
RBC # BLD: ABNORMAL 10*6/UL
VANCOMYCIN SERPL-MCNC: 18.5 UG/ML (ref 5–40)
WBC OTHER # BLD: 20.2 K/UL (ref 4.5–11.5)

## 2024-01-12 PROCEDURE — 99239 HOSP IP/OBS DSCHRG MGMT >30: CPT | Performed by: INTERNAL MEDICINE

## 2024-01-12 PROCEDURE — 2580000003 HC RX 258

## 2024-01-12 PROCEDURE — 6370000000 HC RX 637 (ALT 250 FOR IP): Performed by: INTERNAL MEDICINE

## 2024-01-12 PROCEDURE — 6370000000 HC RX 637 (ALT 250 FOR IP)

## 2024-01-12 PROCEDURE — 82962 GLUCOSE BLOOD TEST: CPT

## 2024-01-12 PROCEDURE — 85025 COMPLETE CBC W/AUTO DIFF WBC: CPT

## 2024-01-12 PROCEDURE — 80202 ASSAY OF VANCOMYCIN: CPT

## 2024-01-12 RX ORDER — INSULIN LISPRO 100 [IU]/ML
0-4 INJECTION, SOLUTION INTRAVENOUS; SUBCUTANEOUS NIGHTLY
DISCHARGE
Start: 2024-01-12

## 2024-01-12 RX ORDER — LINEZOLID 600 MG/1
600 TABLET, FILM COATED ORAL 2 TIMES DAILY
Qty: 28 TABLET | Refills: 0 | DISCHARGE
Start: 2024-01-12 | End: 2024-01-12

## 2024-01-12 RX ORDER — INSULIN LISPRO 100 [IU]/ML
0-4 INJECTION, SOLUTION INTRAVENOUS; SUBCUTANEOUS
DISCHARGE
Start: 2024-01-12

## 2024-01-12 RX ORDER — LISINOPRIL 5 MG/1
5 TABLET ORAL EVERY MORNING
Refills: 1 | DISCHARGE
Start: 2024-01-12

## 2024-01-12 RX ORDER — OXYCODONE HYDROCHLORIDE AND ACETAMINOPHEN 5; 325 MG/1; MG/1
1 TABLET ORAL EVERY 6 HOURS PRN
Qty: 4 TABLET | Refills: 0 | Status: SHIPPED | OUTPATIENT
Start: 2024-01-12 | End: 2024-01-13

## 2024-01-12 RX ORDER — LINEZOLID 600 MG/1
600 TABLET, FILM COATED ORAL 2 TIMES DAILY
Qty: 10 TABLET | Refills: 0 | DISCHARGE
Start: 2024-01-12 | End: 2024-01-17

## 2024-01-12 RX ORDER — POLYETHYLENE GLYCOL 3350 17 G/17G
17 POWDER, FOR SOLUTION ORAL DAILY PRN
Qty: 527 G | Refills: 0 | DISCHARGE
Start: 2024-01-12 | End: 2024-02-11

## 2024-01-12 RX ADMIN — OXYCODONE AND ACETAMINOPHEN 1 TABLET: 5; 325 TABLET ORAL at 15:15

## 2024-01-12 RX ADMIN — OXYCODONE AND ACETAMINOPHEN 1 TABLET: 5; 325 TABLET ORAL at 06:28

## 2024-01-12 RX ADMIN — APIXABAN 5 MG: 5 TABLET, FILM COATED ORAL at 09:52

## 2024-01-12 RX ADMIN — MUPIROCIN: 20 OINTMENT TOPICAL at 14:05

## 2024-01-12 RX ADMIN — SODIUM CHLORIDE, PRESERVATIVE FREE 10 ML: 5 INJECTION INTRAVENOUS at 09:52

## 2024-01-12 RX ADMIN — CARVEDILOL 6.25 MG: 6.25 TABLET, FILM COATED ORAL at 09:52

## 2024-01-12 ASSESSMENT — PAIN DESCRIPTION - LOCATION
LOCATION: FOOT
LOCATION: LEG

## 2024-01-12 ASSESSMENT — PAIN SCALES - GENERAL
PAINLEVEL_OUTOF10: 6
PAINLEVEL_OUTOF10: 3
PAINLEVEL_OUTOF10: 5

## 2024-01-12 ASSESSMENT — PAIN DESCRIPTION - DESCRIPTORS: DESCRIPTORS: ACHING

## 2024-01-12 ASSESSMENT — PAIN DESCRIPTION - ORIENTATION
ORIENTATION: LEFT
ORIENTATION: LEFT

## 2024-01-12 ASSESSMENT — PAIN - FUNCTIONAL ASSESSMENT
PAIN_FUNCTIONAL_ASSESSMENT: PREVENTS OR INTERFERES SOME ACTIVE ACTIVITIES AND ADLS
PAIN_FUNCTIONAL_ASSESSMENT: ACTIVITIES ARE NOT PREVENTED

## 2024-01-12 NOTE — CARE COORDINATION
1/12/2024  Social Work Discharge Planning: Awaiting precert for Pt to go to [facility].  MATT,7000 and transport form are completed. Electronically signed by TEENA Benz on 1/12/2024 at 9:47 AM      1/12/2024  Social Work Discharge Planning: Auth received for Pt to go to [facility]. Nurse was notifed. Waiting to see if Pt will discharge on vanc. Electronically signed by TEENA Benz on 1/12/2024 at 10:20 AM'      1/12/2024  Social Work Discharge Planning:SW set up ambulette transport via [facility] for Pt to go to [facility] today  at 4pm. Still need a discharge order. Will need to cancel transport if Pt doesn't dischrge. NITHIN notified. FAMILY WILL NEED NOTIFIED IF PT DISCHARGES.Electronically signed by TEENA Benz on 1/12/2024 at 11:08 AM

## 2024-01-12 NOTE — PROGRESS NOTES
Pharmacy Consultation Note  (Antibiotic Dosing and Monitoring)    Initial consult date: 1/9  Consulting physician/provider: Lianet  Drug: Vancomycin  Indication: SSTI    Age/  Gender Height Weight IBW  Allergy Information   68 y.o./male 170.2 cm (5' 7\") 100.7 kg (222 lb)     Ideal body weight: 66.1 kg (145 lb 11.6 oz)  Adjusted ideal body weight: 81.4 kg (179 lb 6.3 oz)   Patient has no known allergies.      Renal Function:  Recent Labs     01/10/24  0813 01/11/24  0543   BUN 63* 62*   CREATININE 3.3* 3.1*         Intake/Output Summary (Last 24 hours) at 1/12/2024 1230  Last data filed at 1/12/2024 1211  Gross per 24 hour   Intake 480 ml   Output --   Net 480 ml         Vancomycin Monitoring:  Trough:  No results for input(s): \"VANCOTROUGH\" in the last 72 hours.  Random:    Recent Labs     01/11/24  0543 01/12/24  0620   VANCORANDOM 23.5 18.5         Vancomycin Administration Times:  Recent vancomycin administrations                     vancomycin 1000 mg IVPB in 250 mL NS addavial (mg) 1,000 mg New Bag 01/10/24 1741     1,000 mg New Bag 01/09/24 1700    vancomycin (VANCOCIN) 2000 mg in 0.9% sodium chloride 500 mL IVPB (mg) 2,000 mg New Bag 01/08/24 1755                     Assessment:  Patient is a 68 y.o. male who has been initiated on vancomycin  Estimated Creatinine Clearance: 26 mL/min (A) (based on SCr of 3.1 mg/dL (H)).  To dose vancomycin, pharmacy will be utilizing dosing based off of levels because of patient's renal impairment/insufficiency    Plan:  Random 18.5, give 1g IV q48h today  Will check vancomycin levels when appropriate  Will continue to monitor renal function   Pharmacy to follow    Sarabjit De La Roas, PharmD Candidate 2024  Leonor Chan, AllieD, BCPS 1/12/2024 3:09 PM   Ext: [phone]

## 2024-01-12 NOTE — PROGRESS NOTES
Infectious Disease  Progress Note  [facility]    Chief Complaint: left leg cellulitis    Subjective:  he is doing ok. He still has some pain in that leg but was able to walk. No fever tolerating antibiotics well.    Scheduled Meds:   atorvastatin  20 mg Oral Nightly    carvedilol  6.25 mg Oral BID WC    insulin glargine  7 Units SubCUTAneous Nightly    insulin lispro  0-4 Units SubCUTAneous TID WC    insulin lispro  0-4 Units SubCUTAneous Nightly    apixaban  5 mg Oral BID    sodium chloride flush  5-40 mL IntraVENous 2 times per day     Continuous Infusions:   dextrose      sodium chloride       PRN Meds:dextrose bolus **OR** dextrose bolus, glucagon (rDNA), dextrose, oxyCODONE-acetaminophen, sodium chloride flush, sodium chloride, polyethylene glycol    Prior to Admission medications    Medication Sig Start Date End Date Taking? Authorizing Provider   empagliflozin (JARDIANCE) 25 MG tablet Take 1 tablet by mouth daily 12/20/23   Gerry Cuellar DO   lisinopril (PRINIVIL;ZESTRIL) 20 MG tablet Take 1 tablet by mouth every morning 12/20/23   Gerry Cuellar DO   apixaban (ELIQUIS) 5 MG TABS tablet Take 1 tablet by mouth 2 times daily 12/20/23   Gerry Cuellar DO   insulin glargine (LANTUS SOLOSTAR) 100 UNIT/ML injection pen Inject 10 Units into the skin nightly 12/20/23   Gerry Cuellar DO   Insulin Pen Needle (PEN NEEDLES 3/16\") 31G X 5 MM MISC 1 each by Does not apply route daily 12/20/23   Gerry Cuellar DO   furosemide (LASIX) 40 MG tablet TAKE 1 TABLET DAILY 12/11/23   Gerry Cuellar DO   atorvastatin (LIPITOR) 40 MG tablet Take 0.5 tablets by mouth nightly 11/16/23   Brody Black MD   allopurinol (ZYLOPRIM) 100 MG tablet Take 1 tablet by mouth every morning    Provider, MD Angelina   warfarin (COUMADIN) 3 MG tablet Take 1 tablet by mouth every morning MANAGED BY DR. CUELLAR  LAST INR CHECK: 11/06/2023  NEXT INR CHECK DUE: 11/13/2023  LAST INR VALUE: 2.7  Patient not taking: Reported on

## 2024-01-12 NOTE — DISCHARGE SUMMARY
Physician Discharge Summary     Patient ID:  Clemente Real Sr.  [id number]  68 y.o.  1955    Admit date: 1/8/2024    Discharge date and time:  1/12/2024    Discharge Diagnoses: Principal Problem:    Cellulitis of left lower extremity  Active Problems:    Essential hypertension    Atrial fibrillation (HCC)    Chronic anticoagulation    Type 2 diabetes mellitus without complication, without long-term current use of insulin (HCC)    DAISY (acute kidney injury) (HCC)    Leukocytosis    CONCHITA (obstructive sleep apnea)    CKD (chronic kidney disease) stage 4, GFR 15-29 ml/min ([facility])  Resolved Problems:    * No resolved hospital problems. *      Consults: IP CONSULT TO PODIATRY  IP CONSULT TO INFECTIOUS DISEASES  PHARMACY TO DOSE VANCOMYCIN    Procedures: See below    Hospital Course:   Left lower extremity cellulitis.  Imaging showed soft tissue swelling.  US negative for DVT.  Podiatry following.  Continue vancomycin per ID.  NGTD blood cultures.  MRSA nares screen positive appreciate infectious disease and podiatry's input.  Improving  DAISY on CKD4.  Creatinine 4.3 on admission.  Down to 3.3 today.  Continue IVF.  History of urethral carcinoma.  Follows with hematology and urology as an outpatient.  Plan to start immunotherapy  Leukocytosis.  Likely 2/2 #1.  Continue antibiotics and monitor  A-fib.  Currently rate controlled.  On Eliquis  DM 2.  Controlled.  Continue same    Disposition: Anticipate SNF    Discharge Exam:  See progress note from today    Condition:  Stable    Disposition: SNF    Patient Instructions:   Current Discharge Medication List        START taking these medications    Details   oxyCODONE-acetaminophen (PERCOCET) 5-325 MG per tablet Take 1 tablet by mouth every 6 hours as needed for Pain for up to 1 day. Max Daily Amount: 4 tablets  Qty: 4 tablet, Refills: 0    Comments: Reduce doses taken as pain becomes manageable  Associated Diagnoses: Cellulitis of left lower extremity      !! insulin lispro

## 2024-01-12 NOTE — PROGRESS NOTES
and sister     Arthritis Mother         Social History     Tobacco Use    Smoking status: Former     Current packs/day: 0.00     Average packs/day: 1 pack/day for 30.0 years (30.0 ttl pk-yrs)     Types: Cigarettes     Start date: 1969     Quit date: 1999     Years since quittin.0    Smokeless tobacco: Never   Substance Use Topics    Alcohol use: No        Prior to Admission medications    Medication Sig Start Date End Date Taking? Authorizing Provider   empagliflozin (JARDIANCE) 25 MG tablet Take 1 tablet by mouth daily 23   Gerry Cuellar DO   lisinopril (PRINIVIL;ZESTRIL) 20 MG tablet Take 1 tablet by mouth every morning 23   Gerry Cuellar DO   apixaban (ELIQUIS) 5 MG TABS tablet Take 1 tablet by mouth 2 times daily 23   Gerry Cuellar DO   insulin glargine (LANTUS SOLOSTAR) 100 UNIT/ML injection pen Inject 10 Units into the skin nightly 23   Gerry Cuellar DO   Insulin Pen Needle (PEN NEEDLES 3/16\") 31G X 5 MM MISC 1 each by Does not apply route daily 23   Gerry Cuellar DO   furosemide (LASIX) 40 MG tablet TAKE 1 TABLET DAILY 23   Gerry Cuellar DO   atorvastatin (LIPITOR) 40 MG tablet Take 0.5 tablets by mouth nightly 23   Brody Black MD   allopurinol (ZYLOPRIM) 100 MG tablet Take 1 tablet by mouth every morning    Angelina Perera MD   warfarin (COUMADIN) 3 MG tablet Take 1 tablet by mouth every morning MANAGED BY DR. CUELLAR  LAST INR CHECK: 2023  NEXT INR CHECK DUE: 2023  LAST INR VALUE: 2.7  Patient not taking: Reported on 2024    Angelina Perera MD   SITagliptin (JANUVIA) 100 MG tablet Take 1 tablet by mouth daily  Patient not taking: Reported on 2024   Gerry Cuellar DO   carvedilol (COREG) 6.25 MG tablet Take 1 tablet by mouth 2 times daily (with meals) 23   Polo, Gerry S, DO   Multiple Vitamins-Minerals (THERAPEUTIC MULTIVITAMIN-MINERALS) tablet Take 1 tablet by mouth  every morning    Provider, MD Angelina        Patient has no known allergies.         OBJECTIVE:        Vitals:    01/12/24 0800   BP: 128/77   Pulse: 93   Resp: 18   Temp: 98.1 °F (36.7 °C)   SpO2:               EXAM:        Pt is AAOx3, NAD    Vascular Exam:  DP and PT pulses are palpable bilateral.  CFT is <4 seconds bilateral lower extremity.  Edema is present to left lower extremity.  Skin temp warm to warm.    Neuro Exam:  Light touch and protective sensation intact    Dermatologic Exam: Global erythema and edema from mid leg to mid foot.  No open lesions present.  No drainage noted.  Some peeling skin noted to leg. Medial ankle deroofed blister. No malodor, crepitus, fluctuance.  Clinical pictures below:    MSK: Muscle strength 4/5.  Limited ankle joint range of motion.  There is pain with calf squeeze and palpation of left foot and ankle.              Current Facility-Administered Medications   Medication Dose Route Frequency Provider Last Rate Last Admin    atorvastatin (LIPITOR) tablet 20 mg  20 mg Oral Nightly Codie Salvador APRN - CNP   20 mg at 01/11/24 2117    carvedilol (COREG) tablet 6.25 mg  6.25 mg Oral BID  Codie Salvador APRN - CNP   6.25 mg at 01/11/24 1649    insulin glargine (LANTUS) injection vial 7 Units  7 Units SubCUTAneous Nightly Codie Salvador APRN - CNP   7 Units at 01/11/24 2119    dextrose bolus 10% 125 mL  125 mL IntraVENous PRN Codie Salvador APRN - CNP        Or    dextrose bolus 10% 250 mL  250 mL IntraVENous PRN Codie Salvador APRN - CNP        glucagon injection 1 mg  1 mg SubCUTAneous PRN Codie Salvador APRN - CNP        dextrose 10 % infusion   IntraVENous Continuous PRN Codie Salvador APRN - CNP        insulin lispro (HUMALOG) injection vial 0-4 Units  0-4 Units SubCUTAneous TID  Codie Salvador APRN - CNP   1 Units at 01/09/24 1115    insulin lispro (HUMALOG) injection vial 0-4 Units  0-4 Units SubCUTAneous Nightly Madeleine

## 2024-01-12 NOTE — PROGRESS NOTES
RN called Alexa for nurse to nurse report.  Receiving nurse not available for report.  Left name and phone to call back.  Waiting for call back.    Electronically signed by Maria Victoria Garcia RN on 1/12/2024 at 3:03 PM

## 2024-01-12 NOTE — PROGRESS NOTES
RN called Alexa [phone] again to give nurse to  nurse.  Spoke with nurse Jocelyne, gave report.  All questions answered.      Electronically signed by Maria Victoria Garcia RN on 1/12/2024 at 3:29 PM

## 2024-01-12 NOTE — PROGRESS NOTES
[facility] Hospitalist Progress Note    Admitting Date and Time: 1/8/2024  2:10 PM  Admit Dx: Cellulitis of left lower extremity [L03.116]  Acute kidney injury superimposed on chronic kidney disease (HCC) [N17.9, N18.9]  Leukocytosis, unspecified type [D72.829]    Subjective:  Patient is being followed for Cellulitis of left lower extremity [L03.116]  Acute kidney injury superimposed on chronic kidney disease (HCC) [N17.9, N18.9]  Leukocytosis, unspecified type [D72.829]   Feeling better no complaints  No CP or SOB  No fever or chills   No uncontrolled pain  No vomiting or diarrhea   No events reported overnight  Chart reviewed      atorvastatin  20 mg Oral Nightly    carvedilol  6.25 mg Oral BID WC    insulin glargine  7 Units SubCUTAneous Nightly    insulin lispro  0-4 Units SubCUTAneous TID WC    insulin lispro  0-4 Units SubCUTAneous Nightly    apixaban  5 mg Oral BID    sodium chloride flush  5-40 mL IntraVENous 2 times per day     dextrose bolus, 125 mL, PRN   Or  dextrose bolus, 250 mL, PRN  glucagon (rDNA), 1 mg, PRN  dextrose, , Continuous PRN  oxyCODONE-acetaminophen, 1 tablet, Q6H PRN  sodium chloride flush, 5-40 mL, PRN  sodium chloride, , PRN  polyethylene glycol, 17 g, Daily PRN         Objective:    /77   Pulse 93   Temp 98.1 °F (36.7 °C) (Oral)   Resp 18   Ht 1.702 m (5' 7\")   Wt 104.3 kg (229 lb 14.4 oz)   SpO2 95%   BMI 36.01 kg/m²     General Appearance: alert and oriented to person, place and time and in no acute distress  Skin: warm and dry  Head: normocephalic and atraumatic  Eyes: pupils equal, round, and reactive to light, extraocular eye movements intact, conjunctivae normal  Neck: neck supple and non tender without mass   Pulmonary/Chest: clear to auscultation bilaterally- no wheezes, rales or rhonchi, normal air movement, no respiratory distress  Cardiovascular: normal rate, normal S1 and S2 and no carotid bruits  Abdomen: soft, non-tender, non-distended, normal bowel

## 2024-01-13 LAB
MICROORGANISM SPEC CULT: NORMAL
MICROORGANISM SPEC CULT: NORMAL
SERVICE CMNT-IMP: NORMAL
SERVICE CMNT-IMP: NORMAL
SPECIMEN DESCRIPTION: NORMAL
SPECIMEN DESCRIPTION: NORMAL

## 2024-01-14 LAB
MICROORGANISM SPEC CULT: ABNORMAL
MICROORGANISM/AGENT SPEC: ABNORMAL
SPECIMEN DESCRIPTION: ABNORMAL

## 2024-01-29 LAB
ALBUMIN SERPL-MCNC: NORMAL G/DL
ALP BLD-CCNC: NORMAL U/L
ALT SERPL-CCNC: NORMAL U/L
ANION GAP SERPL CALCULATED.3IONS-SCNC: NORMAL MMOL/L
AST SERPL-CCNC: NORMAL U/L
BILIRUB SERPL-MCNC: NORMAL MG/DL
BUN BLDV-MCNC: NORMAL MG/DL
CALCIUM SERPL-MCNC: NORMAL MG/DL
CHLORIDE BLD-SCNC: NORMAL MMOL/L
CO2: NORMAL
CREAT SERPL-MCNC: NORMAL MG/DL
EGFR: NORMAL
GLUCOSE BLD-MCNC: NORMAL MG/DL
POTASSIUM SERPL-SCNC: NORMAL MMOL/L
SODIUM BLD-SCNC: NORMAL MMOL/L
TOTAL PROTEIN: NORMAL

## 2024-01-31 ENCOUNTER — TELEPHONE (OUTPATIENT)
Dept: FAMILY MEDICINE CLINIC | Age: 69
End: 2024-01-31

## 2024-01-31 NOTE — TELEPHONE ENCOUNTER
Patients alessandra is calling and she is asking for a letter to give to post office stating that he is not able to make it to the curb to get his mail. This way it will be delivered to the door. They are coming for an appointment on Monday if this can be done for them that day

## 2024-02-07 ENCOUNTER — OFFICE VISIT (OUTPATIENT)
Dept: FAMILY MEDICINE CLINIC | Age: 69
End: 2024-02-07
Payer: MEDICARE

## 2024-02-07 VITALS
HEIGHT: 67 IN | RESPIRATION RATE: 18 BRPM | DIASTOLIC BLOOD PRESSURE: 65 MMHG | SYSTOLIC BLOOD PRESSURE: 95 MMHG | HEART RATE: 96 BPM | TEMPERATURE: 97.9 F | WEIGHT: 212.4 LBS | OXYGEN SATURATION: 98 % | BODY MASS INDEX: 33.34 KG/M2

## 2024-02-07 DIAGNOSIS — N18.4 CHRONIC RENAL FAILURE, STAGE 4 (SEVERE) (HCC): ICD-10-CM

## 2024-02-07 DIAGNOSIS — E11.9 TYPE 2 DIABETES MELLITUS WITHOUT COMPLICATION, WITHOUT LONG-TERM CURRENT USE OF INSULIN (HCC): Primary | ICD-10-CM

## 2024-02-07 DIAGNOSIS — I48.91 ATRIAL FIBRILLATION, UNSPECIFIED TYPE (HCC): ICD-10-CM

## 2024-02-07 DIAGNOSIS — I10 ESSENTIAL HYPERTENSION: ICD-10-CM

## 2024-02-07 DIAGNOSIS — E78.5 HYPERLIPIDEMIA, UNSPECIFIED HYPERLIPIDEMIA TYPE: ICD-10-CM

## 2024-02-07 PROCEDURE — 3078F DIAST BP <80 MM HG: CPT | Performed by: FAMILY MEDICINE

## 2024-02-07 PROCEDURE — 1123F ACP DISCUSS/DSCN MKR DOCD: CPT | Performed by: FAMILY MEDICINE

## 2024-02-07 PROCEDURE — 3044F HG A1C LEVEL LT 7.0%: CPT | Performed by: FAMILY MEDICINE

## 2024-02-07 PROCEDURE — 3074F SYST BP LT 130 MM HG: CPT | Performed by: FAMILY MEDICINE

## 2024-02-07 PROCEDURE — 99214 OFFICE O/P EST MOD 30 MIN: CPT | Performed by: FAMILY MEDICINE

## 2024-02-07 RX ORDER — BLOOD-GLUCOSE SENSOR
EACH MISCELLANEOUS
Qty: 2 EACH | Refills: 5 | Status: SHIPPED | OUTPATIENT
Start: 2024-02-07

## 2024-02-07 RX ORDER — LISINOPRIL 5 MG/1
5 TABLET ORAL EVERY MORNING
Qty: 90 TABLET | Refills: 1 | Status: SHIPPED | OUTPATIENT
Start: 2024-02-07

## 2024-02-07 ASSESSMENT — PATIENT HEALTH QUESTIONNAIRE - PHQ9
1. LITTLE INTEREST OR PLEASURE IN DOING THINGS: 0
SUM OF ALL RESPONSES TO PHQ QUESTIONS 1-9: 0

## 2024-02-26 ASSESSMENT — ENCOUNTER SYMPTOMS
SHORTNESS OF BREATH: 0
EYE DISCHARGE: 0
PHOTOPHOBIA: 0
SINUS PAIN: 0
GASTROINTESTINAL NEGATIVE: 1
RHINORRHEA: 0
COUGH: 0
EYE REDNESS: 0

## 2024-02-26 NOTE — PROGRESS NOTES
2024 patient    Clemente SINGH Keobeolivia .    Chief Complaint   Patient presents with    Follow-Up from Hospital    Other     Patient needs letter for mailman to have mailbox move to the house       HPI  History was obtained from patient.  Clemente is a 68 y.o. male who presents today with the followin. Type 2 diabetes mellitus without complication, without long-term current use of insulin ([facility])    2. Chronic renal failure, stage 4 (severe) ([facility])    3. Essential hypertension    4. Hyperlipidemia, unspecified hyperlipidemia type    5. Atrial fibrillation, unspecified type (HCC)    Patient presents for follow-up of chronic medical problems.  Patient is taking all of his medication as prescribed.  No acute complaints today.     REVIEW OF SYMPTOMS    Review of Systems   Constitutional:  Negative for chills, fatigue and fever.   HENT:  Negative for congestion, mouth sores, postnasal drip, rhinorrhea and sinus pain.    Eyes:  Negative for photophobia, discharge and redness.   Respiratory:  Negative for cough and shortness of breath.    Cardiovascular:  Negative for chest pain.   Gastrointestinal: Negative.    Genitourinary:  Negative for difficulty urinating, dysuria, hematuria and urgency.   Neurological:  Negative for headaches.   Psychiatric/Behavioral:  Negative for sleep disturbance.        PAST MEDICAL HISTORY  Past Medical History:   Diagnosis Date    Atrial fibrillation (HCC) 10/2012    now on Coumadin    Bladder tumor     Diabetes mellitus (HCC)     Hyperlipidemia     Hypertension     LONG TERM ANTICOAGULENT USE     Lumbar pain     CONCHITA on CPAP        FAMILY HISTORY  Family History   Problem Relation Age of Onset    Prostate Cancer Father 58    Cancer Father     Hypertension Mother         and sister     Arthritis Mother        SOCIAL HISTORY  Social History     Socioeconomic History    Marital status:      Spouse name: None    Number of children: None    Years of education: None    Highest education level:

## 2024-02-27 ENCOUNTER — COMMUNITY OUTREACH (OUTPATIENT)
Dept: FAMILY MEDICINE CLINIC | Age: 69
End: 2024-02-27

## 2024-03-01 NOTE — TELEPHONE ENCOUNTER
insulin glargine (LANTUS SOLOSTAR) 100 UNIT/ML injection pen   Insulin Pen Needle (PEN NEEDLES 3/16\") 31G X 5 MM MISC     Please send to [facility]

## 2024-03-05 RX ORDER — INSULIN GLARGINE 100 [IU]/ML
10 INJECTION, SOLUTION SUBCUTANEOUS NIGHTLY
Qty: 2 ADJUSTABLE DOSE PRE-FILLED PEN SYRINGE | Refills: 3 | Status: SHIPPED | OUTPATIENT
Start: 2024-03-05

## 2024-03-05 RX ORDER — PEN NEEDLE, DIABETIC 30 GX5/16"
1 NEEDLE, DISPOSABLE MISCELLANEOUS DAILY
Qty: 100 EACH | Refills: 3 | Status: SHIPPED | OUTPATIENT
Start: 2024-03-05

## 2024-04-03 RX ORDER — ALLOPURINOL 100 MG/1
100 TABLET ORAL DAILY
Qty: 90 TABLET | Refills: 1 | Status: SHIPPED | OUTPATIENT
Start: 2024-04-03

## 2024-04-15 DIAGNOSIS — I10 ESSENTIAL HYPERTENSION: ICD-10-CM

## 2024-04-15 RX ORDER — CARVEDILOL 6.25 MG/1
6.25 TABLET ORAL 2 TIMES DAILY WITH MEALS
Qty: 180 TABLET | Refills: 1 | Status: SHIPPED | OUTPATIENT
Start: 2024-04-15

## 2024-04-15 NOTE — TELEPHONE ENCOUNTER
carvedilol (COREG) 6.25 MG tablet       Pt needs refill, [facility]

## 2024-04-16 NOTE — TELEPHONE ENCOUNTER
Please send med, pt canceled appt for a Chemo appt, he will schedule, he has to see when his niece can give him a ride, ANGUS   negative

## 2024-04-29 ENCOUNTER — OFFICE VISIT (OUTPATIENT)
Dept: FAMILY MEDICINE CLINIC | Age: 69
End: 2024-04-29

## 2024-04-29 VITALS — DIASTOLIC BLOOD PRESSURE: 66 MMHG | TEMPERATURE: 98 F | SYSTOLIC BLOOD PRESSURE: 102 MMHG

## 2024-04-29 DIAGNOSIS — I10 ESSENTIAL HYPERTENSION: ICD-10-CM

## 2024-04-29 DIAGNOSIS — N18.4 CHRONIC RENAL FAILURE, STAGE 4 (SEVERE) (HCC): ICD-10-CM

## 2024-04-29 DIAGNOSIS — Z98.890 S/P BLADDER TUMOR EXCISION WITH FULGURATION: ICD-10-CM

## 2024-04-29 DIAGNOSIS — E11.9 TYPE 2 DIABETES MELLITUS WITHOUT COMPLICATION, WITHOUT LONG-TERM CURRENT USE OF INSULIN (HCC): Primary | ICD-10-CM

## 2024-04-29 DIAGNOSIS — I48.91 ATRIAL FIBRILLATION, UNSPECIFIED TYPE (HCC): ICD-10-CM

## 2024-04-29 PROBLEM — N17.9 AKI (ACUTE KIDNEY INJURY) (HCC): Status: RESOLVED | Noted: 2024-01-08 | Resolved: 2024-04-29

## 2024-04-29 LAB — HBA1C MFR BLD: 7.4 %

## 2024-04-29 RX ORDER — FUROSEMIDE 40 MG/1
40 TABLET ORAL DAILY
Qty: 90 TABLET | Refills: 1 | Status: SHIPPED
Start: 2024-04-29 | End: 2024-05-07 | Stop reason: SDUPTHER

## 2024-04-29 NOTE — PROGRESS NOTES
2024    Clemente SINGH Addie .    Chief Complaint   Patient presents with    Diabetes       HPI  History was obtained from patient.  Clemente is a 68 y.o. male who presents today with the followin. Type 2 diabetes mellitus without complication, without long-term current use of insulin ([facility])    2. Essential hypertension    3. Chronic renal failure, stage 4 (severe) ([facility])    4. Atrial fibrillation, unspecified type ([facility])    5. S/P bladder tumor excision with fulguration    Patient presents for follow-up of type 2 diabetes hypertension chronic renal failure and atrial fibrillation.  Patient is taking all of his medication as prescribed.  Patient is definitely not following a diabetic diet drinking 5 Gatorade's a day.    REVIEW OF SYMPTOMS    Review of Systems   Constitutional:  Negative for chills, fatigue and fever.   HENT:  Negative for congestion, mouth sores, postnasal drip, rhinorrhea and sinus pain.    Eyes:  Negative for photophobia, discharge and redness.   Respiratory:  Negative for cough and shortness of breath.    Cardiovascular:  Negative for chest pain.   Gastrointestinal: Negative.    Genitourinary:  Negative for difficulty urinating, dysuria, hematuria and urgency.   Neurological:  Negative for headaches.   Psychiatric/Behavioral:  Negative for sleep disturbance.        PAST MEDICAL HISTORY  Past Medical History:   Diagnosis Date    Atrial fibrillation (HCC) 10/2012    now on Coumadin    Bladder tumor     Diabetes mellitus (HCC)     Hyperlipidemia     Hypertension     LONG TERM ANTICOAGULENT USE     Lumbar pain     CONCHITA on CPAP        FAMILY HISTORY  Family History   Problem Relation Age of Onset    Prostate Cancer Father 58    Cancer Father     Hypertension Mother         and sister     Arthritis Mother        SOCIAL HISTORY  Social History     Socioeconomic History    Marital status:      Spouse name: None    Number of children: None    Years of education: None    Highest education level:

## 2024-05-06 DIAGNOSIS — I48.91 ATRIAL FIBRILLATION, UNSPECIFIED TYPE (HCC): ICD-10-CM

## 2024-05-06 DIAGNOSIS — I10 ESSENTIAL HYPERTENSION: ICD-10-CM

## 2024-05-06 DIAGNOSIS — E11.9 TYPE 2 DIABETES MELLITUS WITHOUT COMPLICATION, WITHOUT LONG-TERM CURRENT USE OF INSULIN (HCC): ICD-10-CM

## 2024-05-06 NOTE — TELEPHONE ENCOUNTER
apixaban (ELIQUIS) 5 MG TABS tablet   empagliflozin (JARDIANCE) 25 MG tablet   furosemide (LASIX) 40 MG tablet   carvedilol (COREG) 6.25 MG tablet       Pt needs these prescription to go to [facility] not [facility]. Please resend these to Ma[facility]

## 2024-05-07 RX ORDER — CARVEDILOL 6.25 MG/1
6.25 TABLET ORAL 2 TIMES DAILY WITH MEALS
Qty: 180 TABLET | Refills: 1 | Status: SHIPPED | OUTPATIENT
Start: 2024-05-07

## 2024-05-07 RX ORDER — FUROSEMIDE 40 MG/1
40 TABLET ORAL DAILY
Qty: 90 TABLET | Refills: 1 | Status: SHIPPED | OUTPATIENT
Start: 2024-05-07

## 2024-05-16 ENCOUNTER — ANTI-COAG VISIT (OUTPATIENT)
Dept: FAMILY MEDICINE CLINIC | Age: 69
End: 2024-05-16

## 2024-05-24 ENCOUNTER — TELEPHONE (OUTPATIENT)
Dept: FAMILY MEDICINE CLINIC | Age: 69
End: 2024-05-24

## 2024-05-24 NOTE — TELEPHONE ENCOUNTER
Called pt to confirm dosage, pt states he may have an old bottle but it says 20mg., I called pts pharmacy and confirmed pt was given 5mg lisinopril, the 20mg was discontinued in December. Called pt back and notified him he is to be taking the 5mg not 20. Pt understands and will continue 5mg.

## 2024-05-24 NOTE — TELEPHONE ENCOUNTER
lisinopril (PRINIVIL;ZESTRIL) 5 MG tablet   Pt called for refill, says its the wrong dosage,it should be 20 mg

## 2024-05-30 ENCOUNTER — TELEPHONE (OUTPATIENT)
Dept: FAMILY MEDICINE CLINIC | Age: 69
End: 2024-05-30

## 2024-05-30 NOTE — TELEPHONE ENCOUNTER
Patients alessandra called and is asking for an order for gauze sponges for the patient nephrostomy tube. She has been paying out of pocket for a few months  but would like to get supplies sent to  [employer] she is going to call back with the phone # . He has been using the Stay fix nephrostomy tube split dressing changed weekly  Yifan-[phone]    Spoke with the company  and ordered the supplies patients franceece notified

## 2024-07-31 ENCOUNTER — TELEPHONE (OUTPATIENT)
Dept: FAMILY MEDICINE CLINIC | Age: 69
End: 2024-07-31

## 2024-07-31 NOTE — TELEPHONE ENCOUNTER
Patient alessandra called to let you know that she cancelled his appt today because he is curently admitted at [facility] in [place]. He has his bladder removed and had some complications. She doesn't expect he will be released anytime soon but will call to setup an appointment when he comes home.

## 2024-09-09 ENCOUNTER — TELEPHONE (OUTPATIENT)
Dept: FAMILY MEDICINE CLINIC | Age: 69
End: 2024-09-09

## 2024-09-11 ENCOUNTER — OFFICE VISIT (OUTPATIENT)
Dept: FAMILY MEDICINE CLINIC | Age: 69
End: 2024-09-11

## 2024-09-11 VITALS
DIASTOLIC BLOOD PRESSURE: 71 MMHG | WEIGHT: 200.8 LBS | SYSTOLIC BLOOD PRESSURE: 109 MMHG | BODY MASS INDEX: 32.27 KG/M2 | HEART RATE: 98 BPM | RESPIRATION RATE: 20 BRPM | TEMPERATURE: 97.8 F | HEIGHT: 66 IN

## 2024-09-11 DIAGNOSIS — N18.4 CHRONIC RENAL FAILURE, STAGE 4 (SEVERE) (HCC): ICD-10-CM

## 2024-09-11 DIAGNOSIS — C67.9 MALIGNANT NEOPLASM OF URINARY BLADDER, UNSPECIFIED SITE (HCC): ICD-10-CM

## 2024-09-11 DIAGNOSIS — I48.91 ATRIAL FIBRILLATION, UNSPECIFIED TYPE (HCC): ICD-10-CM

## 2024-09-11 DIAGNOSIS — E78.5 HYPERLIPIDEMIA, UNSPECIFIED HYPERLIPIDEMIA TYPE: ICD-10-CM

## 2024-09-11 DIAGNOSIS — E11.9 TYPE 2 DIABETES MELLITUS WITHOUT COMPLICATION, WITHOUT LONG-TERM CURRENT USE OF INSULIN (HCC): Primary | ICD-10-CM

## 2024-09-11 DIAGNOSIS — I10 ESSENTIAL HYPERTENSION: ICD-10-CM

## 2024-09-11 SDOH — ECONOMIC STABILITY: FOOD INSECURITY: WITHIN THE PAST 12 MONTHS, YOU WORRIED THAT YOUR FOOD WOULD RUN OUT BEFORE YOU GOT MONEY TO BUY MORE.: NEVER TRUE

## 2024-09-11 SDOH — ECONOMIC STABILITY: FOOD INSECURITY: WITHIN THE PAST 12 MONTHS, THE FOOD YOU BOUGHT JUST DIDN'T LAST AND YOU DIDN'T HAVE MONEY TO GET MORE.: NEVER TRUE

## 2024-09-11 SDOH — ECONOMIC STABILITY: INCOME INSECURITY: HOW HARD IS IT FOR YOU TO PAY FOR THE VERY BASICS LIKE FOOD, HOUSING, MEDICAL CARE, AND HEATING?: NOT HARD AT ALL

## 2024-09-13 LAB
ANION GAP SERPL CALCULATED.3IONS-SCNC: 22 MMOL/L (ref 7–16)
BACTERIA: ABNORMAL
BASOPHILS ABSOLUTE: 0.22 K/UL (ref 0–0.2)
BASOPHILS RELATIVE PERCENT: 2 % (ref 0–2)
BILIRUBIN, URINE: NEGATIVE
BUN BLDV-MCNC: 60 MG/DL (ref 6–23)
CALCIUM SERPL-MCNC: 9.7 MG/DL (ref 8.6–10.2)
CHLORIDE BLD-SCNC: 106 MMOL/L (ref 98–107)
CO2: 15 MMOL/L (ref 22–29)
COLOR, UA: YELLOW
CREAT SERPL-MCNC: 3.1 MG/DL (ref 0.7–1.2)
CREATININE URINE: 64 MG/DL (ref 40–278)
EOSINOPHILS ABSOLUTE: 1.51 K/UL (ref 0.05–0.5)
EOSINOPHILS RELATIVE PERCENT: 12 % (ref 0–6)
FERRITIN: 52 NG/ML
GFR, ESTIMATED: 21 ML/MIN/1.73M2
GLUCOSE BLD-MCNC: 136 MG/DL (ref 74–99)
GLUCOSE URINE: 100 MG/DL
HCT VFR BLD CALC: 34.3 % (ref 37–54)
HEMOGLOBIN: 9.8 G/DL (ref 12.5–16.5)
IRON: 37 UG/DL (ref 59–158)
KETONES, URINE: NEGATIVE MG/DL
LEUKOCYTE ESTERASE, URINE: ABNORMAL
LYMPHOCYTES ABSOLUTE: 2.16 K/UL (ref 1.5–4)
LYMPHOCYTES RELATIVE PERCENT: 17 % (ref 20–42)
MCH RBC QN AUTO: 23.7 PG (ref 26–35)
MCHC RBC AUTO-ENTMCNC: 28.6 G/DL (ref 32–34.5)
MCV RBC AUTO: 82.9 FL (ref 80–99.9)
MICROALBUMIN/CREAT 24H UR: 77 MG/L (ref 0–19)
MONOCYTES ABSOLUTE: 0.75 K/UL (ref 0.1–0.95)
MONOCYTES RELATIVE PERCENT: 6 % (ref 2–12)
MYELOCYTES ABSOLUTE COUNT: 0.11 K/UL
MYELOCYTES: 1 %
NEUTROPHILS ABSOLUTE: 7.66 K/UL (ref 1.8–7.3)
NEUTROPHILS RELATIVE PERCENT: 62 % (ref 43–80)
NITRITE, URINE: POSITIVE
PDW BLD-RTO: 17.4 % (ref 11.5–15)
PH, URINE: 7 (ref 5–9)
PHOSPHORUS: 4.9 MG/DL (ref 2.5–4.5)
PLATELET # BLD: 554 K/UL (ref 130–450)
PMV BLD AUTO: 10.4 FL (ref 7–12)
POTASSIUM SERPL-SCNC: 4.4 MMOL/L (ref 3.5–5)
PROTEIN UA: ABNORMAL MG/DL
PTH INTACT: 54.1 PG/ML (ref 15–65)
RBC # BLD: 4.14 M/UL (ref 3.8–5.8)
RBC # BLD: ABNORMAL 10*6/UL
RBC UA: ABNORMAL /HPF
SODIUM BLD-SCNC: 143 MMOL/L (ref 132–146)
SPECIFIC GRAVITY UA: 1.01 (ref 1–1.03)
TOTAL PROTEIN, URINE: 39 MG/DL (ref 0–12)
TURBIDITY: ABNORMAL
URINE HGB: ABNORMAL
URINE TOTAL PROTEIN CREATININE RATIO: 0.61 (ref 0–0.2)
UROBILINOGEN, URINE: 0.2 EU/DL (ref 0–1)
VITAMIN D 25-HYDROXY: 21.7 NG/ML (ref 30–100)
WBC # BLD: 12.4 K/UL (ref 4.5–11.5)
WBC UA: ABNORMAL /HPF

## 2024-09-24 ASSESSMENT — ENCOUNTER SYMPTOMS
COUGH: 0
RHINORRHEA: 0
GASTROINTESTINAL NEGATIVE: 1
SINUS PAIN: 0
SHORTNESS OF BREATH: 0
EYE DISCHARGE: 0
EYE REDNESS: 0
PHOTOPHOBIA: 0

## 2024-09-26 ENCOUNTER — TELEPHONE (OUTPATIENT)
Dept: FAMILY MEDICINE CLINIC | Age: 69
End: 2024-09-26

## 2024-09-26 DIAGNOSIS — N18.4 CHRONIC RENAL FAILURE, STAGE 4 (SEVERE) (HCC): Primary | ICD-10-CM

## 2024-09-26 NOTE — TELEPHONE ENCOUNTER
Blood pressure has been very low 90's/50's. His niece decreased his Lasix to one a day. He could not get his dialysis due to low blood pressure. He could not get chemo due to the low blood pressure. BP was 116/70 without any blood pressure medications.     Should he continue with BP meds?     He is gaining weight without the Lasix.     She would also like a request for a wheelchair ramp from [employer] (Pended)    Please advise

## 2024-09-26 NOTE — TELEPHONE ENCOUNTER
Spoke with Cece and advised. She will restart Lasix QD, D/C the carvediolo and keep an eye on weight and blood pressure. Will call back if the once daily Lasix is not worknig

## 2024-09-26 NOTE — TELEPHONE ENCOUNTER
Impression: Vitreous degeneration, left eye: H43.812. Plan: There is no evidence of retinal pathology. Patient instructed to call the office immediately if any symptoms noted. Please sign DME order

## 2024-10-03 ENCOUNTER — TELEPHONE (OUTPATIENT)
Dept: FAMILY MEDICINE CLINIC | Age: 69
End: 2024-10-03

## 2024-10-03 DIAGNOSIS — I10 ESSENTIAL HYPERTENSION: ICD-10-CM

## 2024-10-03 DIAGNOSIS — B37.2 CANDIDA INFECTION OF FLEXURAL SKIN: Primary | ICD-10-CM

## 2024-10-03 RX ORDER — MICONAZOLE NITRATE 20 MG/G
POWDER TOPICAL
Qty: 45 G | Refills: 1 | Status: SHIPPED | OUTPATIENT
Start: 2024-10-03

## 2024-10-03 RX ORDER — CLOTRIMAZOLE 1 %
CREAM (GRAM) TOPICAL 2 TIMES DAILY
Qty: 60 G | Refills: 1 | Status: SHIPPED | OUTPATIENT
Start: 2024-10-03

## 2024-10-03 RX ORDER — LISINOPRIL 5 MG/1
5 TABLET ORAL EVERY MORNING
Qty: 90 TABLET | Refills: 1 | Status: SHIPPED | OUTPATIENT
Start: 2024-10-03

## 2024-10-03 NOTE — TELEPHONE ENCOUNTER
Patients niece is calling and states he has a yeast rash in his groin area and between skin folds. She is asking if something can be called in for him. Please advise

## 2024-10-03 NOTE — TELEPHONE ENCOUNTER
Patients niece called and states he stopped the lisinopril as well as carvedilol. She will monitor his blood pressure and call with readings

## 2024-11-06 ENCOUNTER — TELEPHONE (OUTPATIENT)
Dept: FAMILY MEDICINE CLINIC | Age: 69
End: 2024-11-06

## 2024-11-06 NOTE — TELEPHONE ENCOUNTER
ANGUS- [facility] called in stating they are going to do a re-certification for home services.

## 2024-11-22 LAB
ANION GAP SERPL CALCULATED.3IONS-SCNC: 18 MMOL/L (ref 7–16)
BASOPHILS ABSOLUTE: 0.23 K/UL (ref 0–0.2)
BASOPHILS RELATIVE PERCENT: 2 % (ref 0–2)
BUN BLDV-MCNC: 69 MG/DL (ref 6–23)
CALCIUM SERPL-MCNC: 9.5 MG/DL (ref 8.6–10.2)
CHLORIDE BLD-SCNC: 110 MMOL/L (ref 98–107)
CO2: 15 MMOL/L (ref 22–29)
CREAT SERPL-MCNC: 2.9 MG/DL (ref 0.7–1.2)
EOSINOPHILS ABSOLUTE: 1.39 K/UL (ref 0.05–0.5)
EOSINOPHILS RELATIVE PERCENT: 11 % (ref 0–6)
GFR, ESTIMATED: 23 ML/MIN/1.73M2
GLUCOSE BLD-MCNC: 106 MG/DL (ref 74–99)
HCT VFR BLD CALC: 41.9 % (ref 37–54)
HEMOGLOBIN: 12.5 G/DL (ref 12.5–16.5)
LYMPHOCYTES ABSOLUTE: 1.74 K/UL (ref 1.5–4)
LYMPHOCYTES RELATIVE PERCENT: 13 % (ref 20–42)
MCH RBC QN AUTO: 26.4 PG (ref 26–35)
MCHC RBC AUTO-ENTMCNC: 29.8 G/DL (ref 32–34.5)
MCV RBC AUTO: 88.4 FL (ref 80–99.9)
MONOCYTES ABSOLUTE: 0.81 K/UL (ref 0.1–0.95)
MONOCYTES RELATIVE PERCENT: 6 % (ref 2–12)
NEUTROPHILS ABSOLUTE: 9.03 K/UL (ref 1.8–7.3)
NEUTROPHILS RELATIVE PERCENT: 68 % (ref 43–80)
PDW BLD-RTO: 25.9 % (ref 11.5–15)
PHOSPHORUS: 4.6 MG/DL (ref 2.5–4.5)
PLATELET # BLD: 375 K/UL (ref 130–450)
PMV BLD AUTO: 10.6 FL (ref 7–12)
POTASSIUM SERPL-SCNC: 4 MMOL/L (ref 3.5–5)
RBC # BLD: 4.74 M/UL (ref 3.8–5.8)
RBC # BLD: ABNORMAL 10*6/UL
SODIUM BLD-SCNC: 143 MMOL/L (ref 132–146)
WBC # BLD: 13.2 K/UL (ref 4.5–11.5)

## 2024-12-09 NOTE — TELEPHONE ENCOUNTER
Name of Medication(s) Requested:  Requested Prescriptions     Pending Prescriptions Disp Refills    atorvastatin (LIPITOR) 40 MG tablet [Pharmacy Med Name: ATORVASTATIN CALCIUM 40MG TABS] 45 tablet 0     Sig: To be filled by Provider       Medication is on current medication list Yes    Dosage and directions were verified? Yes    Quantity verified: 90 day supply     Pharmacy Verified?  Yes    Last Appointment:  9/11/2024    Future appts:  Future Appointments   Date Time Provider Department Center   12/11/2024 11:40 AM Gerry Cuellar DO [place] [facility] ECC DEP        (If no appt send self scheduling link. .REFILLAPPT)  Scheduling request sent?     [] Yes  [x] No    Does patient need updated?  [] Yes  [x] No

## 2024-12-10 RX ORDER — ATORVASTATIN CALCIUM 20 MG/1
20 TABLET, FILM COATED ORAL DAILY
Qty: 90 TABLET | Refills: 1 | Status: SHIPPED | OUTPATIENT
Start: 2024-12-10

## 2024-12-10 NOTE — PROGRESS NOTES
Physical Therapy  Facility/Department: [address] MED SURG  Physical Therapy Initial Assessment    Name: Clemente Real Sr.  : 1955  MRN: [id number]  Date of Service: 2024        Patient Diagnosis(es): The primary encounter diagnosis was Cellulitis of left lower extremity. Diagnoses of Leukocytosis, unspecified type and Acute kidney injury superimposed on chronic kidney disease (HCC) were also pertinent to this visit.  Past Medical History:  has a past medical history of Atrial fibrillation (HCC), Bladder tumor, Diabetes mellitus (HCC), Hyperlipidemia, Hypertension, LONG TERM ANTICOAGULENT USE, Lumbar pain, and CONCHITA on CPAP.  Past Surgical History:  has a past surgical history that includes back surgery (); Diagnostic Cardiac Cath Lab Procedure (2006); cardiovascular stress test (2006); TURP (N/A, 2021); TURP (N/A, 2021); Cystoscopy (N/A, 10/22/2021); Pain management procedure (Bilateral, 2022); and Lithotripsy (Left, 2023).      Referring provider:  Brody Black MD    PT Order:  PT eval and treat     Evaluating PT:  Cece Prajapati PT, DPT PT [id number]    Room #:  0524/0524-A  Diagnosis:  Cellulitis of left lower extremity [L03.116]  Acute kidney injury superimposed on chronic kidney disease (HCC) [N17.9, N18.9]  Leukocytosis, unspecified type [D72.829]  Precautions:  fall risk  Equipment Needs:  none.  Pt reported owning a rollator.    SUBJECTIVE:    Pt lives alone in a 1 story home with 2 stairs to enter and 0 rail.  Bed and bath is on first floor.  Pt uses the shower in the basement with a  full flight of steps down.   Pt ambulated with rollators PTA.    OBJECTIVE:   Initial Evaluation  Date:  Treatment Short Term/ Long Term   Goals   Was pt agreeable to Eval/treatment? yes     Does pt have pain? Left LE pain     Bed Mobility  Rolling: SBA  Supine to sit: SBA  Sit to supine: SBA  Scooting: SBA along the side of the bed  independent   Transfers Sit to stand: Min A  Stand  none

## 2024-12-11 ENCOUNTER — OFFICE VISIT (OUTPATIENT)
Dept: FAMILY MEDICINE CLINIC | Age: 69
End: 2024-12-11

## 2024-12-11 VITALS
WEIGHT: 201 LBS | HEART RATE: 73 BPM | TEMPERATURE: 98 F | DIASTOLIC BLOOD PRESSURE: 80 MMHG | BODY MASS INDEX: 32.3 KG/M2 | OXYGEN SATURATION: 99 % | HEIGHT: 66 IN | SYSTOLIC BLOOD PRESSURE: 120 MMHG

## 2024-12-11 DIAGNOSIS — E11.9 TYPE 2 DIABETES MELLITUS WITHOUT COMPLICATION, WITHOUT LONG-TERM CURRENT USE OF INSULIN (HCC): ICD-10-CM

## 2024-12-11 DIAGNOSIS — I10 ESSENTIAL HYPERTENSION: ICD-10-CM

## 2024-12-11 DIAGNOSIS — I48.91 ATRIAL FIBRILLATION, UNSPECIFIED TYPE (HCC): ICD-10-CM

## 2024-12-11 DIAGNOSIS — Z00.00 MEDICARE ANNUAL WELLNESS VISIT, SUBSEQUENT: Primary | ICD-10-CM

## 2024-12-11 LAB — HBA1C MFR BLD: 6.1 %

## 2024-12-11 RX ORDER — FUROSEMIDE 40 MG/1
40 TABLET ORAL DAILY
Qty: 90 TABLET | Refills: 1 | Status: SHIPPED | OUTPATIENT
Start: 2024-12-11

## 2024-12-11 RX ORDER — INSULIN GLARGINE 100 [IU]/ML
10 INJECTION, SOLUTION SUBCUTANEOUS NIGHTLY
Qty: 2 ADJUSTABLE DOSE PRE-FILLED PEN SYRINGE | Refills: 3 | Status: SHIPPED | OUTPATIENT
Start: 2024-12-11

## 2024-12-11 RX ORDER — CARVEDILOL 6.25 MG/1
6.25 TABLET ORAL 2 TIMES DAILY WITH MEALS
Qty: 180 TABLET | Refills: 1 | Status: SHIPPED | OUTPATIENT
Start: 2024-12-11

## 2024-12-11 RX ORDER — ALLOPURINOL 100 MG/1
100 TABLET ORAL DAILY
Qty: 90 TABLET | Refills: 1 | Status: SHIPPED | OUTPATIENT
Start: 2024-12-11

## 2024-12-11 ASSESSMENT — PATIENT HEALTH QUESTIONNAIRE - PHQ9
1. LITTLE INTEREST OR PLEASURE IN DOING THINGS: NOT AT ALL
SUM OF ALL RESPONSES TO PHQ QUESTIONS 1-9: 0
2. FEELING DOWN, DEPRESSED OR HOPELESS: NOT AT ALL
SUM OF ALL RESPONSES TO PHQ QUESTIONS 1-9: 0
SUM OF ALL RESPONSES TO PHQ9 QUESTIONS 1 & 2: 0

## 2024-12-17 ENCOUNTER — TRANSCRIBE ORDERS (OUTPATIENT)
Dept: ADMINISTRATIVE | Age: 69
End: 2024-12-17

## 2024-12-17 DIAGNOSIS — N18.4 CHRONIC KIDNEY DISEASE, STAGE IV (SEVERE) (HCC): Primary | ICD-10-CM

## 2025-01-09 ENCOUNTER — TRANSCRIBE ORDERS (OUTPATIENT)
Dept: ADMINISTRATIVE | Age: 70
End: 2025-01-09

## 2025-01-09 DIAGNOSIS — N18.4 ANEMIA IN STAGE 4 CHRONIC KIDNEY DISEASE (HCC): ICD-10-CM

## 2025-01-09 DIAGNOSIS — N18.4 CHRONIC KIDNEY DISEASE, STAGE IV (SEVERE) (HCC): Primary | ICD-10-CM

## 2025-01-09 DIAGNOSIS — D63.1 ANEMIA IN STAGE 4 CHRONIC KIDNEY DISEASE (HCC): ICD-10-CM

## 2025-01-13 ENCOUNTER — HOSPITAL ENCOUNTER (OUTPATIENT)
Dept: NUCLEAR MEDICINE | Age: 70
Discharge: HOME | End: 2025-01-15
Attending: INTERNAL MEDICINE
Payer: MEDICARE

## 2025-01-13 DIAGNOSIS — D63.1 ANEMIA IN STAGE 4 CHRONIC KIDNEY DISEASE (HCC): ICD-10-CM

## 2025-01-13 DIAGNOSIS — N18.4 CHRONIC KIDNEY DISEASE, STAGE IV (SEVERE) (HCC): ICD-10-CM

## 2025-01-13 DIAGNOSIS — N18.4 ANEMIA IN STAGE 4 CHRONIC KIDNEY DISEASE (HCC): ICD-10-CM

## 2025-01-13 PROCEDURE — 3430000000 HC RX DIAGNOSTIC RADIOPHARMACEUTICAL: Performed by: RADIOLOGY

## 2025-01-13 PROCEDURE — 6360000002 HC RX W HCPCS: Performed by: INTERNAL MEDICINE

## 2025-01-13 PROCEDURE — A9562 TC99M MERTIATIDE: HCPCS | Performed by: RADIOLOGY

## 2025-01-13 PROCEDURE — 78708 K FLOW/FUNCT IMAGE W/DRUG: CPT

## 2025-01-13 RX ORDER — FUROSEMIDE 10 MG/ML
10 INJECTION INTRAMUSCULAR; INTRAVENOUS ONCE
Status: COMPLETED | OUTPATIENT
Start: 2025-01-13 | End: 2025-01-13

## 2025-01-13 RX ADMIN — Medication 8.1 MILLICURIE: at 10:49

## 2025-01-13 RX ADMIN — FUROSEMIDE 10 MG: 10 INJECTION, SOLUTION INTRAMUSCULAR; INTRAVENOUS at 10:50

## 2025-01-15 RX ORDER — PSEUDOEPHEDRINE HCL 30 MG
100 TABLET ORAL 2 TIMES DAILY
Qty: 180 CAPSULE | Refills: 1 | Status: SHIPPED | OUTPATIENT
Start: 2025-01-15

## 2025-01-15 RX ORDER — PSEUDOEPHEDRINE HCL 30 MG
100 TABLET ORAL 2 TIMES DAILY PRN
COMMUNITY
End: 2025-01-15 | Stop reason: SDUPTHER

## 2025-01-15 NOTE — TELEPHONE ENCOUNTER
.Name of Medication(s) Requested:  Requested Prescriptions     Pending Prescriptions Disp Refills    docusate (COLACE, DULCOLAX) 100 MG CAPS 180 capsule 1     Sig: Take 100 mg by mouth 2 times daily       Medication is on current medication list Yes    Dosage and directions were verified? Yes    Quantity verified: 90 day supply     Pharmacy Verified?  Yes    Last Appointment:  12/11/2024    Future appts:  Future Appointments   Date Time Provider Department Center   3/17/2025 11:40 AM Gerry Cuellar DO [place] [facility] ECC DEP        (If no appt send self scheduling link. .REFILLAPPT)  Scheduling request sent?     [] Yes  [x] No    Does patient need updated?  [] Yes  [x] No

## 2025-02-25 NOTE — TELEPHONE ENCOUNTER
Name of Medication(s) Requested:  Requested Prescriptions     Pending Prescriptions Disp Refills    B-D UF III MINI PEN NEEDLES 31G X 5 MM MISC [Pharmacy Med Name: BD PEN NEEDLE/MINI/31G X5MM (3/16IN)] 90 each 3     Sig: USE AS DIRECTED ONCE DAILY       Medication is on current medication list Yes    Dosage and directions were verified? Yes    Quantity verified: 90 day supply     Pharmacy Verified?  Yes    Last Appointment:  12/11/2024    Future appts:  Future Appointments   Date Time Provider Department Center   3/17/2025 11:40 AM Gerry Cuellar DO [place] [facility] ECC DEP        (If no appt send self scheduling link. .REFILLAPPT)  Scheduling request sent?     [] Yes  [] No    Does patient need updated?  [] Yes  [] No

## 2025-02-26 RX ORDER — FLURBIPROFEN SODIUM 0.3 MG/ML
SOLUTION/ DROPS OPHTHALMIC
Qty: 90 EACH | Refills: 3 | Status: SHIPPED | OUTPATIENT
Start: 2025-02-26

## 2025-03-24 ENCOUNTER — OFFICE VISIT (OUTPATIENT)
Dept: FAMILY MEDICINE CLINIC | Age: 70
End: 2025-03-24
Payer: MEDICARE

## 2025-03-24 VITALS
DIASTOLIC BLOOD PRESSURE: 80 MMHG | WEIGHT: 222.13 LBS | SYSTOLIC BLOOD PRESSURE: 130 MMHG | TEMPERATURE: 98.7 F | BODY MASS INDEX: 35.85 KG/M2 | HEART RATE: 90 BPM

## 2025-03-24 DIAGNOSIS — E78.5 HYPERLIPIDEMIA, UNSPECIFIED HYPERLIPIDEMIA TYPE: ICD-10-CM

## 2025-03-24 DIAGNOSIS — C67.9 MALIGNANT NEOPLASM OF URINARY BLADDER, UNSPECIFIED SITE (HCC): ICD-10-CM

## 2025-03-24 DIAGNOSIS — I10 ESSENTIAL HYPERTENSION: ICD-10-CM

## 2025-03-24 DIAGNOSIS — E11.9 TYPE 2 DIABETES MELLITUS WITHOUT COMPLICATION, WITHOUT LONG-TERM CURRENT USE OF INSULIN: Primary | ICD-10-CM

## 2025-03-24 DIAGNOSIS — N18.4 CHRONIC RENAL FAILURE, STAGE 4 (SEVERE) (HCC): ICD-10-CM

## 2025-03-24 DIAGNOSIS — I48.91 ATRIAL FIBRILLATION, UNSPECIFIED TYPE (HCC): ICD-10-CM

## 2025-03-24 LAB — HBA1C MFR BLD: 7 %

## 2025-03-24 PROCEDURE — 1159F MED LIST DOCD IN RCRD: CPT | Performed by: FAMILY MEDICINE

## 2025-03-24 PROCEDURE — G2211 COMPLEX E/M VISIT ADD ON: HCPCS | Performed by: FAMILY MEDICINE

## 2025-03-24 PROCEDURE — 3075F SYST BP GE 130 - 139MM HG: CPT | Performed by: FAMILY MEDICINE

## 2025-03-24 PROCEDURE — 3079F DIAST BP 80-89 MM HG: CPT | Performed by: FAMILY MEDICINE

## 2025-03-24 PROCEDURE — 3051F HG A1C>EQUAL 7.0%<8.0%: CPT | Performed by: FAMILY MEDICINE

## 2025-03-24 PROCEDURE — 1123F ACP DISCUSS/DSCN MKR DOCD: CPT | Performed by: FAMILY MEDICINE

## 2025-03-24 PROCEDURE — 83036 HEMOGLOBIN GLYCOSYLATED A1C: CPT | Performed by: FAMILY MEDICINE

## 2025-03-24 PROCEDURE — 99214 OFFICE O/P EST MOD 30 MIN: CPT | Performed by: FAMILY MEDICINE

## 2025-03-24 SDOH — ECONOMIC STABILITY: FOOD INSECURITY: WITHIN THE PAST 12 MONTHS, THE FOOD YOU BOUGHT JUST DIDN'T LAST AND YOU DIDN'T HAVE MONEY TO GET MORE.: NEVER TRUE

## 2025-03-24 SDOH — ECONOMIC STABILITY: FOOD INSECURITY: WITHIN THE PAST 12 MONTHS, YOU WORRIED THAT YOUR FOOD WOULD RUN OUT BEFORE YOU GOT MONEY TO BUY MORE.: NEVER TRUE

## 2025-03-24 ASSESSMENT — PATIENT HEALTH QUESTIONNAIRE - PHQ9
SUM OF ALL RESPONSES TO PHQ QUESTIONS 1-9: 0
SUM OF ALL RESPONSES TO PHQ QUESTIONS 1-9: 0
1. LITTLE INTEREST OR PLEASURE IN DOING THINGS: NOT AT ALL
SUM OF ALL RESPONSES TO PHQ QUESTIONS 1-9: 0
2. FEELING DOWN, DEPRESSED OR HOPELESS: NOT AT ALL
SUM OF ALL RESPONSES TO PHQ QUESTIONS 1-9: 0

## 2025-03-24 NOTE — PROGRESS NOTES
2025    Clemente SINGH Addie .    Chief Complaint   Patient presents with    Diabetes       HPI  History was obtained from patient.  Clemente is a 69 y.o. male who presents today with the followin. Type 2 diabetes mellitus without complication, without long-term current use of insulin (HCC)    2. Atrial fibrillation, unspecified type (HCC)    3. Essential hypertension    4. Chronic renal failure, stage 4 (severe) (HCC)    5. Hyperlipidemia, unspecified hyperlipidemia type    6. Malignant neoplasm of urinary bladder, unspecified site (HCC)    Patient presents for follow-up of diabetes atrial fibrillation hypertension chronic renal failure and hyperlipidemia.  Patient states he is taking all of his medication as prescribed.  Patient has bladder cancer and is following with urology and oncology.     REVIEW OF SYMPTOMS    Review of Systems   Constitutional:  Negative for chills, fatigue and fever.   HENT:  Negative for congestion, mouth sores, postnasal drip, rhinorrhea and sinus pain.    Eyes:  Negative for photophobia, discharge and redness.   Respiratory:  Negative for cough and shortness of breath.    Cardiovascular:  Negative for chest pain.   Gastrointestinal: Negative.    Genitourinary:  Negative for difficulty urinating, dysuria, hematuria and urgency.   Neurological:  Negative for headaches.   Psychiatric/Behavioral:  Negative for sleep disturbance.        PAST MEDICAL HISTORY  Past Medical History:   Diagnosis Date    Atrial fibrillation (HCC) 10/2012    now on Coumadin    Bladder tumor     Diabetes mellitus (HCC)     Hyperlipidemia     Hypertension     LONG TERM ANTICOAGULENT USE     Lumbar pain     CONCHITA on CPAP        FAMILY HISTORY  Family History   Problem Relation Age of Onset    Prostate Cancer Father 58    Cancer Father     Hypertension Mother         and sister     Arthritis Mother        SOCIAL HISTORY  Social History     Socioeconomic History    Marital status:    Tobacco Use    Smoking

## 2025-03-25 DIAGNOSIS — I10 ESSENTIAL HYPERTENSION: ICD-10-CM

## 2025-03-25 RX ORDER — LISINOPRIL 5 MG/1
5 TABLET ORAL EVERY MORNING
Qty: 90 TABLET | Refills: 1 | OUTPATIENT
Start: 2025-03-25

## 2025-04-02 ASSESSMENT — ENCOUNTER SYMPTOMS
COUGH: 0
RHINORRHEA: 0
SHORTNESS OF BREATH: 0
GASTROINTESTINAL NEGATIVE: 1
SINUS PAIN: 0
EYE REDNESS: 0
EYE DISCHARGE: 0
PHOTOPHOBIA: 0

## 2025-04-21 ENCOUNTER — TELEPHONE (OUTPATIENT)
Dept: FAMILY MEDICINE CLINIC | Age: 70
End: 2025-04-21

## 2025-04-22 NOTE — TELEPHONE ENCOUNTER
This type of screening is beneficial for younger patients so they can do their screening for cancers earlier.  I do not believe this would be of any benefit for this patient.

## 2025-04-23 RX ORDER — ATORVASTATIN CALCIUM 20 MG/1
20 TABLET, FILM COATED ORAL DAILY
Qty: 90 TABLET | Refills: 1 | Status: SHIPPED | OUTPATIENT
Start: 2025-04-23

## 2025-04-23 NOTE — TELEPHONE ENCOUNTER
Name of Medication(s) Requested:  Requested Prescriptions     Pending Prescriptions Disp Refills    atorvastatin (LIPITOR) 20 MG tablet [Pharmacy Med Name: ATORVASTATIN CALCIUM 20MG TABS] 90 tablet 1     Sig: TAKE ONE TABLET BY MOUTH EVERY DAY       Medication is on current medication list Yes    Dosage and directions were verified? Yes    Quantity verified: 90 day supply     Pharmacy Verified?  Yes    Last Appointment:  3/24/2025    Future appts:  Future Appointments   Date Time Provider Department Center   6/25/2025 11:40 AM Gerry Cuellar DO [place] [facility] ECC DEP        (If no appt send self scheduling link. .REFILLAPPT)  Scheduling request sent?     [] Yes  [x] No    Does patient need updated?  [] Yes  [x] No

## 2025-04-24 DIAGNOSIS — I48.91 ATRIAL FIBRILLATION, UNSPECIFIED TYPE (HCC): ICD-10-CM

## 2025-04-24 DIAGNOSIS — I10 ESSENTIAL HYPERTENSION: ICD-10-CM

## 2025-04-25 NOTE — TELEPHONE ENCOUNTER
Name of Medication(s) Requested:  Requested Prescriptions     Pending Prescriptions Disp Refills    allopurinol (ZYLOPRIM) 100 MG tablet [Pharmacy Med Name: ALLOPURINOL 100MG TABS] 90 tablet 1     Sig: TAKE ONE TABLET BY MOUTH EVERY DAY    furosemide (LASIX) 40 MG tablet [Pharmacy Med Name: FUROSEMIDE 40MG TABS] 90 tablet 1     Sig: TAKE ONE TABLET BY MOUTH EVERY DAY    ELIQUIS 5 MG TABS tablet [Pharmacy Med Name: Eliquis Oral Tab 5 Mg] 180 tablet 1     Sig: TAKE ONE TABLET BY MOUTH TWICE A DAY    carvedilol (COREG) 6.25 MG tablet [Pharmacy Med Name: CARVEDILOL 6.25MG TABS] 180 tablet 1     Sig: TAKE ONE TABLET BY MOUTH TWICE A DAY WITH MEALS       Medication is on current medication list Yes    Dosage and directions were verified? Yes    Quantity verified: 90 day supply     Pharmacy Verified?  Yes    Last Appointment:  3/24/2025    Future appts:  Future Appointments   Date Time Provider Department Center   6/25/2025 11:40 AM [facility] Cuellar DO [facility] DE[facility]        (If no appt send self scheduling link. .REFILLAPPT)  Scheduling request sent?     [] Yes  [x] No    Does patient need updated?  [] Yes  [x] No

## 2025-04-28 RX ORDER — CARVEDILOL 6.25 MG/1
6.25 TABLET ORAL 2 TIMES DAILY WITH MEALS
Qty: 180 TABLET | Refills: 1 | Status: SHIPPED | OUTPATIENT
Start: 2025-04-28

## 2025-04-28 RX ORDER — ALLOPURINOL 100 MG/1
100 TABLET ORAL DAILY
Qty: 90 TABLET | Refills: 1 | Status: SHIPPED | OUTPATIENT
Start: 2025-04-28

## 2025-04-28 RX ORDER — FUROSEMIDE 40 MG/1
40 TABLET ORAL DAILY
Qty: 90 TABLET | Refills: 1 | Status: SHIPPED | OUTPATIENT
Start: 2025-04-28

## 2025-04-28 RX ORDER — APIXABAN 5 MG/1
5 TABLET, FILM COATED ORAL 2 TIMES DAILY
Qty: 180 TABLET | Refills: 1 | Status: SHIPPED | OUTPATIENT
Start: 2025-04-28

## 2025-05-01 DIAGNOSIS — I10 ESSENTIAL HYPERTENSION: ICD-10-CM

## 2025-05-01 RX ORDER — LISINOPRIL 5 MG/1
5 TABLET ORAL EVERY MORNING
Qty: 90 TABLET | Refills: 1 | OUTPATIENT
Start: 2025-05-01

## 2025-06-25 ENCOUNTER — OFFICE VISIT (OUTPATIENT)
Dept: FAMILY MEDICINE CLINIC | Age: 70
End: 2025-06-25

## 2025-06-25 VITALS
WEIGHT: 220 LBS | TEMPERATURE: 98 F | SYSTOLIC BLOOD PRESSURE: 103 MMHG | RESPIRATION RATE: 18 BRPM | HEART RATE: 89 BPM | OXYGEN SATURATION: 97 % | BODY MASS INDEX: 35.36 KG/M2 | HEIGHT: 66 IN | DIASTOLIC BLOOD PRESSURE: 69 MMHG

## 2025-06-25 DIAGNOSIS — Z00.00 MEDICARE ANNUAL WELLNESS VISIT, SUBSEQUENT: Primary | ICD-10-CM

## 2025-06-25 DIAGNOSIS — E78.5 HYPERLIPIDEMIA, UNSPECIFIED HYPERLIPIDEMIA TYPE: ICD-10-CM

## 2025-06-25 DIAGNOSIS — Z12.11 SCREENING FOR MALIGNANT NEOPLASM OF COLON: ICD-10-CM

## 2025-06-25 DIAGNOSIS — E11.9 TYPE 2 DIABETES MELLITUS WITHOUT COMPLICATION, WITHOUT LONG-TERM CURRENT USE OF INSULIN (HCC): ICD-10-CM

## 2025-06-25 DIAGNOSIS — N18.4 CHRONIC RENAL FAILURE, STAGE 4 (SEVERE) (HCC): ICD-10-CM

## 2025-06-25 DIAGNOSIS — I48.11 LONGSTANDING PERSISTENT ATRIAL FIBRILLATION (HCC): ICD-10-CM

## 2025-06-25 DIAGNOSIS — I10 ESSENTIAL HYPERTENSION: ICD-10-CM

## 2025-06-25 LAB
ALBUMIN: 3.7 G/DL (ref 3.5–5.2)
ALP BLD-CCNC: 123 U/L (ref 40–129)
ALT SERPL-CCNC: 16 U/L (ref 0–50)
ANION GAP SERPL CALCULATED.3IONS-SCNC: 16 MMOL/L (ref 7–16)
AST SERPL-CCNC: 21 U/L (ref 0–50)
BILIRUB SERPL-MCNC: 0.3 MG/DL (ref 0–1.2)
BUN BLDV-MCNC: 90 MG/DL (ref 8–23)
CALCIUM SERPL-MCNC: 9.4 MG/DL (ref 8.8–10.2)
CHLORIDE BLD-SCNC: 108 MMOL/L (ref 98–107)
CHOLESTEROL, TOTAL: 90 MG/DL
CO2: 14 MMOL/L (ref 22–29)
CREAT SERPL-MCNC: 4.5 MG/DL (ref 0.7–1.2)
GFR, ESTIMATED: 13 ML/MIN/1.73M2
GLUCOSE BLD-MCNC: 163 MG/DL (ref 74–99)
HBA1C MFR BLD: 8.3 %
HDLC SERPL-MCNC: 28 MG/DL
LDL CHOLESTEROL: 30 MG/DL
POTASSIUM SERPL-SCNC: 3.6 MMOL/L (ref 3.5–5.1)
SODIUM BLD-SCNC: 138 MMOL/L (ref 136–145)
TOTAL PROTEIN: 8 G/DL (ref 6.4–8.3)
TRIGL SERPL-MCNC: 161 MG/DL
VLDLC SERPL CALC-MCNC: 32 MG/DL

## 2025-06-25 RX ORDER — KETOROLAC TROMETHAMINE 30 MG/ML
INJECTION, SOLUTION INTRAMUSCULAR; INTRAVENOUS
Qty: 1 EACH | Refills: 0 | Status: SHIPPED | OUTPATIENT
Start: 2025-06-25

## 2025-06-25 SDOH — SOCIAL STABILITY: SOCIAL INSECURITY: WITHIN THE LAST YEAR, HAVE YOU BEEN AFRAID OF YOUR PARTNER OR EX-PARTNER?: NO

## 2025-06-25 SDOH — SOCIAL STABILITY: SOCIAL INSECURITY
WITHIN THE LAST YEAR, HAVE YOU BEEN RAPED OR FORCED TO HAVE ANY KIND OF SEXUAL ACTIVITY BY YOUR PARTNER OR EX-PARTNER?: NO

## 2025-06-25 SDOH — SOCIAL STABILITY: SOCIAL NETWORK
IN A TYPICAL WEEK, HOW MANY TIMES DO YOU TALK ON THE PHONE WITH FAMILY, FRIENDS, OR NEIGHBORS?: MORE THAN THREE TIMES A WEEK

## 2025-06-25 SDOH — HEALTH STABILITY: MENTAL HEALTH
DO YOU FEEL STRESS - TENSE, RESTLESS, NERVOUS, OR ANXIOUS, OR UNABLE TO SLEEP AT NIGHT BECAUSE YOUR MIND IS TROUBLED ALL THE TIME - THESE DAYS?: NOT AT ALL

## 2025-06-25 SDOH — ECONOMIC STABILITY: FOOD INSECURITY: WITHIN THE PAST 12 MONTHS, YOU WORRIED THAT YOUR FOOD WOULD RUN OUT BEFORE YOU GOT THE MONEY TO BUY MORE.: NEVER TRUE

## 2025-06-25 SDOH — ECONOMIC STABILITY: FOOD INSECURITY: WITHIN THE PAST 12 MONTHS, THE FOOD YOU BOUGHT JUST DIDN'T LAST AND YOU DIDN'T HAVE MONEY TO GET MORE.: NEVER TRUE

## 2025-06-25 SDOH — ECONOMIC STABILITY: HOUSING INSECURITY: IN THE LAST 12 MONTHS, WAS THERE A TIME WHEN YOU WERE NOT ABLE TO PAY THE MORTGAGE OR RENT ON TIME?: NO

## 2025-06-25 SDOH — HEALTH STABILITY: MENTAL HEALTH: HOW MANY DRINKS CONTAINING ALCOHOL DO YOU HAVE ON A TYPICAL DAY WHEN YOU ARE DRINKING?: PATIENT DOES NOT DRINK

## 2025-06-25 SDOH — SOCIAL STABILITY: SOCIAL NETWORK
DO YOU BELONG TO ANY CLUBS OR ORGANIZATIONS SUCH AS CHURCH GROUPS, UNIONS, FRATERNAL OR ATHLETIC GROUPS, OR SCHOOL GROUPS?: NO

## 2025-06-25 SDOH — SOCIAL STABILITY: SOCIAL INSECURITY: ARE YOU MARRIED, WIDOWED, DIVORCED, SEPARATED, NEVER MARRIED, OR LIVING WITH A PARTNER?: DIVORCED

## 2025-06-25 SDOH — SOCIAL STABILITY: SOCIAL NETWORK: HOW OFTEN DO YOU ATTEND MEETINGS OF THE CLUBS OR ORGANIZATIONS YOU BELONG TO?: NEVER

## 2025-06-25 SDOH — HEALTH STABILITY: PHYSICAL HEALTH: ON AVERAGE, HOW MANY MINUTES DO YOU ENGAGE IN EXERCISE AT THIS LEVEL?: 0 MIN

## 2025-06-25 SDOH — ECONOMIC STABILITY: FOOD INSECURITY: HOW HARD IS IT FOR YOU TO PAY FOR THE VERY BASICS LIKE FOOD, HOUSING, MEDICAL CARE, AND HEATING?: NOT HARD AT ALL

## 2025-06-25 SDOH — SOCIAL STABILITY: SOCIAL INSECURITY
WITHIN THE LAST YEAR, HAVE YOU BEEN KICKED, HIT, SLAPPED, OR OTHERWISE PHYSICALLY HURT BY YOUR PARTNER OR EX-PARTNER?: NO

## 2025-06-25 SDOH — SOCIAL STABILITY: SOCIAL NETWORK: HOW OFTEN DO YOU GET TOGETHER WITH FRIENDS OR RELATIVES?: THREE TIMES A WEEK

## 2025-06-25 SDOH — SOCIAL STABILITY: SOCIAL NETWORK: HOW OFTEN DO YOU ATTEND CHURCH OR RELIGIOUS SERVICES?: NEVER

## 2025-06-25 SDOH — ECONOMIC STABILITY: TRANSPORTATION INSECURITY: IN THE PAST 12 MONTHS, HAS LACK OF TRANSPORTATION KEPT YOU FROM MEDICAL APPOINTMENTS OR FROM GETTING MEDICATIONS?: YES

## 2025-06-25 ASSESSMENT — PATIENT HEALTH QUESTIONNAIRE - PHQ9
SUM OF ALL RESPONSES TO PHQ QUESTIONS 1-9: 0
2. FEELING DOWN, DEPRESSED OR HOPELESS: NOT AT ALL
1. LITTLE INTEREST OR PLEASURE IN DOING THINGS: NOT AT ALL
SUM OF ALL RESPONSES TO PHQ QUESTIONS 1-9: 0

## 2025-06-25 ASSESSMENT — ACTIVITIES OF DAILY LIVING (ADL): LACK_OF_TRANSPORTATION: YES

## 2025-06-25 NOTE — PATIENT INSTRUCTIONS
device in the bathroom with you.   Where can you learn more?  Go to [email or web address] and enter G117 to learn more about \"Preventing Falls: Care Instructions.\"  Current as of: July 31, 2024  Content Version: 14.5  © 6182-9064 [employer].   Care instructions adapted under license by [employer]. If you have questions about a medical condition or this instruction, always ask your healthcare professional. [employer] disclaims any warranty or liability for your use of this information.         Learning About Being Active as an Older Adult  Why is being active important as you get older?     Being active is one of the best things you can do for your health. And it's never too late to start. Being active--or getting active, if you aren't already--has definite benefits. It can:  Give you more energy,  Keep your mind sharp.  Improve balance to reduce your risk of falls.  Help you manage chronic illness with fewer medicines.  No matter how old you are, how fit you are, or what health problems you have, there is a form of activity that will work for you. And the more physical activity you can do, the better your overall health will be.  What kinds of activity can help you stay healthy?  Being more active will make your daily activities easier. Physical activity includes planned exercise and things you do in daily life. There are four types of activity:  Aerobic.  Doing aerobic activity makes your heart and lungs strong.  Includes walking, dancing, and gardening.  Aim for at least 2½ hours spread throughout the week.  It improves your energy and can help you sleep better.  Muscle-strengthening.  This type of activity can help maintain muscle and strengthen bones.  Includes climbing stairs, using resistance bands, and lifting or carrying heavy loads.  Aim for at least twice a week.  It can help protect the knees and other joints.  Stretching.  Stretching gives you better range of

## 2025-06-25 NOTE — PROGRESS NOTES
Cervical back: Neck supple.   Skin:     General: Skin is warm.   Neurological:      Mental Status: He is alert and oriented to person, place, and time.   Psychiatric:         Mood and Affect: Mood normal.         ASSESSMENT & PLAN    Clemente was seen today for medicare awv.    Diagnoses and all orders for this visit:    Medicare annual wellness visit, subsequent    Type 2 diabetes mellitus without complication, without long-term current use of insulin (HCC)  -     POCT glycosylated hemoglobin (Hb A1C)  -     empagliflozin (JARDIANCE) 25 MG tablet; Take 1 tablet by mouth daily  -     Comprehensive Metabolic Panel  Patient's hemoglobin A1c today is 8.3 his last one 3 months ago was 7.0.  Patient will try to be more compliant with his diabetic diet.  Patient will continue with Lantus 10 units nightly.    Hyperlipidemia, unspecified hyperlipidemia type  -     Lipid Panel  Patient's last lipid panel showed an LDL of 30 and triglycerides 161.  Patient will continue with atorvastatin 20 mg.    Screening for malignant neoplasm of colon    -     Cologuard (Fecal DNA Colorectal Cancer Screening)    Essential hypertension  Patient's blood pressure is well-controlled and he will continue with carvedilol 6.25 mg twice a day Lasix 40 mg every day.    Longstanding persistent atrial fibrillation (HCC)  Patient will continue with Eliquis.  Patient has had no bleeding episodes.    Other orders  -     Continuous Glucose  (FREESTYLE MICHI 3 READER) PRAVEEN; Change sensor every 14 days    Chronic renal failure, stage IV  Patient will continue to follow with nephrology    Return in 3 months (on 9/25/2025) for Medicare Annual Wellness Visit in 1 year.         Electronically signed by Gerry Cuellar DO on 7/6/2025          Medicare Annual Wellness Visit    Clemente Real Sr. is here for Medicare AWV    Assessment & Plan   Medicare annual wellness visit, subsequent  Type 2 diabetes mellitus without complication, without long-term

## (undated) DEVICE — SYRINGE,TOOMEY,IRRIGATION,70CC,STERILE: Brand: MEDLINE

## (undated) DEVICE — GAUZE,SPONGE,4"X4",12PLY,STERILE,LF,2'S: Brand: MEDLINE

## (undated) DEVICE — CYSTO PACK: Brand: MEDLINE INDUSTRIES, INC.

## (undated) DEVICE — Z INACTIVE USE 2660664 SOLUTION IRRIG 3000ML 0.9% SOD CHL USP UROMATIC PLAS CONT

## (undated) DEVICE — CATHETER F BLLN 5CC 16FR 2 W HYDRGEL COAT LESS TRAUM LUB

## (undated) DEVICE — SOLUTION IRRIG 3000ML 0.9% SOD CHL USP UROMATIC PLAS CONT

## (undated) DEVICE — MEDICINE CUP, GRADUATED, STER: Brand: MEDLINE

## (undated) DEVICE — 4-PORT MANIFOLD: Brand: NEPTUNE 2

## (undated) DEVICE — GOWN,SIRUS,FABRNF,XL,20/CS: Brand: MEDLINE

## (undated) DEVICE — NEEDLE HYPO 25GA L1.5IN BLU POLYPR HUB S STL REG BVL STR

## (undated) DEVICE — DRAINBAG,ANTI-REFLUX TOWER,L/F,2000ML,LL: Brand: MEDLINE

## (undated) DEVICE — CAMERA STRYKER 1488 HD GEN

## (undated) DEVICE — GLOVE ORANGE PI 7 1/2   MSG9075

## (undated) DEVICE — CATHETER URET 5FR L70CM OPN END SGL LUMN INJ HUB FLEXIMA

## (undated) DEVICE — GARMENT,MEDLINE,DVT,INT,CALF,MED, GEN2: Brand: MEDLINE

## (undated) DEVICE — 6 ML SYRINGE LUER-LOCK TIP: Brand: MONOJECT

## (undated) DEVICE — GAUZE,SPONGE,4"X4",8PLY,STRL,LF,10/TRAY: Brand: MEDLINE

## (undated) DEVICE — Z INACTIVE USE 2735373 APPLICATOR FBR LAIN COT WOOD TIP ECONOMICAL

## (undated) DEVICE — SOLUTION IV IRRIG WATER 1000ML POUR BRL 2F7114

## (undated) DEVICE — CATH GUIDE

## (undated) DEVICE — SYRINGE, LUER LOCK, 5ML: Brand: MEDLINE

## (undated) DEVICE — PLUG,CATHETER,DRAINAGE PROTECTOR,TUBE: Brand: MEDLINE

## (undated) DEVICE — EVACUATOR URO BLDR W/ ADPT UROVAC

## (undated) DEVICE — Z DISCONTINUED USE 2271023 SYRINGE IRRIGATION TOOM 70 CC CATH LUER ADPT TIP PLAS STRL

## (undated) DEVICE — CABLE ENDOSCP L10FT ACT DISP

## (undated) DEVICE — SOLUTION SCRB 4OZ 4% CHG H2O AIDED FOR PREOPERATIVE SKIN

## (undated) DEVICE — CAMERA STRYKER 1488

## (undated) DEVICE — 3M™ RED DOT™ MONITORING ELECTRODE WITH FOAM TAPE AND STICKY GEL 2560, 50/BAG, 20/CASE, 72/PLT: Brand: RED DOT™

## (undated) DEVICE — 12 ML SYRINGE,LUER-LOCK TIP: Brand: MONOJECT

## (undated) DEVICE — HOSE CONN FOR WST MGMT SYS NEPTUNE 2

## (undated) DEVICE — GLOVE SURG SIGNATURE LTX ESS LTX PF 7.5

## (undated) DEVICE — TUBING, SUCTION, 3/16" X 12', STRAIGHT: Brand: MEDLINE

## (undated) DEVICE — LENS CYSTOSCOPE 30 DEG

## (undated) DEVICE — MEDIA CONTRAST ISOVUE  300 10X50ML

## (undated) DEVICE — BANDAGE ADH W1XL3IN NAT FAB WVN FLX DURABLE N ADH PD SEAL

## (undated) DEVICE — HF-RESECTION ELECTRODE PLASMALOOP LOOP, LARGE, 24 FR., 12°/16°, ESG TURIS: Brand: OLYMPUS

## (undated) DEVICE — PAD,NON-ADHERENT,2X3,STERILE,LF,1/PK: Brand: MEDLINE

## (undated) DEVICE — PATIENT RETURN ELECTRODE, SINGLE-USE, CONTACT QUALITY MONITORING, ADULT, WITH 9FT CORD, FOR PATIENTS WEIGING OVER 33LBS. (15KG): Brand: MEGADYNE

## (undated) DEVICE — SET CYSTOSCOPE 21FR

## (undated) DEVICE — SYRINGE MED 10ML TRNSLUC BRL PLUNG BLK MRK POLYPR CTRL

## (undated) DEVICE — CATHETER URETH 24FR L16IN BLLN 30CC SIL COUVELAIRE TIP 3 W

## (undated) DEVICE — SET SURG BASIN MAYO REUSABLE

## (undated) DEVICE — Device: Brand: LEVEL 1

## (undated) DEVICE — SOLUTION SURG PREP ANTIMICROBIAL 4 OZ SKIN WND EXIDINE

## (undated) DEVICE — SYRINGE MED 30ML STD CLR PLAS LUERLOCK TIP N CTRL DISP

## (undated) DEVICE — Device: Brand: PORTEX

## (undated) DEVICE — HF-RESECTION ELECTRODE PLASMALOOP LOOP, MEDIUM, 24 FR., 12°/16°, ESG TURIS: Brand: OLYMPUS

## (undated) DEVICE — ELECTRODE ES VPR FOR USA ELITE SYS

## (undated) DEVICE — SYRINGE 20ML LL S/C 50

## (undated) DEVICE — SOLUTION IRRIG 1000ML STRL H2O USP PLAS POUR BTL

## (undated) DEVICE — NON-DEHP CATHETER EXTENSION SET, MALE LUER LOCK ADAPTER

## (undated) DEVICE — Z DISCONTINUED APPLICATOR SURG PREP 0.35OZ 2% CHG 70% ISO ALC W/ HI LT

## (undated) DEVICE — STRAP,CATHETER,ADJBL FOAM,HOOK&LOOP: Brand: MEDLINE

## (undated) DEVICE — BAG DRNGE COMB PK

## (undated) DEVICE — DRIP REDUCTION MANIFOLD

## (undated) DEVICE — NEEDLE HYPO 18GA L1.5IN PNK POLYPR HUB S STL THN WALL FILL

## (undated) DEVICE — CATHETER URET OLV TIP 5FRX70CM FLEXIMA

## (undated) DEVICE — GLOVE ORANGE PI 7   MSG9070

## (undated) DEVICE — 1071 S-DRP URO STLE-GAMA 10/BX,4X/C: Brand: STERI-DRAPE™

## (undated) DEVICE — Z INACTIVE USE 2635503 SOLUTION IRRIG 3000ML ST H2O USP UROMATIC PLAS CONT

## (undated) DEVICE — READY WET SKIN SCRUB TRAY-LF: Brand: MEDLINE INDUSTRIES, INC.

## (undated) DEVICE — SPECIMEN CUP W/LID: Brand: DEROYAL

## (undated) DEVICE — NEEDLE SPNL 20GA L3.5IN YEL HUB S STL REG WALL FIT STYL W/

## (undated) DEVICE — SYRINGE, LUER LOCK, 10ML: Brand: MEDLINE